# Patient Record
Sex: FEMALE | Race: WHITE | NOT HISPANIC OR LATINO | Employment: OTHER | URBAN - METROPOLITAN AREA
[De-identification: names, ages, dates, MRNs, and addresses within clinical notes are randomized per-mention and may not be internally consistent; named-entity substitution may affect disease eponyms.]

---

## 2017-02-08 ENCOUNTER — ANESTHESIA (OUTPATIENT)
Dept: PERIOP | Facility: HOSPITAL | Age: 67
End: 2017-02-08

## 2017-02-08 ENCOUNTER — ANESTHESIA EVENT (OUTPATIENT)
Dept: PERIOP | Facility: HOSPITAL | Age: 67
End: 2017-02-08

## 2017-02-08 ENCOUNTER — HOSPITAL ENCOUNTER (INPATIENT)
Facility: HOSPITAL | Age: 67
LOS: 10 days | Discharge: NURSING, REHAB OR LONG-TERM CARE | End: 2017-02-18
Attending: ORTHOPAEDIC SURGERY | Admitting: ORTHOPAEDIC SURGERY

## 2017-02-08 ENCOUNTER — APPOINTMENT (OUTPATIENT)
Dept: GENERAL RADIOLOGY | Facility: HOSPITAL | Age: 67
End: 2017-02-08
Attending: ORTHOPAEDIC SURGERY

## 2017-02-08 DIAGNOSIS — A49.02 MRSA INFECTION: ICD-10-CM

## 2017-02-08 DIAGNOSIS — R26.2 DIFFICULTY WALKING: Primary | ICD-10-CM

## 2017-02-08 DIAGNOSIS — T14.8XXA HEMATOMA: ICD-10-CM

## 2017-02-08 PROBLEM — S70.01XA HEMATOMA OF RIGHT HIP: Status: ACTIVE | Noted: 2017-02-08

## 2017-02-08 LAB
ABO GROUP BLD: NORMAL
ALBUMIN SERPL-MCNC: 3.4 G/DL (ref 3.5–5.2)
ALBUMIN/GLOB SERPL: 1.1 G/DL
ALP SERPL-CCNC: 437 U/L (ref 39–117)
ALT SERPL W P-5'-P-CCNC: 143 U/L (ref 1–33)
ANION GAP SERPL CALCULATED.3IONS-SCNC: 14.8 MMOL/L
APTT PPP: 41.1 SECONDS (ref 22.7–35.4)
AST SERPL-CCNC: 198 U/L (ref 1–32)
BASOPHILS # BLD AUTO: 0.03 10*3/MM3 (ref 0–0.2)
BASOPHILS NFR BLD AUTO: 0.5 % (ref 0–1.5)
BILIRUB SERPL-MCNC: 0.6 MG/DL (ref 0.1–1.2)
BLD GP AB SCN SERPL QL: NEGATIVE
BUN BLD-MCNC: 24 MG/DL (ref 8–23)
BUN/CREAT SERPL: 27 (ref 7–25)
CALCIUM SPEC-SCNC: 9.1 MG/DL (ref 8.6–10.5)
CHLORIDE SERPL-SCNC: 90 MMOL/L (ref 98–107)
CO2 SERPL-SCNC: 25.2 MMOL/L (ref 22–29)
CREAT BLD-MCNC: 0.89 MG/DL (ref 0.57–1)
DEPRECATED RDW RBC AUTO: 55.4 FL (ref 37–54)
EOSINOPHIL # BLD AUTO: 0.1 10*3/MM3 (ref 0–0.7)
EOSINOPHIL NFR BLD AUTO: 1.5 % (ref 0.3–6.2)
ERYTHROCYTE [DISTWIDTH] IN BLOOD BY AUTOMATED COUNT: 16.2 % (ref 11.7–13)
GFR SERPL CREATININE-BSD FRML MDRD: 64 ML/MIN/1.73
GLOBULIN UR ELPH-MCNC: 3.1 GM/DL
GLUCOSE BLD-MCNC: 109 MG/DL (ref 65–99)
HCT VFR BLD AUTO: 24.9 % (ref 35.6–45.5)
HCT VFR BLD AUTO: 27.2 % (ref 35.6–45.5)
HGB BLD-MCNC: 8 G/DL (ref 11.9–15.5)
HGB BLD-MCNC: 8.8 G/DL (ref 11.9–15.5)
IMM GRANULOCYTES # BLD: 0 10*3/MM3 (ref 0–0.03)
IMM GRANULOCYTES NFR BLD: 0 % (ref 0–0.5)
INR PPP: 1.81 (ref 0.9–1.1)
INR PPP: 2.01 (ref 0.9–1.1)
LYMPHOCYTES # BLD AUTO: 1.57 10*3/MM3 (ref 0.9–4.8)
LYMPHOCYTES NFR BLD AUTO: 24.2 % (ref 19.6–45.3)
MCH RBC QN AUTO: 30.1 PG (ref 26.9–32)
MCHC RBC AUTO-ENTMCNC: 32.4 G/DL (ref 32.4–36.3)
MCV RBC AUTO: 93.2 FL (ref 80.5–98.2)
MONOCYTES # BLD AUTO: 0.99 10*3/MM3 (ref 0.2–1.2)
MONOCYTES NFR BLD AUTO: 15.3 % (ref 5–12)
NEUTROPHILS # BLD AUTO: 3.8 10*3/MM3 (ref 1.9–8.1)
NEUTROPHILS NFR BLD AUTO: 58.5 % (ref 42.7–76)
NRBC BLD MANUAL-RTO: 0 /100 WBC (ref 0–0)
PLATELET # BLD AUTO: 328 10*3/MM3 (ref 140–500)
PMV BLD AUTO: 10.3 FL (ref 6–12)
POTASSIUM BLD-SCNC: 4.7 MMOL/L (ref 3.5–5.2)
PROT SERPL-MCNC: 6.5 G/DL (ref 6–8.5)
PROTHROMBIN TIME: 20.3 SECONDS (ref 11.7–14.2)
PROTHROMBIN TIME: 22.1 SECONDS (ref 11.7–14.2)
RBC # BLD AUTO: 2.92 10*6/MM3 (ref 3.9–5.2)
RH BLD: POSITIVE
SODIUM BLD-SCNC: 130 MMOL/L (ref 136–145)
WBC NRBC COR # BLD: 6.49 10*3/MM3 (ref 4.5–10.7)

## 2017-02-08 PROCEDURE — 85018 HEMOGLOBIN: CPT | Performed by: ANESTHESIOLOGY

## 2017-02-08 PROCEDURE — 87075 CULTR BACTERIA EXCEPT BLOOD: CPT | Performed by: ORTHOPAEDIC SURGERY

## 2017-02-08 PROCEDURE — 25010000002 NEOSTIGMINE 10 MG/10ML SOLUTION: Performed by: ANESTHESIOLOGY

## 2017-02-08 PROCEDURE — 80053 COMPREHEN METABOLIC PANEL: CPT | Performed by: ORTHOPAEDIC SURGERY

## 2017-02-08 PROCEDURE — 86901 BLOOD TYPING SEROLOGIC RH(D): CPT

## 2017-02-08 PROCEDURE — 25010000002 ONDANSETRON PER 1 MG: Performed by: ANESTHESIOLOGY

## 2017-02-08 PROCEDURE — 85610 PROTHROMBIN TIME: CPT | Performed by: ORTHOPAEDIC SURGERY

## 2017-02-08 PROCEDURE — 25010000002 CEFAZOLIN PER 500 MG: Performed by: ANESTHESIOLOGY

## 2017-02-08 PROCEDURE — 72170 X-RAY EXAM OF PELVIS: CPT

## 2017-02-08 PROCEDURE — 85730 THROMBOPLASTIN TIME PARTIAL: CPT | Performed by: ORTHOPAEDIC SURGERY

## 2017-02-08 PROCEDURE — P9016 RBC LEUKOCYTES REDUCED: HCPCS

## 2017-02-08 PROCEDURE — 25010000002 PROPOFOL 10 MG/ML EMULSION: Performed by: ANESTHESIOLOGY

## 2017-02-08 PROCEDURE — 86900 BLOOD TYPING SEROLOGIC ABO: CPT

## 2017-02-08 PROCEDURE — 86920 COMPATIBILITY TEST SPIN: CPT

## 2017-02-08 PROCEDURE — 85014 HEMATOCRIT: CPT | Performed by: ANESTHESIOLOGY

## 2017-02-08 PROCEDURE — 0JCL0ZZ EXTIRPATION OF MATTER FROM RIGHT UPPER LEG SUBCUTANEOUS TISSUE AND FASCIA, OPEN APPROACH: ICD-10-PCS | Performed by: ORTHOPAEDIC SURGERY

## 2017-02-08 PROCEDURE — 25010000002 FENTANYL CITRATE (PF) 100 MCG/2ML SOLUTION: Performed by: ANESTHESIOLOGY

## 2017-02-08 PROCEDURE — 87205 SMEAR GRAM STAIN: CPT | Performed by: ORTHOPAEDIC SURGERY

## 2017-02-08 PROCEDURE — 25010000002 HYDROMORPHONE PER 4 MG: Performed by: ANESTHESIOLOGY

## 2017-02-08 PROCEDURE — 36430 TRANSFUSION BLD/BLD COMPNT: CPT

## 2017-02-08 PROCEDURE — 87186 SC STD MICRODIL/AGAR DIL: CPT | Performed by: ORTHOPAEDIC SURGERY

## 2017-02-08 PROCEDURE — 86850 RBC ANTIBODY SCREEN: CPT

## 2017-02-08 PROCEDURE — 87070 CULTURE OTHR SPECIMN AEROBIC: CPT | Performed by: ORTHOPAEDIC SURGERY

## 2017-02-08 PROCEDURE — 25010000002 MIDAZOLAM PER 1 MG: Performed by: ANESTHESIOLOGY

## 2017-02-08 PROCEDURE — 25010000002 VANCOMYCIN PER 500 MG: Performed by: ANESTHESIOLOGY

## 2017-02-08 PROCEDURE — 85025 COMPLETE CBC W/AUTO DIFF WBC: CPT | Performed by: ORTHOPAEDIC SURGERY

## 2017-02-08 PROCEDURE — 87147 CULTURE TYPE IMMUNOLOGIC: CPT | Performed by: ORTHOPAEDIC SURGERY

## 2017-02-08 RX ORDER — OXYBUTYNIN CHLORIDE 10 MG/1
10 TABLET, EXTENDED RELEASE ORAL DAILY
Status: DISCONTINUED | OUTPATIENT
Start: 2017-02-08 | End: 2017-02-18 | Stop reason: HOSPADM

## 2017-02-08 RX ORDER — OXYCODONE AND ACETAMINOPHEN 10; 325 MG/1; MG/1
1 TABLET ORAL 4 TIMES DAILY PRN
COMMUNITY
End: 2017-06-29

## 2017-02-08 RX ORDER — FLUTICASONE PROPIONATE 50 MCG
2 SPRAY, SUSPENSION (ML) NASAL DAILY
Status: DISCONTINUED | OUTPATIENT
Start: 2017-02-08 | End: 2017-02-18 | Stop reason: HOSPADM

## 2017-02-08 RX ORDER — FAMOTIDINE 10 MG/ML
20 INJECTION, SOLUTION INTRAVENOUS ONCE
Status: COMPLETED | OUTPATIENT
Start: 2017-02-08 | End: 2017-02-08

## 2017-02-08 RX ORDER — ISOSORBIDE MONONITRATE 30 MG/1
30 TABLET, EXTENDED RELEASE ORAL DAILY
Status: DISCONTINUED | OUTPATIENT
Start: 2017-02-08 | End: 2017-02-18 | Stop reason: HOSPADM

## 2017-02-08 RX ORDER — POVIDONE-IODINE 10 MG/G
OINTMENT TOPICAL AS NEEDED
Status: DISCONTINUED | OUTPATIENT
Start: 2017-02-08 | End: 2017-02-08 | Stop reason: HOSPADM

## 2017-02-08 RX ORDER — FERROUS SULFATE 325(65) MG
325 TABLET ORAL 2 TIMES DAILY
Status: DISCONTINUED | OUTPATIENT
Start: 2017-02-08 | End: 2017-02-18 | Stop reason: HOSPADM

## 2017-02-08 RX ORDER — NALOXONE HCL 0.4 MG/ML
0.2 VIAL (ML) INJECTION AS NEEDED
Status: DISCONTINUED | OUTPATIENT
Start: 2017-02-08 | End: 2017-02-08 | Stop reason: HOSPADM

## 2017-02-08 RX ORDER — FENTANYL CITRATE 50 UG/ML
INJECTION, SOLUTION INTRAMUSCULAR; INTRAVENOUS AS NEEDED
Status: DISCONTINUED | OUTPATIENT
Start: 2017-02-08 | End: 2017-02-08 | Stop reason: SURG

## 2017-02-08 RX ORDER — BUPIVACAINE HCL/0.9 % NACL/PF 0.1 %
3 PLASTIC BAG, INJECTION (ML) EPIDURAL EVERY 8 HOURS
Status: COMPLETED | OUTPATIENT
Start: 2017-02-09 | End: 2017-02-09

## 2017-02-08 RX ORDER — PANTOPRAZOLE SODIUM 40 MG/1
40 TABLET, DELAYED RELEASE ORAL
Status: DISCONTINUED | OUTPATIENT
Start: 2017-02-09 | End: 2017-02-18 | Stop reason: HOSPADM

## 2017-02-08 RX ORDER — OXYCODONE AND ACETAMINOPHEN 10; 325 MG/1; MG/1
1 TABLET ORAL 3 TIMES DAILY PRN
Status: ON HOLD | COMMUNITY
End: 2017-07-26

## 2017-02-08 RX ORDER — NEOSTIGMINE METHYLSULFATE 1 MG/ML
INJECTION, SOLUTION INTRAVENOUS AS NEEDED
Status: DISCONTINUED | OUTPATIENT
Start: 2017-02-08 | End: 2017-02-08 | Stop reason: SURG

## 2017-02-08 RX ORDER — AMLODIPINE BESYLATE 10 MG/1
10 TABLET ORAL DAILY
COMMUNITY
End: 2020-12-16 | Stop reason: SDUPTHER

## 2017-02-08 RX ORDER — FENTANYL CITRATE 50 UG/ML
50 INJECTION, SOLUTION INTRAMUSCULAR; INTRAVENOUS
Status: DISCONTINUED | OUTPATIENT
Start: 2017-02-08 | End: 2017-02-08 | Stop reason: HOSPADM

## 2017-02-08 RX ORDER — PROMETHAZINE HYDROCHLORIDE 25 MG/1
25 TABLET ORAL ONCE AS NEEDED
Status: DISCONTINUED | OUTPATIENT
Start: 2017-02-08 | End: 2017-02-08 | Stop reason: HOSPADM

## 2017-02-08 RX ORDER — IPRATROPIUM BROMIDE AND ALBUTEROL SULFATE 2.5; .5 MG/3ML; MG/3ML
3 SOLUTION RESPIRATORY (INHALATION) EVERY 4 HOURS PRN
Status: DISCONTINUED | OUTPATIENT
Start: 2017-02-08 | End: 2017-02-18 | Stop reason: HOSPADM

## 2017-02-08 RX ORDER — MIDAZOLAM HYDROCHLORIDE 1 MG/ML
2 INJECTION INTRAMUSCULAR; INTRAVENOUS
Status: DISCONTINUED | OUTPATIENT
Start: 2017-02-08 | End: 2017-02-08 | Stop reason: HOSPADM

## 2017-02-08 RX ORDER — ASPIRIN 81 MG/1
81 TABLET, CHEWABLE ORAL DAILY
Status: DISCONTINUED | OUTPATIENT
Start: 2017-02-08 | End: 2017-02-09

## 2017-02-08 RX ORDER — PREGABALIN 100 MG/1
200 CAPSULE ORAL 2 TIMES DAILY
Status: DISCONTINUED | OUTPATIENT
Start: 2017-02-08 | End: 2017-02-18 | Stop reason: HOSPADM

## 2017-02-08 RX ORDER — WARFARIN SODIUM 6 MG/1
6 TABLET ORAL
Status: DISCONTINUED | OUTPATIENT
Start: 2017-02-09 | End: 2017-02-08 | Stop reason: SDUPTHER

## 2017-02-08 RX ORDER — SODIUM CHLORIDE, SODIUM LACTATE, POTASSIUM CHLORIDE, CALCIUM CHLORIDE 600; 310; 30; 20 MG/100ML; MG/100ML; MG/100ML; MG/100ML
9 INJECTION, SOLUTION INTRAVENOUS CONTINUOUS
Status: DISCONTINUED | OUTPATIENT
Start: 2017-02-08 | End: 2017-02-18 | Stop reason: HOSPADM

## 2017-02-08 RX ORDER — HYDROMORPHONE HYDROCHLORIDE 1 MG/ML
0.25 INJECTION, SOLUTION INTRAMUSCULAR; INTRAVENOUS; SUBCUTANEOUS
Status: DISCONTINUED | OUTPATIENT
Start: 2017-02-08 | End: 2017-02-08 | Stop reason: HOSPADM

## 2017-02-08 RX ORDER — PROMETHAZINE HYDROCHLORIDE 25 MG/ML
12.5 INJECTION, SOLUTION INTRAMUSCULAR; INTRAVENOUS ONCE AS NEEDED
Status: DISCONTINUED | OUTPATIENT
Start: 2017-02-08 | End: 2017-02-08 | Stop reason: HOSPADM

## 2017-02-08 RX ORDER — SENNA AND DOCUSATE SODIUM 50; 8.6 MG/1; MG/1
2 TABLET, FILM COATED ORAL 2 TIMES DAILY
Status: DISCONTINUED | OUTPATIENT
Start: 2017-02-08 | End: 2017-02-18 | Stop reason: HOSPADM

## 2017-02-08 RX ORDER — LEVOTHYROXINE SODIUM 0.1 MG/1
100 TABLET ORAL
Status: DISCONTINUED | OUTPATIENT
Start: 2017-02-08 | End: 2017-02-18 | Stop reason: HOSPADM

## 2017-02-08 RX ORDER — PROPOFOL 10 MG/ML
VIAL (ML) INTRAVENOUS AS NEEDED
Status: DISCONTINUED | OUTPATIENT
Start: 2017-02-08 | End: 2017-02-08 | Stop reason: SURG

## 2017-02-08 RX ORDER — AMLODIPINE BESYLATE 10 MG/1
10 TABLET ORAL DAILY
Status: DISCONTINUED | OUTPATIENT
Start: 2017-02-08 | End: 2017-02-08

## 2017-02-08 RX ORDER — DIPHENHYDRAMINE HCL 25 MG
25 CAPSULE ORAL EVERY 6 HOURS PRN
Status: DISCONTINUED | OUTPATIENT
Start: 2017-02-08 | End: 2017-02-18 | Stop reason: HOSPADM

## 2017-02-08 RX ORDER — GLYCOPYRROLATE 0.2 MG/ML
INJECTION INTRAMUSCULAR; INTRAVENOUS AS NEEDED
Status: DISCONTINUED | OUTPATIENT
Start: 2017-02-08 | End: 2017-02-08 | Stop reason: SURG

## 2017-02-08 RX ORDER — CLOPIDOGREL BISULFATE 75 MG/1
75 TABLET ORAL DAILY
Status: DISCONTINUED | OUTPATIENT
Start: 2017-02-08 | End: 2017-02-18 | Stop reason: HOSPADM

## 2017-02-08 RX ORDER — DOCUSATE SODIUM 100 MG/1
100 CAPSULE, LIQUID FILLED ORAL 2 TIMES DAILY PRN
Status: DISCONTINUED | OUTPATIENT
Start: 2017-02-08 | End: 2017-02-18 | Stop reason: HOSPADM

## 2017-02-08 RX ORDER — ROCURONIUM BROMIDE 10 MG/ML
INJECTION, SOLUTION INTRAVENOUS AS NEEDED
Status: DISCONTINUED | OUTPATIENT
Start: 2017-02-08 | End: 2017-02-08 | Stop reason: SURG

## 2017-02-08 RX ORDER — HYDROMORPHONE HYDROCHLORIDE 1 MG/ML
0.5 INJECTION, SOLUTION INTRAMUSCULAR; INTRAVENOUS; SUBCUTANEOUS
Status: DISCONTINUED | OUTPATIENT
Start: 2017-02-08 | End: 2017-02-18 | Stop reason: HOSPADM

## 2017-02-08 RX ORDER — NALOXONE HCL 0.4 MG/ML
0.1 VIAL (ML) INJECTION
Status: DISCONTINUED | OUTPATIENT
Start: 2017-02-08 | End: 2017-02-18 | Stop reason: HOSPADM

## 2017-02-08 RX ORDER — HYDROMORPHONE HCL 110MG/55ML
PATIENT CONTROLLED ANALGESIA SYRINGE INTRAVENOUS AS NEEDED
Status: DISCONTINUED | OUTPATIENT
Start: 2017-02-08 | End: 2017-02-08 | Stop reason: SURG

## 2017-02-08 RX ORDER — SODIUM CHLORIDE 0.9 % (FLUSH) 0.9 %
1-10 SYRINGE (ML) INJECTION AS NEEDED
Status: DISCONTINUED | OUTPATIENT
Start: 2017-02-08 | End: 2017-02-08 | Stop reason: HOSPADM

## 2017-02-08 RX ORDER — ONDANSETRON 2 MG/ML
INJECTION INTRAMUSCULAR; INTRAVENOUS AS NEEDED
Status: DISCONTINUED | OUTPATIENT
Start: 2017-02-08 | End: 2017-02-08 | Stop reason: SURG

## 2017-02-08 RX ORDER — HYDROMORPHONE HYDROCHLORIDE 1 MG/ML
0.5 INJECTION, SOLUTION INTRAMUSCULAR; INTRAVENOUS; SUBCUTANEOUS
Status: DISCONTINUED | OUTPATIENT
Start: 2017-02-08 | End: 2017-02-08 | Stop reason: HOSPADM

## 2017-02-08 RX ORDER — DOCUSATE SODIUM 100 MG/1
100 CAPSULE, LIQUID FILLED ORAL 2 TIMES DAILY
Status: ON HOLD | COMMUNITY
End: 2017-07-26

## 2017-02-08 RX ORDER — LABETALOL HYDROCHLORIDE 5 MG/ML
5 INJECTION, SOLUTION INTRAVENOUS
Status: DISCONTINUED | OUTPATIENT
Start: 2017-02-08 | End: 2017-02-08 | Stop reason: HOSPADM

## 2017-02-08 RX ORDER — DOCUSATE SODIUM 100 MG/1
100 CAPSULE, LIQUID FILLED ORAL 2 TIMES DAILY
Status: DISCONTINUED | OUTPATIENT
Start: 2017-02-08 | End: 2017-02-18 | Stop reason: HOSPADM

## 2017-02-08 RX ORDER — FERROUS SULFATE 325(65) MG
325 TABLET ORAL 2 TIMES DAILY
COMMUNITY
End: 2018-04-27 | Stop reason: ALTCHOICE

## 2017-02-08 RX ORDER — ATORVASTATIN CALCIUM 40 MG/1
40 TABLET, FILM COATED ORAL DAILY
Status: DISCONTINUED | OUTPATIENT
Start: 2017-02-08 | End: 2017-02-18 | Stop reason: HOSPADM

## 2017-02-08 RX ORDER — ISOSORBIDE MONONITRATE 30 MG/1
30 TABLET, EXTENDED RELEASE ORAL DAILY
COMMUNITY
End: 2020-12-16 | Stop reason: SDUPTHER

## 2017-02-08 RX ORDER — HYDRALAZINE HYDROCHLORIDE 20 MG/ML
5 INJECTION INTRAMUSCULAR; INTRAVENOUS
Status: DISCONTINUED | OUTPATIENT
Start: 2017-02-08 | End: 2017-02-08 | Stop reason: HOSPADM

## 2017-02-08 RX ORDER — ASPIRIN 81 MG/1
81 TABLET, CHEWABLE ORAL DAILY
COMMUNITY
End: 2017-06-29

## 2017-02-08 RX ORDER — LIDOCAINE HYDROCHLORIDE 20 MG/ML
INJECTION, SOLUTION INFILTRATION; PERINEURAL AS NEEDED
Status: DISCONTINUED | OUTPATIENT
Start: 2017-02-08 | End: 2017-02-08 | Stop reason: SURG

## 2017-02-08 RX ORDER — ONDANSETRON 2 MG/ML
4 INJECTION INTRAMUSCULAR; INTRAVENOUS ONCE AS NEEDED
Status: DISCONTINUED | OUTPATIENT
Start: 2017-02-08 | End: 2017-02-08 | Stop reason: HOSPADM

## 2017-02-08 RX ORDER — MIDAZOLAM HYDROCHLORIDE 1 MG/ML
1 INJECTION INTRAMUSCULAR; INTRAVENOUS
Status: DISCONTINUED | OUTPATIENT
Start: 2017-02-08 | End: 2017-02-08 | Stop reason: HOSPADM

## 2017-02-08 RX ORDER — MODAFINIL 100 MG/1
200 TABLET ORAL 2 TIMES DAILY
Status: DISCONTINUED | OUTPATIENT
Start: 2017-02-08 | End: 2017-02-17

## 2017-02-08 RX ORDER — CARVEDILOL 12.5 MG/1
12.5 TABLET ORAL 2 TIMES DAILY WITH MEALS
Status: DISCONTINUED | OUTPATIENT
Start: 2017-02-08 | End: 2017-02-08

## 2017-02-08 RX ORDER — CEFAZOLIN SODIUM 500 MG/2.2ML
INJECTION, POWDER, FOR SOLUTION INTRAMUSCULAR; INTRAVENOUS AS NEEDED
Status: DISCONTINUED | OUTPATIENT
Start: 2017-02-08 | End: 2017-02-08 | Stop reason: SURG

## 2017-02-08 RX ORDER — POTASSIUM CHLORIDE 750 MG/1
10 CAPSULE, EXTENDED RELEASE ORAL DAILY
Status: DISCONTINUED | OUTPATIENT
Start: 2017-02-08 | End: 2017-02-18 | Stop reason: HOSPADM

## 2017-02-08 RX ORDER — HYDROCODONE BITARTRATE AND ACETAMINOPHEN 10; 325 MG/1; MG/1
1 TABLET ORAL EVERY 4 HOURS PRN
Status: DISCONTINUED | OUTPATIENT
Start: 2017-02-08 | End: 2017-02-14

## 2017-02-08 RX ORDER — WARFARIN SODIUM 6 MG/1
6 TABLET ORAL
Status: DISCONTINUED | OUTPATIENT
Start: 2017-02-08 | End: 2017-02-09

## 2017-02-08 RX ORDER — VILAZODONE HYDROCHLORIDE 40 MG/1
40 TABLET ORAL DAILY
Status: DISCONTINUED | OUTPATIENT
Start: 2017-02-08 | End: 2017-02-09

## 2017-02-08 RX ORDER — DIPHENHYDRAMINE HYDROCHLORIDE 50 MG/ML
12.5 INJECTION INTRAMUSCULAR; INTRAVENOUS
Status: DISCONTINUED | OUTPATIENT
Start: 2017-02-08 | End: 2017-02-08 | Stop reason: HOSPADM

## 2017-02-08 RX ORDER — CLONAZEPAM 0.5 MG/1
0.5 TABLET ORAL 3 TIMES DAILY PRN
Status: DISCONTINUED | OUTPATIENT
Start: 2017-02-08 | End: 2017-02-18 | Stop reason: HOSPADM

## 2017-02-08 RX ORDER — PROMETHAZINE HYDROCHLORIDE 25 MG/1
25 SUPPOSITORY RECTAL ONCE AS NEEDED
Status: DISCONTINUED | OUTPATIENT
Start: 2017-02-08 | End: 2017-02-08 | Stop reason: HOSPADM

## 2017-02-08 RX ORDER — WARFARIN SODIUM 6 MG/1
6 TABLET ORAL
Status: ON HOLD | COMMUNITY
End: 2017-07-26

## 2017-02-08 RX ADMIN — VILAZODONE HYDROCHLORIDE 40 MG: 40 TABLET ORAL at 22:46

## 2017-02-08 RX ADMIN — SODIUM CHLORIDE, POTASSIUM CHLORIDE, SODIUM LACTATE AND CALCIUM CHLORIDE 500 ML: 600; 310; 30; 20 INJECTION, SOLUTION INTRAVENOUS at 18:46

## 2017-02-08 RX ADMIN — EPHEDRINE SULFATE 5 MG: 50 INJECTION INTRAMUSCULAR; INTRAVENOUS; SUBCUTANEOUS at 16:59

## 2017-02-08 RX ADMIN — MIDAZOLAM 1 MG: 1 INJECTION INTRAMUSCULAR; INTRAVENOUS at 14:41

## 2017-02-08 RX ADMIN — FERROUS SULFATE TAB 325 MG (65 MG ELEMENTAL FE) 325 MG: 325 (65 FE) TAB at 22:46

## 2017-02-08 RX ADMIN — ASPIRIN 81 MG: 81 TABLET, CHEWABLE ORAL at 22:47

## 2017-02-08 RX ADMIN — DOCUSATE SODIUM -SENNOSIDES 2 TABLET: 50; 8.6 TABLET, COATED ORAL at 22:46

## 2017-02-08 RX ADMIN — FENTANYL CITRATE 50 MCG: 50 INJECTION INTRAMUSCULAR; INTRAVENOUS at 14:41

## 2017-02-08 RX ADMIN — SODIUM CHLORIDE, POTASSIUM CHLORIDE, SODIUM LACTATE AND CALCIUM CHLORIDE 9 ML/HR: 600; 310; 30; 20 INJECTION, SOLUTION INTRAVENOUS at 14:40

## 2017-02-08 RX ADMIN — HYDROMORPHONE HYDROCHLORIDE 0.5 MG: 1 INJECTION, SOLUTION INTRAMUSCULAR; INTRAVENOUS; SUBCUTANEOUS at 19:30

## 2017-02-08 RX ADMIN — GLYCOPYRROLATE 0.6 MG: 0.2 INJECTION INTRAMUSCULAR; INTRAVENOUS at 17:10

## 2017-02-08 RX ADMIN — DOCUSATE SODIUM 100 MG: 100 CAPSULE, LIQUID FILLED ORAL at 22:55

## 2017-02-08 RX ADMIN — HYDROMORPHONE HYDROCHLORIDE 0.5 MG: 2 INJECTION, SOLUTION INTRAMUSCULAR; INTRAVENOUS; SUBCUTANEOUS at 17:18

## 2017-02-08 RX ADMIN — HYDROMORPHONE HYDROCHLORIDE 0.5 MG: 2 INJECTION, SOLUTION INTRAMUSCULAR; INTRAVENOUS; SUBCUTANEOUS at 17:02

## 2017-02-08 RX ADMIN — OXYBUTYNIN CHLORIDE 10 MG: 10 TABLET, FILM COATED, EXTENDED RELEASE ORAL at 22:46

## 2017-02-08 RX ADMIN — FENTANYL CITRATE 50 MCG: 50 INJECTION INTRAMUSCULAR; INTRAVENOUS at 19:04

## 2017-02-08 RX ADMIN — ROCURONIUM BROMIDE 30 MG: 10 INJECTION INTRAVENOUS at 16:27

## 2017-02-08 RX ADMIN — EPHEDRINE SULFATE 5 MG: 50 INJECTION INTRAMUSCULAR; INTRAVENOUS; SUBCUTANEOUS at 16:41

## 2017-02-08 RX ADMIN — FENTANYL CITRATE 100 MCG: 50 INJECTION INTRAMUSCULAR; INTRAVENOUS at 16:27

## 2017-02-08 RX ADMIN — CEFAZOLIN SODIUM 3 G: 500 INJECTION, POWDER, FOR SOLUTION INTRAMUSCULAR; INTRAVENOUS at 16:53

## 2017-02-08 RX ADMIN — HYDROMORPHONE HYDROCHLORIDE 0.5 MG: 2 INJECTION, SOLUTION INTRAMUSCULAR; INTRAVENOUS; SUBCUTANEOUS at 16:49

## 2017-02-08 RX ADMIN — FAMOTIDINE 20 MG: 10 INJECTION, SOLUTION INTRAVENOUS at 14:40

## 2017-02-08 RX ADMIN — EPHEDRINE SULFATE 5 MG: 50 INJECTION INTRAMUSCULAR; INTRAVENOUS; SUBCUTANEOUS at 16:51

## 2017-02-08 RX ADMIN — FLUTICASONE PROPIONATE 2 SPRAY: 50 SPRAY, METERED NASAL at 22:55

## 2017-02-08 RX ADMIN — LIDOCAINE HYDROCHLORIDE 60 MG: 20 INJECTION, SOLUTION INFILTRATION; PERINEURAL at 16:27

## 2017-02-08 RX ADMIN — EPHEDRINE SULFATE 5 MG: 50 INJECTION INTRAMUSCULAR; INTRAVENOUS; SUBCUTANEOUS at 16:48

## 2017-02-08 RX ADMIN — EPHEDRINE SULFATE 5 MG: 50 INJECTION INTRAMUSCULAR; INTRAVENOUS; SUBCUTANEOUS at 16:55

## 2017-02-08 RX ADMIN — NEOSTIGMINE METHYLSULFATE 3.5 MG: 1 INJECTION INTRAVENOUS at 17:10

## 2017-02-08 RX ADMIN — PROPOFOL 160 MG: 10 INJECTION, EMULSION INTRAVENOUS at 16:27

## 2017-02-08 RX ADMIN — CEFAZOLIN 3 G: 1 INJECTION, POWDER, FOR SOLUTION INTRAMUSCULAR; INTRAVENOUS; PARENTERAL at 23:54

## 2017-02-08 RX ADMIN — EPHEDRINE SULFATE 5 MG: 50 INJECTION INTRAMUSCULAR; INTRAVENOUS; SUBCUTANEOUS at 16:45

## 2017-02-08 RX ADMIN — HYDROMORPHONE HYDROCHLORIDE 0.5 MG: 1 INJECTION, SOLUTION INTRAMUSCULAR; INTRAVENOUS; SUBCUTANEOUS at 18:52

## 2017-02-08 RX ADMIN — ONDANSETRON 4 MG: 2 INJECTION INTRAMUSCULAR; INTRAVENOUS at 17:05

## 2017-02-08 RX ADMIN — VANCOMYCIN HYDROCHLORIDE 1 G: 1 INJECTION, POWDER, LYOPHILIZED, FOR SOLUTION INTRAVENOUS at 16:53

## 2017-02-08 RX ADMIN — PREGABALIN 200 MG: 100 CAPSULE ORAL at 22:55

## 2017-02-08 NOTE — PLAN OF CARE
Problem: Patient Care Overview (Adult)  Goal: Plan of Care Review  Outcome: Ongoing (interventions implemented as appropriate)    02/08/17 1428   Coping/Psychosocial Response Interventions   Plan Of Care Reviewed With patient   Patient Care Overview   Progress progress toward functional goals as expected       Goal: Adult Individualization and Mutuality  Outcome: Ongoing (interventions implemented as appropriate)                        02/08/17 1428   Mutuality/Individual Preferences   What Anxieties, Fears or Concerns Do You Have About Your Health or Care? pt denies   Individualization   Patient Specific Preferences pt goes by Frances Caraballo       Goal: Discharge Needs Assessment  Outcome: Ongoing (interventions implemented as appropriate)    02/08/17 1428   Discharge Needs Assessment   Concerns To Be Addressed denies needs/concerns at this time   Discharge Facility/Level Of Care Needs rehabilitation facility   Discharge Planning Comments patient reports tomorrow will return to [facility]   Living Environment   Transportation Available ambulance         Problem: Perioperative Period (Adult)  Goal: Signs and Symptoms of Listed Potential Problems Will be Absent or Manageable (Perioperative Period)  Outcome: Ongoing (interventions implemented as appropriate)    02/08/17 1428   Perioperative Period   Problems Assessed (Perioperative Period) pain;infection   Problems Present (Perioperative Period) pain;infection

## 2017-02-08 NOTE — PERIOPERATIVE NURSING NOTE
Call from Dr. Jimenez requesting me to call Dr. Martin regarding Xray results. Page out to Dr. Martin at [phone]. dpe

## 2017-02-08 NOTE — PERIOPERATIVE NURSING NOTE
Called into OR, Dr. Martin notified INR 1.8 and that patient had a dose of Plavix yesterday. MD also aware Hgb 8.8 and patients type and cross sent to blood bank. No new orders received.

## 2017-02-08 NOTE — H&P
Patient Care Team:  Talha Echeverria MD as PCP - General (Internal Medicine)    Chief complaint Right hip drainage    Subjective  Bleeding from right hip    History of Present Illness This patient underwent a revision total hip on 1/16/17. She has had some persistent drainage from the wound off and on. She appears to have a hematoma and she comes to the hosital for evacuation of the hematoma    Review of Systems     Past Medical History   Diagnosis Date   • Anxiety    • Arthritis    • Bipolar disorder    • COPD (chronic obstructive pulmonary disease)    • Coronary artery disease    • Diastolic dysfunction      grade II per echocardiogram 11/18/2016   • Difficulty walking    • Disease of thyroid gland      hypothyroidism   • Dislocation of hip joint      recurrent dislocation right hip   • DVT (deep venous thrombosis)    • GERD without esophagitis    • Heartburn    • Hematoma    • Hyperlipidemia    • Hypertension    • LVH (left ventricular hypertrophy)      mild to moderate per echocardiogram 11/18/2016   • Major depressive disorder    • Mitral annular calcification      severe per echocardiogram 11/18/2016   • Mitral regurgitation      mild per echo 11/18/2016   • Mitral valve disease    • Muscle weakness    • Narcolepsy    • Overactive bladder    • Pneumonia    • Pulmonary emboli    • Pulmonary hypertension      mild per echo 11/18/2016   • Sleep apnea      obstructive   • Tricuspid regurgitation      mild to moderate per echo 11/18/2016     Past Surgical History   Procedure Laterality Date   • Total hip arthroplasty Right 2011   • Total hip arthroplasty revision Right 11/22/2016   • Knee arthroplasty Left 03/2010     TWICE   • Total knee arthroplasty revision Left 09/2010   • Other surgical history       IVC filter placement   • Orif ankle fracture Left 06/16/2011   • Hardware removal foot / ankle Left 12/01/2011   • Quadriceps tendon repair Left 06/21/2010     also done 9-    • Joint replacement      • Coronary angioplasty with stent placement       x2 stents   • Cataract extraction w/ intraocular lens implant Right    • Eye surgery       cataract surgery bilateral     History reviewed. No pertinent family history.  Social History   Substance Use Topics   • Smoking status: Former Smoker     Packs/day: 1.00     Years: 19.00     Types: Cigarettes     Quit date: 11/21/2016   • Smokeless tobacco: Never Used   • Alcohol use No     Prescriptions Prior to Admission   Medication Sig Dispense Refill Last Dose   • ALBUTEROL IN Inhale 4 (Four) Times a Day As Needed.   2/8/2017 at 0900   • amLODIPine (NORVASC) 10 MG tablet Take 10 mg by mouth Daily.   2/7/2017 at 0900   • aspirin 81 MG chewable tablet Chew 81 mg Daily.   2/7/2017 at 0900   • atorvastatin (LIPITOR) 40 MG tablet Take 40 mg by mouth daily.   2/7/2017 at 2100   • carvedilol (COREG) 12.5 MG tablet Take 12.5 mg by mouth 2 (two) times a day with meals.   2/7/2017 at 2100   • clonazePAM (KlonoPIN) 0.5 MG tablet Take 0.5 mg by mouth 3 (three) times a day as needed for seizures.   2/7/2017 at 0500   • clopidogrel (PLAVIX) 75 MG tablet Take 1 tablet by mouth daily. 90 tablet 3 2/7/2017 at 0900   • docusate sodium (COLACE) 100 MG capsule Take 100 mg by mouth 2 (Two) Times a Day.   2/7/2017 at 0900   • ferrous sulfate 325 (65 FE) MG tablet Take 325 mg by mouth 2 (Two) Times a Day.   2/7/2017 at 1730   • fluticasone (FLONASE) 50 MCG/ACT nasal spray 2 sprays into each nostril daily. Administer 2 sprays in each nostril for each dose.   2/7/2017 at 0900   • ipratropium-albuterol (DUO-NEB) 0.5-2.5 mg/mL nebulizer Take 3 mL by nebulization every 4 (four) hours as needed for wheezing.   Taking   • isosorbide mononitrate (IMDUR) 30 MG 24 hr tablet Take 30 mg by mouth Daily.   2/7/2017 at 0900   • levothyroxine (SYNTHROID, LEVOTHROID) 100 MCG tablet Take 100 mcg by mouth daily.   2/8/2017 at 0600   • modafinil (PROVIGIL) 200 MG tablet Take 200 mg by mouth 2 (two) times a day.    2/7/2017 at 2100   • omeprazole (PriLOSEC) 20 MG capsule Take 20 mg by mouth 2 (two) times a day.   2/7/2017 at 2100   • oxyCODONE-acetaminophen (PERCOCET)  MG per tablet Take 1 tablet by mouth 4 (Four) Times a Day As Needed for moderate pain (4-6).   2/7/2017 at 2242   • oxyCODONE-acetaminophen (PERCOCET)  MG per tablet Take 1 tablet by mouth 3 (Three) Times a Day As Needed for moderate pain (4-6).   2/7/2017 at 0900   • potassium chloride (K-DUR) 10 MEQ CR tablet Take 10 mEq by mouth daily.   2/7/2017 at 0900   • pregabalin (LYRICA) 200 MG capsule Take 200 mg by mouth 2 (two) times a day.   2/7/2017 at 2100   • tiotropium (SPIRIVA) 18 MCG per inhalation capsule Place 1 capsule into inhaler and inhale 1 (one) time daily.   2/7/2017 at 0900   • vilazodone (VIIBRYD) 40 MG tablet tablet Take 40 mg by mouth daily.   2/7/2017 at 2100   • warfarin (COUMADIN) 6 MG tablet Take 6 mg by mouth Daily.   2/6/2017 at 1700   • darifenacin (ENABLEX) 15 MG 24 hr tablet Take 15 mg by mouth daily.   2/7/2017 at 0900     Allergies:  Review of patient's allergies indicates no known allergies.    Objective  right hip drainage    Vital Signs  Temp:  [98.9 °F (37.2 °C)] 98.9 °F (37.2 °C)  Heart Rate:  [64] 64  Resp:  [20] 20  BP: (108)/(43) 108/43    Physical Exam Alert and Awake    HEENT  PERRLA    Neck is supple    Heart  RRR without gallops or murmers   Lungs  Clear to auscutation    Abdomen  Positive bowel sounds with no rebound    Neuro exam is normal    Vascular exam  There are palpable pulses in all 4 extremities    Ortho  Right hip wound has some old blood draining. No odor is noted. No erythema noted.     Results Review:   I reviewed the patient's new clinical results.      Assessment/Plan  Hematoma right hip and the plan is for I&D    Active Problems:    * No active hospital problems. *      Assessment & Plan    I discussed the patients findings and my recommendations with patient    Erlin Martin,  MD  02/08/17  2:33 PM    Time:

## 2017-02-08 NOTE — ANESTHESIA PROCEDURE NOTES
Airway  Urgency: elective    Airway not difficult    General Information and Staff    Patient location during procedure: OR  Anesthesiologist: NANCY ARTEAGA    Indications and Patient Condition  Indications for airway management: airway protection    Preoxygenated: yes  Mask difficulty assessment: 1 - vent by mask    Final Airway Details  Final airway type: endotracheal airway      Successful airway: ETT  Cuffed: yes   Successful intubation technique: direct laryngoscopy  Facilitating devices/methods: intubating stylet  Endotracheal tube insertion site: oral  Blade: Palmer  Blade size: #2  ETT size: 7.0 mm  Cormack-Lehane Classification: grade IIa - partial view of glottis  Placement verified by: chest auscultation and capnometry   Cuff volume (mL): 5  Measured from: teeth  ETT to teeth (cm): 22  Number of attempts at approach: 1

## 2017-02-09 LAB
ABO + RH BLD: NORMAL
ABO + RH BLD: NORMAL
ANION GAP SERPL CALCULATED.3IONS-SCNC: 12.4 MMOL/L
BH BB BLOOD EXPIRATION DATE: NORMAL
BH BB BLOOD EXPIRATION DATE: NORMAL
BH BB BLOOD TYPE BARCODE: 5100
BH BB BLOOD TYPE BARCODE: 5100
BH BB DISPENSE STATUS: NORMAL
BH BB DISPENSE STATUS: NORMAL
BH BB PRODUCT CODE: NORMAL
BH BB PRODUCT CODE: NORMAL
BH BB UNIT NUMBER: NORMAL
BH BB UNIT NUMBER: NORMAL
BUN BLD-MCNC: 15 MG/DL (ref 8–23)
BUN/CREAT SERPL: 25.4 (ref 7–25)
CALCIUM SPEC-SCNC: 8.7 MG/DL (ref 8.6–10.5)
CHLORIDE SERPL-SCNC: 92 MMOL/L (ref 98–107)
CO2 SERPL-SCNC: 24.6 MMOL/L (ref 22–29)
CREAT BLD-MCNC: 0.59 MG/DL (ref 0.57–1)
CROSSMATCH INTERPRETATION: NORMAL
CROSSMATCH INTERPRETATION: NORMAL
GFR SERPL CREATININE-BSD FRML MDRD: 102 ML/MIN/1.73
GLUCOSE BLD-MCNC: 112 MG/DL (ref 65–99)
HCT VFR BLD AUTO: 27 % (ref 35.6–45.5)
HGB BLD-MCNC: 8.9 G/DL (ref 11.9–15.5)
INR PPP: 2.06 (ref 0.9–1.1)
POTASSIUM BLD-SCNC: 4.7 MMOL/L (ref 3.5–5.2)
PROTHROMBIN TIME: 22.5 SECONDS (ref 11.7–14.2)
SODIUM BLD-SCNC: 129 MMOL/L (ref 136–145)
UNIT  ABO: NORMAL
UNIT  ABO: NORMAL
UNIT  RH: NORMAL
UNIT  RH: NORMAL

## 2017-02-09 PROCEDURE — 36430 TRANSFUSION BLD/BLD COMPNT: CPT

## 2017-02-09 PROCEDURE — P9016 RBC LEUKOCYTES REDUCED: HCPCS

## 2017-02-09 PROCEDURE — 85018 HEMOGLOBIN: CPT | Performed by: ORTHOPAEDIC SURGERY

## 2017-02-09 PROCEDURE — 85014 HEMATOCRIT: CPT | Performed by: ORTHOPAEDIC SURGERY

## 2017-02-09 PROCEDURE — 80048 BASIC METABOLIC PNL TOTAL CA: CPT | Performed by: ORTHOPAEDIC SURGERY

## 2017-02-09 PROCEDURE — 85610 PROTHROMBIN TIME: CPT | Performed by: ORTHOPAEDIC SURGERY

## 2017-02-09 PROCEDURE — 97161 PT EVAL LOW COMPLEX 20 MIN: CPT

## 2017-02-09 PROCEDURE — 25010000002 VANCOMYCIN: Performed by: ORTHOPAEDIC SURGERY

## 2017-02-09 PROCEDURE — 97110 THERAPEUTIC EXERCISES: CPT

## 2017-02-09 PROCEDURE — 86900 BLOOD TYPING SEROLOGIC ABO: CPT

## 2017-02-09 PROCEDURE — 94640 AIRWAY INHALATION TREATMENT: CPT

## 2017-02-09 RX ORDER — ASPIRIN 325 MG
325 TABLET ORAL 2 TIMES DAILY
Status: DISCONTINUED | OUTPATIENT
Start: 2017-02-09 | End: 2017-02-13

## 2017-02-09 RX ORDER — VILAZODONE HYDROCHLORIDE 40 MG/1
40 TABLET ORAL NIGHTLY
Status: DISCONTINUED | OUTPATIENT
Start: 2017-02-09 | End: 2017-02-18 | Stop reason: HOSPADM

## 2017-02-09 RX ADMIN — HYDROCODONE BITARTRATE AND ACETAMINOPHEN 1 TABLET: 10; 325 TABLET ORAL at 10:25

## 2017-02-09 RX ADMIN — DOCUSATE SODIUM 100 MG: 100 CAPSULE, LIQUID FILLED ORAL at 09:15

## 2017-02-09 RX ADMIN — ATORVASTATIN CALCIUM 40 MG: 40 TABLET, FILM COATED ORAL at 09:15

## 2017-02-09 RX ADMIN — FERROUS SULFATE TAB 325 MG (65 MG ELEMENTAL FE) 325 MG: 325 (65 FE) TAB at 09:15

## 2017-02-09 RX ADMIN — FERROUS SULFATE TAB 325 MG (65 MG ELEMENTAL FE) 325 MG: 325 (65 FE) TAB at 17:44

## 2017-02-09 RX ADMIN — LEVOTHYROXINE SODIUM 100 MCG: 100 TABLET ORAL at 05:47

## 2017-02-09 RX ADMIN — DOCUSATE SODIUM -SENNOSIDES 2 TABLET: 50; 8.6 TABLET, COATED ORAL at 09:15

## 2017-02-09 RX ADMIN — VILAZODONE HYDROCHLORIDE 40 MG: 40 TABLET ORAL at 21:30

## 2017-02-09 RX ADMIN — POTASSIUM CHLORIDE 10 MEQ: 750 CAPSULE, EXTENDED RELEASE ORAL at 09:15

## 2017-02-09 RX ADMIN — OXYBUTYNIN CHLORIDE 10 MG: 10 TABLET, FILM COATED, EXTENDED RELEASE ORAL at 09:15

## 2017-02-09 RX ADMIN — PREGABALIN 200 MG: 100 CAPSULE ORAL at 11:47

## 2017-02-09 RX ADMIN — HYDROCODONE BITARTRATE AND ACETAMINOPHEN 1 TABLET: 10; 325 TABLET ORAL at 00:22

## 2017-02-09 RX ADMIN — FLUTICASONE PROPIONATE 2 SPRAY: 50 SPRAY, METERED NASAL at 09:15

## 2017-02-09 RX ADMIN — CEFAZOLIN 3 G: 1 INJECTION, POWDER, FOR SOLUTION INTRAMUSCULAR; INTRAVENOUS; PARENTERAL at 09:16

## 2017-02-09 RX ADMIN — ASPIRIN 325 MG: 325 TABLET ORAL at 17:44

## 2017-02-09 RX ADMIN — HYDROCODONE BITARTRATE AND ACETAMINOPHEN 1 TABLET: 10; 325 TABLET ORAL at 21:30

## 2017-02-09 RX ADMIN — HYDROCODONE BITARTRATE AND ACETAMINOPHEN 1 TABLET: 10; 325 TABLET ORAL at 15:22

## 2017-02-09 RX ADMIN — VANCOMYCIN HYDROCHLORIDE 2000 MG: 1 INJECTION, POWDER, LYOPHILIZED, FOR SOLUTION INTRAVENOUS at 14:07

## 2017-02-09 RX ADMIN — TIOTROPIUM BROMIDE 1 CAPSULE: 18 CAPSULE ORAL; RESPIRATORY (INHALATION) at 08:24

## 2017-02-09 RX ADMIN — MODAFINIL 200 MG: 100 TABLET ORAL at 11:48

## 2017-02-09 RX ADMIN — CLOPIDOGREL 75 MG: 75 TABLET, FILM COATED ORAL at 09:15

## 2017-02-09 RX ADMIN — ASPIRIN 325 MG: 325 TABLET ORAL at 09:15

## 2017-02-09 RX ADMIN — PANTOPRAZOLE SODIUM 40 MG: 40 TABLET, DELAYED RELEASE ORAL at 05:47

## 2017-02-09 RX ADMIN — PREGABALIN 200 MG: 100 CAPSULE ORAL at 17:44

## 2017-02-09 RX ADMIN — DOCUSATE SODIUM 100 MG: 100 CAPSULE, LIQUID FILLED ORAL at 17:44

## 2017-02-09 RX ADMIN — HYDROCODONE BITARTRATE AND ACETAMINOPHEN 1 TABLET: 10; 325 TABLET ORAL at 05:47

## 2017-02-09 NOTE — PLAN OF CARE
Problem: Patient Care Overview (Adult)  Goal: Plan of Care Review  Outcome: Ongoing (interventions implemented as appropriate)    02/09/17 7398   Coping/Psychosocial Response Interventions   Plan Of Care Reviewed With patient   Patient Care Overview   Progress improving   Outcome Evaluation   Outcome Summary/Follow up Plan PT WORKED C PT, UP TO CHAIR, PAIN CONTROLLED       Goal: Adult Individualization and Mutuality  Outcome: Ongoing (interventions implemented as appropriate)  Goal: Discharge Needs Assessment  Outcome: Ongoing (interventions implemented as appropriate)    Problem: Hip Replacement, Total (Adult)  Goal: Signs and Symptoms of Listed Potential Problems Will be Absent or Manageable (Hip Replacement, Total)  Outcome: Ongoing (interventions implemented as appropriate)    Problem: Fall Risk (Adult)  Goal: Absence of Falls  Outcome: Ongoing (interventions implemented as appropriate)

## 2017-02-09 NOTE — CONSULTS
"Adult Nutrition  Assessment/PES    Patient Name:  Frances Downs  YOB: 1950  MRN: [id number]  Admit Date:  2/8/2017    Assessment Date:  2/9/2017     Consult received- spoke with patient and stated appetite has been poor since Nov 2016-encouraged adequate po intake, likes ensure-ordered; RD to monitor and follow           Reason for Assessment       02/09/17 1159    Reason for Assessment    Reason For Assessment/Visit identified at risk by screening criteria;nurse/nurse practitioner consult    Diagnosis --   hematoma R hip                Anthropometrics       02/09/17 1159    Anthropometrics (Special Considerations)    Height Used for Calculations 1.626 m (5' 4\")    Weight Used for Calculations 103 kg (226 lb)    RD Calculated     RD Calculated %     RD Calculated BMI (kg/m2) 38.7    Usual Body Weight (UBW)    Weight Loss 1.814 kg (4 lb)    Weight Loss Time Frame 2 months    Body Mass Index (BMI)    BMI Grade 35 - 39.9 - obesity - grade II            Labs/Tests/Procedures/Meds       02/09/17 1159    Labs/Tests/Procedures/Meds    Diagnostic Test/Procedure Review reviewed    Labs/Tests Review Reviewed;Glucose;Na+    Medication Review Reviewed, pertinent   colace, iron, PPI    Significant Vitals reviewed            Physical Findings       02/09/17 1200    Physical Findings/Assessment    Additional Documentation --   B=19            Estimated/Assessed Needs       02/09/17 1200    Calculation Measurements    Weight Used For Calculations 103 kg (226 lb)    Height Used for Calculations 1.626 m (5' 4\")    Estimated/Assessed Energy Needs    Energy Need Method Kcal/kg    kcal/kg 20    20 Kcal/Kg (kcal) 2050.26    Estimated/Assessed Protein Needs    Weight Used for Protein Calculation 103 kg (226 lb)    Protein (gm/kg) 0.8    0.8 Gm Protein (gm) 82.01    Estimated/Assessed Fluid Needs    Fluid Need Method RDA method    RDA Method (mL)  2000            Nutrition Prescription Ordered       " 02/09/17 1200    Nutrition Prescription PO    Current PO Diet Regular            Evaluation of Received Nutrient/Fluid Intake       02/09/17 1200    PO Evaluation    Number of Meals 1    % PO Intake 25              Problem/Interventions:        Problem 1       02/09/17 1200    Nutrition Diagnoses Problem 1    Problem 1 Inadequate Nutrient Intake    Etiology (related to) MNT for Treatment/Condition    Signs/Symptoms (evidenced by) Unintended Weight Change    Unintended Weight Change Loss    Number of Pounds Lost 4#    Weight loss time period 2 months                    Intervention Goal       02/09/17 1201    Intervention Goal    General Maintain nutrition    PO Tolerate PO;Increase intake    Weight Maintain weight            Nutrition Intervention       02/09/17 1201    Nutrition Intervention    RD/Tech Action Interview for preference;Follow Tx progress;Care plan reviewd;Supplement provided;Encourage intake            Nutrition Prescription       02/09/17 1201    Nutrition Prescription PO    PO Prescription Begin/change supplement    Supplement Ensure    Supplement Frequency Daily    New PO Prescription Ordered? Yes            Education/Evaluation       02/09/17 1201    Education    Education Will Instruct as appropriate    Monitor/Evaluation    Monitor Per protocol    Education Follow-up Reinforce PRN          Electronically signed by:  Maribel Seay RD  02/09/17 12:01 PM

## 2017-02-09 NOTE — PERIOPERATIVE NURSING NOTE
Return call from Dr. Martin. Will review xray when he gets home. Lab results given. 2 Units of blood to be infused tonight. Call to Dr. Jimenez.

## 2017-02-09 NOTE — CONSULTS
CONSULT NOTE    Infectious Diseases - Krysta Guerra MD  [facility]       Patient Identification:  Name: Frances Downs  Age: 66 y.o.  Sex: female  :  1950  MRN: [id number]             Date of Consultation:  2017        Primary Care Physician: Talha Echeverria MD                               Requesting Physician: Dr. Martin  Reason for Consultation: Positive culture for MRSA after I&D and evacuation of right hip hematoma.    Impression: 66-year-old female with history of right hip replacement 6 years ago started to have recurrent dislocation eventually requiring revision in 2016.  Subsequently she had a change in her prosthetic position in early January while sitting in the wheelchair resulting in the evaluation and requiring another revision performed on 2017.  Patient subsequently developed postop bleeding and hematoma formation and was brought in electively yesterday for I&D and hematoma evacuation.  Patient did not have any fever did not have any chills and did not have any worsening pain in her hip area.  It appears the patient's hematoma was evacuated hip joint is still in place and intraoperative culture this morning came back positive for MRSA resulting in infectious disease consultation.  This positive culture in the above context is consistent with:  1- probable postop surgical site infection with infected hematoma  2- at risk of deep joint infection and prosthetic involvement clinically not apparent at this time  3-status post multiple surgeries on right hip - as detailed above  4-history of hypercoagulable state on chronic and the coalition therapy for DVT and PEs  5-coronary artery disease and is post angioplasty and stenting  6-hypothyroidism  7-anemia  8-COPD        Recommendations/Discussions: At this juncture I agree with your antibiotics choice consisting of IV vancomycin.  Will ask pharmacy to dose it to achieve a trough level of 20.  If in  orthopedic surgery services opinion, based on the operative finding, joint space is not involved and prosthetic joint is not infected then she would need couple of weeks of antibiotic therapy directed towards MRSA.  Based on the sensitivity IV vancomycin can be switched to oral Zyvox with close monitoring of Interaction and side effects.  If in orthopedic surgery service's opinion joint space is involved, then the question becomes IV approaching it as if it's to salvage the prosthetic joint and in that scenario patient would need combination of vancomycin and rifampin based on the sensitivity data and should be treated for 6 weeks with any needed adjunctive orthopedic intervention based on their assessment.  We will discuss with Dr. Martin about these concepts of care while continuing vancomycin for the time being.  Thank you Dr. Martin for letting me be the part of your patient care please see above impression and recommendation.  Please don't hesitate to call me at [phone].      History of Present Illness:   Patient is a 66-year-old female with conjugated past medical history has been struggling with recurrent dislocation of the right hip which was placed 6 years ago.  Patient eventually had revision performed in November 2016.  Subsequently she had a unremarkable course until early January when it snapped again according to her.  She underwent second revision on January 16, 2017 and since then patient has not stopped draining.  From the wound.  She had bleeding and she required transfusions.  She eventually brought in yesterday for I&D of hematoma which was performed.  I do not have operative report available but it looks like everything was outside the joint space.  Intraoperative samples are taken for Gram stain and culture and today, positive for MRSA and resulting in introduction of vancomycin and infectious disease consultation.  Patient denies any fever or denies any chills denies any nausea vomiting or  diarrhea.      Past Medical History:  Past Medical History   Diagnosis Date   • Anxiety    • Arthritis    • Bipolar disorder    • COPD (chronic obstructive pulmonary disease)    • Coronary artery disease    • Diastolic dysfunction      grade II per echocardiogram 11/18/2016   • Difficulty walking    • Disease of thyroid gland      hypothyroidism   • Dislocation of hip joint      recurrent dislocation right hip   • DVT (deep venous thrombosis)    • GERD without esophagitis    • Heartburn    • Hematoma    • Hyperlipidemia    • Hypertension    • LVH (left ventricular hypertrophy)      mild to moderate per echocardiogram 11/18/2016   • Major depressive disorder    • Mitral annular calcification      severe per echocardiogram 11/18/2016   • Mitral regurgitation      mild per echo 11/18/2016   • Mitral valve disease    • Muscle weakness    • Narcolepsy    • Overactive bladder    • Pneumonia    • Pulmonary emboli    • Pulmonary hypertension      mild per echo 11/18/2016   • Sleep apnea      obstructive   • Tricuspid regurgitation      mild to moderate per echo 11/18/2016     Past Surgical History:  Past Surgical History   Procedure Laterality Date   • Total hip arthroplasty Right 2011   • Total hip arthroplasty revision Right 11/22/2016   • Knee arthroplasty Left 03/2010     TWICE   • Total knee arthroplasty revision Left 09/2010   • Other surgical history       IVC filter placement   • Orif ankle fracture Left 06/16/2011   • Hardware removal foot / ankle Left 12/01/2011   • Quadriceps tendon repair Left 06/21/2010     also done 9-    • Joint replacement     • Coronary angioplasty with stent placement       x2 stents   • Cataract extraction w/ intraocular lens implant Right    • Eye surgery       cataract surgery bilateral   • Incision and drainage hip Right 2/8/2017     Procedure: RT HIP INCISION AND DRAINAGE;  Surgeon: Erlin Martin MD;  Location: [facility];  Service:       Home Meds:  Prescriptions Prior  to Admission   Medication Sig Dispense Refill Last Dose   • ALBUTEROL IN Inhale 4 (Four) Times a Day As Needed.   2/8/2017 at 0900   • amLODIPine (NORVASC) 10 MG tablet Take 10 mg by mouth Daily.   2/7/2017 at 0900   • aspirin 81 MG chewable tablet Chew 81 mg Daily.   2/7/2017 at 0900   • atorvastatin (LIPITOR) 40 MG tablet Take 40 mg by mouth daily.   2/7/2017 at 2100   • carvedilol (COREG) 12.5 MG tablet Take 12.5 mg by mouth 2 (two) times a day with meals.   2/7/2017 at 2100   • clonazePAM (KlonoPIN) 0.5 MG tablet Take 0.5 mg by mouth 3 (three) times a day as needed for seizures.   2/7/2017 at 0500   • clopidogrel (PLAVIX) 75 MG tablet Take 1 tablet by mouth daily. 90 tablet 3 2/7/2017 at 0900   • docusate sodium (COLACE) 100 MG capsule Take 100 mg by mouth 2 (Two) Times a Day.   2/7/2017 at 0900   • ferrous sulfate 325 (65 FE) MG tablet Take 325 mg by mouth 2 (Two) Times a Day.   2/7/2017 at 1730   • fluticasone (FLONASE) 50 MCG/ACT nasal spray 2 sprays into each nostril daily. Administer 2 sprays in each nostril for each dose.   2/7/2017 at 0900   • ipratropium-albuterol (DUO-NEB) 0.5-2.5 mg/mL nebulizer Take 3 mL by nebulization every 4 (four) hours as needed for wheezing.   Taking   • isosorbide mononitrate (IMDUR) 30 MG 24 hr tablet Take 30 mg by mouth Daily.   2/7/2017 at 0900   • levothyroxine (SYNTHROID, LEVOTHROID) 100 MCG tablet Take 100 mcg by mouth daily.   2/8/2017 at 0600   • modafinil (PROVIGIL) 200 MG tablet Take 200 mg by mouth 2 (two) times a day.   2/7/2017 at 2100   • omeprazole (PriLOSEC) 20 MG capsule Take 20 mg by mouth 2 (two) times a day.   2/7/2017 at 2100   • oxyCODONE-acetaminophen (PERCOCET)  MG per tablet Take 1 tablet by mouth 4 (Four) Times a Day As Needed for moderate pain (4-6).   2/7/2017 at 2242   • oxyCODONE-acetaminophen (PERCOCET)  MG per tablet Take 1 tablet by mouth 3 (Three) Times a Day As Needed for moderate pain (4-6).   2/7/2017 at 0900   • potassium  chloride (K-DUR) 10 MEQ CR tablet Take 10 mEq by mouth daily.   2/7/2017 at 0900   • pregabalin (LYRICA) 200 MG capsule Take 200 mg by mouth 2 (two) times a day.   2/7/2017 at 2100   • tiotropium (SPIRIVA) 18 MCG per inhalation capsule Place 1 capsule into inhaler and inhale 1 (one) time daily.   2/7/2017 at 0900   • vilazodone (VIIBRYD) 40 MG tablet tablet Take 40 mg by mouth daily.   2/7/2017 at 2100   • warfarin (COUMADIN) 6 MG tablet Take 6 mg by mouth Daily.   2/6/2017 at 1700   • darifenacin (ENABLEX) 15 MG 24 hr tablet Take 15 mg by mouth daily.   2/7/2017 at 0900     Current Meds:     Current Facility-Administered Medications:   •  aspirin tablet 325 mg, 325 mg, Oral, BID, Erlin Martin MD, 325 mg at 02/09/17 0915  •  atorvastatin (LIPITOR) tablet 40 mg, 40 mg, Oral, Daily, Erlin Martin MD, 40 mg at 02/09/17 0915  •  benzocaine-menthol (CHLORASEPTIC) lozenge 1 lozenge, 1 lozenge, Mouth/Throat, Q2H PRN, Erlin Martin MD  •  bisacodyl (DULCOLAX) EC tablet 10 mg, 10 mg, Oral, Daily PRN, Erlin Martin MD  •  clonazePAM (KlonoPIN) tablet 0.5 mg, 0.5 mg, Oral, TID PRN, Erlin Martin MD  •  clopidogrel (PLAVIX) tablet 75 mg, 75 mg, Oral, Daily, Erlin Martin MD, 75 mg at 02/09/17 0915  •  diphenhydrAMINE (BENADRYL) capsule 25 mg, 25 mg, Oral, Q6H PRN, Erlin Martin MD  •  docusate sodium (COLACE) capsule 100 mg, 100 mg, Oral, BID, Erlin Martin MD, 100 mg at 02/09/17 0915  •  docusate sodium (COLACE) capsule 100 mg, 100 mg, Oral, BID PRN, Erlin Martin MD  •  ferrous sulfate tablet 325 mg, 325 mg, Oral, BID, Erlin Martin MD, 325 mg at 02/09/17 0915  •  fluticasone (FLONASE) 50 MCG/ACT nasal spray 2 spray, 2 spray, Nasal, Daily, Erlin Martin MD, 2 spray at 02/09/17 0915  •  HYDROcodone-acetaminophen (NORCO)  MG per tablet 1 tablet, 1 tablet, Oral, Q4H PRN, Erlin Martin MD, 1 tablet at 02/09/17 1025  •  HYDROmorphone (DILAUDID) injection 0.5 mg, 0.5 mg,  Intravenous, Q2H PRN **AND** naloxone (NARCAN) injection 0.1 mg, 0.1 mg, Intravenous, Q5 Min PRN, Erlin Martin MD  •  ipratropium-albuterol (DUO-NEB) nebulizer solution 3 mL, 3 mL, Nebulization, Q4H PRN, Erlin Martin MD  •  isosorbide mononitrate (IMDUR) 24 hr tablet 30 mg, 30 mg, Oral, Daily, Erlin Martin MD, 30 mg at 02/08/17 2144  •  lactated ringers infusion, 9 mL/hr, Intravenous, Continuous, Bernadette Farris MD, Last Rate: 9 mL/hr at 02/08/17 1440, 9 mL/hr at 02/08/17 1440  •  levothyroxine (SYNTHROID, LEVOTHROID) tablet 100 mcg, 100 mcg, Oral, Q AM, Erlin Martin MD, 100 mcg at 02/09/17 0547  •  modafinil (PROVIGIL) tablet 200 mg, 200 mg, Oral, BID, Erlin Martin MD, 200 mg at 02/09/17 1148  •  oxybutynin XL (DITROPAN-XL) 24 hr tablet 10 mg, 10 mg, Oral, Daily, Erlin Martin MD, 10 mg at 02/09/17 0915  •  pantoprazole (PROTONIX) EC tablet 40 mg, 40 mg, Oral, Q AM, Erlin Martin MD, 40 mg at 02/09/17 0547  •  Pharmacy to dose vancomycin, , Does not apply, Continuous PRN, Erlin Martin MD  •  potassium chloride (MICRO-K) CR capsule 10 mEq, 10 mEq, Oral, Daily, Erlin Martin MD, 10 mEq at 02/09/17 0915  •  pregabalin (LYRICA) capsule 200 mg, 200 mg, Oral, BID, Erlin Martin MD, 200 mg at 02/09/17 1147  •  sennosides-docusate sodium (SENOKOT-S) 8.6-50 MG tablet 2 tablet, 2 tablet, Oral, BID, Erlin Martin MD, 2 tablet at 02/09/17 0915  •  tiotropium (SPIRIVA) 18 MCG per inhalation capsule 1 capsule, 1 capsule, Inhalation, Daily - RT, Erlin Martin MD, 1 capsule at 02/09/17 0824  •  [START ON 2/10/2017] vancomycin 1500 mg/500 mL 0.9% NS IVPB ([facility]), 1,500 mg, Intravenous, Q12H, Erlin Martin MD  •  vancomycin 2000 mg/500 mL 0.9% NS IVPB ([facility]), 20 mg/kg, Intravenous, Once, Erlin Martin MD  •  vilazodone (VIIBRYD) tablet 40 mg, 40 mg, Oral, Nightly, Erlin Martin MD  Allergies:  No Known Allergies  Social History:   Social History   Substance Use Topics   •  "Smoking status: Former Smoker     Packs/day: 1.00     Years: 19.00     Types: Cigarettes     Quit date: 11/21/2016   • Smokeless tobacco: Never Used   • Alcohol use No      Family History:  History reviewed. No pertinent family history.       Review of Systems  See history of present illness and past medical history. .  Constitutional: Negative for activity change, appetite change, fatigue, fever and unexpected weight change.   HENT: Positive for sore throat. Negative for trouble swallowing.   Eyes: Negative for pain and visual disturbance.   Respiratory: Positive for apnea. Negative for cough, chest tightness and shortness of breath.   Cardiovascular: Negative for chest pain, palpitations and leg swelling.   Gastrointestinal: Negative for abdominal pain, constipation, diarrhea, nausea and vomiting.   Endocrine:   Negative for Diabetes or thyroid disease   Genitourinary: Negative for hematuria.   Musculoskeletal: Positive for arthralgias and joint swelling. Negative for back pain, neck pain and neck stiffness.   Skin: Negative for rash and wound.   Neurological: Negative for dizziness, syncope, weakness, light-headedness and headaches.   Hematological: Negative for adenopathy. Bruises/bleeds easily.   Psychiatric/Behavioral: Positive for confusion. Negative for agitation and behavioral problems.     Vitals:   Visit Vitals   • BP 92/40 (BP Location: Right arm, Patient Position: Sitting)   • Pulse 59   • Temp 97.8 °F (36.6 °C) (Oral)   • Resp 16   • Ht 64\" (162.6 cm)   • Wt 226 lb (103 kg)   • SpO2 96%   • BMI 38.79 kg/m2     I/O:   Intake/Output Summary (Last 24 hours) at 02/09/17 1340  Last data filed at 02/09/17 0858   Gross per 24 hour   Intake   1307 ml   Output    650 ml   Net    657 ml     Exam:  General Appearance:    Alert, cooperative, no distress, appears stated age   Head:    Normocephalic, without obvious abnormality, atraumatic   Eyes:    PERRL, conjunctiva/corneas clear, EOM's intact, both eyes   Ears:  "   Normal external ear canals, both ears   Nose:   Nares normal, septum midline, mucosa normal, no drainage    or sinus tenderness   Throat:   Lips, tongue, gums normal; oral mucosa pink and moist   Neck:   Supple, symmetrical, trachea midline, no adenopathy;     thyroid:  no enlargement/tenderness/nodules; no carotid    bruit or JVD   Back:     Symmetric, no curvature, ROM normal, no CVA tenderness   Lungs:     Clear to auscultation bilaterally, respirations unlabored   Chest Wall:    No tenderness or deformity    Heart:    Regular rate and rhythm, S1 and S2 normal, no murmur, rub   or gallop   Abdomen:     Soft, non-tender, bowel sounds active all four quadrants,     no masses, no hepatomegaly, no splenomegaly   Extremities:   right hip surgical site dressed drain in place    Pulses:   Pulses palpable in all extremities; symmetric all extremities   Skin:   Skin color normal, Skin is warm and dry,  no rashes or palpable lesions       Data Review:    I reviewed the patient's new clinical results.    Results from last 7 days  Lab Units 02/09/17  0506 02/08/17  1844 02/08/17  1334   WBC 10*3/mm3  --   --  6.49   HEMOGLOBIN g/dL 8.9* 8.0* 8.8*   PLATELETS 10*3/mm3  --   --  328       Results from last 7 days  Lab Units 02/09/17  0506 02/08/17  1333   SODIUM mmol/L 129* 130*   POTASSIUM mmol/L 4.7 4.7   CHLORIDE mmol/L 92* 90*   TOTAL CO2 mmol/L 24.6 25.2   BUN mg/dL 15 24*   CREATININE mg/dL 0.59 0.89   CALCIUM mg/dL 8.7 9.1   GLUCOSE mg/dL 112* 109*       Microbiology Results (last 21 days)        Procedure Component Value - Date/Time       Anaerobic Culture [[id number]] Collected: 02/08/17 1701       Lab Status: In process Specimen: Wound from Hip, Right Updated: 02/08/17 1708       Wound Culture [[id number]] (Abnormal) Collected: 02/08/17 1701       Lab Status: Preliminary result Specimen: Wound from Hip, Right Updated: 02/09/17 1027        Wound Culture           Moderate growth (3+) Staphylococcus aureus, MRSA (C)            Methicillin resistant Staphylococcus aureus, Patient may be an isolation risk.           Gram Stain Result Rare (1+) WBCs seen          Rare (1+) Gram positive cocci in pairs           Assessment:  Active Problems:    Hematoma of right hip      Plan:  See above  Krysta Benito MD   2/9/2017  1:40 PM    Much of this encounter note is an electronic transcription/translation of spoken language to printed text. The electronic translation of spoken language may permit erroneous, or at times, nonsensical words or phrases to be inadvertently transcribed; Although I have reviewed the note for such errors, some may still exist

## 2017-02-09 NOTE — PLAN OF CARE
Problem: Patient Care Overview (Adult)  Goal: Plan of Care Review    02/09/17 0850   Coping/Psychosocial Response Interventions   Plan Of Care Reviewed With patient   Outcome Evaluation   Outcome Summary/Follow up Plan Pt s/p R hip I & D; also w/R acetabular fracture; presents with pain, weakness, impaired balance, and non-weightbearing status limiting mobility. Pt would benefit from skilled PT to address impairments and assist w/return to PLOF. Recommend snu for further PT prior to returning home, as pt currently requires assist of two w/all mobility.          Problem: Inpatient Physical Therapy  Goal: Bed Mobility Goal LTG- PT    02/09/17 0850   Bed Mobility PT LTG   Bed Mobility PT LTG, Date Established 02/09/17   Bed Mobility PT LTG, Time to Achieve 5 days   Bed Mobility PT LTG, Activity Type all bed mobility   Bed Mobility PT LTG, [place] Level minimum assist (75% patient effort);2 person assist required       Goal: Transfer Training Goal 1 LTG- PT    02/09/17 0850   Transfer Training PT LTG   Transfer Training PT LTG, Date Established 02/09/17   Transfer Training PT LTG, Time to Achieve 5 days   Transfer Training PT LTG, Activity Type all transfers   Transfer Training PT LTG, [place] Level minimum assist (75% patient effort);2 person assist required   Transfer Training PT LTG, Assist Device walker, rolling       Goal: Patient Education Goal LTG- PT    02/09/17 0850   Patient Education PT LTG   Patient Education PT LTG, Date Established 02/09/17   Patient Education PT LTG, Time to Achieve 5 days   Patient Education PT LTG, Education Type HEP;precaution per surgeon   Patient Education PT LTG, Education Understanding demonstrate adequately;verbalize understanding

## 2017-02-09 NOTE — CONSULTS
Name: Frances Downs ADMIT: 2017   : 1950  PCP: Talha Echeverria MD    MRN: [id number] LOS: 0 days   AGE/SEX: 66 y.o. female  ROOM: P779/1           ConsultsDate of Admit: 2017  Date of Consult: 17    Subjective   History of Present Illness  The patient is a pleasant 66-year-old white female with a history of coronary artery disease status post drug-eluting stent in May 2016, COPD, hypertension, recent right hip revision on 2017 at [facility] who has had frequent bloody drainage from the incision since that time requiring 12 units of blood total according to the patient.  She was admitted to orthopedics after a right hip drainage earlier today.  Our service was consulted for medical management.  She has multiple medical problems including coronary artery disease and takes Plavix and aspirin.  She also has hypertension for which he takes Coreg and amlodipine.  She had some postoperative hypotension at the 80 systolic which has now improved.  She also has DENISE but did not bring her CPAP to the hospital.  She is history of provoked DVTs ×2 1 in  and one in  with pulmonary embolism.  Her home medication list suggests that she takes warfarin 6 mg chronically with the patient currently denies taking taking this medication.  States she only takes after her surgeries.  She does endorse being a little confused from the operation and being sleepy.  She was sleeping and snoring when I came in to evaluate her.    Past Medical History   Diagnosis Date   • Anxiety    • Arthritis    • Bipolar disorder    • COPD (chronic obstructive pulmonary disease)    • Coronary artery disease    • Diastolic dysfunction      grade II per echocardiogram 2016   • Difficulty walking    • Disease of thyroid gland      hypothyroidism   • Dislocation of hip joint      recurrent dislocation right hip   • DVT (deep venous thrombosis)    • GERD without esophagitis    •  Heartburn    • Hematoma    • Hyperlipidemia    • Hypertension    • LVH (left ventricular hypertrophy)      mild to moderate per echocardiogram 11/18/2016   • Major depressive disorder    • Mitral annular calcification      severe per echocardiogram 11/18/2016   • Mitral regurgitation      mild per echo 11/18/2016   • Mitral valve disease    • Muscle weakness    • Narcolepsy    • Overactive bladder    • Pneumonia    • Pulmonary emboli    • Pulmonary hypertension      mild per echo 11/18/2016   • Sleep apnea      obstructive   • Tricuspid regurgitation      mild to moderate per echo 11/18/2016     Past Surgical History   Procedure Laterality Date   • Total hip arthroplasty Right 2011   • Total hip arthroplasty revision Right 11/22/2016   • Knee arthroplasty Left 03/2010     TWICE   • Total knee arthroplasty revision Left 09/2010   • Other surgical history       IVC filter placement   • Orif ankle fracture Left 06/16/2011   • Hardware removal foot / ankle Left 12/01/2011   • Quadriceps tendon repair Left 06/21/2010     also done 9-    • Joint replacement     • Coronary angioplasty with stent placement       x2 stents   • Cataract extraction w/ intraocular lens implant Right    • Eye surgery       cataract surgery bilateral     History reviewed. No pertinent family history.  Social History   Substance Use Topics   • Smoking status: Former Smoker     Packs/day: 1.00     Years: 19.00     Types: Cigarettes     Quit date: 11/21/2016   • Smokeless tobacco: Never Used   • Alcohol use No     Prescriptions Prior to Admission   Medication Sig Dispense Refill Last Dose   • ALBUTEROL IN Inhale 4 (Four) Times a Day As Needed.   2/8/2017 at 0900   • amLODIPine (NORVASC) 10 MG tablet Take 10 mg by mouth Daily.   2/7/2017 at 0900   • aspirin 81 MG chewable tablet Chew 81 mg Daily.   2/7/2017 at 0900   • atorvastatin (LIPITOR) 40 MG tablet Take 40 mg by mouth daily.   2/7/2017 at 2100   • carvedilol (COREG) 12.5 MG tablet Take  12.5 mg by mouth 2 (two) times a day with meals.   2/7/2017 at 2100   • clonazePAM (KlonoPIN) 0.5 MG tablet Take 0.5 mg by mouth 3 (three) times a day as needed for seizures.   2/7/2017 at 0500   • clopidogrel (PLAVIX) 75 MG tablet Take 1 tablet by mouth daily. 90 tablet 3 2/7/2017 at 0900   • docusate sodium (COLACE) 100 MG capsule Take 100 mg by mouth 2 (Two) Times a Day.   2/7/2017 at 0900   • ferrous sulfate 325 (65 FE) MG tablet Take 325 mg by mouth 2 (Two) Times a Day.   2/7/2017 at 1730   • fluticasone (FLONASE) 50 MCG/ACT nasal spray 2 sprays into each nostril daily. Administer 2 sprays in each nostril for each dose.   2/7/2017 at 0900   • ipratropium-albuterol (DUO-NEB) 0.5-2.5 mg/mL nebulizer Take 3 mL by nebulization every 4 (four) hours as needed for wheezing.   Taking   • isosorbide mononitrate (IMDUR) 30 MG 24 hr tablet Take 30 mg by mouth Daily.   2/7/2017 at 0900   • levothyroxine (SYNTHROID, LEVOTHROID) 100 MCG tablet Take 100 mcg by mouth daily.   2/8/2017 at 0600   • modafinil (PROVIGIL) 200 MG tablet Take 200 mg by mouth 2 (two) times a day.   2/7/2017 at 2100   • omeprazole (PriLOSEC) 20 MG capsule Take 20 mg by mouth 2 (two) times a day.   2/7/2017 at 2100   • oxyCODONE-acetaminophen (PERCOCET)  MG per tablet Take 1 tablet by mouth 4 (Four) Times a Day As Needed for moderate pain (4-6).   2/7/2017 at 2242   • oxyCODONE-acetaminophen (PERCOCET)  MG per tablet Take 1 tablet by mouth 3 (Three) Times a Day As Needed for moderate pain (4-6).   2/7/2017 at 0900   • potassium chloride (K-DUR) 10 MEQ CR tablet Take 10 mEq by mouth daily.   2/7/2017 at 0900   • pregabalin (LYRICA) 200 MG capsule Take 200 mg by mouth 2 (two) times a day.   2/7/2017 at 2100   • tiotropium (SPIRIVA) 18 MCG per inhalation capsule Place 1 capsule into inhaler and inhale 1 (one) time daily.   2/7/2017 at 0900   • vilazodone (VIIBRYD) 40 MG tablet tablet Take 40 mg by mouth daily.   2/7/2017 at 2100   • warfarin  (COUMADIN) 6 MG tablet Take 6 mg by mouth Daily.   2/6/2017 at 1700   • darifenacin (ENABLEX) 15 MG 24 hr tablet Take 15 mg by mouth daily.   2/7/2017 at 0900     Allergies:  Review of patient's allergies indicates no known allergies.    Review of Systems   Constitutional: Negative for activity change, appetite change, fatigue, fever and unexpected weight change.   HENT: Positive for sore throat. Negative for trouble swallowing.    Eyes: Negative for pain and visual disturbance.   Respiratory: Positive for apnea. Negative for cough, chest tightness and shortness of breath.    Cardiovascular: Negative for chest pain, palpitations and leg swelling.   Gastrointestinal: Negative for abdominal pain, constipation, diarrhea, nausea and vomiting.   Endocrine:        Negative for Diabetes or thyroid disease   Genitourinary: Negative for hematuria.   Musculoskeletal: Positive for arthralgias and joint swelling. Negative for back pain, neck pain and neck stiffness.   Skin: Negative for rash and wound.   Neurological: Negative for dizziness, syncope, weakness, light-headedness and headaches.   Hematological: Negative for adenopathy. Bruises/bleeds easily.   Psychiatric/Behavioral: Positive for confusion. Negative for agitation and behavioral problems.       Objective      Vital Signs  Temp:  [98.4 °F (36.9 °C)-99.5 °F (37.5 °C)] 99.3 °F (37.4 °C)  Heart Rate:  [64-83] 69  Resp:  [16-20] 16  BP: ()/(34-80) 96/49  Body mass index is 38.79 kg/(m^2).    Physical Exam   Constitutional: She is oriented to person, place, and time. She appears well-developed and well-nourished.   HENT:   Head: Normocephalic and atraumatic.   Mouth/Throat: Oropharynx is clear and moist. No oropharyngeal exudate.   Eyes: Conjunctivae and EOM are normal. Pupils are equal, round, and reactive to light. No scleral icterus.   Neck: Normal range of motion. Neck supple. No JVD present. No thyromegaly present.   Cardiovascular: Normal rate and regular  rhythm.  Exam reveals no gallop and no friction rub.    Murmur heard.  Pulmonary/Chest: Effort normal and breath sounds normal. No stridor. No respiratory distress. She has no wheezes.   Abdominal: Soft. Bowel sounds are normal. She exhibits no distension. There is no tenderness. There is no rebound and no guarding.   Musculoskeletal: She exhibits edema and tenderness.        Right hip: She exhibits decreased range of motion, tenderness and swelling.   Lymphadenopathy:     She has no cervical adenopathy.   Neurological: She is alert and oriented to person, place, and time. No cranial nerve deficit.   Skin: Skin is warm and dry. There is pallor.   Psychiatric: She has a normal mood and affect. Her behavior is normal.       Results Review:    I reviewed the patient's new clinical results.      Assessment/Plan     Active Problems:    Hematoma of right hip      Assessment & Plan    Anemia: due to hematoma of right hip, 8.8-->8, getting PRBC now, continue iron supplement    Hypotension: hold coreg, amlodipine for now.  If becomes hypertensive he will need to restart these medications    CAD: s/p MICHAEL in 2011 to RCA, MICHAEL to RCA on 5/2/16.  Recommend to hold tonight's dose of plavix but given stent 9 months ago she will need dual antiplatelet therapy and should restart this tomorrow if hgb fine.  Continue aspirin 81 mg    Chronic diastolic heart failure: No active issues ,appears euvolemic    H/o DVT and PE: These were provoked after left knee surgery in 2010 and 2011.  Patient states she does not currently take it but also he is confused at the current time.  She states she only takes warfarin after her surgeries., recommend to hold dose tonight and to re-evaluate in morning since her Hgb is 8 and she has had multiple transfusions in the past and currently getting a unit.  We'll need to address this in the morning to see if she really does take warfarin or not.    COPD: continue bronchodilators    Right hip hematoma:  drained earlier today by Dr. Martin    Thank you very much for asking [facility] to be involved in this patient's care.  We will follow along with you.      César Alvarez MD  [facility]  02/08/17  10:18 PM

## 2017-02-09 NOTE — PROGRESS NOTES
Orthopedic Total Progress Note        Patient: Frances Downs    Date of Admission: 2/8/2017  1:10 PM    YOB: 1950    Medical Record Number: [id number]    Attending Physician: Erlin Martin MD      POD # 1     Status post: RT HIP INCISION AND DRAINAGE      Systemic or Specific Complaints: No Complaints      No Known Allergies      Current Medications:  Scheduled Meds:  aspirin 81 mg Oral Daily   atorvastatin 40 mg Oral Daily   ceFAZolin 3 g Intravenous Q8H   clopidogrel 75 mg Oral Daily   docusate sodium 100 mg Oral BID   ferrous sulfate 325 mg Oral BID   fluticasone 2 spray Nasal Daily   isosorbide mononitrate 30 mg Oral Daily   levothyroxine 100 mcg Oral Q AM   modafinil 200 mg Oral BID   oxybutynin XL 10 mg Oral Daily   pantoprazole 40 mg Oral Q AM   potassium chloride 10 mEq Oral Daily   pregabalin 200 mg Oral BID   sennosides-docusate sodium 2 tablet Oral BID   tiotropium 1 capsule Inhalation Daily - RT   vilazodone 40 mg Oral Daily   warfarin 6 mg Oral Daily     Continuous Infusions:  lactated ringers 9 mL/hr Last Rate: 9 mL/hr (02/08/17 1440)     PRN Meds:.benzocaine-menthol  •  bisacodyl  •  clonazePAM  •  diphenhydrAMINE  •  docusate sodium  •  HYDROcodone-acetaminophen  •  HYDROmorphone **AND** naloxone  •  ipratropium-albuterol      Physical Exam: 66 y.o. female  General Appearance:    alert and oriented                Pain Relief: Patient reports some relief       Vitals:    02/09/17 0034 02/09/17 0051 02/09/17 0100 02/09/17 0335   BP: 97/56 94/51 92/53 97/57   BP Location:       Patient Position:       Pulse: 70 69 67 63   Resp: 16 16 16 16   Temp: 98.3 °F (36.8 °C) 97.9 °F (36.6 °C) 98.3 °F (36.8 °C) 97.6 °F (36.4 °C)   TempSrc: Oral Oral Oral Oral   SpO2: 97% 96% 97% 97%   Weight:       Height:               Extremities:   Operative extremity neurovascular status intact. ROM intact.    Incision intact w/out signs or  symptoms of infection. No           edema, no cyanosis, no  calf tenderness     Pulses:     Pulses palpable and equal bilaterally     Skin:     Skin Warm/Dry w/out ulceration, ecchymosis, rash, or   cyanosis     Activity: Mobilizing Per P.T.       Diagnostic Tests:   Admission on 02/08/2017   Component Date Value Ref Range Status   • ABO Type 02/08/2017 A   Final   • RH type 02/08/2017 Positive   Final   • Antibody Screen 02/08/2017 Negative   Final   • Glucose 02/08/2017 109* 65 - 99 mg/dL Final   • BUN 02/08/2017 24* 8 - 23 mg/dL Final   • Creatinine 02/08/2017 0.89  0.57 - 1.00 mg/dL Final   • Sodium 02/08/2017 130* 136 - 145 mmol/L Final   • Potassium 02/08/2017 4.7  3.5 - 5.2 mmol/L Final   • Chloride 02/08/2017 90* 98 - 107 mmol/L Final   • CO2 02/08/2017 25.2  22.0 - 29.0 mmol/L Final   • Calcium 02/08/2017 9.1  8.6 - 10.5 mg/dL Final   • Total Protein 02/08/2017 6.5  6.0 - 8.5 g/dL Final   • Albumin 02/08/2017 3.40* 3.50 - 5.20 g/dL Final   • ALT (SGPT) 02/08/2017 143* 1 - 33 U/L Final   • AST (SGOT) 02/08/2017 198* 1 - 32 U/L Final   • Alkaline Phosphatase 02/08/2017 437* 39 - 117 U/L Final   • Total Bilirubin 02/08/2017 0.6  0.1 - 1.2 mg/dL Final   • eGFR Non  Amer 02/08/2017 64  >60 mL/min/1.73 Final   • Globulin 02/08/2017 3.1  gm/dL Final   • A/G Ratio 02/08/2017 1.1  g/dL Final   • BUN/Creatinine Ratio 02/08/2017 27.0* 7.0 - 25.0 Final   • Anion Gap 02/08/2017 14.8  mmol/L Final   • PTT 02/08/2017 41.1* 22.7 - 35.4 seconds Final   • Protime 02/08/2017 20.3* 11.7 - 14.2 Seconds Final   • INR 02/08/2017 1.81* 0.90 - 1.10 Final   • WBC 02/08/2017 6.49  4.50 - 10.70 10*3/mm3 Final   • RBC 02/08/2017 2.92* 3.90 - 5.20 10*6/mm3 Final   • Hemoglobin 02/08/2017 8.8* 11.9 - 15.5 g/dL Final   • Hematocrit 02/08/2017 27.2* 35.6 - 45.5 % Final   • MCV 02/08/2017 93.2  80.5 - 98.2 fL Final   • MCH 02/08/2017 30.1  26.9 - 32.0 pg Final   • MCHC 02/08/2017 32.4  32.4 - 36.3 g/dL Final   • RDW 02/08/2017 16.2* 11.7 - 13.0 % Final   • RDW-SD 02/08/2017 55.4* 37.0 - 54.0  fl Final   • MPV 02/08/2017 10.3  6.0 - 12.0 fL Final   • Platelets 02/08/2017 328  140 - 500 10*3/mm3 Final   • Neutrophil % 02/08/2017 58.5  42.7 - 76.0 % Final   • Lymphocyte % 02/08/2017 24.2  19.6 - 45.3 % Final   • Monocyte % 02/08/2017 15.3* 5.0 - 12.0 % Final   • Eosinophil % 02/08/2017 1.5  0.3 - 6.2 % Final   • Basophil % 02/08/2017 0.5  0.0 - 1.5 % Final   • Immature Grans % 02/08/2017 0.0  0.0 - 0.5 % Final   • Neutrophils, Absolute 02/08/2017 3.80  1.90 - 8.10 10*3/mm3 Final   • Lymphocytes, Absolute 02/08/2017 1.57  0.90 - 4.80 10*3/mm3 Final   • Monocytes, Absolute 02/08/2017 0.99  0.20 - 1.20 10*3/mm3 Final   • Eosinophils, Absolute 02/08/2017 0.10  0.00 - 0.70 10*3/mm3 Final   • Basophils, Absolute 02/08/2017 0.03  0.00 - 0.20 10*3/mm3 Final   • Immature Grans, Absolute 02/08/2017 0.00  0.00 - 0.03 10*3/mm3 Final   • nRBC 02/08/2017 0.0  0.0 - 0.0 /100 WBC Final   • Hemoglobin 02/08/2017 8.0* 11.9 - 15.5 g/dL Final   • Hematocrit 02/08/2017 24.9* 35.6 - 45.5 % Final   • Product Code 02/09/2017 [id number]   Final   • Unit Number 02/09/2017 [id number]   Final   • UNIT  ABO 02/09/2017 O   Final   • UNIT  RH 02/09/2017 POS   Final   • CROSSMATCH 1 INTERPRETATION 02/09/2017 Compatible   Final   • Dispense Status 02/09/2017 IS   Final   • Blood Type 02/09/2017 OPOS   Final   • Blood Expiration Date 02/09/2017 [id number]   Final   • Blood Type Barcode 02/09/2017 5100   Final   • Product Code 02/09/2017 [id number]   Final   • Unit Number 02/09/2017 [id number]   Final   • UNIT  ABO 02/09/2017 O   Final   • UNIT  RH 02/09/2017 POS   Final   • CROSSMATCH 1 INTERPRETATION 02/09/2017 Compatible   Final   • Dispense Status 02/09/2017 IS   Final   • Blood Type 02/09/2017 OPOS   Final   • Blood Expiration Date 02/09/2017 [id number]   Final   • Blood Type Barcode 02/09/2017 5100   Final   • Protime 02/08/2017 22.1* 11.7 - 14.2 Seconds Final   • INR 02/08/2017 2.01* 0.90 - 1.10 Final   • Protime 02/09/2017  22.5* 11.7 - 14.2 Seconds Final   • INR 02/09/2017 2.06* 0.90 - 1.10 Final   • Glucose 02/09/2017 112* 65 - 99 mg/dL Final   • BUN 02/09/2017 15  8 - 23 mg/dL Final   • Creatinine 02/09/2017 0.59  0.57 - 1.00 mg/dL Final   • Sodium 02/09/2017 129* 136 - 145 mmol/L Final   • Potassium 02/09/2017 4.7  3.5 - 5.2 mmol/L Final   • Chloride 02/09/2017 92* 98 - 107 mmol/L Final   • CO2 02/09/2017 24.6  22.0 - 29.0 mmol/L Final   • Calcium 02/09/2017 8.7  8.6 - 10.5 mg/dL Final   • eGFR Non African Amer 02/09/2017 102  >60 mL/min/1.73 Final   • BUN/Creatinine Ratio 02/09/2017 25.4* 7.0 - 25.0 Final   • Anion Gap 02/09/2017 12.4  mmol/L Final   • Hemoglobin 02/09/2017 8.9* 11.9 - 15.5 g/dL Final   • Hematocrit 02/09/2017 27.0* 35.6 - 45.5 % Final       Xr Pelvis 1 Or 2 View    Result Date: 2/8/2017  Narrative: STAT PORTABLE RADIOGRAPHIC VIEW OF THE PELVIS AND RIGHT HIP  CLINICAL HISTORY: Status post incision and drainage.  Frontal projection of the pelvis and right hip demonstrate a right hip arthroplasty in place. There is a fracture through the acetabulum and the acetabular component of the right hip arthroplasty projects into the pelvis. Typical postoperative changes are identified within the adjacent soft tissues.  The findings of this fracture were directly discussed with Kim Manley RN, the nurse caring for this patient on 02/08/2017 at approximately 6:50 PM. She was instructed to notify Dr. Martin with these findings.  This report was finalized on 2/8/2017 8:35 PM by Dr. Benjy Jimenez MD.          Assessment:  Patient Active Problem List   Diagnosis   • Hematoma of right hip     Post-operative Pain  Immobility    Plan:    Continue Physical Therapy, increase mobility as tolerated.  Continue SCDs, Continue DVT prophalaxis.  Continue Pain management efforts  Continue Incisional care      Discharge Plan: tomorrow to SNF    Date: 2/9/2017   Time: 7:07 AM    Erlin Martin MD

## 2017-02-09 NOTE — ANESTHESIA POSTPROCEDURE EVALUATION
Patient: Frances Downs    Procedure Summary     Date Anesthesia Start Anesthesia Stop Room / Location    02/08/17 0295 0269  [facility] / [facility]       Procedure Diagnosis Surgeon Provider    RT HIP INCISION AND DRAINAGE (Right Hip) No diagnosis on file. MD Terence La MD          Anesthesia Type: general  Last vitals  BP 94/53 (02/08/17 2005)    Temp 37.3 °C (99.1 °F) (02/08/17 2005)    Pulse 70 (02/08/17 2005)   Resp 16 (02/08/17 2005)    SpO2 94 % (02/08/17 2005)      Post Anesthesia Care and Evaluation    Patient location during evaluation: PACU  Anesthetic complications: No anesthetic complications

## 2017-02-09 NOTE — PROGRESS NOTES
"Pharmacokinetic Consult - Vancomycin Dosing (Initial Note)    Frances Downs has been consulted for pharmacy to dose vancomycin for bone and joint infection per consult of Dr. Martin.  Goal trough: 15-20 mg/L     Relevant clinical data and objective history reviewed:  66 y.o. female 64\" (162.6 cm) 226 lb (103 kg)    CREATININE   Date Value Ref Range Status   02/09/2017 0.59 0.57 - 1.00 mg/dL Final   02/08/2017 0.89 0.57 - 1.00 mg/dL Final     BUN   Date Value Ref Range Status   02/09/2017 15 8 - 23 mg/dL Final     Estimated Creatinine Clearance: 81.9 mL/min (by C-G formula based on Cr of 0.59).    Lab Results   Component Value Date    WBC 6.49 02/08/2017     Temp Readings from Last 3 Encounters:   02/09/17 97.8 °F (36.6 °C) (Oral)        Assessment/Plan  Will start vancomycin 2gm (20mg/kg) IV x 1 dose now then 1500mg (14.5mg/kg) IV q12h. Vancomycin level ordered prior to noon dose on 2/11. Pharmacy will continue to follow daily while on vancomycin and adjust as needed.     Ivory Brewer, PharmD  2/9/2017  12:05 PM    "

## 2017-02-09 NOTE — PLAN OF CARE
Problem: Patient Care Overview (Adult)  Goal: Plan of Care Review  Outcome: Ongoing (interventions implemented as appropriate)    02/09/17 0251   Coping/Psychosocial Response Interventions   Plan Of Care Reviewed With patient   Patient Care Overview   Progress improving   Outcome Evaluation   Outcome Summary/Follow up Plan patient received 2 units PRBC after surgery upon arrival to unit, b/p's remain low, having increased pain in hip       Goal: Adult Individualization and Mutuality  Outcome: Ongoing (interventions implemented as appropriate)  Goal: Discharge Needs Assessment  Outcome: Ongoing (interventions implemented as appropriate)    Problem: Perioperative Period (Adult)  Goal: Signs and Symptoms of Listed Potential Problems Will be Absent or Manageable (Perioperative Period)  Outcome: Ongoing (interventions implemented as appropriate)    Problem: Hip Replacement, Total (Adult)  Goal: Signs and Symptoms of Listed Potential Problems Will be Absent or Manageable (Hip Replacement, Total)  Outcome: Ongoing (interventions implemented as appropriate)    Problem: Fall Risk (Adult)  Goal: Identify Related Risk Factors and Signs and Symptoms  Outcome: Outcome(s) achieved Date Met:  02/09/17  Goal: Absence of Falls  Outcome: Ongoing (interventions implemented as appropriate)

## 2017-02-09 NOTE — PROGRESS NOTES
Acute Care - Physical Therapy Initial Evaluation  [facility]     Patient Name: Frances Downs  : 1950  MRN: [id number]  Today's Date: 2017         Referring Physician: Veronica      Admit Date: 2017     Visit Dx:    ICD-10-CM ICD-9-CM   1. Difficulty walking R26.2 719.7   2. Hematoma T14.8 924.9     Patient Active Problem List   Diagnosis   • Hematoma of right hip     Past Medical History   Diagnosis Date   • Anxiety    • Arthritis    • Bipolar disorder    • COPD (chronic obstructive pulmonary disease)    • Coronary artery disease    • Diastolic dysfunction      grade II per echocardiogram 2016   • Difficulty walking    • Disease of thyroid gland      hypothyroidism   • Dislocation of hip joint      recurrent dislocation right hip   • DVT (deep venous thrombosis)    • GERD without esophagitis    • Heartburn    • Hematoma    • Hyperlipidemia    • Hypertension    • LVH (left ventricular hypertrophy)      mild to moderate per echocardiogram 2016   • Major depressive disorder    • Mitral annular calcification      severe per echocardiogram 2016   • Mitral regurgitation      mild per echo 2016   • Mitral valve disease    • Muscle weakness    • Narcolepsy    • Overactive bladder    • Pneumonia    • Pulmonary emboli    • Pulmonary hypertension      mild per echo 2016   • Sleep apnea      obstructive   • Tricuspid regurgitation      mild to moderate per echo 2016     Past Surgical History   Procedure Laterality Date   • Total hip arthroplasty Right    • Total hip arthroplasty revision Right 2016   • Knee arthroplasty Left 2010     TWICE   • Total knee arthroplasty revision Left 2010   • Other surgical history       IVC filter placement   • Orif ankle fracture Left 2011   • Hardware removal foot / ankle Left 2011   • Quadriceps tendon repair Left 2010     also done 2010    • Joint replacement     • Coronary angioplasty with  stent placement       x2 stents   • Cataract extraction w/ intraocular lens implant Right    • Eye surgery       cataract surgery bilateral          PT ASSESSMENT (last 72 hours)      PT Evaluation       02/09/17 0830 02/09/17 0251    Rehab Evaluation    Document Type evaluation  -EE     Subjective Information agree to therapy;complains of;pain  -EE     Patient Effort, Rehab Treatment good  -EE     Symptoms Noted During/After Treatment fatigue;increased pain  -EE     Symptoms Noted Comment PT order states NWB; activity order states TTWB. Discussed w/Jessica and will maintain NWB R LE until clarified with MD.  -EE     General Information    Referring Physician Veronica  -EE     General Observations Adult female, supine in bed in no acute distress.  -EE     Pertinent History Of Current Problem s/p R hip I & D; R acetabular fx  -EE     Precautions/Limitations fall precautions;non-weight bearing status   NWB R LE  -EE     Prior Level of Function independent:;all household mobility;community mobility  -EE     Equipment Currently Used at Home cane, straight;walker, rolling  -EE     Plans/Goals Discussed With patient;agreed upon  -EE     Barriers to Rehab none identified  -EE     Living Environment    Transportation Available  car  -BM    Clinical Impression    Patient/Family Goals Statement Decrease pain; go home  -EE     Criteria for Skilled Therapeutic Interventions Met yes;treatment indicated  -EE     Pathology/Pathophysiology Noted (Describe Specifically for Each System) musculoskeletal  -EE     Impairments Found (describe specific impairments) gait, locomotion, and balance  -EE     Rehab Potential good, to achieve stated therapy goals  -EE     Pain Assessment    Pain Assessment 0-10  -EE     Pain Score 7  -EE     Pain Type Acute pain  -EE     Pain Location Hip  -EE     Pain Orientation Right  -EE     Pain Intervention(s) Repositioned;Cold applied  -EE     Response to Interventions tolerated  -EE     Cognitive  Assessment/Intervention    Current Cognitive/Communication Assessment functional  -EE     Orientation Status oriented x 4  -EE     Follows Commands/Answers Questions 100% of the time  -EE     Personal Safety WNL/WFL  -EE     Personal Safety Interventions fall prevention program maintained;gait belt;muscle strengthening facilitated;nonskid shoes/slippers when out of bed;supervised activity  -EE     ROM (Range of Motion)    General ROM other (see comments)  -EE     General ROM Detail B UEs/L LE grossly WFL; R hip and knee not tested due to pain  -EE     MMT (Manual Muscle Testing)    General MMT Assessment lower extremity strength deficits identified  -EE     General MMT Assessment Detail L LE functionally 4-/5; R ankle 2-/5; R knee 2/5; R hip deferred  -EE     Mobility Assessment/Training    Extremity Weight-Bearing Status right lower extremity  -EE     Right Lower Extremity Weight-Bearing non weight-bearing  -EE     Bed Mobility, Assessment/Treatment    Bed Mobility, Assistive Device head of bed elevated;draw sheet;bed rails  -EE     Bed Mob, Supine to Sit, [place] moderate assist (50% patient effort);2 person assist required;verbal cues required  -EE     Bed Mob, Sit to Supine, [place] not tested  -EE     Transfer Assessment/Treatment    Transfers, Bed-Chair [place] moderate assist (50% patient effort);maximum assist (25% patient effort);2 person assist required;verbal cues required;hand held assist  -EE     Transfers, Sit-Stand [place] moderate assist (50% patient effort);2 person assist required;verbal cues required;hand held assist  -EE     Transfers, Stand-Sit [place] moderate assist (50% patient effort);2 person assist required;verbal cues required;hand held assist  -EE     Transfer, Maintain Weight Bearing Status cues to maintain weight bearing status;assist to maintain weight bearing status  -EE     Transfer, Safety Issues weight-shifting ability decreased  -EE     Transfer,  Impairments strength decreased;pain;impaired balance  -EE     Transfer, Comment stand pivot transfer bed to chair  -EE     Gait Assessment/Treatment    Gait, [place] Level not appropriate to assess  -EE     Motor Skills/Interventions    Additional Documentation Balance Skills Training (Group)  -EE     Balance Skills Training    Sitting-Level of Assistance Distant supervision  -EE     Sitting-Balance Support Right upper extremity supported;Left upper extremity supported  -EE     Standing-Level of Assistance Moderate assistance;x2  -EE     Static Standing Balance Support Right upper extremity supported;Left upper extremity supported  -EE     Therapy Exercises    Bilateral Lower Extremities AROM:;ankle pumps/circles;glut sets;quad sets;10 reps  -EE     Positioning and Restraints    Pre-Treatment Position in bed  -EE     Post Treatment Position chair  -EE     In Chair reclined;call light within reach;encouraged to call for assist;notified nsg  -EE       02/08/17 2018 02/08/17 2017    General Information    Equipment Currently Used at Home  cane, straight;walker, rolling  -BM    Living Environment    Lives With alone  -BM     Living Arrangements apartment  -BM     Home Accessibility stairs to enter home  -BM     Number of Stairs to Enter Home 1  -BM     Stair Railings at Home none  -BM     Type of Financial/Environmental Concern none  -BM     Transportation Available car  -BM       02/08/17 1428 02/08/17 1409    Living Environment    Living Arrangements  extended care facility  -JV    Transportation Available ambulance  -JV ambulance  -JV      User Key  (r) = Recorded By, (t) = Taken By, (c) = Cosigned By    Initials Name Provider Type    GALILEO Campos, PORTILLO Physical Therapist     Paula Sanders, RN Registered Nurse    CANDIDO Matos, RN Registered Nurse          Physical Therapy Education     Title: PT OT SLP Therapies (Active)     Topic: Physical Therapy (Active)     Point: Mobility training  (Active)    Learning Progress Summary    Learner Readiness Method Response Comment Documented by Status   Patient Acceptance E,TB NR  EE 02/09/17 0850 Active               Point: Home exercise program (Active)    Learning Progress Summary    Learner Readiness Method Response Comment Documented by Status   Patient Acceptance E,TB NR  EE 02/09/17 0850 Active               Point: Body mechanics (Active)    Learning Progress Summary    Learner Readiness Method Response Comment Documented by Status   Patient Acceptance E,TB NR  EE 02/09/17 0850 Active               Point: Precautions (Active)    Learning Progress Summary    Learner Readiness Method Response Comment Documented by Status   Patient Acceptance E,TB NR  EE 02/09/17 0850 Active                      User Key     Initials Effective Dates Name Provider Type Discipline    EE 12/01/15 -  Yue Campos, PT Physical Therapist PT                PT Recommendation and Plan  Anticipated Discharge Disposition: skilled nursing facility  Planned Therapy Interventions: balance training, bed mobility training, home exercise program, patient/family education, strengthening, stretching, transfer training  PT Frequency: daily  Plan of Care Review  Plan Of Care Reviewed With: patient  Outcome Summary/Follow up Plan: Pt s/p R hip I & D; also w/R acetabular fracture; presents with pain, weakness, impaired balance, and non-weightbearing status limiting mobility. Pt would benefit from skilled PT to address impairments and assist w/return to PLOF. Recommend snu for further PT prior to returning home, as pt currently requires assist of two w/all mobility.           IP PT Goals       02/09/17 0850          Bed Mobility PT LTG    Bed Mobility PT LTG, Date Established 02/09/17  -EE      Bed Mobility PT LTG, Time to Achieve 5 days  -EE      Bed Mobility PT LTG, Activity Type all bed mobility  -EE      Bed Mobility PT LTG, [place] Level minimum assist (75% patient effort);2 person assist  required  -EE      Transfer Training PT LTG    Transfer Training PT LTG, Date Established 02/09/17  -EE      Transfer Training PT LTG, Time to Achieve 5 days  -EE      Transfer Training PT LTG, Activity Type all transfers  -EE      Transfer Training PT LTG, [place] Level minimum assist (75% patient effort);2 person assist required  -EE      Transfer Training PT LTG, Assist Device walker, rolling  -EE      Patient Education PT LTG    Patient Education PT LTG, Date Established 02/09/17  -EE      Patient Education PT LTG, Time to Achieve 5 days  -EE      Patient Education PT LTG, Education Type HEP;precaution per surgeon  -EE      Patient Education PT LTG, Education Understanding demonstrate adequately;verbalize understanding  -EE        User Key  (r) = Recorded By, (t) = Taken By, (c) = Cosigned By    Initials Name Provider Type    GALILEO Campos PT Physical Therapist                Outcome Measures       02/09/17 0800          How much help from another person do you currently need...    Turning from your back to your side while in flat bed without using bedrails? 2  -EE      Moving from lying on back to sitting on the side of a flat bed without bedrails? 2  -EE      Moving to and from a bed to a chair (including a wheelchair)? 2  -EE      Standing up from a chair using your arms (e.g., wheelchair, bedside chair)? 2  -EE      Climbing 3-5 steps with a railing? 1  -EE      To walk in hospital room? 1  -EE      AM-PAC 6 Clicks Score 10  -EE      Functional Assessment    Outcome Measure Options AM-PAC 6 Clicks Basic Mobility (PT)  -EE        User Key  (r) = Recorded By, (t) = Taken By, (c) = Cosigned By    Initials Name Provider Type    GALILEO Campos PT Physical Therapist           Time Calculation:         PT Charges       02/09/17 0854          Time Calculation    Start Time 0830  -EE      Stop Time 0845  -EE      Time Calculation (min) 15 min  -EE      PT Received On 02/09/17  -EE      PT - Next Appointment  02/10/17  -EE      PT Goal Re-Cert Due Date 02/14/17  -EE        User Key  (r) = Recorded By, (t) = Taken By, (c) = Cosigned By    Initials Name Provider Type    EE Yue Campos PT Physical Therapist          Therapy Charges for Today     Code Description Service Date Service Provider Modifiers Qty    82834917438 HC PT EVAL LOW COMPLEXITY 2 2/9/2017 Yue Campos, PT GP 1    70602246821 HC PT THER PROC EA 15 MIN 2/9/2017 Yue Campos, PT GP 1    86284967737 HC PT THER SUPP EA 15 MIN 2/9/2017 Yue Campos, PT GP 1          PT G-Codes  Outcome Measure Options: AM-PAC 6 Clicks Basic Mobility (PT)      Yue Campos PT  2/9/2017

## 2017-02-09 NOTE — PROGRESS NOTES
Discharge Planning Assessment  [facility]     Patient Name: Frances Downs  MRN: [id number]  Today's Date: 2/9/2017    Admit Date: 2/8/2017          Discharge Needs Assessment       02/09/17 1731    Living Environment    Lives With facility resident    Living Arrangements --   receiving rehab at [place], [place]    Discharge Needs Assessment    Transportation Available car;family or friend will provide;ambulance            Discharge Plan       02/09/17 1720    Case Management/Social Work Plan    Plan to return to [facility]/rehab     Additional Comments Met w/ pt at the bedside, verified facesheet and explained CCP role. Pt was admitted from [facility]/rehab in [place], [place], she would like to return there when medically stable. Josie with [facility] notified, Pt's bed will remain on hold until she can return.  Partial transfer packet in pt's cubby.          Discharge Placement     Facility/Agency Request Status Selected? Address Phone Number Fax Number    [facility] Accepted    Yes [address] [facility] [place] [address] [phone]         Emily Oleary RN 2/9/2017 17:20    2/9/2017  Notified Josie, Pt's bed will remain on hold until she can return.Emily Oleary, ARRON                             Demographic Summary     None            Functional Status       02/09/17 1729    Functional Status Current    Ambulation 3-->assistive equipment and person    Transferring 3-->assistive equipment and person    Toileting 3-->assistive equipment and person    Bathing 2-->assistive person    Dressing 2-->assistive person    Eating 0-->independent    Communication 0-->understands/communicates without difficulty    Swallowing (if score 2 or more for any item, consult Rehab Services) 0-->swallows foods/liquids without difficulty    Change in Functional Status Since Onset of Current Illness/Injury yes    Functional Status Prior    Ambulation 1-->assistive  equipment    Transferring 1-->assistive equipment    Toileting 1-->assistive equipment    Bathing 0-->independent    Dressing 0-->independent    Eating 0-->independent    Communication 0-->understands/communicates without difficulty    Swallowing 0-->swallows foods/liquids without difficulty    IADL    Medications independent    Meal Preparation independent    Housekeeping independent    Laundry independent    Shopping independent    Oral Care independent    Activity Tolerance    Usual Activity Tolerance good    Current Activity Tolerance fair    Cognitive/Perceptual/Developmental    Current Mental Status/Cognitive Functioning unable to assess    Recent Changes in Mental Status/Cognitive Functioning unable to assess            Psychosocial     None            Abuse/Neglect     None            Legal     None            Substance Abuse     None            Patient Forms       02/09/17 1732    Patient Forms    Provider Choice List Delivered    Delivered to Patient    Method of delivery In person          Emily Olaery RN

## 2017-02-10 PROBLEM — I25.10 CORONARY ARTERY DISEASE: Status: ACTIVE | Noted: 2017-02-10

## 2017-02-10 PROBLEM — A49.02 MRSA (METHICILLIN RESISTANT STAPHYLOCOCCUS AUREUS) INFECTION: Status: ACTIVE | Noted: 2017-02-10

## 2017-02-10 PROBLEM — Z96.649 PROSTHETIC HIP INFECTION (HCC): Status: ACTIVE | Noted: 2017-02-10

## 2017-02-10 PROBLEM — I82.409 DVT (DEEP VENOUS THROMBOSIS) (HCC): Status: ACTIVE | Noted: 2017-02-10

## 2017-02-10 PROBLEM — E78.5 HYPERLIPIDEMIA: Status: ACTIVE | Noted: 2017-02-10

## 2017-02-10 PROBLEM — F31.9 BIPOLAR DISORDER (HCC): Status: ACTIVE | Noted: 2017-02-10

## 2017-02-10 PROBLEM — T84.59XA PROSTHETIC HIP INFECTION: Status: ACTIVE | Noted: 2017-02-10

## 2017-02-10 PROBLEM — I10 HYPERTENSION: Status: ACTIVE | Noted: 2017-02-10

## 2017-02-10 PROBLEM — M19.90 ARTHRITIS: Status: ACTIVE | Noted: 2017-02-10

## 2017-02-10 LAB
BACTERIA SPEC AEROBE CULT: ABNORMAL
GRAM STN SPEC: ABNORMAL
GRAM STN SPEC: ABNORMAL
HCT VFR BLD AUTO: 26.7 % (ref 35.6–45.5)
HGB BLD-MCNC: 8.6 G/DL (ref 11.9–15.5)
INR PPP: 2.07 (ref 0.9–1.1)
PROTHROMBIN TIME: 22.6 SECONDS (ref 11.7–14.2)

## 2017-02-10 PROCEDURE — 85014 HEMATOCRIT: CPT | Performed by: ORTHOPAEDIC SURGERY

## 2017-02-10 PROCEDURE — 25010000002 VITAMIN K1 PER 1 MG: Performed by: ORTHOPAEDIC SURGERY

## 2017-02-10 PROCEDURE — 97110 THERAPEUTIC EXERCISES: CPT

## 2017-02-10 PROCEDURE — 94799 UNLISTED PULMONARY SVC/PX: CPT

## 2017-02-10 PROCEDURE — 25010000002 VANCOMYCIN: Performed by: ORTHOPAEDIC SURGERY

## 2017-02-10 PROCEDURE — 94640 AIRWAY INHALATION TREATMENT: CPT

## 2017-02-10 PROCEDURE — 85610 PROTHROMBIN TIME: CPT | Performed by: ORTHOPAEDIC SURGERY

## 2017-02-10 PROCEDURE — 85018 HEMOGLOBIN: CPT | Performed by: ORTHOPAEDIC SURGERY

## 2017-02-10 RX ADMIN — PANTOPRAZOLE SODIUM 40 MG: 40 TABLET, DELAYED RELEASE ORAL at 05:33

## 2017-02-10 RX ADMIN — LEVOTHYROXINE SODIUM 100 MCG: 100 TABLET ORAL at 05:33

## 2017-02-10 RX ADMIN — HYDROCODONE BITARTRATE AND ACETAMINOPHEN 1 TABLET: 10; 325 TABLET ORAL at 17:35

## 2017-02-10 RX ADMIN — ATORVASTATIN CALCIUM 40 MG: 40 TABLET, FILM COATED ORAL at 21:52

## 2017-02-10 RX ADMIN — FERROUS SULFATE TAB 325 MG (65 MG ELEMENTAL FE) 325 MG: 325 (65 FE) TAB at 18:50

## 2017-02-10 RX ADMIN — PREGABALIN 200 MG: 100 CAPSULE ORAL at 21:17

## 2017-02-10 RX ADMIN — DOCUSATE SODIUM 100 MG: 100 CAPSULE, LIQUID FILLED ORAL at 09:34

## 2017-02-10 RX ADMIN — HYDROCODONE BITARTRATE AND ACETAMINOPHEN 1 TABLET: 10; 325 TABLET ORAL at 21:52

## 2017-02-10 RX ADMIN — CLOPIDOGREL 75 MG: 75 TABLET, FILM COATED ORAL at 13:18

## 2017-02-10 RX ADMIN — HYDROCODONE BITARTRATE AND ACETAMINOPHEN 1 TABLET: 10; 325 TABLET ORAL at 05:33

## 2017-02-10 RX ADMIN — VANCOMYCIN HYDROCHLORIDE 1500 MG: 1 INJECTION, POWDER, LYOPHILIZED, FOR SOLUTION INTRAVENOUS at 13:17

## 2017-02-10 RX ADMIN — VILAZODONE HYDROCHLORIDE 40 MG: 40 TABLET ORAL at 21:17

## 2017-02-10 RX ADMIN — TIOTROPIUM BROMIDE 1 CAPSULE: 18 CAPSULE ORAL; RESPIRATORY (INHALATION) at 08:53

## 2017-02-10 RX ADMIN — MODAFINIL 200 MG: 100 TABLET ORAL at 09:34

## 2017-02-10 RX ADMIN — POTASSIUM CHLORIDE 10 MEQ: 750 CAPSULE, EXTENDED RELEASE ORAL at 09:34

## 2017-02-10 RX ADMIN — PREGABALIN 200 MG: 100 CAPSULE ORAL at 09:34

## 2017-02-10 RX ADMIN — VANCOMYCIN HYDROCHLORIDE 1500 MG: 1 INJECTION, POWDER, LYOPHILIZED, FOR SOLUTION INTRAVENOUS at 00:50

## 2017-02-10 RX ADMIN — PHYTONADIONE 5 MG: 10 INJECTION, EMULSION INTRAMUSCULAR; INTRAVENOUS; SUBCUTANEOUS at 17:35

## 2017-02-10 RX ADMIN — ASPIRIN 325 MG: 325 TABLET ORAL at 09:34

## 2017-02-10 RX ADMIN — OXYBUTYNIN CHLORIDE 10 MG: 10 TABLET, FILM COATED, EXTENDED RELEASE ORAL at 09:34

## 2017-02-10 RX ADMIN — ASPIRIN 325 MG: 325 TABLET ORAL at 18:50

## 2017-02-10 RX ADMIN — HYDROCODONE BITARTRATE AND ACETAMINOPHEN 1 TABLET: 10; 325 TABLET ORAL at 09:34

## 2017-02-10 RX ADMIN — HYDROCODONE BITARTRATE AND ACETAMINOPHEN 1 TABLET: 10; 325 TABLET ORAL at 13:17

## 2017-02-10 NOTE — PLAN OF CARE
Problem: Patient Care Overview (Adult)  Goal: Plan of Care Review  Outcome: Ongoing (interventions implemented as appropriate)    02/10/17 0305   Coping/Psychosocial Response Interventions   Plan Of Care Reviewed With patient   Patient Care Overview   Progress no change   Outcome Evaluation   Outcome Summary/Follow up Plan patient sleeping most of night, continues to hold urine as patient refuses to notify staff when she needs to void r/t not wanting to move, pain controlled with oral pain medication at this time       Goal: Adult Individualization and Mutuality  Outcome: Ongoing (interventions implemented as appropriate)  Goal: Discharge Needs Assessment  Outcome: Ongoing (interventions implemented as appropriate)    Problem: Hip Replacement, Total (Adult)  Goal: Signs and Symptoms of Listed Potential Problems Will be Absent or Manageable (Hip Replacement, Total)  Outcome: Ongoing (interventions implemented as appropriate)    Problem: Fall Risk (Adult)  Goal: Absence of Falls  Outcome: Ongoing (interventions implemented as appropriate)    Problem: Infection, Risk/Actual (Adult)  Goal: Identify Related Risk Factors and Signs and Symptoms  Outcome: Outcome(s) achieved Date Met:  02/10/17  Goal: Infection Prevention/Resolution  Outcome: Ongoing (interventions implemented as appropriate)

## 2017-02-10 NOTE — PROGRESS NOTES
"  Infectious Diseases Progress Note    Krysta Benito MD     [facility]  [place]: 2 days  Patient Identification:  Name: Frances Downs  Age: 66 y.o.  Sex: female  :  1950  MRN: [id number]         Primary Care Physician: Talha Echeverria MD            Subjective: Feels about the same denies any fever and chills tolerating vancomycin without any problem  Interval History: See consultation note.     Objective:    Scheduled Meds:  aspirin 325 mg Oral BID   atorvastatin 40 mg Oral Daily   clopidogrel 75 mg Oral Daily   docusate sodium 100 mg Oral BID   ferrous sulfate 325 mg Oral BID   fluticasone 2 spray Nasal Daily   isosorbide mononitrate 30 mg Oral Daily   levothyroxine 100 mcg Oral Q AM   modafinil 200 mg Oral BID   oxybutynin XL 10 mg Oral Daily   pantoprazole 40 mg Oral Q AM   phytonadione (VITAMIN K) IVPB 5 mg Intravenous Once   potassium chloride 10 mEq Oral Daily   pregabalin 200 mg Oral BID   sennosides-docusate sodium 2 tablet Oral BID   tiotropium 1 capsule Inhalation Daily - RT   vancomycin 1,500 mg Intravenous Q12H   vilazodone 40 mg Oral Nightly     Continuous Infusions:  lactated ringers 9 mL/hr Last Rate: 9 mL/hr (17 1440)   Pharmacy to dose vancomycin         Vital signs in last 24 hours:  Temp:  [97.8 °F (36.6 °C)-99.5 °F (37.5 °C)] 99.5 °F (37.5 °C)  Heart Rate:  [58-66] 63  Resp:  [14-16] 16  BP: ()/(40-74) 108/58    Intake/Output:    Intake/Output Summary (Last 24 hours) at 02/10/17 0932  Last data filed at 02/10/17 0500   Gross per 24 hour   Intake      0 ml   Output   1225 ml   Net  -1225 ml       Exam:  Visit Vitals   • /58 (BP Location: Right arm, Patient Position: Lying)   • Pulse 63   • Temp 99.5 °F (37.5 °C) (Oral)   • Resp 16   • Ht 64\" (162.6 cm)   • Wt 226 lb (103 kg)   • SpO2 100%   • BMI 38.79 kg/m2       General Appearance:    Alert, cooperative, no distress, AAOx3                          Head:    Normocephalic, without obvious abnormality, " atraumatic                           Eyes:    PERRL, conjunctiva/corneas clear, EOM's intact, both eyes                         Throat:   Lips, tongue, gums normal; oral mucosa pink and moist                           Neck:   Supple, symmetrical, trachea midline, no JVD                         Lungs:    Clear to auscultation bilaterally, respirations unlabored                 Chest Wall:    No tenderness or deformity                          Heart:    Regular rate and rhythm, S1 and S2 normal, no murmur,no  Rub                                      or gallop                  Abdomen:     Soft, non-tender, bowel sounds active, no masses, no                                                        organomegaly                  Extremities:   Right hip dressed drain in place                        Pulses:   Pulses palpable in all extremities                            Skin:   Skin is warm and dry,  no rashes or palpable lesions                  Data Review:    I reviewed the patient's new clinical results.    Results from last 7 days  Lab Units 02/10/17  0256 02/09/17  0506 02/08/17  1844 02/08/17  1334   WBC 10*3/mm3  --   --   --  6.49   HEMOGLOBIN g/dL 8.6* 8.9* 8.0* 8.8*   PLATELETS 10*3/mm3  --   --   --  328       Results from last 7 days  Lab Units 02/09/17  0506 02/08/17  1333   SODIUM mmol/L 129* 130*   POTASSIUM mmol/L 4.7 4.7   CHLORIDE mmol/L 92* 90*   TOTAL CO2 mmol/L 24.6 25.2   BUN mg/dL 15 24*   CREATININE mg/dL 0.59 0.89   CALCIUM mg/dL 8.7 9.1   GLUCOSE mg/dL 112* 109*     Anaerobic Culture [[id number]] Collected: 02/08/17 1701        Lab Status: In process Specimen: Wound from Hip, Right Updated: 02/08/17 1708       Wound Culture [[id number]] (Abnormal)  Collected: 02/08/17 1701       Lab Status: Final result Specimen: Wound from Hip, Right Updated: 02/10/17 0648        Wound Culture           Moderate growth (3+) Staphylococcus aureus, MRSA (C)         D test is negative. Isolate does not exhibit  "\"inducible\" resistance to Clindamycin (isolate remains susceptible to Clindamycin).     Methicillin resistant Staphylococcus aureus, Patient may be an isolation risk.           Gram Stain Result Rare (1+) WBCs seen          Rare (1+) Gram positive cocci in pairs          Susceptibility         Staphylococcus aureus, MRSA         ROSSY         Clindamycin <=0.25  Susceptible         Erythromycin >=8  Resistant         Oxacillin >=4  Resistant         Penicillin G >=0.5  Resistant         Rifampin <=0.5  Susceptible         Tetracycline <=1  Susceptible         Trimethoprim + Sulfamethoxazole <=10  Susceptible         Vancomycin 1  Susceptible                             Assessment:  Active Problems:    Hematoma of right hip   postop surgical site infection with MRSA    Plan:  Continue IV vancomycin while awaiting the final decision making in terms of subsequent intervention    Krysta Benito MD  2/10/2017  9:32 AM    Much of this encounter note is an electronic transcription/translation of spoken language to printed text. The electronic translation of spoken language may permit erroneous, or at times, nonsensical words or phrases to be inadvertently transcribed; Although I have reviewed the note for such errors, some may still exist    "

## 2017-02-10 NOTE — PLAN OF CARE
Problem: Patient Care Overview (Adult)  Goal: Plan of Care Review    02/10/17 1430   Coping/Psychosocial Response Interventions   Plan Of Care Reviewed With patient   Patient Care Overview   Progress progress towards functional goals is fair   Outcome Evaluation   Outcome Summary/Follow up Plan Slow progress w/mobility due to pain and weakness. Continues to require assist of two for mobility; manual assistance required to maintain NWB status during transfer. No new concerns.

## 2017-02-10 NOTE — PROGRESS NOTES
Acute Care - Physical Therapy Treatment Note  [facility]     Patient Name: Frances Downs  : 1950  MRN: [id number]  Today's Date: 2/10/2017        Referring Physician: Veronica    Admit Date: 2017    Visit Dx:    ICD-10-CM ICD-9-CM   1. Difficulty walking R26.2 719.7   2. Hematoma T14.8 924.9     Patient Active Problem List   Diagnosis   • Hematoma of right hip   • DVT (deep venous thrombosis), hx of   • Hypertension   • Hyperlipidemia   • Coronary artery disease, hx of stent   • Arthritis   • Bipolar disorder   • Prosthetic hip infection, mrsa               Adult Rehabilitation Note       02/10/17 1410          Rehab Assessment/Intervention    Discipline physical therapist  -EE      Document Type therapy note (daily note)  -EE      Subjective Information agree to therapy;complains of;weakness;fatigue;pain  -EE      Patient Effort, Rehab Treatment adequate  -EE      Symptoms Noted During/After Treatment fatigue  -EE      Precautions/Limitations fall precautions;other (see comments)   NWB R LE  -EE      Recorded by [EE] Yue Campos, PT      Pain Assessment    Pain Assessment 0-10  -EE      Pain Score 7  -EE      Pain Type Acute pain  -EE      Pain Location Hip  -EE      Pain Orientation Right  -EE      Pain Intervention(s) Repositioned;Rest  -EE      Response to Interventions tolerated  -EE      Recorded by [EE] Yue Campos PT      Cognitive Assessment/Intervention    Current Cognitive/Communication Assessment functional  -EE      Orientation Status oriented x 4  -EE      Follows Commands/Answers Questions 100% of the time  -EE      Personal Safety WNL/WFL  -EE      Personal Safety Interventions fall prevention program maintained;gait belt;muscle strengthening facilitated;nonskid shoes/slippers when out of bed;supervised activity  -EE      Recorded by [EE] Yue Campos, PORTILLO      Mobility Assessment/Training    Extremity Weight-Bearing Status right lower extremity  -EE      Right Lower Extremity  Weight-Bearing non weight-bearing  -EE      Recorded by [EE] Yue Campos PT      Bed Mobility, Assessment/Treatment    Bed Mob, Supine to Sit, [place] not tested  -EE      Bed Mob, Sit to Supine, [place] moderate assist (50% patient effort);2 person assist required;verbal cues required  -EE      Recorded by [EE] Yue Campos, PT      Transfer Assessment/Treatment    Transfers, Bed-Chair Rob[place] moderate assist (50% patient effort);maximum assist (25% patient effort);2 person assist required;verbal cues required;hand held assist  -EE      Transfers, Sit-Stand Rob[place] moderate assist (50% patient effort);2 person assist required;verbal cues required;hand held assist  -EE      Transfers, Stand-Sit Rob[place] moderate assist (50% patient effort);2 person assist required;verbal cues required;hand held assist  -EE      Transfer, Maintain Weight Bearing Status cues to maintain weight bearing status;assist to maintain weight bearing status  -EE      Transfer, Safety Issues weight-shifting ability decreased  -EE      Transfer, Impairments strength decreased;pain;impaired balance  -EE      Transfer, Comment pt required physical assistance to keep R LE from floor and maintain NWB R LE  -EE      Recorded by [EE] Yue Campos PT      Therapy Exercises    Bilateral Lower Extremities AROM:;10 reps;ankle pumps/circles;glut sets;quad sets  -EE      Recorded by [EE] Yue Campos PT      Positioning and Restraints    Pre-Treatment Position sitting in chair/recliner  -EE      Post Treatment Position bed  -EE      In Bed supine;call light within reach;encouraged to call for assist;exit alarm on  -EE      Recorded by [EE] Yue Campos PT        User Key  (r) = Recorded By, (t) = Taken By, (c) = Cosigned By    Initials Name Effective Dates    EE Yue Campos PT 12/01/15 -                 IP PT Goals       02/09/17 0850          Bed Mobility PT LTG    Bed Mobility PT LTG, Date Established 02/09/17  -EE      Bed  Mobility PT LTG, Time to Achieve 5 days  -EE      Bed Mobility PT LTG, Activity Type all bed mobility  -EE      Bed Mobility PT LTG, [place] Level minimum assist (75% patient effort);2 person assist required  -EE      Transfer Training PT LTG    Transfer Training PT LTG, Date Established 02/09/17  -EE      Transfer Training PT LTG, Time to Achieve 5 days  -EE      Transfer Training PT LTG, Activity Type all transfers  -EE      Transfer Training PT LTG, [place] Level minimum assist (75% patient effort);2 person assist required  -EE      Transfer Training PT LTG, Assist Device walker, rolling  -EE      Patient Education PT LTG    Patient Education PT LTG, Date Established 02/09/17  -EE      Patient Education PT LTG, Time to Achieve 5 days  -EE      Patient Education PT LTG, Education Type HEP;precaution per surgeon  -EE      Patient Education PT LTG, Education Understanding demonstrate adequately;verbalize understanding  -EE        User Key  (r) = Recorded By, (t) = Taken By, (c) = Cosigned By    Initials Name Provider Type    GALILEO Campos, PT Physical Therapist          Physical Therapy Education     Title: PT OT SLP Therapies (Active)     Topic: Physical Therapy (Active)     Point: Mobility training (Active)    Learning Progress Summary    Learner Readiness Method Response Comment Documented by Status   Patient Acceptance E,TB NR  EE 02/10/17 1429 Active    Acceptance E,TB NR  EE 02/09/17 0850 Active               Point: Home exercise program (Active)    Learning Progress Summary    Learner Readiness Method Response Comment Documented by Status   Patient Acceptance E,TB NR  EE 02/10/17 1429 Active    Acceptance E,TB NR  EE 02/09/17 0850 Active               Point: Body mechanics (Active)    Learning Progress Summary    Learner Readiness Method Response Comment Documented by Status   Patient Acceptance E,TB NR  EE 02/10/17 1429 Active    Acceptance E,TB NR  EE 02/09/17 0850 Active               Point:  Precautions (Active)    Learning Progress Summary    Learner Readiness Method Response Comment Documented by Status   Patient Acceptance E,TB NR  EE 02/10/17 1429 Active    Acceptance E,TB NR  EE 02/09/17 0850 Active                      User Key     Initials Effective Dates Name Provider Type Discipline    EE 12/01/15 -  Yue Campos PT Physical Therapist PT                    PT Recommendation and Plan  Anticipated Discharge Disposition: skilled nursing facility  Planned Therapy Interventions: balance training, bed mobility training, home exercise program, patient/family education, strengthening, stretching, transfer training  PT Frequency: daily  Plan of Care Review  Plan Of Care Reviewed With: patient  Progress: progress towards functional goals is fair  Outcome Summary/Follow up Plan: Slow progress w/mobility due to pain and weakness. Continues to require assist of two for mobility; manual assistance required to maintain NWB status during transfer. No new concerns.           Outcome Measures       02/10/17 1400 02/09/17 0800       How much help from another person do you currently need...    Turning from your back to your side while in flat bed without using bedrails? 2  -EE 2  -EE     Moving from lying on back to sitting on the side of a flat bed without bedrails? 2  -EE 2  -EE     Moving to and from a bed to a chair (including a wheelchair)? 2  -EE 2  -EE     Standing up from a chair using your arms (e.g., wheelchair, bedside chair)? 2  -EE 2  -EE     Climbing 3-5 steps with a railing? 1  -EE 1  -EE     To walk in hospital room? 1  -EE 1  -EE     AM-PAC 6 Clicks Score 10  -EE 10  -EE     Functional Assessment    Outcome Measure Options AM-PAC 6 Clicks Basic Mobility (PT)  -EE AM-PAC 6 Clicks Basic Mobility (PT)  -EE       User Key  (r) = Recorded By, (t) = Taken By, (c) = Cosigned By    Initials Name Provider Type    GALILEO Campos PT Physical Therapist           Time Calculation:         PT Charges        02/10/17 1431          Time Calculation    Start Time 1410  -EE      Stop Time 1422  -EE      Time Calculation (min) 12 min  -EE      PT Received On 02/10/17  -EE      PT - Next Appointment 02/11/17  -EE        User Key  (r) = Recorded By, (t) = Taken By, (c) = Cosigned By    Initials Name Provider Type    EE Yue Campos PT Physical Therapist          Therapy Charges for Today     Code Description Service Date Service Provider Modifiers Qty    83213952366 HC PT EVAL LOW COMPLEXITY 2 2/9/2017 Yue Campos, PT GP 1    62603087034 HC PT THER PROC EA 15 MIN 2/9/2017 Yue Campos, PT GP 1    40648046804 HC PT THER SUPP EA 15 MIN 2/9/2017 Yue Campos, PT GP 1    82929036412 HC PT THER PROC EA 15 MIN 2/10/2017 Yue Campos, PT GP 1    95476353396 HC PT THER SUPP EA 15 MIN 2/10/2017 Yue Campos, PT GP 1          PT G-Codes  Outcome Measure Options: AM-PAC 6 Clicks Basic Mobility (PT)    Yue Campos PT  2/10/2017

## 2017-02-10 NOTE — PROGRESS NOTES
Name: Frances Downs ADMIT: 2017   : 1950  PCP: Talha Echeverria MD    MRN: [id number] LOS: 1 days   AGE/SEX: 66 y.o. female  ROOM: [facility]     Subjective   No acute events.  Pain is well-controlled.  Taking PO well.  No BM.    General: No fever or chills, Resp: No cough or dyspnea, Cardiac: No chest pain or palpitations, GI: No nausea, vomiting, or diarrhea    Objective   Vital Signs  Temp:  [97.2 °F (36.2 °C)-99.5 °F (37.5 °C)] 98.5 °F (36.9 °C)  Heart Rate:  [59-71] 62  Resp:  [16] 16  BP: ()/(40-60) 122/60  SpO2:  [93 %-98 %] 96 %  on  Flow (L/min):  [2] 2;   O2 Device: nasal cannula  Body mass index is 38.79 kg/(m^2).  Gerneral: Alert, no acute distress  Head: Normocephalic and atraumatic.   Mouth/Throat: Oropharynx is clear and moist. No oropharyngeal exudate.   Eyes: Conjunctivae and pupils nml, EOMI  Neck: Supple, no JVD   Cardiovascular: RRR, no murmur  Pulmonary/Chest: CTAB, no wheezes or rales  Abdominal: Soft. Bowel sounds are normal. She exhibits no distension or tenderness  Musculoskeletal: Right hip surgery site with drain in place, no edema  Neurological: CN intact, oriented x3  Skin: Skin is warm and dry. No pallor or rash.   Psychiatric: She has a normal mood and affect. Her behavior is normal.     ObjectiveResults Review:       I reviewed the patient's new clinical results.    Results from last 7 days  Lab Units 17  0506 17  1844 17  1334   WBC 10*3/mm3  --   --  6.49   HEMOGLOBIN g/dL 8.9* 8.0* 8.8*   PLATELETS 10*3/mm3  --   --  328     Results from last 7 days  Lab Units 17  0506 17  1333   SODIUM mmol/L 129* 130*   POTASSIUM mmol/L 4.7 4.7   CHLORIDE mmol/L 92* 90*   TOTAL CO2 mmol/L 24.6 25.2   BUN mg/dL 15 24*   CREATININE mg/dL 0.59 0.89   GLUCOSE mg/dL 112* 109*   Estimated Creatinine Clearance: 81.9 mL/min (by C-G formula based on Cr of 0.59).  Results from last 7 days  Lab Units 17  0506 17  1333   CALCIUM mg/dL 8.7  9.1   ALBUMIN g/dL  --  3.40*         aspirin 325 mg Oral BID   atorvastatin 40 mg Oral Daily   clopidogrel 75 mg Oral Daily   docusate sodium 100 mg Oral BID   ferrous sulfate 325 mg Oral BID   fluticasone 2 spray Nasal Daily   isosorbide mononitrate 30 mg Oral Daily   levothyroxine 100 mcg Oral Q AM   modafinil 200 mg Oral BID   oxybutynin XL 10 mg Oral Daily   pantoprazole 40 mg Oral Q AM   potassium chloride 10 mEq Oral Daily   pregabalin 200 mg Oral BID   sennosides-docusate sodium 2 tablet Oral BID   tiotropium 1 capsule Inhalation Daily - RT   [START ON 2/10/2017] vancomycin 1,500 mg Intravenous Q12H   vilazodone 40 mg Oral Nightly       lactated ringers 9 mL/hr Last Rate: 9 mL/hr (02/08/17 1440)   Pharmacy to dose vancomycin     Diet Regular      Assessment/Plan   Assessment:     Active Hospital Problems (** Indicates Principal Problem)    Diagnosis Date Noted   • Hematoma of right hip [S70.01XA] 02/08/2017      Resolved Hospital Problems    Diagnosis Date Noted Date Resolved   No resolved problems to display.       Plan:   Right Hip infected Hematoma   S/p OR 2/8.  Operative cultures growing MRSA.  Dr. Benito is on board and recommending IV vancomycin for now.      Anemia:   From right hip hematoma, s/p PRBC 8.0->8.9.  Will follow.  Continue supplemental iron     Perioperative hypotension   Holding amlodipine and coreg held. Improving and no evidence of rebound tachycardia.  Can restart these stepwise beginning with coreg in AM if this continues to improve.      CAD   s/p MICHAEL in 2011 to RCA, MICHAEL to RCA on 5/2/16.  Continue on dual antiplatelet therapy as stent is 9 months old.     Chronic diastolic heart failure: Appears well-compensated and euvolemic     H/o DVT and PE: These were provoked after left knee surgery in 2010 and 2011. Patient completed coumadin for these and did not continue other than with surgeries.    Would not start at this time in light of hematoma/anemia.  Recommend continuing with  mechanical prophylaxis but given history would consider pharmacologic prophylaxis with either temporary coumadin or lovenox when stable from a surgical standpoint.     COPD: continue bronchodilators, not in acute exacerbation       Disposition  TBD.      oYn Hines MD  [facility]  02/09/17  5:48PM

## 2017-02-10 NOTE — PLAN OF CARE
Problem: Patient Care Overview (Adult)  Goal: Plan of Care Review  Outcome: Ongoing (interventions implemented as appropriate)    02/10/17 1523   Coping/Psychosocial Response Interventions   Plan Of Care Reviewed With patient   Patient Care Overview   Progress no change   Outcome Evaluation   Outcome Summary/Follow up Plan Stand and pivot for transfer. C/o pain managed with PO pain medication. Continue IV antibiotics. IV vitamin K.       Goal: Adult Individualization and Mutuality  Outcome: Ongoing (interventions implemented as appropriate)  Goal: Discharge Needs Assessment  Outcome: Ongoing (interventions implemented as appropriate)    Problem: Hip Replacement, Total (Adult)  Goal: Signs and Symptoms of Listed Potential Problems Will be Absent or Manageable (Hip Replacement, Total)  Outcome: Ongoing (interventions implemented as appropriate)    02/10/17 1523   Hip Replacement, Total   Problems Assessed (Total Hip Replacement) all   Problems Present (Total Hip Replacement) pain;constipation         Problem: Fall Risk (Adult)  Goal: Absence of Falls  Outcome: Ongoing (interventions implemented as appropriate)    02/10/17 1523   Fall Risk (Adult)   Absence of Falls achieves outcome         Problem: Infection, Risk/Actual (Adult)  Goal: Infection Prevention/Resolution  Outcome: Ongoing (interventions implemented as appropriate)    02/10/17 1523   Infection, Risk/Actual (Adult)   Infection Prevention/Resolution making progress toward outcome

## 2017-02-10 NOTE — PROGRESS NOTES
Orthopedic Total Progress Note        Patient: Frances Downs    Date of Admission: 2/8/2017  1:10 PM    YOB: 1950    Medical Record Number: [id number]    Attending Physician: Erlin Martin MD      POD # 2     Status post: RT HIP INCISION AND DRAINAGE      Systemic or Specific Complaints: No Complaints. Her cultures from the OR are growing mrsa. We had a long discussion this morning regarding 6 weeks of antibiotics versus removal of the implants and then 6 weeks of antibiotics. She is going to consider these options and we will decide tomorrow what she would like to do.      No Known Allergies      Current Medications:  Scheduled Meds:  aspirin 325 mg Oral BID   atorvastatin 40 mg Oral Daily   clopidogrel 75 mg Oral Daily   docusate sodium 100 mg Oral BID   ferrous sulfate 325 mg Oral BID   fluticasone 2 spray Nasal Daily   isosorbide mononitrate 30 mg Oral Daily   levothyroxine 100 mcg Oral Q AM   modafinil 200 mg Oral BID   oxybutynin XL 10 mg Oral Daily   pantoprazole 40 mg Oral Q AM   potassium chloride 10 mEq Oral Daily   pregabalin 200 mg Oral BID   sennosides-docusate sodium 2 tablet Oral BID   tiotropium 1 capsule Inhalation Daily - RT   vancomycin 1,500 mg Intravenous Q12H   vilazodone 40 mg Oral Nightly     Continuous Infusions:  lactated ringers 9 mL/hr Last Rate: 9 mL/hr (02/08/17 1440)   Pharmacy to dose vancomycin       PRN Meds:.benzocaine-menthol  •  bisacodyl  •  clonazePAM  •  diphenhydrAMINE  •  docusate sodium  •  HYDROcodone-acetaminophen  •  HYDROmorphone **AND** naloxone  •  ipratropium-albuterol  •  Pharmacy to dose vancomycin      Physical Exam: 66 y.o. female  General Appearance:    alert and oriented                Pain Relief: Patient reports some relief       Vitals:    02/09/17 1504 02/09/17 1900 02/10/17 0000 02/10/17 0300   BP: 122/60 138/60 134/74 130/58   BP Location: Right arm Right arm Right arm Right arm   Patient Position: Lying Lying Lying Lying   Pulse:  62 66 62 58   Resp: 16 16 16 16   Temp: 98.5 °F (36.9 °C) 99.3 °F (37.4 °C) 98.2 °F (36.8 °C) 98.8 °F (37.1 °C)   TempSrc: Oral Oral Oral Oral   SpO2: 96% 92% 96% 94%   Weight:       Height:               Extremities:   Operative extremity neurovascular status intact. ROM intact.    Incision intact w/out signs or  symptoms of infection. No           edema, no cyanosis, no calf tenderness     Pulses:     Pulses palpable and equal bilaterally     Skin:     Skin Warm/Dry w/out ulceration, ecchymosis, rash, or   cyanosis     Activity: Mobilizing Per P.T.       Diagnostic Tests:   Admission on 02/08/2017   Component Date Value Ref Range Status   • ABO Type 02/08/2017 A   Final   • RH type 02/08/2017 Positive   Final   • Antibody Screen 02/08/2017 Negative   Final   • Glucose 02/08/2017 109* 65 - 99 mg/dL Final   • BUN 02/08/2017 24* 8 - 23 mg/dL Final   • Creatinine 02/08/2017 0.89  0.57 - 1.00 mg/dL Final   • Sodium 02/08/2017 130* 136 - 145 mmol/L Final   • Potassium 02/08/2017 4.7  3.5 - 5.2 mmol/L Final   • Chloride 02/08/2017 90* 98 - 107 mmol/L Final   • CO2 02/08/2017 25.2  22.0 - 29.0 mmol/L Final   • Calcium 02/08/2017 9.1  8.6 - 10.5 mg/dL Final   • Total Protein 02/08/2017 6.5  6.0 - 8.5 g/dL Final   • Albumin 02/08/2017 3.40* 3.50 - 5.20 g/dL Final   • ALT (SGPT) 02/08/2017 143* 1 - 33 U/L Final   • AST (SGOT) 02/08/2017 198* 1 - 32 U/L Final   • Alkaline Phosphatase 02/08/2017 437* 39 - 117 U/L Final   • Total Bilirubin 02/08/2017 0.6  0.1 - 1.2 mg/dL Final   • eGFR Non  Amer 02/08/2017 64  >60 mL/min/1.73 Final   • Globulin 02/08/2017 3.1  gm/dL Final   • A/G Ratio 02/08/2017 1.1  g/dL Final   • BUN/Creatinine Ratio 02/08/2017 27.0* 7.0 - 25.0 Final   • Anion Gap 02/08/2017 14.8  mmol/L Final   • PTT 02/08/2017 41.1* 22.7 - 35.4 seconds Final   • Protime 02/08/2017 20.3* 11.7 - 14.2 Seconds Final   • INR 02/08/2017 1.81* 0.90 - 1.10 Final   • WBC 02/08/2017 6.49  4.50 - 10.70 10*3/mm3 Final   • RBC  02/08/2017 2.92* 3.90 - 5.20 10*6/mm3 Final   • Hemoglobin 02/08/2017 8.8* 11.9 - 15.5 g/dL Final   • Hematocrit 02/08/2017 27.2* 35.6 - 45.5 % Final   • MCV 02/08/2017 93.2  80.5 - 98.2 fL Final   • MCH 02/08/2017 30.1  26.9 - 32.0 pg Final   • MCHC 02/08/2017 32.4  32.4 - 36.3 g/dL Final   • RDW 02/08/2017 16.2* 11.7 - 13.0 % Final   • RDW-SD 02/08/2017 55.4* 37.0 - 54.0 fl Final   • MPV 02/08/2017 10.3  6.0 - 12.0 fL Final   • Platelets 02/08/2017 328  140 - 500 10*3/mm3 Final   • Neutrophil % 02/08/2017 58.5  42.7 - 76.0 % Final   • Lymphocyte % 02/08/2017 24.2  19.6 - 45.3 % Final   • Monocyte % 02/08/2017 15.3* 5.0 - 12.0 % Final   • Eosinophil % 02/08/2017 1.5  0.3 - 6.2 % Final   • Basophil % 02/08/2017 0.5  0.0 - 1.5 % Final   • Immature Grans % 02/08/2017 0.0  0.0 - 0.5 % Final   • Neutrophils, Absolute 02/08/2017 3.80  1.90 - 8.10 10*3/mm3 Final   • Lymphocytes, Absolute 02/08/2017 1.57  0.90 - 4.80 10*3/mm3 Final   • Monocytes, Absolute 02/08/2017 0.99  0.20 - 1.20 10*3/mm3 Final   • Eosinophils, Absolute 02/08/2017 0.10  0.00 - 0.70 10*3/mm3 Final   • Basophils, Absolute 02/08/2017 0.03  0.00 - 0.20 10*3/mm3 Final   • Immature Grans, Absolute 02/08/2017 0.00  0.00 - 0.03 10*3/mm3 Final   • nRBC 02/08/2017 0.0  0.0 - 0.0 /100 WBC Final   • Wound Culture 02/08/2017 Moderate growth (3+) Staphylococcus aureus, MRSA*  Preliminary      Methicillin resistant Staphylococcus aureus, Patient may be an isolation risk.   • Gram Stain Result 02/08/2017 Rare (1+) WBCs seen   Preliminary   • Gram Stain Result 02/08/2017 Rare (1+) Gram positive cocci in pairs   Preliminary   • Hemoglobin 02/08/2017 8.0* 11.9 - 15.5 g/dL Final   • Hematocrit 02/08/2017 24.9* 35.6 - 45.5 % Final   • Product Code 02/09/2017 [id number]   Final   • Unit Number 02/09/2017 [id number]   Final   • UNIT  ABO 02/09/2017 O   Final   • UNIT  RH 02/09/2017 POS   Final   • CROSSMATCH 1 INTERPRETATION 02/09/2017 Compatible   Final   • Dispense  Status 02/09/2017 PT   Final   • Blood Type 02/09/2017 OPOS   Final   • Blood Expiration Date 02/09/2017 201703092359   Final   • Blood Type Barcode 02/09/2017 5100   Final   • Product Code 02/09/2017 [id number]   Final   • Unit Number 02/09/2017 [id number]   Final   • UNIT  ABO 02/09/2017 O   Final   • UNIT  RH 02/09/2017 POS   Final   • CROSSMATCH 1 INTERPRETATION 02/09/2017 Compatible   Final   • Dispense Status 02/09/2017 PT   Final   • Blood Type 02/09/2017 OPOS   Final   • Blood Expiration Date 02/09/2017 [id number]   Final   • Blood Type Barcode 02/09/2017 5100   Final   • Protime 02/08/2017 22.1* 11.7 - 14.2 Seconds Final   • INR 02/08/2017 2.01* 0.90 - 1.10 Final   • Protime 02/09/2017 22.5* 11.7 - 14.2 Seconds Final   • INR 02/09/2017 2.06* 0.90 - 1.10 Final   • Glucose 02/09/2017 112* 65 - 99 mg/dL Final   • BUN 02/09/2017 15  8 - 23 mg/dL Final   • Creatinine 02/09/2017 0.59  0.57 - 1.00 mg/dL Final   • Sodium 02/09/2017 129* 136 - 145 mmol/L Final   • Potassium 02/09/2017 4.7  3.5 - 5.2 mmol/L Final   • Chloride 02/09/2017 92* 98 - 107 mmol/L Final   • CO2 02/09/2017 24.6  22.0 - 29.0 mmol/L Final   • Calcium 02/09/2017 8.7  8.6 - 10.5 mg/dL Final   • eGFR Non African Amer 02/09/2017 102  >60 mL/min/1.73 Final   • BUN/Creatinine Ratio 02/09/2017 25.4* 7.0 - 25.0 Final   • Anion Gap 02/09/2017 12.4  mmol/L Final   • Hemoglobin 02/09/2017 8.9* 11.9 - 15.5 g/dL Final   • Hematocrit 02/09/2017 27.0* 35.6 - 45.5 % Final   • Protime 02/10/2017 22.6* 11.7 - 14.2 Seconds Final   • INR 02/10/2017 2.07* 0.90 - 1.10 Final   • Hemoglobin 02/10/2017 8.6* 11.9 - 15.5 g/dL Final   • Hematocrit 02/10/2017 26.7* 35.6 - 45.5 % Final       Xr Pelvis 1 Or 2 View    Result Date: 2/8/2017  Narrative: STAT PORTABLE RADIOGRAPHIC VIEW OF THE PELVIS AND RIGHT HIP  CLINICAL HISTORY: Status post incision and drainage.  Frontal projection of the pelvis and right hip demonstrate a right hip arthroplasty in place. There is a  fracture through the acetabulum and the acetabular component of the right hip arthroplasty projects into the pelvis. Typical postoperative changes are identified within the adjacent soft tissues.  The findings of this fracture were directly discussed with Kim Manley RN, the nurse caring for this patient on 02/08/2017 at approximately 6:50 PM. She was instructed to notify Dr. Martin with these findings.  This report was finalized on 2/8/2017 8:35 PM by Dr. Benjy Jimenez MD.          Assessment:  Patient Active Problem List   Diagnosis   • Hematoma of right hip     Post-operative Pain  Immobility    Plan:    Continue Physical Therapy, increase mobility as tolerated.  Continue SCDs, Continue DVT prophalaxis.  Continue Pain management efforts  Continue Incisional care  Continue IV antibiotics    Discharge Plan: tbd to SNF    Date: 2/10/2017   Time: 6:44 AM    Erlin Martin MD

## 2017-02-10 NOTE — PROGRESS NOTES
"DAILY PROGRESS NOTE  [facility]    Patient Identification:  Name: Frances Downs  Age: 66 y.o.  Sex: female  :  1950  MRN: [id number]         Primary Care Physician: Talha Echeverria MD    Subjective:  Interval History:She is anxious about having hip removed. Complains of hip pain.    Objective:    Scheduled Meds:    aspirin 325 mg Oral BID   atorvastatin 40 mg Oral Daily   clopidogrel 75 mg Oral Daily   docusate sodium 100 mg Oral BID   ferrous sulfate 325 mg Oral BID   fluticasone 2 spray Nasal Daily   isosorbide mononitrate 30 mg Oral Daily   levothyroxine 100 mcg Oral Q AM   modafinil 200 mg Oral BID   oxybutynin XL 10 mg Oral Daily   pantoprazole 40 mg Oral Q AM   potassium chloride 10 mEq Oral Daily   pregabalin 200 mg Oral BID   sennosides-docusate sodium 2 tablet Oral BID   tiotropium 1 capsule Inhalation Daily - RT   vancomycin 1,500 mg Intravenous Q12H   vilazodone 40 mg Oral Nightly     Continuous Infusions:    lactated ringers 9 mL/hr Last Rate: 9 mL/hr (17 1440)   Pharmacy to dose vancomycin         Vital signs in last 24 hours:  Temp:  [98.2 °F (36.8 °C)-99.5 °F (37.5 °C)] 99.3 °F (37.4 °C)  Heart Rate:  [56-63] 56  Resp:  [14-16] 16  BP: ()/(58-74) 112/72    Intake/Output:    Intake/Output Summary (Last 24 hours) at 02/10/17 2048  Last data filed at 02/10/17 0500   Gross per 24 hour   Intake      0 ml   Output    650 ml   Net   -650 ml       Exam:  Visit Vitals   • /72 (BP Location: Left arm, Patient Position: Lying)   • Pulse 56   • Temp 99.3 °F (37.4 °C) (Oral)   • Resp 16   • Ht 64\" (162.6 cm)   • Wt 226 lb (103 kg)   • SpO2 100%   • BMI 38.79 kg/m2       General Appearance:    Alert, cooperative, no distress   Head:    Normocephalic, without obvious abnormality, atraumatic   Eyes:       Throat:   Lips, tongue, gums normal   Neck:   Supple, symmetrical, trachea midline, no JVD   Lungs:     Clear to auscultation bilaterally, respirations unlabored "   Chest Wall:    No tenderness or deformity    Heart:    Regular rate and rhythm, S1 and S2 normal, no murmur,no  Rub or gallop   Abdomen:     Soft, non-tender, bowel sounds active, no masses, no organomegaly    Extremities:   Extremities normal, right hip with drain, no cyanosis or edema   Pulses:      Skin:   Skin is warm and dry,  no rashes or palpable lesions   Neurologic:   no focal deficits noted      [unfilled]  Data Review:    Results from last 7 days  Lab Units 02/09/17  0506 02/08/17  1333   SODIUM mmol/L 129* 130*   POTASSIUM mmol/L 4.7 4.7   CHLORIDE mmol/L 92* 90*   TOTAL CO2 mmol/L 24.6 25.2   BUN mg/dL 15 24*   CREATININE mg/dL 0.59 0.89   GLUCOSE mg/dL 112* 109*   CALCIUM mg/dL 8.7 9.1       Results from last 7 days  Lab Units 02/10/17  0256 02/09/17  0506 02/08/17  1844 02/08/17  1334   WBC 10*3/mm3  --   --   --  6.49   HEMOGLOBIN g/dL 8.6* 8.9* 8.0* 8.8*   HEMATOCRIT % 26.7* 27.0* 24.9* 27.2*   PLATELETS 10*3/mm3  --   --   --  328             No results found for: TROPONINT        Results from last 7 days  Lab Units 02/08/17  1333   ALK PHOS U/L 437*   BILIRUBIN mg/dL 0.6   ALT (SGPT) U/L 143*   AST (SGOT) U/L 198*             No results found for: POCGLU    Results from last 7 days  Lab Units 02/10/17  0256 02/09/17  0506 02/08/17  2024   INR  2.07* 2.06* 2.01*       Patient Active Problem List   Diagnosis Code   • Hematoma of right hip S70.01XA   • DVT (deep venous thrombosis), hx of I82.409   • Hypertension I10   • Hyperlipidemia E78.5   • Coronary artery disease, hx of stent I25.10   • Arthritis M19.90   • Bipolar disorder F31.9   • Prosthetic hip infection, 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 ?? Remove hip sometime. Off coumadin.    Driss Gutierrez MD  2/10/2017  8:48 PM

## 2017-02-11 LAB
ANION GAP SERPL CALCULATED.3IONS-SCNC: 12.4 MMOL/L
BASOPHILS # BLD AUTO: 0.06 10*3/MM3 (ref 0–0.2)
BASOPHILS NFR BLD AUTO: 1.5 % (ref 0–1.5)
BUN BLD-MCNC: 8 MG/DL (ref 8–23)
BUN/CREAT SERPL: 16.3 (ref 7–25)
CALCIUM SPEC-SCNC: 8.4 MG/DL (ref 8.6–10.5)
CHLORIDE SERPL-SCNC: 98 MMOL/L (ref 98–107)
CO2 SERPL-SCNC: 24.6 MMOL/L (ref 22–29)
CREAT BLD-MCNC: 0.49 MG/DL (ref 0.57–1)
DEPRECATED RDW RBC AUTO: 57 FL (ref 37–54)
EOSINOPHIL # BLD AUTO: 0.19 10*3/MM3 (ref 0–0.7)
EOSINOPHIL NFR BLD AUTO: 4.8 % (ref 0.3–6.2)
ERYTHROCYTE [DISTWIDTH] IN BLOOD BY AUTOMATED COUNT: 16.6 % (ref 11.7–13)
GFR SERPL CREATININE-BSD FRML MDRD: 127 ML/MIN/1.73
GLUCOSE BLD-MCNC: 85 MG/DL (ref 65–99)
HCT VFR BLD AUTO: 27.3 % (ref 35.6–45.5)
HGB BLD-MCNC: 8.6 G/DL (ref 11.9–15.5)
IMM GRANULOCYTES # BLD: 0.05 10*3/MM3 (ref 0–0.03)
IMM GRANULOCYTES NFR BLD: 1.3 % (ref 0–0.5)
INR PPP: 1.35 (ref 0.9–1.1)
LYMPHOCYTES # BLD AUTO: 1.31 10*3/MM3 (ref 0.9–4.8)
LYMPHOCYTES NFR BLD AUTO: 33.4 % (ref 19.6–45.3)
MCH RBC QN AUTO: 29.5 PG (ref 26.9–32)
MCHC RBC AUTO-ENTMCNC: 31.5 G/DL (ref 32.4–36.3)
MCV RBC AUTO: 93.5 FL (ref 80.5–98.2)
MONOCYTES # BLD AUTO: 0.61 10*3/MM3 (ref 0.2–1.2)
MONOCYTES NFR BLD AUTO: 15.6 % (ref 5–12)
NEUTROPHILS # BLD AUTO: 1.7 10*3/MM3 (ref 1.9–8.1)
NEUTROPHILS NFR BLD AUTO: 43.4 % (ref 42.7–76)
PLATELET # BLD AUTO: 335 10*3/MM3 (ref 140–500)
PMV BLD AUTO: 10.2 FL (ref 6–12)
POTASSIUM BLD-SCNC: 4.1 MMOL/L (ref 3.5–5.2)
PROTHROMBIN TIME: 16.2 SECONDS (ref 11.7–14.2)
RBC # BLD AUTO: 2.92 10*6/MM3 (ref 3.9–5.2)
SODIUM BLD-SCNC: 135 MMOL/L (ref 136–145)
VANCOMYCIN TROUGH SERPL-MCNC: 15.6 MCG/ML (ref 5–20)
WBC NRBC COR # BLD: 3.92 10*3/MM3 (ref 4.5–10.7)

## 2017-02-11 PROCEDURE — 97110 THERAPEUTIC EXERCISES: CPT

## 2017-02-11 PROCEDURE — 94640 AIRWAY INHALATION TREATMENT: CPT

## 2017-02-11 PROCEDURE — 80048 BASIC METABOLIC PNL TOTAL CA: CPT | Performed by: HOSPITALIST

## 2017-02-11 PROCEDURE — 85610 PROTHROMBIN TIME: CPT | Performed by: ORTHOPAEDIC SURGERY

## 2017-02-11 PROCEDURE — 85025 COMPLETE CBC W/AUTO DIFF WBC: CPT | Performed by: HOSPITALIST

## 2017-02-11 PROCEDURE — 80202 ASSAY OF VANCOMYCIN: CPT | Performed by: ORTHOPAEDIC SURGERY

## 2017-02-11 PROCEDURE — 25010000002 VANCOMYCIN: Performed by: ORTHOPAEDIC SURGERY

## 2017-02-11 RX ORDER — HYDROCODONE BITARTRATE AND ACETAMINOPHEN 10; 325 MG/1; MG/1
2 TABLET ORAL EVERY 4 HOURS PRN
Status: DISCONTINUED | OUTPATIENT
Start: 2017-02-11 | End: 2017-02-14

## 2017-02-11 RX ADMIN — VANCOMYCIN HYDROCHLORIDE 1500 MG: 1 INJECTION, POWDER, LYOPHILIZED, FOR SOLUTION INTRAVENOUS at 12:00

## 2017-02-11 RX ADMIN — OXYBUTYNIN CHLORIDE 10 MG: 10 TABLET, FILM COATED, EXTENDED RELEASE ORAL at 08:22

## 2017-02-11 RX ADMIN — VILAZODONE HYDROCHLORIDE 40 MG: 40 TABLET ORAL at 22:30

## 2017-02-11 RX ADMIN — DOCUSATE SODIUM 100 MG: 100 CAPSULE, LIQUID FILLED ORAL at 17:40

## 2017-02-11 RX ADMIN — FERROUS SULFATE TAB 325 MG (65 MG ELEMENTAL FE) 325 MG: 325 (65 FE) TAB at 17:39

## 2017-02-11 RX ADMIN — TIOTROPIUM BROMIDE 1 CAPSULE: 18 CAPSULE ORAL; RESPIRATORY (INHALATION) at 08:38

## 2017-02-11 RX ADMIN — FERROUS SULFATE TAB 325 MG (65 MG ELEMENTAL FE) 325 MG: 325 (65 FE) TAB at 08:22

## 2017-02-11 RX ADMIN — DOCUSATE SODIUM 100 MG: 100 CAPSULE, LIQUID FILLED ORAL at 08:21

## 2017-02-11 RX ADMIN — ASPIRIN 325 MG: 325 TABLET ORAL at 17:39

## 2017-02-11 RX ADMIN — ISOSORBIDE MONONITRATE 30 MG: 30 TABLET, EXTENDED RELEASE ORAL at 08:22

## 2017-02-11 RX ADMIN — CLOPIDOGREL 75 MG: 75 TABLET, FILM COATED ORAL at 08:22

## 2017-02-11 RX ADMIN — DOCUSATE SODIUM -SENNOSIDES 2 TABLET: 50; 8.6 TABLET, COATED ORAL at 22:30

## 2017-02-11 RX ADMIN — HYDROCODONE BITARTRATE AND ACETAMINOPHEN 2 TABLET: 10; 325 TABLET ORAL at 17:40

## 2017-02-11 RX ADMIN — MODAFINIL 200 MG: 100 TABLET ORAL at 17:40

## 2017-02-11 RX ADMIN — PANTOPRAZOLE SODIUM 40 MG: 40 TABLET, DELAYED RELEASE ORAL at 05:38

## 2017-02-11 RX ADMIN — LEVOTHYROXINE SODIUM 100 MCG: 100 TABLET ORAL at 05:38

## 2017-02-11 RX ADMIN — HYDROCODONE BITARTRATE AND ACETAMINOPHEN 1 TABLET: 10; 325 TABLET ORAL at 05:38

## 2017-02-11 RX ADMIN — PREGABALIN 200 MG: 100 CAPSULE ORAL at 08:22

## 2017-02-11 RX ADMIN — MODAFINIL 200 MG: 100 TABLET ORAL at 08:23

## 2017-02-11 RX ADMIN — HYDROCODONE BITARTRATE AND ACETAMINOPHEN 2 TABLET: 10; 325 TABLET ORAL at 22:30

## 2017-02-11 RX ADMIN — ATORVASTATIN CALCIUM 40 MG: 40 TABLET, FILM COATED ORAL at 22:30

## 2017-02-11 RX ADMIN — DOCUSATE SODIUM -SENNOSIDES 2 TABLET: 50; 8.6 TABLET, COATED ORAL at 08:21

## 2017-02-11 RX ADMIN — FLUTICASONE PROPIONATE 2 SPRAY: 50 SPRAY, METERED NASAL at 08:30

## 2017-02-11 RX ADMIN — VANCOMYCIN HYDROCHLORIDE 1500 MG: 1 INJECTION, POWDER, LYOPHILIZED, FOR SOLUTION INTRAVENOUS at 00:10

## 2017-02-11 RX ADMIN — ASPIRIN 325 MG: 325 TABLET ORAL at 08:21

## 2017-02-11 RX ADMIN — HYDROCODONE BITARTRATE AND ACETAMINOPHEN 2 TABLET: 10; 325 TABLET ORAL at 09:12

## 2017-02-11 RX ADMIN — POTASSIUM CHLORIDE 10 MEQ: 750 CAPSULE, EXTENDED RELEASE ORAL at 08:22

## 2017-02-11 RX ADMIN — HYDROCODONE BITARTRATE AND ACETAMINOPHEN 2 TABLET: 10; 325 TABLET ORAL at 13:34

## 2017-02-11 RX ADMIN — PREGABALIN 200 MG: 100 CAPSULE ORAL at 17:38

## 2017-02-11 NOTE — PLAN OF CARE
Problem: Patient Care Overview (Adult)  Goal: Plan of Care Review  Outcome: Ongoing (interventions implemented as appropriate)    02/11/17 6010   Coping/Psychosocial Response Interventions   Plan Of Care Reviewed With patient   Patient Care Overview   Progress no change   Outcome Evaluation   Outcome Summary/Follow up Plan pain and wkness limiting ability for pt to actively maintain NWB even for tfers

## 2017-02-11 NOTE — PLAN OF CARE
Problem: Patient Care Overview (Adult)  Goal: Plan of Care Review  Outcome: Ongoing (interventions implemented as appropriate)    02/11/17 1406   Coping/Psychosocial Response Interventions   Plan Of Care Reviewed With patient   Patient Care Overview   Progress improving   Outcome Evaluation   Outcome Summary/Follow up Plan Vancomycin continues trough 15.3 Poor pain control with 1 Lortab. MD notified and increased to 2 tabs . Good pain control with change         Problem: Hip Replacement, Total (Adult)  Goal: Signs and Symptoms of Listed Potential Problems Will be Absent or Manageable (Hip Replacement, Total)  Outcome: Ongoing (interventions implemented as appropriate)    Problem: Fall Risk (Adult)  Goal: Absence of Falls  Outcome: Ongoing (interventions implemented as appropriate)    Problem: Infection, Risk/Actual (Adult)  Goal: Infection Prevention/Resolution  Outcome: Ongoing (interventions implemented as appropriate)    Problem: Skin Integrity Impairment, Risk/Actual (Adult)  Goal: Identify Related Risk Factors and Signs and Symptoms  Outcome: Outcome(s) achieved Date Met:  02/11/17  Goal: Skin Integrity/Wound Healing  Outcome: Ongoing (interventions implemented as appropriate)

## 2017-02-11 NOTE — PROGRESS NOTES
"  Infectious Diseases Progress Note    Krysta Benito MD     [facility]  [place]: 3 days  Patient Identification:  Name: Frances Downs  Age: 66 y.o.  Sex: female  :  1950  MRN: [id number]         Primary Care Physician: Talha Echeverria MD            Subjective: No specific complaints denies any fever and chills.  Right hip still hurts and has persistent drainage  Interval History: See above     Objective:    Scheduled Meds:  aspirin 325 mg Oral BID   atorvastatin 40 mg Oral Daily   clopidogrel 75 mg Oral Daily   docusate sodium 100 mg Oral BID   ferrous sulfate 325 mg Oral BID   fluticasone 2 spray Nasal Daily   isosorbide mononitrate 30 mg Oral Daily   levothyroxine 100 mcg Oral Q AM   modafinil 200 mg Oral BID   oxybutynin XL 10 mg Oral Daily   pantoprazole 40 mg Oral Q AM   potassium chloride 10 mEq Oral Daily   pregabalin 200 mg Oral BID   sennosides-docusate sodium 2 tablet Oral BID   tiotropium 1 capsule Inhalation Daily - RT   vancomycin 1,500 mg Intravenous Q12H   vilazodone 40 mg Oral Nightly     Continuous Infusions:  lactated ringers 9 mL/hr Last Rate: 9 mL/hr (17 1440)   Pharmacy to dose vancomycin         Vital signs in last 24 hours:  Temp:  [97.6 °F (36.4 °C)-99.3 °F (37.4 °C)] 98.3 °F (36.8 °C)  Heart Rate:  [56-86] 58  Resp:  [16] 16  BP: ()/(58-76) 98/58    Intake/Output:    Intake/Output Summary (Last 24 hours) at 17 1830  Last data filed at 17 1700   Gross per 24 hour   Intake    440 ml   Output   1050 ml   Net   -610 ml       Exam:  Visit Vitals   • BP 98/58 (BP Location: Right arm, Patient Position: Sitting)   • Pulse 58   • Temp 98.3 °F (36.8 °C) (Oral)   • Resp 16   • Ht 64\" (162.6 cm)   • Wt 226 lb (103 kg)   • SpO2 97%   • BMI 38.79 kg/m2       General Appearance:    Alert, cooperative, no distress, AAOx3                          Head:    Normocephalic, without obvious abnormality, atraumatic                           Eyes:    PERRL, " conjunctiva/corneas clear, EOM's intact, both eyes                         Throat:   Lips, tongue, gums normal; oral mucosa pink and moist                           Neck:   Supple, symmetrical, trachea midline, no JVD                         Lungs:    Clear to auscultation bilaterally, respirations unlabored                 Chest Wall:    No tenderness or deformity                          Heart:    Regular rate and rhythm, S1 and S2 normal, no murmur,no  Rub                                      or gallop                  Abdomen:     Soft, non-tender, bowel sounds active, no masses, no                                                        organomegaly                  Extremities:   Right hip surgical site dressed drain in place                        Pulses:   Pulses palpable in all extremities                            Skin:   Skin is warm and dry,  no rashes or palpable lesions                  Neurologic:   CNII-XII intact, motor strength grossly intact, sensation grossly                                         intact to light touch, no focal deficits noted       Data Review:    I reviewed the patient's new clinical results.    Results from last 7 days  Lab Units 02/11/17  0343 02/10/17  0256 02/09/17  0506 02/08/17  1844 02/08/17  1334   WBC 10*3/mm3 3.92*  --   --   --  6.49   HEMOGLOBIN g/dL 8.6* 8.6* 8.9* 8.0* 8.8*   PLATELETS 10*3/mm3 335  --   --   --  328       Results from last 7 days  Lab Units 02/11/17  0343 02/09/17  0506 02/08/17  1333   SODIUM mmol/L 135* 129* 130*   POTASSIUM mmol/L 4.1 4.7 4.7   CHLORIDE mmol/L 98 92* 90*   TOTAL CO2 mmol/L 24.6 24.6 25.2   BUN mg/dL 8 15 24*   CREATININE mg/dL 0.49* 0.59 0.89   CALCIUM mg/dL 8.4* 8.7 9.1   GLUCOSE mg/dL 85 112* 109*         Assessment:  Principal Problem:    Prosthetic hip infection, mrsa  Active Problems:    Hematoma of right hip    DVT (deep venous thrombosis), hx of    Hypertension    Hyperlipidemia    Coronary artery disease, hx of stent     Arthritis    Bipolar disorder    MRSA (methicillin resistant Staphylococcus aureus) infection      Plan:  Arrange a PICC line and at least 6-8 weeks of IV vancomycin therapy from final surgery.    Krysta Benito MD  2/11/2017  6:30 PM    Much of this encounter note is an electronic transcription/translation of spoken language to printed text. The electronic translation of spoken language may permit erroneous, or at times, nonsensical words or phrases to be inadvertently transcribed; Although I have reviewed the note for such errors, some may still exist

## 2017-02-11 NOTE — PROGRESS NOTES
Acute Care - Physical Therapy Treatment Note  [facility]     Patient Name: Frances Downs  : 1950  MRN: [id number]  Today's Date: 2017        Referring Physician: Veronica    Admit Date: 2017    Visit Dx:    ICD-10-CM ICD-9-CM   1. Difficulty walking R26.2 719.7   2. Hematoma T14.8 924.9     Patient Active Problem List   Diagnosis   • Hematoma of right hip   • DVT (deep venous thrombosis), hx of   • Hypertension   • Hyperlipidemia   • Coronary artery disease, hx of stent   • Arthritis   • Bipolar disorder   • Prosthetic hip infection, mrsa   • MRSA (methicillin resistant Staphylococcus aureus) infection               Adult Rehabilitation Note       17 1350 02/10/17 1410       Rehab Assessment/Intervention    Discipline physical therapy assistant  - physical therapist  -EE     Document Type therapy note (daily note)  - therapy note (daily note)  -EE     Subjective Information agree to therapy;complains of;fatigue;pain;swelling;weakness  - agree to therapy;complains of;weakness;fatigue;pain  -EE     Patient Effort, Rehab Treatment  adequate  -EE     Symptoms Noted During/After Treatment  fatigue  -EE     Precautions/Limitations fall precautions;non-weight bearing status   right  - fall precautions;other (see comments)   NWB R LE  -EE     Specific Treatment Considerations rt hip drain  -JM      Recorded by [JM] Shruti Palmer PTA [EE] Yue Campos, PT     Pain Assessment    Pain Assessment 0-10  -JM 0-10  -EE     Pain Score 8  -JM 7  -EE     Pain Type  Acute pain  -EE     Pain Location Hip  -JM Hip  -EE     Pain Orientation Right  - Right  -EE     Pain Intervention(s) Medication (See MAR);Repositioned;Elevated  -JM Repositioned;Rest  -EE     Response to Interventions milad  -JM tolerated  -EE     Recorded by [MARILEE] Shruti Pamler PTA [EE] Yue Campos, PORTILLO     Cognitive Assessment/Intervention    Current Cognitive/Communication Assessment  functional  -EE     Orientation Status   oriented x 4  -EE     Follows Commands/Answers Questions  100% of the time  -EE     Personal Safety  WNL/WFL  -EE     Personal Safety Interventions  fall prevention program maintained;gait belt;muscle strengthening facilitated;nonskid shoes/slippers when out of bed;supervised activity  -EE     Recorded by  [EE] Yue Campos, PT     Mobility Assessment/Training    Extremity Weight-Bearing Status  right lower extremity  -EE     Right Lower Extremity Weight-Bearing  non weight-bearing  -EE     Recorded by  [EE] Yue Campos, PT     Bed Mobility, Assessment/Treatment    Bed Mobility, Assistive Device bed rails;head of bed elevated  -      Bed Mob, Supine to Sit, [place] moderate assist (50% patient effort);maximum assist (25% patient effort)  - not tested  -EE     Bed Mob, Sit to Supine, [place]  moderate assist (50% patient effort);2 person assist required;verbal cues required  -EE     Recorded by [JM] Shruti Palmer PTA [EE] Yue Campos, PT     Transfer Assessment/Treatment    Transfers, Bed-Chair [place] moderate assist (50% patient effort);2 person assist required;verbal cues required;nonverbal cues required (demo/gesture)  - moderate assist (50% patient effort);maximum assist (25% patient effort);2 person assist required;verbal cues required;hand held assist  -     Transfers, Sit-Stand [place] moderate assist (50% patient effort);2 person assist required  - moderate assist (50% patient effort);2 person assist required;verbal cues required;hand held assist  -EE     Transfers, Stand-Sit [place] minimum assist (75% patient effort);2 person assist required;verbal cues required  - moderate assist (50% patient effort);2 person assist required;verbal cues required;hand held assist  -EE     Transfers, Sit-Stand-Sit, Assist Device rolling walker  -      Transfer, Maintain Weight Bearing Status cues to maintain weight bearing status;unable to maintain weight bearing status  - cues to  maintain weight bearing status;assist to maintain weight bearing status  -EE     Transfer, Safety Issues weight-shifting ability decreased;balance decreased during turns  - weight-shifting ability decreased  -EE     Transfer, Impairments strength decreased;impaired balance;pain  - strength decreased;pain;impaired balance  -EE     Transfer, Comment still unable to NWB w/o assist--unable to maintain NWB entire tfer   slid toes on floor, but said no weight put down   - pt required physical assistance to keep R LE from floor and maintain NWB R LE  -EE     Recorded by [JM] Shruti Palmer PTA [EE] Yue Campos PT     Gait Assessment/Treatment    Gait, [place] Level unable to perform  -JM      Recorded by [JM] Shruti Palmer PTA      Therapy Exercises    Bilateral Lower Extremities  AROM:;10 reps;ankle pumps/circles;glut sets;quad sets  -EE     Recorded by  [EE] Yue Campos PT     Positioning and Restraints    Pre-Treatment Position in bed  -JM sitting in chair/recliner  -EE     Post Treatment Position  bed  -EE     In Bed  supine;call light within reach;encouraged to call for assist;exit alarm on  -EE     In Chair reclined;call light within reach;encouraged to call for assist;notified nsg  -JM      Recorded by [MARILEE] Shruti Palmer PTA [EE] Yue Campos PT       User Key  (r) = Recorded By, (t) = Taken By, (c) = Cosigned By    Initials Name Effective Dates    EE Yue Campos, PT 12/01/15 -     MARILEE Palmer PTA 02/18/16 -                 IP PT Goals       02/09/17 0850          Bed Mobility PT LTG    Bed Mobility PT LTG, Date Established 02/09/17  -EE      Bed Mobility PT LTG, Time to Achieve 5 days  -EE      Bed Mobility PT LTG, Activity Type all bed mobility  -EE      Bed Mobility PT LTG, [place] Level minimum assist (75% patient effort);2 person assist required  -EE      Transfer Training PT LTG    Transfer Training PT LTG, Date Established 02/09/17  -EE      Transfer Training PT LTG, Time to  Achieve 5 days  -EE      Transfer Training PT LTG, Activity Type all transfers  -EE      Transfer Training PT LTG, [place] Level minimum assist (75% patient effort);2 person assist required  -EE      Transfer Training PT LTG, Assist Device walker, rolling  -EE      Patient Education PT LTG    Patient Education PT LTG, Date Established 02/09/17  -EE      Patient Education PT LTG, Time to Achieve 5 days  -EE      Patient Education PT LTG, Education Type HEP;precaution per surgeon  -EE      Patient Education PT LTG, Education Understanding demonstrate adequately;verbalize understanding  -EE        User Key  (r) = Recorded By, (t) = Taken By, (c) = Cosigned By    Initials Name Provider Type    EE Yue Campos, PT Physical Therapist          Physical Therapy Education     Title: PT OT SLP Therapies (Active)     Topic: Physical Therapy (Active)     Point: Mobility training (Active)    Learning Progress Summary    Learner Readiness Method Response Comment Documented by Status   Patient Acceptance E,TB,D NR   02/11/17 1355 Active    Acceptance E,TB NR  EE 02/10/17 1429 Active    Acceptance E,TB NR  EE 02/09/17 0850 Active               Point: Home exercise program (Active)    Learning Progress Summary    Learner Readiness Method Response Comment Documented by Status   Patient Acceptance E,TB,D NR   02/11/17 1355 Active    Acceptance E,TB NR  EE 02/10/17 1429 Active    Acceptance E,TB NR  EE 02/09/17 0850 Active               Point: Body mechanics (Active)    Learning Progress Summary    Learner Readiness Method Response Comment Documented by Status   Patient Acceptance E,TB,D NR   02/11/17 1355 Active    Acceptance E,TB NR  EE 02/10/17 1429 Active    Acceptance E,TB NR  EE 02/09/17 0850 Active               Point: Precautions (Active)    Learning Progress Summary    Learner Readiness Method Response Comment Documented by Status   Patient Acceptance E,TB,D NR   02/11/17 1355 Active    Acceptance E,TB NR  EE  02/10/17 1429 Active    Acceptance E,TB NR  EE 02/09/17 0850 Active                      User Key     Initials Effective Dates Name Provider Type Discipline    EE 12/01/15 -  Yue Campos, PT Physical Therapist PT     02/18/16 -  Shruti Palmer PTA Physical Therapy Assistant PT                    PT Recommendation and Plan  Anticipated Discharge Disposition: skilled nursing facility  Planned Therapy Interventions: balance training, bed mobility training, home exercise program, patient/family education, strengthening, stretching, transfer training  PT Frequency: daily  Plan of Care Review  Plan Of Care Reviewed With: patient  Progress: no change  Outcome Summary/Follow up Plan: pain and wkness limiting ability for pt to actively maintain NWB even for tfers          Outcome Measures       02/11/17 1300 02/10/17 1400 02/09/17 0800    How much help from another person do you currently need...    Turning from your back to your side while in flat bed without using bedrails? 2  - 2  -EE 2  -EE    Moving from lying on back to sitting on the side of a flat bed without bedrails? 2  - 2  -EE 2  -EE    Moving to and from a bed to a chair (including a wheelchair)? 2  - 2  -EE 2  -EE    Standing up from a chair using your arms (e.g., wheelchair, bedside chair)? 2  - 2  -EE 2  -EE    Climbing 3-5 steps with a railing? 1  - 1  -EE 1  -EE    To walk in hospital room? 1  - 1  -EE 1  -EE    AM-PAC 6 Clicks Score 10  - 10  -EE 10  -EE    Functional Assessment    Outcome Measure Options  AM-PAC 6 Clicks Basic Mobility (PT)  -EE AM-PAC 6 Clicks Basic Mobility (PT)  -EE      User Key  (r) = Recorded By, (t) = Taken By, (c) = Cosigned By    Initials Name Provider Type    GALILEO Campos, PT Physical Therapist    MARILEE Palmer PTA Physical Therapy Assistant           Time Calculation:         PT Charges       02/11/17 1528          Time Calculation    Start Time 1340  -JM      Stop Time 1400  -JM      Time Calculation  (min) 20 min  -MARILEE      PT Received On 02/11/17  -MARILEE      PT - Next Appointment 02/12/17  -MARILEE        User Key  (r) = Recorded By, (t) = Taken By, (c) = Cosigned By    Initials Name Provider Type    MARILEE Palmer PTA Physical Therapy Assistant          Therapy Charges for Today     Code Description Service Date Service Provider Modifiers Qty    27208003841 HC PT THER PROC EA 15 MIN 2/11/2017 Shruti Palmer PTA GP 1    37546753521 HC PT THER SUPP EA 15 MIN 2/11/2017 Shruti Palmer PTA GP 1          PT G-Codes  Outcome Measure Options: AM-PAC 6 Clicks Basic Mobility (PT)    Shruti Palmer PTA  2/11/2017

## 2017-02-11 NOTE — PLAN OF CARE
Problem: Patient Care Overview (Adult)  Goal: Plan of Care Review  Outcome: Ongoing (interventions implemented as appropriate)    02/11/17 1417   Coping/Psychosocial Response Interventions   Plan Of Care Reviewed With patient   Patient Care Overview   Progress improving   Outcome Evaluation   Outcome Summary/Follow up Plan AA consulted for papin management. Femerol nerve block done. with pain relief. Pt continues to take pain med due to fear of increased pain.          Problem: Hip Replacement, Total (Adult)  Goal: Signs and Symptoms of Listed Potential Problems Will be Absent or Manageable (Hip Replacement, Total)  Outcome: Ongoing (interventions implemented as appropriate)    Problem: Infection, Risk/Actual (Adult)  Goal: Infection Prevention/Resolution  Outcome: Ongoing (interventions implemented as appropriate)    Problem: Skin Integrity Impairment, Risk/Actual (Adult)  Goal: Skin Integrity/Wound Healing  Outcome: Ongoing (interventions implemented as appropriate)

## 2017-02-11 NOTE — PROGRESS NOTES
"DAILY PROGRESS NOTE  [facility]    Patient Identification:  Name: Frances Downs  Age: 66 y.o.  Sex: female  :  1950  MRN: [id number]         Primary Care Physician: Talha Echeverria MD    Subjective:  Interval History:She is anxious about having hip removed. Complains of hip pain.    Objective:    Scheduled Meds:    aspirin 325 mg Oral BID   atorvastatin 40 mg Oral Daily   clopidogrel 75 mg Oral Daily   docusate sodium 100 mg Oral BID   ferrous sulfate 325 mg Oral BID   fluticasone 2 spray Nasal Daily   isosorbide mononitrate 30 mg Oral Daily   levothyroxine 100 mcg Oral Q AM   modafinil 200 mg Oral BID   oxybutynin XL 10 mg Oral Daily   pantoprazole 40 mg Oral Q AM   potassium chloride 10 mEq Oral Daily   pregabalin 200 mg Oral BID   sennosides-docusate sodium 2 tablet Oral BID   tiotropium 1 capsule Inhalation Daily - RT   vancomycin 1,500 mg Intravenous Q12H   vilazodone 40 mg Oral Nightly     Continuous Infusions:    lactated ringers 9 mL/hr Last Rate: 9 mL/hr (17 1440)   Pharmacy to dose vancomycin         Vital signs in last 24 hours:  Temp:  [98.4 °F (36.9 °C)-99.3 °F (37.4 °C)] 99.2 °F (37.3 °C)  Heart Rate:  [56-86] 81  Resp:  [16] 16  BP: ()/(58-72) 126/70    Intake/Output:    Intake/Output Summary (Last 24 hours) at 17 1137  Last data filed at 17 0700   Gross per 24 hour   Intake      0 ml   Output   1000 ml   Net  -1000 ml       Exam:  Visit Vitals   • /70 (BP Location: Left arm, Patient Position: Sitting)   • Pulse 81   • Temp 99.2 °F (37.3 °C) (Oral)   • Resp 16   • Ht 64\" (162.6 cm)   • Wt 226 lb (103 kg)   • SpO2 92%   • BMI 38.79 kg/m2       General Appearance:    Alert, cooperative, no distress   Head:    Normocephalic, without obvious abnormality, atraumatic   Eyes:       Throat:   Lips, tongue, gums normal   Neck:   Supple, symmetrical, trachea midline, no JVD   Lungs:     Clear to auscultation bilaterally, respirations unlabored "   Chest Wall:    No tenderness or deformity    Heart:    Regular rate and rhythm, S1 and S2 normal, no murmur,no  Rub or gallop   Abdomen:     Soft, non-tender, bowel sounds active, no masses, no organomegaly    Extremities:   Extremities normal, right hip with drain, no cyanosis or edema   Pulses:      Skin:   Skin is warm and dry,  no rashes or palpable lesions   Neurologic:   no focal deficits noted      [unfilled]  Data Review:    Results from last 7 days  Lab Units 02/11/17  0343 02/09/17  0506 02/08/17  1333   SODIUM mmol/L 135* 129* 130*   POTASSIUM mmol/L 4.1 4.7 4.7   CHLORIDE mmol/L 98 92* 90*   TOTAL CO2 mmol/L 24.6 24.6 25.2   BUN mg/dL 8 15 24*   CREATININE mg/dL 0.49* 0.59 0.89   GLUCOSE mg/dL 85 112* 109*   CALCIUM mg/dL 8.4* 8.7 9.1       Results from last 7 days  Lab Units 02/11/17  0343 02/10/17  0256 02/09/17  0506  02/08/17  1334   WBC 10*3/mm3 3.92*  --   --   --  6.49   HEMOGLOBIN g/dL 8.6* 8.6* 8.9*  < > 8.8*   HEMATOCRIT % 27.3* 26.7* 27.0*  < > 27.2*   PLATELETS 10*3/mm3 335  --   --   --  328   < > = values in this interval not displayed.          No results found for: TROPONINT        Results from last 7 days  Lab Units 02/08/17  1333   ALK PHOS U/L 437*   BILIRUBIN mg/dL 0.6   ALT (SGPT) U/L 143*   AST (SGOT) U/L 198*             No results found for: POCGLU    Results from last 7 days  Lab Units 02/11/17  0343 02/10/17  0256 02/09/17  0506   INR  1.35* 2.07* 2.06*       Patient Active Problem List   Diagnosis Code   • Hematoma of right hip S70.01XA   • DVT (deep venous thrombosis), hx of I82.409   • Hypertension I10   • Hyperlipidemia E78.5   • Coronary artery disease, hx of stent I25.10   • Arthritis M19.90   • Bipolar disorder F31.9   • Prosthetic hip infection, 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 ?? Remove hip sometime  Possibly on Tuesday. Off coumadin.    Driss Gutierrez MD  2/11/2017  11:37 AM

## 2017-02-11 NOTE — PROGRESS NOTES
"Pharmacy to Dose Vancomycin IV    Day 3  Consult for Dr. Martin  Treating: Bone & joint infection/Rt hip wound MRSA+  Goal trough/random: 15-20 mcg/mL  Dr Benito following    Current Vancomycin dose: 29 mg/Kg/day    IV Anti-Infectives     Ordered     Dose/Rate Route Frequency Start Stop    02/09/17 1201  vancomycin 1500 mg/500 mL 0.9% NS IVPB ([facility])     Ordering Provider:  Erlin Martin MD    1,500 mg Intravenous Every 12 Hours 02/10/17 0000      02/09/17 1201  vancomycin 2000 mg/500 mL 0.9% NS IVPB ([facility])     Ordering Provider:  Erlin Martin MD    20 mg/kg × 103 kg Intravenous Once 02/09/17 1245 02/09/17 1407    02/09/17 1058  Pharmacy to dose vancomycin     Ordering Provider:  Erlin Martin MD     Does not apply Continuous PRN 02/09/17 1056      02/08/17 2004  ceFAZolin in 0.9% normal saline (ANCEF) IVPB solution 3 g     Ordering Provider:  Erlin Martin MD    3 g  over 30 Minutes Intravenous Every 8 Hours 02/09/17 0000 02/09/17 0946      Relevant clinical data and objective history reviewed:  66 y.o. female 64\" (162.6 cm) 226 lb (103 kg)  Body mass index is 38.79 kg/(m^2).    Results from last 7 days  Lab Units 02/11/17  0343 02/09/17  0506 02/08/17  1333   CREATININE mg/dL 0.49* 0.59 0.89     Estimated Creatinine Clearance: 81.9 mL/min (by C-G formula based on Cr of 0.49).    Lab Results   Component Value Date    WBC 3.92 (L) 02/11/2017     Temp:  [97.6 °F (36.4 °C)-99.3 °F (37.4 °C)] 97.6 °F (36.4 °C)  Heart Rate:  [56-86] 70  Resp:  [16] 16  BP: (112-137)/(62-76) 134/76    IV Meds:  lactated ringers 9 mL/hr Last Rate: 9 mL/hr (02/08/17 1440)   Baseline cultures/labs/radiology:  2/08 Wound cx (Rt hip): 3+ MRSA  2/08 Anaerobic cx: NG x 3 days  2/08 CXR: right hip arthroplasty in place. There is a fracture through the acetabulum and the acetabular component of the right hip arthroplasty projects into the  pelvis.     Assessment/Plan:   67 y/o F that underwent revision of total hip on 01/16/17. She " had persistent drainage from wound & a hematoma s/p I&D on 02/08/17    Lab Results   Component Value Date    VANCO TROUGH 15.60 mcg/mL 02/11/2017 @ 11:21 am     Vancomycin trough level appropriate. Will continue same for now.    Thank you for your consult,    Puja Olvera [facility]  02/11/17 2:17 PM

## 2017-02-11 NOTE — PROGRESS NOTES
.   Orthopedic Progress Note    Subjective :   The patient continues to admit she has pain in her right hip.    Objective :    Vital signs in last 24 hours:  Temp:  [98.4 °F (36.9 °C)-99.5 °F (37.5 °C)] 98.4 °F (36.9 °C)  Heart Rate:  [56-86] 86  Resp:  [14-16] 16  BP: ()/(58-72) 137/72  Vitals:    02/10/17 1205 02/10/17 1838 02/11/17 0027 02/11/17 0355   BP: 98/58 112/72 112/62 137/72   BP Location: Right arm Left arm Left arm Left arm   Patient Position: Sitting Lying Lying Lying   Pulse: 60 56 61 86   Resp: 16 16 16 16   Temp: 98.4 °F (36.9 °C) 99.3 °F (37.4 °C) 98.7 °F (37.1 °C) 98.4 °F (36.9 °C)   TempSrc: Oral Oral Oral Oral   SpO2:   90% 91%   Weight:       Height:           PHYSICAL EXAM:  Patient is calm, in no acute distress, awake and oriented x 3.  Dressing clean, dry and intact.  No signs of infection.  Swelling in appropriate in amount.  Ecchymosis is appropriate in amount.  Homans test is negative.  Patient is neurovascularly intact distally.      LABS:    Results from last 7 days  Lab Units 02/11/17  0343   WBC 10*3/mm3 3.92*   HEMOGLOBIN g/dL 8.6*   HEMATOCRIT % 27.3*   PLATELETS 10*3/mm3 335       Results from last 7 days  Lab Units 02/11/17  0343   SODIUM mmol/L 135*   POTASSIUM mmol/L 4.1   CHLORIDE mmol/L 98   TOTAL CO2 mmol/L 24.6   BUN mg/dL 8   CREATININE mg/dL 0.49*   GLUCOSE mg/dL 85   CALCIUM mg/dL 8.4*       Results from last 7 days  Lab Units 02/11/17  0343 02/10/17  0256 02/09/17  0506  02/08/17  1334   INR  1.35* 2.07* 2.06*  < > 1.81*   APTT seconds  --   --   --   --  41.1*   < > = values in this interval not displayed.    ASSESSMENT:  Status post right hip I&D    Plan:  Continue Physical Therapy.  Continue SCDs, Continue aspirin 325 twice a day for DVT prophalaxis.  Dr. Martin recommends at this point a [place] to try to clear the patient's infection.  She would like to talk to her family about this.    Thony Finn PA-C    Date: 2/11/2017  Time: 6:30 AM

## 2017-02-12 LAB
ABO GROUP BLD: NORMAL
BLD GP AB SCN SERPL QL: NEGATIVE
HCT VFR BLD AUTO: 27.6 % (ref 35.6–45.5)
HGB BLD-MCNC: 8.5 G/DL (ref 11.9–15.5)
INR PPP: 1.18 (ref 0.9–1.1)
PROTHROMBIN TIME: 14.6 SECONDS (ref 11.7–14.2)
RH BLD: POSITIVE

## 2017-02-12 PROCEDURE — 94799 UNLISTED PULMONARY SVC/PX: CPT

## 2017-02-12 PROCEDURE — 94640 AIRWAY INHALATION TREATMENT: CPT

## 2017-02-12 PROCEDURE — 86850 RBC ANTIBODY SCREEN: CPT

## 2017-02-12 PROCEDURE — 97110 THERAPEUTIC EXERCISES: CPT

## 2017-02-12 PROCEDURE — 25010000002 ENOXAPARIN PER 10 MG: Performed by: ORTHOPAEDIC SURGERY

## 2017-02-12 PROCEDURE — 86900 BLOOD TYPING SEROLOGIC ABO: CPT

## 2017-02-12 PROCEDURE — 25010000002 VANCOMYCIN: Performed by: ORTHOPAEDIC SURGERY

## 2017-02-12 PROCEDURE — 86923 COMPATIBILITY TEST ELECTRIC: CPT

## 2017-02-12 PROCEDURE — P9016 RBC LEUKOCYTES REDUCED: HCPCS

## 2017-02-12 PROCEDURE — 85610 PROTHROMBIN TIME: CPT | Performed by: ORTHOPAEDIC SURGERY

## 2017-02-12 PROCEDURE — 86901 BLOOD TYPING SEROLOGIC RH(D): CPT

## 2017-02-12 PROCEDURE — 36430 TRANSFUSION BLD/BLD COMPNT: CPT

## 2017-02-12 PROCEDURE — 85014 HEMATOCRIT: CPT | Performed by: ORTHOPAEDIC SURGERY

## 2017-02-12 PROCEDURE — 85018 HEMOGLOBIN: CPT | Performed by: ORTHOPAEDIC SURGERY

## 2017-02-12 RX ADMIN — CLONAZEPAM 0.5 MG: 0.5 TABLET ORAL at 11:54

## 2017-02-12 RX ADMIN — VILAZODONE HYDROCHLORIDE 40 MG: 40 TABLET ORAL at 21:33

## 2017-02-12 RX ADMIN — HYDROCODONE BITARTRATE AND ACETAMINOPHEN 2 TABLET: 10; 325 TABLET ORAL at 13:16

## 2017-02-12 RX ADMIN — MODAFINIL 200 MG: 100 TABLET ORAL at 17:18

## 2017-02-12 RX ADMIN — FERROUS SULFATE TAB 325 MG (65 MG ELEMENTAL FE) 325 MG: 325 (65 FE) TAB at 21:33

## 2017-02-12 RX ADMIN — CLOPIDOGREL 75 MG: 75 TABLET, FILM COATED ORAL at 08:23

## 2017-02-12 RX ADMIN — TIOTROPIUM BROMIDE 1 CAPSULE: 18 CAPSULE ORAL; RESPIRATORY (INHALATION) at 12:19

## 2017-02-12 RX ADMIN — PANTOPRAZOLE SODIUM 40 MG: 40 TABLET, DELAYED RELEASE ORAL at 04:48

## 2017-02-12 RX ADMIN — PREGABALIN 150 MG: 100 CAPSULE ORAL at 08:23

## 2017-02-12 RX ADMIN — ISOSORBIDE MONONITRATE 30 MG: 30 TABLET, EXTENDED RELEASE ORAL at 08:23

## 2017-02-12 RX ADMIN — DOCUSATE SODIUM 100 MG: 100 CAPSULE, LIQUID FILLED ORAL at 08:24

## 2017-02-12 RX ADMIN — VANCOMYCIN HYDROCHLORIDE 1500 MG: 1 INJECTION, POWDER, LYOPHILIZED, FOR SOLUTION INTRAVENOUS at 11:52

## 2017-02-12 RX ADMIN — FERROUS SULFATE TAB 325 MG (65 MG ELEMENTAL FE) 325 MG: 325 (65 FE) TAB at 08:23

## 2017-02-12 RX ADMIN — HYDROCODONE BITARTRATE AND ACETAMINOPHEN 2 TABLET: 10; 325 TABLET ORAL at 17:19

## 2017-02-12 RX ADMIN — OXYBUTYNIN CHLORIDE 10 MG: 10 TABLET, FILM COATED, EXTENDED RELEASE ORAL at 08:23

## 2017-02-12 RX ADMIN — VANCOMYCIN HYDROCHLORIDE 1500 MG: 1 INJECTION, POWDER, LYOPHILIZED, FOR SOLUTION INTRAVENOUS at 00:05

## 2017-02-12 RX ADMIN — ASPIRIN 325 MG: 325 TABLET ORAL at 08:25

## 2017-02-12 RX ADMIN — DOCUSATE SODIUM -SENNOSIDES 2 TABLET: 50; 8.6 TABLET, COATED ORAL at 08:23

## 2017-02-12 RX ADMIN — DOCUSATE SODIUM -SENNOSIDES 2 TABLET: 50; 8.6 TABLET, COATED ORAL at 17:18

## 2017-02-12 RX ADMIN — MODAFINIL 200 MG: 100 TABLET ORAL at 08:23

## 2017-02-12 RX ADMIN — DOCUSATE SODIUM 100 MG: 100 CAPSULE, LIQUID FILLED ORAL at 17:18

## 2017-02-12 RX ADMIN — PREGABALIN 150 MG: 100 CAPSULE ORAL at 17:18

## 2017-02-12 RX ADMIN — POTASSIUM CHLORIDE 10 MEQ: 750 CAPSULE, EXTENDED RELEASE ORAL at 08:23

## 2017-02-12 RX ADMIN — HYDROCODONE BITARTRATE AND ACETAMINOPHEN 2 TABLET: 10; 325 TABLET ORAL at 09:19

## 2017-02-12 RX ADMIN — HYDROCODONE BITARTRATE AND ACETAMINOPHEN 2 TABLET: 10; 325 TABLET ORAL at 04:47

## 2017-02-12 RX ADMIN — ATORVASTATIN CALCIUM 40 MG: 40 TABLET, FILM COATED ORAL at 21:34

## 2017-02-12 RX ADMIN — ENOXAPARIN SODIUM 40 MG: 40 INJECTION SUBCUTANEOUS at 08:22

## 2017-02-12 RX ADMIN — HYDROCODONE BITARTRATE AND ACETAMINOPHEN 2 TABLET: 10; 325 TABLET ORAL at 21:33

## 2017-02-12 RX ADMIN — LEVOTHYROXINE SODIUM 100 MCG: 100 TABLET ORAL at 04:47

## 2017-02-12 NOTE — PLAN OF CARE
Problem: Patient Care Overview (Adult)  Goal: Plan of Care Review  Outcome: Ongoing (interventions implemented as appropriate)    02/12/17 1434   Coping/Psychosocial Response Interventions   Plan Of Care Reviewed With patient   Patient Care Overview   Progress no change   Outcome Evaluation   Outcome Summary/Follow up Plan still working towards more indep tfers and bed mobility, pn and wkness , NWB status limiitng

## 2017-02-12 NOTE — PROGRESS NOTES
Acute Care - Physical Therapy Treatment Note  [facility]     Patient Name: Frances Downs  : 1950  MRN: [id number]  Today's Date: 2017        Referring Physician: Veronica    Admit Date: 2017    Visit Dx:    ICD-10-CM ICD-9-CM   1. Difficulty walking R26.2 719.7   2. Hematoma T14.8 924.9     Patient Active Problem List   Diagnosis   • Hematoma of right hip   • DVT (deep venous thrombosis), hx of   • Hypertension   • Hyperlipidemia   • Coronary artery disease, hx of stent   • Arthritis   • Bipolar disorder   • Prosthetic hip infection, mrsa   • MRSA (methicillin resistant Staphylococcus aureus) infection               Adult Rehabilitation Note       17 1400 17 1350 02/10/17 1410    Rehab Assessment/Intervention    Discipline physical therapy assistant  - physical therapy assistant  - physical therapist  -EE    Document Type therapy note (daily note)  - therapy note (daily note)  - therapy note (daily note)  -EE    Subjective Information agree to therapy;complains of;weakness;fatigue;pain  - agree to therapy;complains of;fatigue;pain;swelling;weakness  - agree to therapy;complains of;weakness;fatigue;pain  -EE    Patient Effort, Rehab Treatment   adequate  -EE    Symptoms Noted During/After Treatment   fatigue  -EE    Precautions/Limitations fall precautions;non-weight bearing status  - fall precautions;non-weight bearing status   right  - fall precautions;other (see comments)   NWB R LE  -EE    Specific Treatment Considerations rt hip drain  - rt hip drain  -     Recorded by [JM] Shruti Palmer PTA [JM] Shruti Palmer PTA [EE] Yue Campos, PORTILLO    Pain Assessment    Pain Assessment 0-10  -JM 0-10  -JM 0-10  -EE    Pain Score 7  -JM 8  -JM 7  -EE    Pain Type   Acute pain  -EE    Pain Location Hip  -JM Hip  -JM Hip  -EE    Pain Orientation Right  -JM Right  -JM Right  -EE    Pain Intervention(s) Medication (See MAR);Repositioned  - Medication (See  MAR);Repositioned;Elevated  - Repositioned;Rest  -EE    Response to Interventions milad  -JM milad  -JM tolerated  -EE    Recorded by [JM] Shruti Palmer PTA [JM] Shruti Palmer PTA [EE] Yue Campos, PORTILLO    Cognitive Assessment/Intervention    Current Cognitive/Communication Assessment   functional  -EE    Orientation Status   oriented x 4  -EE    Follows Commands/Answers Questions   100% of the time  -EE    Personal Safety   WNL/WFL  -EE    Personal Safety Interventions   fall prevention program maintained;gait belt;muscle strengthening facilitated;nonskid shoes/slippers when out of bed;supervised activity  -EE    Recorded by   [EE] Yue Campos, PORTILLO    Mobility Assessment/Training    Extremity Weight-Bearing Status   right lower extremity  -EE    Right Lower Extremity Weight-Bearing   non weight-bearing  -EE    Recorded by   [EE] Yue Campos PT    Bed Mobility, Assessment/Treatment    Bed Mobility, Assistive Device bed rails;head of bed elevated  - bed rails;head of bed elevated  -     Bed Mob, Supine to Sit, Parker moderate assist (50% patient effort);maximum assist (25% patient effort)  - moderate assist (50% patient effort);maximum assist (25% patient effort)  - not tested  -EE    Bed Mob, Sit to Supine, Parker   moderate assist (50% patient effort);2 person assist required;verbal cues required  -EE    Recorded by [JM] Shruti Palmer PTA [JM] Shruti Palmer PTA [EE] Yue Campos, PORTILLO    Transfer Assessment/Treatment    Transfers, Bed-Chair Parker moderate assist (50% patient effort);2 person assist required;verbal cues required;nonverbal cues required (demo/gesture)  - moderate assist (50% patient effort);2 person assist required;verbal cues required;nonverbal cues required (demo/gesture)  - moderate assist (50% patient effort);maximum assist (25% patient effort);2 person assist required;verbal cues required;hand held assist  -EE    Transfers, Sit-Stand Parker moderate  assist (50% patient effort);2 person assist required  - moderate assist (50% patient effort);2 person assist required  -JM moderate assist (50% patient effort);2 person assist required;verbal cues required;hand held assist  -EE    Transfers, Stand-Sit Barron minimum assist (75% patient effort);2 person assist required;verbal cues required  - minimum assist (75% patient effort);2 person assist required;verbal cues required  -JM moderate assist (50% patient effort);2 person assist required;verbal cues required;hand held assist  -EE    Transfers, Sit-Stand-Sit, Assist Device rolling walker  -JM rolling walker  -JM     Transfer, Maintain Weight Bearing Status  cues to maintain weight bearing status;unable to maintain weight bearing status  -JM cues to maintain weight bearing status;assist to maintain weight bearing status  -EE    Transfer, Safety Issues weight-shifting ability decreased;balance decreased during turns  -JM weight-shifting ability decreased;balance decreased during turns  -JM weight-shifting ability decreased  -EE    Transfer, Impairments strength decreased;impaired balance;pain  - strength decreased;impaired balance;pain  -JM strength decreased;pain;impaired balance  -EE    Transfer, Comment still req assist to NWB  - still unable to NWB w/o assist--unable to maintain NWB entire tfer   slid toes on floor, but said no weight put down   - pt required physical assistance to keep R LE from floor and maintain NWB R LE  -EE    Recorded by [JM] Shruti Palmer PTA [JM] Shruti Palmer PTA [EE] Yue Campos, PORTILLO    Gait Assessment/Treatment    Gait, Barron Level unable to perform  - unable to perform  -     Recorded by [MARILEE] Shruti Palmer PTA [JM] Shruti Palmer PTA     Therapy Exercises    Bilateral Lower Extremities   AROM:;10 reps;ankle pumps/circles;glut sets;quad sets  -EE    Exercise Protocols --   unable to perf, urgency for BSC  -JM      Recorded by [JM] Shruti Palmer PTA   [EE] Yue Campos PT    Positioning and Restraints    Pre-Treatment Position in bed  -JM in bed  -JM sitting in chair/recliner  -EE    Post Treatment Position bsc  -  bed  -EE    In Bed sitting;call light within reach;with nsg  -JM  supine;call light within reach;encouraged to call for assist;exit alarm on  -EE    In Chair  reclined;call light within reach;encouraged to call for assist;notified nsg  -     Recorded by [JM] Shruti Palmer PTA [JM] Shruti Palmer PTA [EE] Yue Campos PT      User Key  (r) = Recorded By, (t) = Taken By, (c) = Cosigned By    Initials Name Effective Dates    EE Yue Campos PT 12/01/15 -     MARILEE Palmer PTA 02/18/16 -                 IP PT Goals       02/09/17 0850          Bed Mobility PT LTG    Bed Mobility PT LTG, Date Established 02/09/17  -EE      Bed Mobility PT LTG, Time to Achieve 5 days  -EE      Bed Mobility PT LTG, Activity Type all bed mobility  -EE      Bed Mobility PT LTG, Bell Level minimum assist (75% patient effort);2 person assist required  -EE      Transfer Training PT LTG    Transfer Training PT LTG, Date Established 02/09/17  -EE      Transfer Training PT LTG, Time to Achieve 5 days  -EE      Transfer Training PT LTG, Activity Type all transfers  -EE      Transfer Training PT LTG, Bell Level minimum assist (75% patient effort);2 person assist required  -EE      Transfer Training PT LTG, Assist Device walker, rolling  -EE      Patient Education PT LTG    Patient Education PT LTG, Date Established 02/09/17  -EE      Patient Education PT LTG, Time to Achieve 5 days  -EE      Patient Education PT LTG, Education Type HEP;precaution per surgeon  -EE      Patient Education PT LTG, Education Understanding demonstrate adequately;verbalize understanding  -EE        User Key  (r) = Recorded By, (t) = Taken By, (c) = Cosigned By    Initials Name Provider Type    GALILEO Campos PT Physical Therapist          Physical Therapy Education     Title:  PT OT SLP Therapies (Done)     Topic: Physical Therapy (Done)     Point: Mobility training (Done)    Learning Progress Summary    Learner Readiness Method Response Comment Documented by Status   Patient Acceptance E,TB,D VU,NR aware she is NWB rt, but c/o wkness and pn in left knee limiting also, assist given to maintain NWB rt  02/12/17 1430 Done    Acceptance E,TB,D NR   02/11/17 1355 Active    Acceptance E,TB NR  EE 02/10/17 1429 Active    Acceptance E,TB NR  EE 02/09/17 0850 Active               Point: Home exercise program (Done)    Learning Progress Summary    Learner Readiness Method Response Comment Documented by Status   Patient Acceptance E,TB,D VU,NR aware she is NWB rt, but c/o wkness and pn in left knee limiting also, assist given to maintain NWB rt  02/12/17 1430 Done    Acceptance E,TB,D NR   02/11/17 1355 Active    Acceptance E,TB NR  EE 02/10/17 1429 Active    Acceptance E,TB NR  EE 02/09/17 0850 Active               Point: Body mechanics (Done)    Learning Progress Summary    Learner Readiness Method Response Comment Documented by Status   Patient Acceptance E,TB,D VU,NR aware she is NWB rt, but c/o wkness and pn in left knee limiting also, assist given to maintain NWB rt  02/12/17 1430 Done    Acceptance E,TB,D NR   02/11/17 1355 Active    Acceptance E,TB NR  EE 02/10/17 1429 Active    Acceptance E,TB NR  EE 02/09/17 0850 Active               Point: Precautions (Done)    Learning Progress Summary    Learner Readiness Method Response Comment Documented by Status   Patient Acceptance E,TB,D VU,NR aware she is NWB rt, but c/o wkness and pn in left knee limiting also, assist given to maintain NWB rt  02/12/17 1430 Done    Acceptance E,TB,D NR   02/11/17 1355 Active    Acceptance E,TB NR  EE 02/10/17 1429 Active    Acceptance E,TB NR  EE 02/09/17 0850 Active                      User Key     Initials Effective Dates Name Provider Type Discipline    EE 12/01/15 -  Yue Campos, PT  Physical Therapist PT     02/18/16 -  Shruti Palmer PTA Physical Therapy Assistant PT                    PT Recommendation and Plan  Anticipated Discharge Disposition: skilled nursing facility  Planned Therapy Interventions: balance training, bed mobility training, home exercise program, patient/family education, strengthening, stretching, transfer training  PT Frequency: daily  Plan of Care Review  Plan Of Care Reviewed With: patient  Progress: no change  Outcome Summary/Follow up Plan: still working towards more indep tfers and bed mobility, pn and wkness , NWB status limiitng          Outcome Measures       02/12/17 1400 02/11/17 1300 02/10/17 1400    How much help from another person do you currently need...    Turning from your back to your side while in flat bed without using bedrails? 2  - 2  -JM 2  -EE    Moving from lying on back to sitting on the side of a flat bed without bedrails? 2  - 2  - 2  -EE    Moving to and from a bed to a chair (including a wheelchair)? 2  - 2  - 2  -EE    Standing up from a chair using your arms (e.g., wheelchair, bedside chair)? 2  - 2  -JM 2  -EE    Climbing 3-5 steps with a railing? 1  - 1  - 1  -EE    To walk in hospital room? 1  - 1  - 1  -EE    AM-PAC 6 Clicks Score 10  - 10  - 10  -EE    Functional Assessment    Outcome Measure Options   AM-PAC 6 Clicks Basic Mobility (PT)  -EE      User Key  (r) = Recorded By, (t) = Taken By, (c) = Cosigned By    Initials Name Provider Type    EE Yue Campos, PT Physical Therapist    MARILEE Palmer PTA Physical Therapy Assistant           Time Calculation:         PT Charges       02/12/17 1427          Time Calculation    Start Time 0945  -      Stop Time 1000  -      Time Calculation (min) 15 min  -      PT Received On 02/12/17  -      PT - Next Appointment 02/13/17  -        User Key  (r) = Recorded By, (t) = Taken By, (c) = Cosigned By    Initials Name Provider Type    MARILEE Palmer  BARRERA Physical Therapy Assistant          Therapy Charges for Today     Code Description Service Date Service Provider Modifiers Qty    09434519209 HC PT THER PROC EA 15 MIN 2/11/2017 Shruti Palmer PTA GP 1    44257699961 HC PT THER SUPP EA 15 MIN 2/11/2017 Shruti Palmer PTA GP 1    84093247037 HC PT THER PROC EA 15 MIN 2/12/2017 Shruti Palmer PTA GP 1          PT G-Codes  Outcome Measure Options: AM-PAC 6 Clicks Basic Mobility (PT)    Shruti Palmer PTA  2/12/2017

## 2017-02-12 NOTE — PLAN OF CARE
Problem: Patient Care Overview (Adult)  Goal: Plan of Care Review  Outcome: Ongoing (interventions implemented as appropriate)    02/12/17 0327   Coping/Psychosocial Response Interventions   Plan Of Care Reviewed With patient   Patient Care Overview   Progress improving   Outcome Evaluation   Outcome Summary/Follow up Plan pt up to chair with assist without difficulty. pain well controlled.         Problem: Hip Replacement, Total (Adult)  Goal: Signs and Symptoms of Listed Potential Problems Will be Absent or Manageable (Hip Replacement, Total)  Outcome: Ongoing (interventions implemented as appropriate)    02/12/17 0327   Hip Replacement, Total   Problems Assessed (Total Hip Replacement) pain;functional decline/self care deficit   Problems Present (Total Hip Replacement) functional decline/self care deficit         Problem: Fall Risk (Adult)  Goal: Absence of Falls  Outcome: Ongoing (interventions implemented as appropriate)    02/12/17 0327   Fall Risk (Adult)   Absence of Falls making progress toward outcome

## 2017-02-12 NOTE — PROGRESS NOTES
Orthopedic Total Progress Note        Patient: Frances Downs    Date of Admission: 2/8/2017  1:10 PM    YOB: 1950    Medical Record Number: [id number]    Attending Physician: Erlin Martin MD      POD # 4     Status post: RT HIP INCISION AND DRAINAGE      Systemic or Specific Complaints: No Complaints      No Known Allergies      Current Medications:  Scheduled Meds:  aspirin 325 mg Oral BID   atorvastatin 40 mg Oral Daily   clopidogrel 75 mg Oral Daily   docusate sodium 100 mg Oral BID   ferrous sulfate 325 mg Oral BID   fluticasone 2 spray Nasal Daily   isosorbide mononitrate 30 mg Oral Daily   levothyroxine 100 mcg Oral Q AM   modafinil 200 mg Oral BID   oxybutynin XL 10 mg Oral Daily   pantoprazole 40 mg Oral Q AM   potassium chloride 10 mEq Oral Daily   pregabalin 200 mg Oral BID   sennosides-docusate sodium 2 tablet Oral BID   tiotropium 1 capsule Inhalation Daily - RT   vancomycin 1,500 mg Intravenous Q12H   vilazodone 40 mg Oral Nightly     Continuous Infusions:  lactated ringers 9 mL/hr Last Rate: 9 mL/hr (02/08/17 1440)   Pharmacy to dose vancomycin       PRN Meds:.benzocaine-menthol  •  bisacodyl  •  clonazePAM  •  diphenhydrAMINE  •  docusate sodium  •  HYDROcodone-acetaminophen  •  HYDROcodone-acetaminophen  •  HYDROmorphone **AND** naloxone  •  ipratropium-albuterol  •  Pharmacy to dose vancomycin      Physical Exam: 66 y.o. female  General Appearance:    alert and oriented                Pain Relief: Patient reports some relief       Vitals:    02/11/17 1900 02/11/17 2300 02/12/17 0240 02/12/17 0300   BP: 116/64 112/62 118/62    BP Location: Left arm Right arm Right arm    Patient Position: Lying Lying Lying    Pulse: 63 64 64    Resp: 16 16 16    Temp: 97.8 °F (36.6 °C) 99.1 °F (37.3 °C) 97.3 °F (36.3 °C)    TempSrc: Oral Oral Oral    SpO2: 97% 95% 93% 93%   Weight:       Height:               Extremities:   Operative extremity neurovascular status intact. ROM intact.     Incision intact w/out signs or  symptoms of infection. No           edema, no cyanosis, no calf tenderness     Pulses:     Pulses palpable and equal bilaterally     Skin:     Skin Warm/Dry w/out ulceration, ecchymosis, rash, or   cyanosis     Activity: Mobilizing Per P.T.       Diagnostic Tests:   Admission on 02/08/2017   Component Date Value Ref Range Status   • ABO Type 02/08/2017 A   Final   • RH type 02/08/2017 Positive   Final   • Antibody Screen 02/08/2017 Negative   Final   • Glucose 02/08/2017 109* 65 - 99 mg/dL Final   • BUN 02/08/2017 24* 8 - 23 mg/dL Final   • Creatinine 02/08/2017 0.89  0.57 - 1.00 mg/dL Final   • Sodium 02/08/2017 130* 136 - 145 mmol/L Final   • Potassium 02/08/2017 4.7  3.5 - 5.2 mmol/L Final   • Chloride 02/08/2017 90* 98 - 107 mmol/L Final   • CO2 02/08/2017 25.2  22.0 - 29.0 mmol/L Final   • Calcium 02/08/2017 9.1  8.6 - 10.5 mg/dL Final   • Total Protein 02/08/2017 6.5  6.0 - 8.5 g/dL Final   • Albumin 02/08/2017 3.40* 3.50 - 5.20 g/dL Final   • ALT (SGPT) 02/08/2017 143* 1 - 33 U/L Final   • AST (SGOT) 02/08/2017 198* 1 - 32 U/L Final   • Alkaline Phosphatase 02/08/2017 437* 39 - 117 U/L Final   • Total Bilirubin 02/08/2017 0.6  0.1 - 1.2 mg/dL Final   • eGFR Non  Amer 02/08/2017 64  >60 mL/min/1.73 Final   • Globulin 02/08/2017 3.1  gm/dL Final   • A/G Ratio 02/08/2017 1.1  g/dL Final   • BUN/Creatinine Ratio 02/08/2017 27.0* 7.0 - 25.0 Final   • Anion Gap 02/08/2017 14.8  mmol/L Final   • PTT 02/08/2017 41.1* 22.7 - 35.4 seconds Final   • Protime 02/08/2017 20.3* 11.7 - 14.2 Seconds Final   • INR 02/08/2017 1.81* 0.90 - 1.10 Final   • WBC 02/08/2017 6.49  4.50 - 10.70 10*3/mm3 Final   • RBC 02/08/2017 2.92* 3.90 - 5.20 10*6/mm3 Final   • Hemoglobin 02/08/2017 8.8* 11.9 - 15.5 g/dL Final   • Hematocrit 02/08/2017 27.2* 35.6 - 45.5 % Final   • MCV 02/08/2017 93.2  80.5 - 98.2 fL Final   • MCH 02/08/2017 30.1  26.9 - 32.0 pg Final   • MCHC 02/08/2017 32.4  32.4 - 36.3 g/dL  "Final   • RDW 02/08/2017 16.2* 11.7 - 13.0 % Final   • RDW-SD 02/08/2017 55.4* 37.0 - 54.0 fl Final   • MPV 02/08/2017 10.3  6.0 - 12.0 fL Final   • Platelets 02/08/2017 328  140 - 500 10*3/mm3 Final   • Neutrophil % 02/08/2017 58.5  42.7 - 76.0 % Final   • Lymphocyte % 02/08/2017 24.2  19.6 - 45.3 % Final   • Monocyte % 02/08/2017 15.3* 5.0 - 12.0 % Final   • Eosinophil % 02/08/2017 1.5  0.3 - 6.2 % Final   • Basophil % 02/08/2017 0.5  0.0 - 1.5 % Final   • Immature Grans % 02/08/2017 0.0  0.0 - 0.5 % Final   • Neutrophils, Absolute 02/08/2017 3.80  1.90 - 8.10 10*3/mm3 Final   • Lymphocytes, Absolute 02/08/2017 1.57  0.90 - 4.80 10*3/mm3 Final   • Monocytes, Absolute 02/08/2017 0.99  0.20 - 1.20 10*3/mm3 Final   • Eosinophils, Absolute 02/08/2017 0.10  0.00 - 0.70 10*3/mm3 Final   • Basophils, Absolute 02/08/2017 0.03  0.00 - 0.20 10*3/mm3 Final   • Immature Grans, Absolute 02/08/2017 0.00  0.00 - 0.03 10*3/mm3 Final   • nRBC 02/08/2017 0.0  0.0 - 0.0 /100 WBC Final   • Culture 02/08/2017 No anaerobes isolated at 3 days   Preliminary   • Wound Culture 02/08/2017 Moderate growth (3+) Staphylococcus aureus, MRSA*  Final    D test is negative. Isolate does not exhibit \"inducible\" resistance to Clindamycin (isolate remains susceptible to Clindamycin).    Methicillin resistant Staphylococcus aureus, Patient may be an isolation risk.   • Gram Stain Result 02/08/2017 Rare (1+) WBCs seen   Final   • Gram Stain Result 02/08/2017 Rare (1+) Gram positive cocci in pairs   Final   • Hemoglobin 02/08/2017 8.0* 11.9 - 15.5 g/dL Final   • Hematocrit 02/08/2017 24.9* 35.6 - 45.5 % Final   • Product Code 02/09/2017 [id number]   Final   • Unit Number 02/09/2017 [id number]   Final   • UNIT  ABO 02/09/2017 O   Final   • UNIT  RH 02/09/2017 POS   Final   • CROSSMATCH 1 INTERPRETATION 02/09/2017 Compatible   Final   • Dispense Status 02/09/2017 PT   Final   • Blood Type 02/09/2017 OPOS   Final   • Blood Expiration Date 02/09/2017 " [id number]   Final   • Blood Type Barcode 02/09/2017 5100   Final   • Product Code 02/09/2017 [id number]   Final   • Unit Number 02/09/2017 [id number]   Final   • UNIT  ABO 02/09/2017 O   Final   • UNIT  RH 02/09/2017 POS   Final   • CROSSMATCH 1 INTERPRETATION 02/09/2017 Compatible   Final   • Dispense Status 02/09/2017 PT   Final   • Blood Type 02/09/2017 OPOS   Final   • Blood Expiration Date 02/09/2017 [id number]   Final   • Blood Type Barcode 02/09/2017 5100   Final   • Protime 02/08/2017 22.1* 11.7 - 14.2 Seconds Final   • INR 02/08/2017 2.01* 0.90 - 1.10 Final   • Protime 02/09/2017 22.5* 11.7 - 14.2 Seconds Final   • INR 02/09/2017 2.06* 0.90 - 1.10 Final   • Glucose 02/09/2017 112* 65 - 99 mg/dL Final   • BUN 02/09/2017 15  8 - 23 mg/dL Final   • Creatinine 02/09/2017 0.59  0.57 - 1.00 mg/dL Final   • Sodium 02/09/2017 129* 136 - 145 mmol/L Final   • Potassium 02/09/2017 4.7  3.5 - 5.2 mmol/L Final   • Chloride 02/09/2017 92* 98 - 107 mmol/L Final   • CO2 02/09/2017 24.6  22.0 - 29.0 mmol/L Final   • Calcium 02/09/2017 8.7  8.6 - 10.5 mg/dL Final   • eGFR Non African Amer 02/09/2017 102  >60 mL/min/1.73 Final   • BUN/Creatinine Ratio 02/09/2017 25.4* 7.0 - 25.0 Final   • Anion Gap 02/09/2017 12.4  mmol/L Final   • Hemoglobin 02/09/2017 8.9* 11.9 - 15.5 g/dL Final   • Hematocrit 02/09/2017 27.0* 35.6 - 45.5 % Final   • Protime 02/10/2017 22.6* 11.7 - 14.2 Seconds Final   • INR 02/10/2017 2.07* 0.90 - 1.10 Final   • Hemoglobin 02/10/2017 8.6* 11.9 - 15.5 g/dL Final   • Hematocrit 02/10/2017 26.7* 35.6 - 45.5 % Final   • Protime 02/11/2017 16.2* 11.7 - 14.2 Seconds Final   • INR 02/11/2017 1.35* 0.90 - 1.10 Final   • Glucose 02/11/2017 85  65 - 99 mg/dL Final   • BUN 02/11/2017 8  8 - 23 mg/dL Final   • Creatinine 02/11/2017 0.49* 0.57 - 1.00 mg/dL Final   • Sodium 02/11/2017 135* 136 - 145 mmol/L Final   • Potassium 02/11/2017 4.1  3.5 - 5.2 mmol/L Final   • Chloride 02/11/2017 98  98 - 107 mmol/L  Final   • CO2 02/11/2017 24.6  22.0 - 29.0 mmol/L Final   • Calcium 02/11/2017 8.4* 8.6 - 10.5 mg/dL Final   • eGFR Non African Amer 02/11/2017 127  >60 mL/min/1.73 Final   • BUN/Creatinine Ratio 02/11/2017 16.3  7.0 - 25.0 Final   • Anion Gap 02/11/2017 12.4  mmol/L Final   • WBC 02/11/2017 3.92* 4.50 - 10.70 10*3/mm3 Final   • RBC 02/11/2017 2.92* 3.90 - 5.20 10*6/mm3 Final   • Hemoglobin 02/11/2017 8.6* 11.9 - 15.5 g/dL Final   • Hematocrit 02/11/2017 27.3* 35.6 - 45.5 % Final   • MCV 02/11/2017 93.5  80.5 - 98.2 fL Final   • MCH 02/11/2017 29.5  26.9 - 32.0 pg Final   • MCHC 02/11/2017 31.5* 32.4 - 36.3 g/dL Final   • RDW 02/11/2017 16.6* 11.7 - 13.0 % Final   • RDW-SD 02/11/2017 57.0* 37.0 - 54.0 fl Final   • MPV 02/11/2017 10.2  6.0 - 12.0 fL Final   • Platelets 02/11/2017 335  140 - 500 10*3/mm3 Final   • Neutrophil % 02/11/2017 43.4  42.7 - 76.0 % Final   • Lymphocyte % 02/11/2017 33.4  19.6 - 45.3 % Final   • Monocyte % 02/11/2017 15.6* 5.0 - 12.0 % Final   • Eosinophil % 02/11/2017 4.8  0.3 - 6.2 % Final   • Basophil % 02/11/2017 1.5  0.0 - 1.5 % Final   • Immature Grans % 02/11/2017 1.3* 0.0 - 0.5 % Final   • Neutrophils, Absolute 02/11/2017 1.70* 1.90 - 8.10 10*3/mm3 Final   • Lymphocytes, Absolute 02/11/2017 1.31  0.90 - 4.80 10*3/mm3 Final   • Monocytes, Absolute 02/11/2017 0.61  0.20 - 1.20 10*3/mm3 Final   • Eosinophils, Absolute 02/11/2017 0.19  0.00 - 0.70 10*3/mm3 Final   • Basophils, Absolute 02/11/2017 0.06  0.00 - 0.20 10*3/mm3 Final   • Immature Grans, Absolute 02/11/2017 0.05* 0.00 - 0.03 10*3/mm3 Final   • Vancomycin Trough 02/11/2017 15.60  5.00 - 20.00 mcg/mL Final   • Protime 02/12/2017 14.6* 11.7 - 14.2 Seconds Final   • INR 02/12/2017 1.18* 0.90 - 1.10 Final   • Hemoglobin 02/12/2017 8.5* 11.9 - 15.5 g/dL Final   • Hematocrit 02/12/2017 27.6* 35.6 - 45.5 % Final       Xr Pelvis 1 Or 2 View    Result Date: 2/8/2017  Narrative: STAT PORTABLE RADIOGRAPHIC VIEW OF THE PELVIS AND RIGHT HIP   CLINICAL HISTORY: Status post incision and drainage.  Frontal projection of the pelvis and right hip demonstrate a right hip arthroplasty in place. There is a fracture through the acetabulum and the acetabular component of the right hip arthroplasty projects into the pelvis. Typical postoperative changes are identified within the adjacent soft tissues.  The findings of this fracture were directly discussed with Kim Manley RN, the nurse caring for this patient on 02/08/2017 at approximately 6:50 PM. She was instructed to notify Dr. Martin with these findings.  This report was finalized on 2/8/2017 8:35 PM by Dr. Benjy Jimenez MD.          Assessment:  Patient Active Problem List   Diagnosis   • Hematoma of right hip   • DVT (deep venous thrombosis), hx of   • Hypertension   • Hyperlipidemia   • Coronary artery disease, hx of stent   • Arthritis   • Bipolar disorder   • Prosthetic hip infection, mrsa   • MRSA (methicillin resistant Staphylococcus aureus) infection     Post-operative Pain  Immobility    Plan:    Continue Physical Therapy, increase mobility as tolerated.  Continue SCDs, Continue DVT prophalaxis.  Continue Pain management efforts  Continue Incisional care  Removal of right total hip on 2/13/17  Transfuse today    Discharge Plan: tbd to snf    Date: 2/12/2017   Time: 7:08 AM    Erlin Martin MD

## 2017-02-12 NOTE — PLAN OF CARE
Problem: Patient Care Overview (Adult)  Goal: Plan of Care Review  Outcome: Ongoing (interventions implemented as appropriate)    02/12/17 9266   Coping/Psychosocial Response Interventions   Plan Of Care Reviewed With patient   Patient Care Overview   Progress improving   Outcome Evaluation   Outcome Summary/Follow up Plan Received 1 unit of PRBC today. 2 units ready for OR. Good pain control Isolation continues         Problem: Fall Risk (Adult)  Goal: Absence of Falls  Outcome: Ongoing (interventions implemented as appropriate)    Problem: Infection, Risk/Actual (Adult)  Goal: Infection Prevention/Resolution  Outcome: Ongoing (interventions implemented as appropriate)    Problem: Skin Integrity Impairment, Risk/Actual (Adult)  Goal: Skin Integrity/Wound Healing  Outcome: Ongoing (interventions implemented as appropriate)

## 2017-02-12 NOTE — PROGRESS NOTES
"   LOS: 4 days   Patient Care Team:  Talha Echeverria MD as PCP - General (Internal Medicine)    Chief Complaint: scared    Subjective     HPI Comments: No appetite. Very anxious about tomorrow's procedure and the prospect of life without a hip joint.      Subjective:  Symptoms:  Stable.  She reports anxiety.  No shortness of breath, malaise, cough, chest pain, weakness, headache, chest pressure, anorexia or diarrhea.    Diet:  Poor intake.  No nausea or vomiting.    Activity level: Impaired due to pain.    Pain:  She complains of pain that is moderate.  She reports pain is improving.  Pain is well controlled.        History taken from: patient chart    Objective     Vital Signs  Temp:  [97.2 °F (36.2 °C)-99.1 °F (37.3 °C)] 98.8 °F (37.1 °C)  Heart Rate:  [60-66] 66  Resp:  [16-20] 16  BP: (111-120)/(62-68) 115/68    Objective:  General Appearance:  Comfortable and in no acute distress.    Vital signs: (most recent): Blood pressure 115/68, pulse 66, temperature 98.8 °F (37.1 °C), temperature source Oral, resp. rate 16, height 64\" (162.6 cm), weight 226 lb (103 kg), SpO2 96 %.  Vital signs are normal.  No fever.    Output: Producing urine and producing stool.    Lungs:  Normal respiratory rate and normal effort.  Breath sounds clear to auscultation.    Heart: Normal rate.  Regular rhythm.    Abdomen: Abdomen is soft.  Bowel sounds are normal.   There is no abdominal tenderness.     Extremities: There is no dependent edema.    Pulses: Distal pulses are intact.    Neurological: Patient is alert and oriented to person, place and time.    Skin:  Warm and dry.              Results Review:     I reviewed the patient's new clinical results.  I reviewed the patient's other test results and agree with the interpretation  Discussed with patient    Medication Review: reviewed    Assessment/Plan     Principal Problem:    Prosthetic hip infection, mrsa  Active Problems:    Hematoma of right hip    DVT (deep venous thrombosis), " hx of    Hypertension    Hyperlipidemia    Coronary artery disease, hx of stent    Arthritis    Bipolar disorder    MRSA (methicillin resistant Staphylococcus aureus) infection      Assessment:  (1. MRSA infection of right hip prosthesis--POD #4 s/p I&D  2. Anemia NOS  3. H/o DVT, AC on hold for OR  4. CAD with stent  5. HTN  6. Hyperlipidemia  7. Chronic pain syndrome  8. HypoT4  9. Bipolar d/o).     Plan:   (Receiving 2 units PRBCs now  Plan is for OR tomorrow--removal of right hip hardware  Pt pretty scared about whole thing  Supportive psychotherapy utilizing BATHE).       Cornelio Perry MD  02/12/17  5:16 PM    Time: 15min

## 2017-02-13 ENCOUNTER — ANESTHESIA (OUTPATIENT)
Dept: PERIOP | Facility: HOSPITAL | Age: 67
End: 2017-02-13

## 2017-02-13 ENCOUNTER — ANESTHESIA EVENT (OUTPATIENT)
Dept: PERIOP | Facility: HOSPITAL | Age: 67
End: 2017-02-13

## 2017-02-13 ENCOUNTER — APPOINTMENT (OUTPATIENT)
Dept: GENERAL RADIOLOGY | Facility: HOSPITAL | Age: 67
End: 2017-02-13

## 2017-02-13 LAB
ABO + RH BLD: NORMAL
ANION GAP SERPL CALCULATED.3IONS-SCNC: 13 MMOL/L
BACTERIA SPEC ANAEROBE CULT: NORMAL
BH BB BLOOD EXPIRATION DATE: NORMAL
BH BB BLOOD TYPE BARCODE: 6200
BH BB DISPENSE STATUS: NORMAL
BH BB PRODUCT CODE: NORMAL
BH BB UNIT NUMBER: NORMAL
BUN BLD-MCNC: 5 MG/DL (ref 8–23)
BUN/CREAT SERPL: 12.8 (ref 7–25)
CALCIUM SPEC-SCNC: 8.2 MG/DL (ref 8.6–10.5)
CHLORIDE SERPL-SCNC: 100 MMOL/L (ref 98–107)
CO2 SERPL-SCNC: 24 MMOL/L (ref 22–29)
CREAT BLD-MCNC: 0.39 MG/DL (ref 0.57–1)
DEPRECATED RDW RBC AUTO: 58.2 FL (ref 37–54)
DEPRECATED RDW RBC AUTO: 59.1 FL (ref 37–54)
ERYTHROCYTE [DISTWIDTH] IN BLOOD BY AUTOMATED COUNT: 17 % (ref 11.7–13)
ERYTHROCYTE [DISTWIDTH] IN BLOOD BY AUTOMATED COUNT: 17.2 % (ref 11.7–13)
GFR SERPL CREATININE-BSD FRML MDRD: >150 ML/MIN/1.73
GLUCOSE BLD-MCNC: 94 MG/DL (ref 65–99)
HCT VFR BLD AUTO: 29.1 % (ref 35.6–45.5)
HCT VFR BLD AUTO: 30.3 % (ref 35.6–45.5)
HGB BLD-MCNC: 9.1 G/DL (ref 11.9–15.5)
HGB BLD-MCNC: 9.5 G/DL (ref 11.9–15.5)
INR PPP: 1.13 (ref 0.9–1.1)
INR PPP: 1.3 (ref 0.9–1.1)
MCH RBC QN AUTO: 29.2 PG (ref 26.9–32)
MCH RBC QN AUTO: 29.4 PG (ref 26.9–32)
MCHC RBC AUTO-ENTMCNC: 31.3 G/DL (ref 32.4–36.3)
MCHC RBC AUTO-ENTMCNC: 31.4 G/DL (ref 32.4–36.3)
MCV RBC AUTO: 93.2 FL (ref 80.5–98.2)
MCV RBC AUTO: 93.9 FL (ref 80.5–98.2)
PLATELET # BLD AUTO: 319 10*3/MM3 (ref 140–500)
PLATELET # BLD AUTO: 358 10*3/MM3 (ref 140–500)
PMV BLD AUTO: 9.7 FL (ref 6–12)
PMV BLD AUTO: 9.8 FL (ref 6–12)
POTASSIUM BLD-SCNC: 4.1 MMOL/L (ref 3.5–5.2)
PROTHROMBIN TIME: 14.1 SECONDS (ref 11.7–14.2)
PROTHROMBIN TIME: 15.7 SECONDS (ref 11.7–14.2)
RBC # BLD AUTO: 3.1 10*6/MM3 (ref 3.9–5.2)
RBC # BLD AUTO: 3.25 10*6/MM3 (ref 3.9–5.2)
SODIUM BLD-SCNC: 137 MMOL/L (ref 136–145)
UNIT  ABO: NORMAL
UNIT  RH: NORMAL
WBC NRBC COR # BLD: 11.03 10*3/MM3 (ref 4.5–10.7)
WBC NRBC COR # BLD: 4.29 10*3/MM3 (ref 4.5–10.7)

## 2017-02-13 PROCEDURE — 87186 SC STD MICRODIL/AGAR DIL: CPT | Performed by: ORTHOPAEDIC SURGERY

## 2017-02-13 PROCEDURE — 36430 TRANSFUSION BLD/BLD COMPNT: CPT

## 2017-02-13 PROCEDURE — 25010000002 HYDROMORPHONE PER 4 MG: Performed by: NURSE ANESTHETIST, CERTIFIED REGISTERED

## 2017-02-13 PROCEDURE — 25010000002 PROPOFOL 10 MG/ML EMULSION: Performed by: NURSE ANESTHETIST, CERTIFIED REGISTERED

## 2017-02-13 PROCEDURE — 0SP90JZ REMOVAL OF SYNTHETIC SUBSTITUTE FROM RIGHT HIP JOINT, OPEN APPROACH: ICD-10-PCS | Performed by: ORTHOPAEDIC SURGERY

## 2017-02-13 PROCEDURE — 86900 BLOOD TYPING SEROLOGIC ABO: CPT

## 2017-02-13 PROCEDURE — 25010000002 MIDAZOLAM PER 1 MG: Performed by: ANESTHESIOLOGY

## 2017-02-13 PROCEDURE — 25010000002 PHENYLEPHRINE PER 1 ML: Performed by: NURSE ANESTHETIST, CERTIFIED REGISTERED

## 2017-02-13 PROCEDURE — 94799 UNLISTED PULMONARY SVC/PX: CPT

## 2017-02-13 PROCEDURE — 87205 SMEAR GRAM STAIN: CPT | Performed by: ORTHOPAEDIC SURGERY

## 2017-02-13 PROCEDURE — 25010000002 FENTANYL CITRATE (PF) 100 MCG/2ML SOLUTION

## 2017-02-13 PROCEDURE — 72170 X-RAY EXAM OF PELVIS: CPT

## 2017-02-13 PROCEDURE — 25010000002 VANCOMYCIN: Performed by: ORTHOPAEDIC SURGERY

## 2017-02-13 PROCEDURE — 87070 CULTURE OTHR SPECIMN AEROBIC: CPT | Performed by: ORTHOPAEDIC SURGERY

## 2017-02-13 PROCEDURE — P9016 RBC LEUKOCYTES REDUCED: HCPCS

## 2017-02-13 PROCEDURE — 80202 ASSAY OF VANCOMYCIN: CPT | Performed by: ORTHOPAEDIC SURGERY

## 2017-02-13 PROCEDURE — P9041 ALBUMIN (HUMAN),5%, 50ML: HCPCS | Performed by: NURSE ANESTHETIST, CERTIFIED REGISTERED

## 2017-02-13 PROCEDURE — 87176 TISSUE HOMOGENIZATION CULTR: CPT | Performed by: ORTHOPAEDIC SURGERY

## 2017-02-13 PROCEDURE — 85610 PROTHROMBIN TIME: CPT | Performed by: ORTHOPAEDIC SURGERY

## 2017-02-13 PROCEDURE — 25010000002 ALBUMIN HUMAN 5% PER 50 ML: Performed by: NURSE ANESTHETIST, CERTIFIED REGISTERED

## 2017-02-13 PROCEDURE — 85027 COMPLETE CBC AUTOMATED: CPT | Performed by: HOSPITALIST

## 2017-02-13 PROCEDURE — 87147 CULTURE TYPE IMMUNOLOGIC: CPT | Performed by: ORTHOPAEDIC SURGERY

## 2017-02-13 PROCEDURE — 94640 AIRWAY INHALATION TREATMENT: CPT

## 2017-02-13 PROCEDURE — 25010000002 HYDROMORPHONE PER 4 MG: Performed by: ORTHOPAEDIC SURGERY

## 2017-02-13 PROCEDURE — 85027 COMPLETE CBC AUTOMATED: CPT | Performed by: ANESTHESIOLOGY

## 2017-02-13 PROCEDURE — 87075 CULTR BACTERIA EXCEPT BLOOD: CPT | Performed by: ORTHOPAEDIC SURGERY

## 2017-02-13 PROCEDURE — 25010000002 FENTANYL CITRATE (PF) 100 MCG/2ML SOLUTION: Performed by: NURSE ANESTHETIST, CERTIFIED REGISTERED

## 2017-02-13 PROCEDURE — 80048 BASIC METABOLIC PNL TOTAL CA: CPT | Performed by: HOSPITALIST

## 2017-02-13 RX ORDER — FAMOTIDINE 10 MG/ML
20 INJECTION, SOLUTION INTRAVENOUS ONCE
Status: COMPLETED | OUTPATIENT
Start: 2017-02-13 | End: 2017-02-13

## 2017-02-13 RX ORDER — SODIUM CHLORIDE, SODIUM LACTATE, POTASSIUM CHLORIDE, CALCIUM CHLORIDE 600; 310; 30; 20 MG/100ML; MG/100ML; MG/100ML; MG/100ML
9 INJECTION, SOLUTION INTRAVENOUS CONTINUOUS
Status: DISCONTINUED | OUTPATIENT
Start: 2017-02-13 | End: 2017-02-18 | Stop reason: HOSPADM

## 2017-02-13 RX ORDER — TRANEXAMIC ACID 100 MG/ML
INJECTION, SOLUTION INTRAVENOUS
Status: DISPENSED
Start: 2017-02-13 | End: 2017-02-14

## 2017-02-13 RX ORDER — FENTANYL CITRATE 50 UG/ML
INJECTION, SOLUTION INTRAMUSCULAR; INTRAVENOUS AS NEEDED
Status: DISCONTINUED | OUTPATIENT
Start: 2017-02-13 | End: 2017-02-13 | Stop reason: SURG

## 2017-02-13 RX ORDER — FENTANYL CITRATE 50 UG/ML
INJECTION, SOLUTION INTRAMUSCULAR; INTRAVENOUS
Status: COMPLETED
Start: 2017-02-13 | End: 2017-02-13

## 2017-02-13 RX ORDER — HYDROMORPHONE HCL 110MG/55ML
PATIENT CONTROLLED ANALGESIA SYRINGE INTRAVENOUS AS NEEDED
Status: DISCONTINUED | OUTPATIENT
Start: 2017-02-13 | End: 2017-02-13 | Stop reason: SURG

## 2017-02-13 RX ORDER — ROCURONIUM BROMIDE 10 MG/ML
INJECTION, SOLUTION INTRAVENOUS AS NEEDED
Status: DISCONTINUED | OUTPATIENT
Start: 2017-02-13 | End: 2017-02-13 | Stop reason: SURG

## 2017-02-13 RX ORDER — PROMETHAZINE HYDROCHLORIDE 25 MG/1
25 SUPPOSITORY RECTAL ONCE AS NEEDED
Status: DISCONTINUED | OUTPATIENT
Start: 2017-02-13 | End: 2017-02-13 | Stop reason: HOSPADM

## 2017-02-13 RX ORDER — MIDAZOLAM HYDROCHLORIDE 1 MG/ML
2 INJECTION INTRAMUSCULAR; INTRAVENOUS
Status: DISCONTINUED | OUTPATIENT
Start: 2017-02-13 | End: 2017-02-13 | Stop reason: HOSPADM

## 2017-02-13 RX ORDER — HYDROMORPHONE HYDROCHLORIDE 1 MG/ML
0.5 INJECTION, SOLUTION INTRAMUSCULAR; INTRAVENOUS; SUBCUTANEOUS
Status: DISCONTINUED | OUTPATIENT
Start: 2017-02-13 | End: 2017-02-13 | Stop reason: HOSPADM

## 2017-02-13 RX ORDER — WARFARIN SODIUM 4 MG/1
4 TABLET ORAL
Status: COMPLETED | OUTPATIENT
Start: 2017-02-13 | End: 2017-02-14

## 2017-02-13 RX ORDER — FENTANYL CITRATE 50 UG/ML
50 INJECTION, SOLUTION INTRAMUSCULAR; INTRAVENOUS
Status: DISCONTINUED | OUTPATIENT
Start: 2017-02-13 | End: 2017-02-13 | Stop reason: HOSPADM

## 2017-02-13 RX ORDER — MIDAZOLAM HYDROCHLORIDE 1 MG/ML
1 INJECTION INTRAMUSCULAR; INTRAVENOUS
Status: DISCONTINUED | OUTPATIENT
Start: 2017-02-13 | End: 2017-02-13 | Stop reason: HOSPADM

## 2017-02-13 RX ORDER — PROMETHAZINE HYDROCHLORIDE 25 MG/ML
6.25 INJECTION, SOLUTION INTRAMUSCULAR; INTRAVENOUS ONCE AS NEEDED
Status: DISCONTINUED | OUTPATIENT
Start: 2017-02-13 | End: 2017-02-13 | Stop reason: HOSPADM

## 2017-02-13 RX ORDER — LIDOCAINE HYDROCHLORIDE 20 MG/ML
INJECTION, SOLUTION INFILTRATION; PERINEURAL AS NEEDED
Status: DISCONTINUED | OUTPATIENT
Start: 2017-02-13 | End: 2017-02-13 | Stop reason: SURG

## 2017-02-13 RX ORDER — POVIDONE-IODINE 10 MG/G
OINTMENT TOPICAL AS NEEDED
Status: DISCONTINUED | OUTPATIENT
Start: 2017-02-13 | End: 2017-02-13 | Stop reason: HOSPADM

## 2017-02-13 RX ORDER — IPRATROPIUM BROMIDE AND ALBUTEROL SULFATE 2.5; .5 MG/3ML; MG/3ML
3 SOLUTION RESPIRATORY (INHALATION) ONCE AS NEEDED
Status: DISCONTINUED | OUTPATIENT
Start: 2017-02-13 | End: 2017-02-13 | Stop reason: HOSPADM

## 2017-02-13 RX ORDER — LIDOCAINE HYDROCHLORIDE 40 MG/ML
SOLUTION TOPICAL
Status: DISPENSED
Start: 2017-02-13 | End: 2017-02-14

## 2017-02-13 RX ORDER — PROPOFOL 10 MG/ML
VIAL (ML) INTRAVENOUS AS NEEDED
Status: DISCONTINUED | OUTPATIENT
Start: 2017-02-13 | End: 2017-02-13 | Stop reason: SURG

## 2017-02-13 RX ORDER — ALBUMIN, HUMAN INJ 5% 5 %
SOLUTION INTRAVENOUS CONTINUOUS PRN
Status: DISCONTINUED | OUTPATIENT
Start: 2017-02-13 | End: 2017-02-13 | Stop reason: SURG

## 2017-02-13 RX ORDER — FENTANYL CITRATE 50 UG/ML
INJECTION, SOLUTION INTRAMUSCULAR; INTRAVENOUS
Status: DISPENSED
Start: 2017-02-13 | End: 2017-02-14

## 2017-02-13 RX ORDER — TRANEXAMIC ACID 100 MG/ML
INJECTION, SOLUTION INTRAVENOUS AS NEEDED
Status: DISCONTINUED | OUTPATIENT
Start: 2017-02-13 | End: 2017-02-13 | Stop reason: SURG

## 2017-02-13 RX ORDER — SODIUM CHLORIDE 0.9 % (FLUSH) 0.9 %
1-10 SYRINGE (ML) INJECTION AS NEEDED
Status: DISCONTINUED | OUTPATIENT
Start: 2017-02-13 | End: 2017-02-13 | Stop reason: HOSPADM

## 2017-02-13 RX ORDER — PROMETHAZINE HYDROCHLORIDE 25 MG/1
25 TABLET ORAL ONCE AS NEEDED
Status: DISCONTINUED | OUTPATIENT
Start: 2017-02-13 | End: 2017-02-13 | Stop reason: HOSPADM

## 2017-02-13 RX ORDER — ALBUMIN, HUMAN INJ 5% 5 %
SOLUTION INTRAVENOUS
Status: DISPENSED
Start: 2017-02-13 | End: 2017-02-14

## 2017-02-13 RX ORDER — LIDOCAINE HYDROCHLORIDE 40 MG/ML
SOLUTION TOPICAL AS NEEDED
Status: DISCONTINUED | OUTPATIENT
Start: 2017-02-13 | End: 2017-02-13 | Stop reason: SURG

## 2017-02-13 RX ADMIN — PROPOFOL 150 MG: 10 INJECTION, EMULSION INTRAVENOUS at 18:10

## 2017-02-13 RX ADMIN — FENTANYL CITRATE 50 MCG: 50 INJECTION INTRAMUSCULAR; INTRAVENOUS at 19:53

## 2017-02-13 RX ADMIN — HYDROMORPHONE HYDROCHLORIDE 0.5 MG: 2 INJECTION, SOLUTION INTRAMUSCULAR; INTRAVENOUS; SUBCUTANEOUS at 19:00

## 2017-02-13 RX ADMIN — FENTANYL CITRATE 50 MCG: 50 INJECTION, SOLUTION INTRAMUSCULAR; INTRAVENOUS at 17:50

## 2017-02-13 RX ADMIN — SODIUM CHLORIDE, POTASSIUM CHLORIDE, SODIUM LACTATE AND CALCIUM CHLORIDE: 600; 310; 30; 20 INJECTION, SOLUTION INTRAVENOUS at 19:58

## 2017-02-13 RX ADMIN — TIOTROPIUM BROMIDE 1 CAPSULE: 18 CAPSULE ORAL; RESPIRATORY (INHALATION) at 12:28

## 2017-02-13 RX ADMIN — PREGABALIN 200 MG: 100 CAPSULE ORAL at 10:05

## 2017-02-13 RX ADMIN — VANCOMYCIN HYDROCHLORIDE 1500 MG: 1 INJECTION, POWDER, LYOPHILIZED, FOR SOLUTION INTRAVENOUS at 00:00

## 2017-02-13 RX ADMIN — FAMOTIDINE 20 MG: 10 INJECTION, SOLUTION INTRAVENOUS at 17:48

## 2017-02-13 RX ADMIN — PHENYLEPHRINE HYDROCHLORIDE 200 MCG: 10 INJECTION INTRAVENOUS at 19:27

## 2017-02-13 RX ADMIN — ALBUMIN (HUMAN): 0.05 SOLUTION INTRAVENOUS at 18:45

## 2017-02-13 RX ADMIN — FENTANYL CITRATE 50 MCG: 50 INJECTION INTRAMUSCULAR; INTRAVENOUS at 17:50

## 2017-02-13 RX ADMIN — SODIUM CHLORIDE, POTASSIUM CHLORIDE, SODIUM LACTATE AND CALCIUM CHLORIDE: 600; 310; 30; 20 INJECTION, SOLUTION INTRAVENOUS at 17:00

## 2017-02-13 RX ADMIN — EPHEDRINE SULFATE 10 MG: 50 INJECTION INTRAMUSCULAR; INTRAVENOUS; SUBCUTANEOUS at 19:36

## 2017-02-13 RX ADMIN — PHENYLEPHRINE HYDROCHLORIDE 100 MCG: 10 INJECTION INTRAVENOUS at 19:36

## 2017-02-13 RX ADMIN — LIDOCAINE HYDROCHLORIDE 1 EACH: 40 SPRAY LARYNGEAL; TRANSTRACHEAL at 18:13

## 2017-02-13 RX ADMIN — HYDROMORPHONE HYDROCHLORIDE 0.5 MG: 2 INJECTION, SOLUTION INTRAMUSCULAR; INTRAVENOUS; SUBCUTANEOUS at 18:45

## 2017-02-13 RX ADMIN — PANTOPRAZOLE SODIUM 40 MG: 40 TABLET, DELAYED RELEASE ORAL at 06:08

## 2017-02-13 RX ADMIN — EPHEDRINE SULFATE 10 MG: 50 INJECTION INTRAMUSCULAR; INTRAVENOUS; SUBCUTANEOUS at 19:11

## 2017-02-13 RX ADMIN — HYDROMORPHONE HYDROCHLORIDE 0.5 MG: 1 INJECTION, SOLUTION INTRAMUSCULAR; INTRAVENOUS; SUBCUTANEOUS at 14:48

## 2017-02-13 RX ADMIN — ROCURONIUM BROMIDE 50 MG: 10 INJECTION INTRAVENOUS at 18:10

## 2017-02-13 RX ADMIN — LIDOCAINE HYDROCHLORIDE 60 MG: 20 INJECTION, SOLUTION INFILTRATION; PERINEURAL at 18:10

## 2017-02-13 RX ADMIN — ISOSORBIDE MONONITRATE 30 MG: 30 TABLET, EXTENDED RELEASE ORAL at 09:28

## 2017-02-13 RX ADMIN — HYDROCODONE BITARTRATE AND ACETAMINOPHEN 2 TABLET: 10; 325 TABLET ORAL at 01:41

## 2017-02-13 RX ADMIN — PHENYLEPHRINE HYDROCHLORIDE 100 MCG: 10 INJECTION INTRAVENOUS at 19:18

## 2017-02-13 RX ADMIN — FENTANYL CITRATE 100 MCG: 50 INJECTION INTRAMUSCULAR; INTRAVENOUS at 18:10

## 2017-02-13 RX ADMIN — LEVOTHYROXINE SODIUM 100 MCG: 100 TABLET ORAL at 06:08

## 2017-02-13 RX ADMIN — MIDAZOLAM 1 MG: 1 INJECTION INTRAMUSCULAR; INTRAVENOUS at 17:49

## 2017-02-13 RX ADMIN — HYDROCODONE BITARTRATE AND ACETAMINOPHEN 2 TABLET: 10; 325 TABLET ORAL at 10:45

## 2017-02-13 RX ADMIN — FENTANYL CITRATE 50 MCG: 50 INJECTION INTRAMUSCULAR; INTRAVENOUS at 19:26

## 2017-02-13 RX ADMIN — PHENYLEPHRINE HYDROCHLORIDE 100 MCG: 10 INJECTION INTRAVENOUS at 19:11

## 2017-02-13 RX ADMIN — TRANEXAMIC ACID 1000 MG: 100 INJECTION, SOLUTION INTRAVENOUS at 18:50

## 2017-02-13 RX ADMIN — VANCOMYCIN HYDROCHLORIDE 1500 MG: 1 INJECTION, POWDER, LYOPHILIZED, FOR SOLUTION INTRAVENOUS at 12:36

## 2017-02-13 RX ADMIN — SODIUM CHLORIDE, POTASSIUM CHLORIDE, SODIUM LACTATE AND CALCIUM CHLORIDE 9 ML/HR: 600; 310; 30; 20 INJECTION, SOLUTION INTRAVENOUS at 17:48

## 2017-02-13 RX ADMIN — HYDROCODONE BITARTRATE AND ACETAMINOPHEN 2 TABLET: 10; 325 TABLET ORAL at 06:08

## 2017-02-13 NOTE — OP NOTE
Date of Procedure:  02/08/2017    PREOPERATIVE DIAGNOSIS: Hematoma, right hip.     POSTOPERATIVE DIAGNOSIS: Hematoma, right hip.    PROCEDURE PERFORMED: Incision and drainage right hip wound.    SURGEON: Erlin Martin MD     FIRST ASSISTANT: KARMA Ness    ANESTHESIA: General.     ESTIMATED BLOOD LOSS: New blood loss was about 100 mL.     DESCRIPTION OF PROCEDURE: The patient was brought to the operating room and given a general anesthetic. She had been started on preoperative vancomycin and Ancef. She was placed in decubitus position with the right side up. Staples were removed from the previous incision. After this was done, the patient had the subcutaneous tissue opened and there was a large hematoma present. This was evacuated. The previous fascial repair had dehisced so this was opened further and further hematoma was encountered. All of this was removed, and then the patient had the wound irrigated with a dilute Betadine solution and bacitracin and then it was closed over a drain. We did take cultures intraoperatively. It was closed with interrupted 0 Vicryl and 2-0 Vicryl. First assistant KARMA Ness was present throughout the entire case.         LEEANN Oh:hans  D:   02/12/2017 17:13:00  T:   02/12/2017 21:23:11  Job ID:   [id number]  Document ID:   [id number]  cc:

## 2017-02-13 NOTE — PLAN OF CARE
Problem: Patient Care Overview (Adult)  Goal: Plan of Care Review  Outcome: Ongoing (interventions implemented as appropriate)    02/13/17 6522   Coping/Psychosocial Response Interventions   Plan Of Care Reviewed With patient   Outcome Evaluation   Outcome Summary/Follow up Plan To OR for removal of RTH. 2 units prbc completed prior to departure for preop. Continue contact isolation for MRSA. HV drain stiched in place.       Goal: Adult Individualization and Mutuality  Outcome: Ongoing (interventions implemented as appropriate)  Goal: Discharge Needs Assessment  Outcome: Ongoing (interventions implemented as appropriate)    Problem: Hip Replacement, Total (Adult)  Goal: Signs and Symptoms of Listed Potential Problems Will be Absent or Manageable (Hip Replacement, Total)  Outcome: Ongoing (interventions implemented as appropriate)    Problem: Fall Risk (Adult)  Goal: Absence of Falls  Outcome: Ongoing (interventions implemented as appropriate)    Problem: Infection, Risk/Actual (Adult)  Goal: Infection Prevention/Resolution  Outcome: Ongoing (interventions implemented as appropriate)    Problem: Skin Integrity Impairment, Risk/Actual (Adult)  Goal: Skin Integrity/Wound Healing  Outcome: Ongoing (interventions implemented as appropriate)

## 2017-02-13 NOTE — PLAN OF CARE
Problem: Patient Care Overview (Adult)  Goal: Plan of Care Review  Outcome: Ongoing (interventions implemented as appropriate)    02/13/17 1712   Coping/Psychosocial Response Interventions   Plan Of Care Reviewed With patient   Patient Care Overview   Progress no change   Outcome Evaluation   Outcome Summary/Follow up Plan preparing for OR         Problem: Perioperative Period (Adult)  Goal: Signs and Symptoms of Listed Potential Problems Will be Absent or Manageable (Perioperative Period)  Outcome: Ongoing (interventions implemented as appropriate)    02/13/17 1712   Perioperative Period   Problems Assessed (Perioperative Period) pain;infection;situational response   Problems Present (Perioperative Period) pain;infection

## 2017-02-13 NOTE — PROGRESS NOTES
Orthopedic Progress Note    Subjective :   The patient continues to have right hip pain.  She is with the understanding that this would be removed today.    Objective :    Vital signs in last 24 hours:  Temp:  [97.9 °F (36.6 °C)-98.8 °F (37.1 °C)] 98.5 °F (36.9 °C)  Heart Rate:  [60-69] 69  Resp:  [16-20] 16  BP: ()/(61-69) 111/67  Vitals:    02/12/17 1900 02/12/17 2007 02/12/17 2300 02/13/17 0300   BP: 114/61 98/63 112/69 111/67   BP Location: Left arm  Left arm Left arm   Patient Position: Lying  Lying Lying   Pulse: 60 60 65 69   Resp: 16 16 16 16   Temp: 98.3 °F (36.8 °C) 98.3 °F (36.8 °C) 98.6 °F (37 °C) 98.5 °F (36.9 °C)   TempSrc: Oral Oral Oral Oral   SpO2: 95% 97% 95% 95%   Weight:       Height:           PHYSICAL EXAM:  Patient is calm, in no acute distress, awake and oriented x 3.  Patient does have drainage from her right hip.  Swelling in appropriate in amount.  Ecchymosis is appropriate in amount.  Homans test is negative.  Patient is neurovascularly intact distally.      LABS:    Results from last 7 days  Lab Units 02/13/17  0405   WBC 10*3/mm3 4.29*   HEMOGLOBIN g/dL 9.5*   HEMATOCRIT % 30.3*   PLATELETS 10*3/mm3 319       Results from last 7 days  Lab Units 02/13/17  0405   SODIUM mmol/L 137   POTASSIUM mmol/L 4.1   CHLORIDE mmol/L 100   TOTAL CO2 mmol/L 24.0   BUN mg/dL 5*   CREATININE mg/dL 0.39*   GLUCOSE mg/dL 94   CALCIUM mg/dL 8.2*       Results from last 7 days  Lab Units 02/13/17  0405 02/12/17  0341 02/11/17  0343  02/08/17  1334   INR  1.13* 1.18* 1.35*  < > 1.81*   APTT seconds  --   --   --   --  41.1*   < > = values in this interval not displayed.    ASSESSMENT:  Status post right hematoma evacuation    Plan:  Dr. Martin is planning on her right hip removal later this afternoon.  The patient will get a clear liquid breakfast and then nothing by mouth after that.    Thony Finn PA-C    Date: 2/13/2017  Time: 7:37 AM

## 2017-02-13 NOTE — PROGRESS NOTES
"  Infectious Diseases Progress Note    Krysta Benito MD     [facility]  Los: 4 days  Patient Identification:  Name: Frances Downs  Age: 66 y.o.  Sex: female  :  1950  MRN: [id number]         Primary Care Physician: Talha Echeverria MD            Subjective: No specific complaints denies any fever and chills.  Right hip still hurts and has persistent drainage  Interval History: See above     Objective:    Scheduled Meds:    aspirin 325 mg Oral BID   atorvastatin 40 mg Oral Daily   clopidogrel 75 mg Oral Daily   docusate sodium 100 mg Oral BID   ferrous sulfate 325 mg Oral BID   fluticasone 2 spray Nasal Daily   isosorbide mononitrate 30 mg Oral Daily   levothyroxine 100 mcg Oral Q AM   modafinil 200 mg Oral BID   oxybutynin XL 10 mg Oral Daily   pantoprazole 40 mg Oral Q AM   potassium chloride 10 mEq Oral Daily   pregabalin 200 mg Oral BID   sennosides-docusate sodium 2 tablet Oral BID   tiotropium 1 capsule Inhalation Daily - RT   vancomycin 1,500 mg Intravenous Q12H   vilazodone 40 mg Oral Nightly     Continuous Infusions:    lactated ringers 9 mL/hr Last Rate: 9 mL/hr (17 1440)   Pharmacy to dose vancomycin         Vital signs in last 24 hours:  Temp:  [97.2 °F (36.2 °C)-99.1 °F (37.3 °C)] 98.3 °F (36.8 °C)  Heart Rate:  [60-66] 60  Resp:  [16-20] 16  BP: ()/(61-68) 98/63    Intake/Output:    Intake/Output Summary (Last 24 hours) at 17  Last data filed at 17   Gross per 24 hour   Intake    231 ml   Output   1000 ml   Net   -769 ml       Exam:  Visit Vitals   • BP 98/63   • Pulse 60   • Temp 98.3 °F (36.8 °C) (Oral)   • Resp 16   • Ht 64\" (162.6 cm)   • Wt 226 lb (103 kg)   • SpO2 97%   • BMI 38.79 kg/m2       General Appearance:    Alert, cooperative, no distress, AAOx3                          Head:    Normocephalic, without obvious abnormality, atraumatic                           Eyes:    PERRL, conjunctiva/corneas clear, EOM's intact, both eyes   "                       Throat:   Lips, tongue, gums normal; oral mucosa pink and moist                           Neck:   Supple, symmetrical, trachea midline, no JVD                         Lungs:    Clear to auscultation bilaterally, respirations unlabored                 Chest Wall:    No tenderness or deformity                          Heart:    Regular rate and rhythm, S1 and S2 normal, no murmur,no  Rub                                      or gallop                  Abdomen:     Soft, non-tender, bowel sounds active, no masses, no                                                        organomegaly                  Extremities:   Right hip surgical site dressed drain in place                        Pulses:   Pulses palpable in all extremities                            Skin:   Skin is warm and dry,  no rashes or palpable lesions                  Neurologic:   CNII-XII intact, motor strength grossly intact, sensation grossly                                         intact to light touch, no focal deficits noted       Data Review:    I reviewed the patient's new clinical results.    Results from last 7 days  Lab Units 02/12/17  0342 02/11/17  0343 02/10/17  0256 02/09/17  0506 02/08/17  1844 02/08/17  1334   WBC 10*3/mm3  --  3.92*  --   --   --  6.49   HEMOGLOBIN g/dL 8.5* 8.6* 8.6* 8.9* 8.0* 8.8*   PLATELETS 10*3/mm3  --  335  --   --   --  328       Results from last 7 days  Lab Units 02/11/17  0343 02/09/17  0506 02/08/17  1333   SODIUM mmol/L 135* 129* 130*   POTASSIUM mmol/L 4.1 4.7 4.7   CHLORIDE mmol/L 98 92* 90*   TOTAL CO2 mmol/L 24.6 24.6 25.2   BUN mg/dL 8 15 24*   CREATININE mg/dL 0.49* 0.59 0.89   CALCIUM mg/dL 8.4* 8.7 9.1   GLUCOSE mg/dL 85 112* 109*         Assessment:  Principal Problem:    Prosthetic hip infection, mrsa  Active Problems:    Hematoma of right hip    DVT (deep venous thrombosis), hx of    Hypertension    Hyperlipidemia    Coronary artery disease, hx of stent    Arthritis    Bipolar  disorder    MRSA (methicillin resistant Staphylococcus aureus) infection      Plan:  Arrange a PICC line and at least 6-8 weeks of IV vancomycin therapy from final surgery, which is scheduled for 2/13/2017.    Krysta Benito MD  2/12/2017  8:16 PM    Much of this encounter note is an electronic transcription/translation of spoken language to printed text. The electronic translation of spoken language may permit erroneous, or at times, nonsensical words or phrases to be inadvertently transcribed; Although I have reviewed the note for such errors, some may still exist

## 2017-02-13 NOTE — PROGRESS NOTES
"Pharmacokinetic Consult - Vancomycin Dosing    Frances Downs   Day 5 (2/13/2017)  Consult for Dr. Martin  Treating: Bone and Joint infection. Prosthetic Hip. Rt Hip wound MRSA+  Dr. Benito following. Plan for 6-8 weeks of IV Vancomycin     Relevant clinical data and objective history reviewed:  66 y.o. female 64\" (162.6 cm) 226 lb (103 kg)  Body mass index is 38.79 kg/(m^2).    CREATININE   Date Value Ref Range Status   02/13/2017 0.39 (L) 0.57 - 1.00 mg/dL Final   02/11/2017 0.49 (L) 0.57 - 1.00 mg/dL Final   02/09/2017 0.59 0.57 - 1.00 mg/dL Final       Estimated Creatinine Clearance: 81.9 mL/min (by C-G formula based on Cr of 0.39).      Temp Readings from Last 3 Encounters:   02/13/17 98.4 °F (36.9 °C) (Oral)     Baseline cultures/labs/radiology:  2/08 Wound cx (Rt hip): 3+ MRSA  2/08 CXR: right hip arthroplasty in place. There is a fracture through the acetabulum and the acetabular component of the right hip arthroplasty projects into the  pelvis.     Assessment/Plan  Currently on Vancomycin 1500 mg IV [id number] (29mg/kg/day). Last Vancomycin trough:15.6 mcg/mL (2/11/2017). Goal Trough 15-20 mcg/mL.   Plan to obtain repeat trough 2/14. Pharmacy will continue to follow.    Thanks.  Tracy Joyner, PharmD  "

## 2017-02-13 NOTE — SIGNIFICANT NOTE
02/13/17 1008   Rehab Treatment   Discipline physical therapist   Treatment Not Performed other (see comments)  (Pt just starting blood transfusion, going for surgery later this afternoon. Pt states she would like to rest, will still be getting up to BSC today. Requested PT follow up postoperatively; RN, Verónica, aware. )   Recommendation   PT - Next Appointment 02/14/17

## 2017-02-13 NOTE — PROGRESS NOTES
"  Infectious Diseases Progress Note    Krysta Benito MD     [facility]  Los: 5 days  Patient Identification:  Name: Frances Downs  Age: 66 y.o.  Sex: female  :  1950  MRN: [id number]         Primary Care Physician: Talha Echeverria MD            Subjective: The patient come to terms about the needed surgery and associated treatment for infected right hip prosthetic joint.  Planning to have surgery later this evening at 5 PM.    Interval History: See above     Objective:    Scheduled Meds:    aspirin 325 mg Oral BID   atorvastatin 40 mg Oral Daily   clopidogrel 75 mg Oral Daily   docusate sodium 100 mg Oral BID   ferrous sulfate 325 mg Oral BID   fluticasone 2 spray Nasal Daily   isosorbide mononitrate 30 mg Oral Daily   levothyroxine 100 mcg Oral Q AM   modafinil 200 mg Oral BID   oxybutynin XL 10 mg Oral Daily   pantoprazole 40 mg Oral Q AM   potassium chloride 10 mEq Oral Daily   pregabalin 200 mg Oral BID   sennosides-docusate sodium 2 tablet Oral BID   tiotropium 1 capsule Inhalation Daily - RT   vancomycin 1,500 mg Intravenous Q12H   vilazodone 40 mg Oral Nightly     Continuous Infusions:    lactated ringers 9 mL/hr Last Rate: 9 mL/hr (17 1440)   Pharmacy to dose vancomycin         Vital signs in last 24 hours:  Temp:  [97.9 °F (36.6 °C)-98.9 °F (37.2 °C)] 98.9 °F (37.2 °C)  Heart Rate:  [60-69] 68  Resp:  [16-20] 16  BP: ()/(61-80) 122/76    Intake/Output:    Intake/Output Summary (Last 24 hours) at 17 0954  Last data filed at 17 0635   Gross per 24 hour   Intake    731 ml   Output   1025 ml   Net   -294 ml       Exam:  Visit Vitals   • /76   • Pulse 68   • Temp 98.9 °F (37.2 °C)   • Resp 16   • Ht 64\" (162.6 cm)   • Wt 226 lb (103 kg)   • SpO2 96%   • BMI 38.79 kg/m2       General Appearance:    Alert, cooperative, no distress, AAOx3                          Head:    Normocephalic, without obvious abnormality, atraumatic                           " Eyes:    PERRL, conjunctiva/corneas clear, EOM's intact, both eyes                         Throat:   Lips, tongue, gums normal; oral mucosa pink and moist                           Neck:   Supple, symmetrical, trachea midline, no JVD                         Lungs:    Clear to auscultation bilaterally, respirations unlabored                 Chest Wall:    No tenderness or deformity                          Heart:    Regular rate and rhythm, S1 and S2 normal, no murmur,no  Rub                                      or gallop                  Abdomen:     Soft, non-tender, bowel sounds active, no masses, no                                                        organomegaly                  Extremities:   Right hip surgical site dressed                        Pulses:   Pulses palpable in all extremities                            Skin:   Skin is warm and dry,  no rashes or palpable lesions                         Data Review:    I reviewed the patient's new clinical results.    Results from last 7 days  Lab Units 02/13/17  0405 02/12/17  0342 02/11/17  0343 02/10/17  0256 02/09/17  0506 02/08/17  1844 02/08/17  1334   WBC 10*3/mm3 4.29*  --  3.92*  --   --   --  6.49   HEMOGLOBIN g/dL 9.5* 8.5* 8.6* 8.6* 8.9* 8.0* 8.8*   PLATELETS 10*3/mm3 319  --  335  --   --   --  328       Results from last 7 days  Lab Units 02/13/17  0405 02/11/17  0343 02/09/17  0506 02/08/17  1333   SODIUM mmol/L 137 135* 129* 130*   POTASSIUM mmol/L 4.1 4.1 4.7 4.7   CHLORIDE mmol/L 100 98 92* 90*   TOTAL CO2 mmol/L 24.0 24.6 24.6 25.2   BUN mg/dL 5* 8 15 24*   CREATININE mg/dL 0.39* 0.49* 0.59 0.89   CALCIUM mg/dL 8.2* 8.4* 8.7 9.1   GLUCOSE mg/dL 94 85 112* 109*         Assessment:  Principal Problem:    Prosthetic hip infection, mrsa  Active Problems:    Hematoma of right hip    DVT (deep venous thrombosis), hx of    Hypertension    Hyperlipidemia    Coronary artery disease, hx of stent    Arthritis    Bipolar disorder    MRSA (methicillin  resistant Staphylococcus aureus) infection      Plan:  6-8 weeks of IV vancomycin after final surgery and explant of hardware.  We would recommend observing off of antibiotic therapy for at least couple weeks with close monitoring of her inflammatory markers in that timeframe to decide if she is a candidate for stage II implant based on orthopedic surgery service's assessment.    Krysta Benito MD  2/13/2017  9:54 AM    Much of this encounter note is an electronic transcription/translation of spoken language to printed text. The electronic translation of spoken language may permit erroneous, or at times, nonsensical words or phrases to be inadvertently transcribed; Although I have reviewed the note for such errors, some may still exist

## 2017-02-13 NOTE — PLAN OF CARE
Problem: Patient Care Overview (Adult)  Goal: Plan of Care Review  Outcome: Ongoing (interventions implemented as appropriate)    02/13/17 0546   Coping/Psychosocial Response Interventions   Plan Of Care Reviewed With patient   Patient Care Overview   Progress no change   Outcome Evaluation   Outcome Summary/Follow up Plan blood finished tonight, 2 more on hold for OR-scheduled around 5pm, NWB, BSC, bandage changed once through the night, drain stitched in, contact isolation-MRSA       Goal: Adult Individualization and Mutuality  Outcome: Ongoing (interventions implemented as appropriate)  Goal: Discharge Needs Assessment  Outcome: Ongoing (interventions implemented as appropriate)    Problem: Fall Risk (Adult)  Goal: Absence of Falls  Outcome: Ongoing (interventions implemented as appropriate)    Problem: Infection, Risk/Actual (Adult)  Goal: Infection Prevention/Resolution  Outcome: Ongoing (interventions implemented as appropriate)    Problem: Skin Integrity Impairment, Risk/Actual (Adult)  Goal: Skin Integrity/Wound Healing  Outcome: Ongoing (interventions implemented as appropriate)

## 2017-02-13 NOTE — ANESTHESIA PREPROCEDURE EVALUATION
Anesthesia Evaluation     NPO Status: > 6 hours   Airway   Mallampati: III  TM distance: <3 FB  Neck ROM: limited  difficult intubation highly probable, small opening and anterior  Dental    (+) poor dentation    Pulmonary    (+) pneumonia , pulmonary embolism, COPD moderate, shortness of breath, sleep apnea on CPAP,   Cardiovascular   Exercise tolerance: poor (<4 METS)    NYHA Classification: II  ECG reviewed  Patient on routine beta blocker and Beta blocker given within 24 hours of surgery  Rhythm: regular    (+) hypertension poorly controlled, valvular problems/murmurs (mild to moderate MR with mild pulm hypertension) MR and TI, CAD, cardiac stents Drug eluting stent unknown timeframe GALVAN, DVT resolved      Neuro/Psych  (+) psychiatric history Bipolar,    GI/Hepatic/Renal/Endo    (+) obesity, morbid obesity, GERD, hypothyroidism,     Musculoskeletal     Abdominal   (+) obese,    Substance History      OB/GYN          Other   (+) blood dyscrasia (on Plavix), arthritis       Other Comment: MRSA infection of KAYLEE                                Anesthesia Plan    ASA 4 - emergent     general     intravenous induction   Anesthetic plan and risks discussed with patient.

## 2017-02-13 NOTE — ANESTHESIA PROCEDURE NOTES
Airway  Urgency: elective    Date/Time: 2/13/2017 6:13 PM  Airway not difficult    General Information and Staff    Patient location during procedure: OR  Anesthesiologist: SAUL XAVIER  CRNA: MARION CHARLES    Indications and Patient Condition  Indications for airway management: airway protection    Preoxygenated: yes  MILS maintained throughout  Mask difficulty assessment: 2 - vent by mask + OA or adjuvant +/- NMBA    Final Airway Details  Final airway type: endotracheal airway      Successful airway: ETT  Cuffed: yes   Successful intubation technique: direct laryngoscopy  Endotracheal tube insertion site: oral  Blade: Christopher  Blade size: #3  ETT size: 7.0 mm  Cormack-Lehane Classification: grade IIa - partial view of glottis  Placement verified by: chest auscultation and capnometry   Measured from: teeth  ETT to teeth (cm): 21  Number of attempts at approach: 1

## 2017-02-14 LAB
ANION GAP SERPL CALCULATED.3IONS-SCNC: 12.5 MMOL/L
BUN BLD-MCNC: 6 MG/DL (ref 8–23)
BUN/CREAT SERPL: 12 (ref 7–25)
CALCIUM SPEC-SCNC: 7.9 MG/DL (ref 8.6–10.5)
CHLORIDE SERPL-SCNC: 100 MMOL/L (ref 98–107)
CO2 SERPL-SCNC: 23.5 MMOL/L (ref 22–29)
CREAT BLD-MCNC: 0.5 MG/DL (ref 0.57–1)
DEPRECATED RDW RBC AUTO: 59.9 FL (ref 37–54)
ERYTHROCYTE [DISTWIDTH] IN BLOOD BY AUTOMATED COUNT: 17.5 % (ref 11.7–13)
FERRITIN SERPL-MCNC: 417.9 NG/ML (ref 13–150)
FOLATE SERPL-MCNC: 14.49 NG/ML (ref 4.78–24.2)
GFR SERPL CREATININE-BSD FRML MDRD: 124 ML/MIN/1.73
GLUCOSE BLD-MCNC: 93 MG/DL (ref 65–99)
HCT VFR BLD AUTO: 23.9 % (ref 35.6–45.5)
HGB BLD-MCNC: 7.6 G/DL (ref 11.9–15.5)
INR PPP: 1.27 (ref 0.9–1.1)
IRON 24H UR-MRATE: 22 MCG/DL (ref 37–145)
IRON SATN MFR SERPL: 13 % (ref 20–50)
MCH RBC QN AUTO: 29.9 PG (ref 26.9–32)
MCHC RBC AUTO-ENTMCNC: 31.8 G/DL (ref 32.4–36.3)
MCV RBC AUTO: 94.1 FL (ref 80.5–98.2)
PLATELET # BLD AUTO: 331 10*3/MM3 (ref 140–500)
PMV BLD AUTO: 10 FL (ref 6–12)
POTASSIUM BLD-SCNC: 4.3 MMOL/L (ref 3.5–5.2)
PROTHROMBIN TIME: 15.4 SECONDS (ref 11.7–14.2)
RBC # BLD AUTO: 2.54 10*6/MM3 (ref 3.9–5.2)
SODIUM BLD-SCNC: 136 MMOL/L (ref 136–145)
TIBC SERPL-MCNC: 173 MCG/DL (ref 298–536)
TRANSFERRIN SERPL-MCNC: 116 MG/DL (ref 200–360)
VANCOMYCIN TROUGH SERPL-MCNC: 14.2 MCG/ML (ref 5–20)
VIT B12 BLD-MCNC: 1167 PG/ML (ref 211–946)
WBC NRBC COR # BLD: 9.36 10*3/MM3 (ref 4.5–10.7)

## 2017-02-14 PROCEDURE — 82746 ASSAY OF FOLIC ACID SERUM: CPT | Performed by: HOSPITALIST

## 2017-02-14 PROCEDURE — 85027 COMPLETE CBC AUTOMATED: CPT | Performed by: HOSPITALIST

## 2017-02-14 PROCEDURE — 94799 UNLISTED PULMONARY SVC/PX: CPT

## 2017-02-14 PROCEDURE — C1894 INTRO/SHEATH, NON-LASER: HCPCS

## 2017-02-14 PROCEDURE — 82607 VITAMIN B-12: CPT | Performed by: HOSPITALIST

## 2017-02-14 PROCEDURE — 82728 ASSAY OF FERRITIN: CPT | Performed by: HOSPITALIST

## 2017-02-14 PROCEDURE — 94640 AIRWAY INHALATION TREATMENT: CPT

## 2017-02-14 PROCEDURE — 97110 THERAPEUTIC EXERCISES: CPT

## 2017-02-14 PROCEDURE — 80048 BASIC METABOLIC PNL TOTAL CA: CPT | Performed by: HOSPITALIST

## 2017-02-14 PROCEDURE — 84466 ASSAY OF TRANSFERRIN: CPT | Performed by: HOSPITALIST

## 2017-02-14 PROCEDURE — 25010000002 VANCOMYCIN: Performed by: ORTHOPAEDIC SURGERY

## 2017-02-14 PROCEDURE — 85610 PROTHROMBIN TIME: CPT | Performed by: ORTHOPAEDIC SURGERY

## 2017-02-14 PROCEDURE — 25010000002 ENOXAPARIN PER 10 MG: Performed by: PHYSICIAN ASSISTANT

## 2017-02-14 PROCEDURE — 83540 ASSAY OF IRON: CPT | Performed by: HOSPITALIST

## 2017-02-14 RX ORDER — WARFARIN SODIUM 5 MG/1
5 TABLET ORAL
Status: COMPLETED | OUTPATIENT
Start: 2017-02-14 | End: 2017-02-14

## 2017-02-14 RX ORDER — OXYCODONE AND ACETAMINOPHEN 10; 325 MG/1; MG/1
0.5 TABLET ORAL EVERY 4 HOURS PRN
Status: DISCONTINUED | OUTPATIENT
Start: 2017-02-14 | End: 2017-02-15

## 2017-02-14 RX ORDER — OXYCODONE AND ACETAMINOPHEN 10; 325 MG/1; MG/1
1 TABLET ORAL EVERY 4 HOURS PRN
Status: DISCONTINUED | OUTPATIENT
Start: 2017-02-14 | End: 2017-02-15

## 2017-02-14 RX ADMIN — WARFARIN SODIUM 5 MG: 5 TABLET ORAL at 17:33

## 2017-02-14 RX ADMIN — PREGABALIN 200 MG: 100 CAPSULE ORAL at 08:44

## 2017-02-14 RX ADMIN — POTASSIUM CHLORIDE 10 MEQ: 750 CAPSULE, EXTENDED RELEASE ORAL at 08:44

## 2017-02-14 RX ADMIN — LEVOTHYROXINE SODIUM 100 MCG: 100 TABLET ORAL at 04:49

## 2017-02-14 RX ADMIN — FLUTICASONE PROPIONATE 2 SPRAY: 50 SPRAY, METERED NASAL at 08:44

## 2017-02-14 RX ADMIN — TIOTROPIUM BROMIDE 1 CAPSULE: 18 CAPSULE ORAL; RESPIRATORY (INHALATION) at 15:38

## 2017-02-14 RX ADMIN — VANCOMYCIN HYDROCHLORIDE 1500 MG: 1 INJECTION, POWDER, LYOPHILIZED, FOR SOLUTION INTRAVENOUS at 02:31

## 2017-02-14 RX ADMIN — VANCOMYCIN HYDROCHLORIDE 1500 MG: 1 INJECTION, POWDER, LYOPHILIZED, FOR SOLUTION INTRAVENOUS at 12:59

## 2017-02-14 RX ADMIN — HYDROCODONE BITARTRATE AND ACETAMINOPHEN 2 TABLET: 10; 325 TABLET ORAL at 12:57

## 2017-02-14 RX ADMIN — PREGABALIN 200 MG: 100 CAPSULE ORAL at 17:31

## 2017-02-14 RX ADMIN — CLOPIDOGREL 75 MG: 75 TABLET, FILM COATED ORAL at 08:44

## 2017-02-14 RX ADMIN — OXYBUTYNIN CHLORIDE 10 MG: 10 TABLET, FILM COATED, EXTENDED RELEASE ORAL at 08:44

## 2017-02-14 RX ADMIN — OXYCODONE HYDROCHLORIDE AND ACETAMINOPHEN 1 TABLET: 10; 325 TABLET ORAL at 21:46

## 2017-02-14 RX ADMIN — VANCOMYCIN HYDROCHLORIDE 1500 MG: 1 INJECTION, POWDER, LYOPHILIZED, FOR SOLUTION INTRAVENOUS at 23:59

## 2017-02-14 RX ADMIN — DOCUSATE SODIUM 100 MG: 100 CAPSULE, LIQUID FILLED ORAL at 08:44

## 2017-02-14 RX ADMIN — DOCUSATE SODIUM -SENNOSIDES 2 TABLET: 50; 8.6 TABLET, COATED ORAL at 17:33

## 2017-02-14 RX ADMIN — CLONAZEPAM 0.5 MG: 0.5 TABLET ORAL at 17:31

## 2017-02-14 RX ADMIN — DOCUSATE SODIUM -SENNOSIDES 2 TABLET: 50; 8.6 TABLET, COATED ORAL at 08:44

## 2017-02-14 RX ADMIN — FERROUS SULFATE TAB 325 MG (65 MG ELEMENTAL FE) 325 MG: 325 (65 FE) TAB at 08:44

## 2017-02-14 RX ADMIN — OXYCODONE HYDROCHLORIDE AND ACETAMINOPHEN 1 TABLET: 10; 325 TABLET ORAL at 17:32

## 2017-02-14 RX ADMIN — ENOXAPARIN SODIUM 40 MG: 40 INJECTION SUBCUTANEOUS at 08:44

## 2017-02-14 RX ADMIN — CLONAZEPAM 0.5 MG: 0.5 TABLET ORAL at 08:44

## 2017-02-14 RX ADMIN — FERROUS SULFATE TAB 325 MG (65 MG ELEMENTAL FE) 325 MG: 325 (65 FE) TAB at 17:34

## 2017-02-14 RX ADMIN — WARFARIN SODIUM 4 MG: 4 TABLET ORAL at 00:02

## 2017-02-14 RX ADMIN — HYDROCODONE BITARTRATE AND ACETAMINOPHEN 2 TABLET: 10; 325 TABLET ORAL at 04:46

## 2017-02-14 RX ADMIN — VILAZODONE HYDROCHLORIDE 40 MG: 40 TABLET ORAL at 21:46

## 2017-02-14 RX ADMIN — PANTOPRAZOLE SODIUM 40 MG: 40 TABLET, DELAYED RELEASE ORAL at 04:49

## 2017-02-14 RX ADMIN — FERROUS SULFATE TAB 325 MG (65 MG ELEMENTAL FE) 325 MG: 325 (65 FE) TAB at 00:05

## 2017-02-14 RX ADMIN — ISOSORBIDE MONONITRATE 30 MG: 30 TABLET, EXTENDED RELEASE ORAL at 08:44

## 2017-02-14 RX ADMIN — HYDROCODONE BITARTRATE AND ACETAMINOPHEN 2 TABLET: 10; 325 TABLET ORAL at 08:44

## 2017-02-14 RX ADMIN — HYDROCODONE BITARTRATE AND ACETAMINOPHEN 2 TABLET: 10; 325 TABLET ORAL at 00:01

## 2017-02-14 RX ADMIN — ATORVASTATIN CALCIUM 40 MG: 40 TABLET, FILM COATED ORAL at 21:46

## 2017-02-14 RX ADMIN — MODAFINIL 200 MG: 100 TABLET ORAL at 08:44

## 2017-02-14 NOTE — PLAN OF CARE
Problem: Patient Care Overview (Adult)  Goal: Plan of Care Review  Outcome: Ongoing (interventions implemented as appropriate)    02/14/17 0526   Coping/Psychosocial Response Interventions   Plan Of Care Reviewed With patient   Patient Care Overview   Progress no change   Outcome Evaluation   Outcome Summary/Follow up Plan surgery 2/13/2017. pain well controlled.         Problem: Fall Risk (Adult)  Goal: Absence of Falls  Outcome: Ongoing (interventions implemented as appropriate)    02/14/17 0526   Fall Risk (Adult)   Absence of Falls making progress toward outcome

## 2017-02-14 NOTE — PLAN OF CARE
Problem: Patient Care Overview (Adult)  Goal: Plan of Care Review    02/14/17 1104   Coping/Psychosocial Response Interventions   Plan Of Care Reviewed With patient   Outcome Evaluation   Outcome Summary/Follow up Plan Pt s/p R KAYLEE removal w/implantation of spacer, presents with post op pain, weakness, impaired balance, and NWB RLE limiting mobility. Pt demonstrates good tolerance of exercsies; requires assist of 2 for bed mobility and transfers due to weakness and impaired balance. Pt would continue to benefit from skilled PT to address impairments and increase independence with functional mobility. Recommend snu at [place] for further strengthening and transfer training.          Problem: Inpatient Physical Therapy  Goal: Bed Mobility Goal LTG- PT  Outcome: Ongoing (interventions implemented as appropriate)    02/09/17 0850 02/14/17 1104   Bed Mobility PT LTG   Bed Mobility PT LTG, Time to Achieve --  1 wk   Bed Mobility PT LTG, Activity Type all bed mobility --    Bed Mobility PT LTG, [place] minimum assist (75% patient effort);2 person assist required --    Bed Mobility PT LTG, Date Goal Reviewed --  02/14/17   Bed Mobility PT LTG, Outcome --  goal ongoing   Bed Mobility PT LTG, Reason Goal Not Met --  progress slower than expected       Goal: Transfer Training Goal 1 LTG- PT  Outcome: Revised    02/14/17 1104   Transfer Training PT LTG   Transfer Training PT LTG, Date Established 02/14/17   Transfer Training PT LTG, Time to Achieve 1 wk   Transfer Training PT LTG, Activity Type all transfers   Transfer Training PT LTG, [place] moderate assist (50% patient effort);2 person assist required   Transfer Training PT LTG, Date Goal Reviewed 02/14/17   Transfer Training PT LTG, Outcome goal revised   Transfer Training PT LTG, Reason Goal Not Met progress slower than expected       Goal: Patient Education Goal LTG- PT  Outcome: Ongoing (interventions implemented as appropriate)    02/09/17 0850 02/14/17  1104   Patient Education PT LTG   Patient Education PT LTG, Time to Achieve --  1 wk   Patient Education PT LTG, Education Type HEP;precaution per surgeon --    Patient Education PT LTG, Education Understanding demonstrate adequately;verbalize understanding --    Patient Education PT LTG, Date Goal Reviewed --  02/14/17   Patient Education PT LTG Outcome --  goal ongoing   Patient Education PT LTG, Reason Goal Not Met --  progress slower than expected

## 2017-02-14 NOTE — PROGRESS NOTES
Acute Care - Physical Therapy Re-Evaluation  [facility]     Patient Name: Frances Downs  : 1950  MRN: [id number]  Today's Date: 2017   Onset of Illness/Injury or Date of Surgery Date: 17     Referring Physician: Veronica      Admit Date: 2017     Visit Dx:    ICD-10-CM ICD-9-CM   1. Difficulty walking R26.2 719.7   2. Hematoma T14.8 924.9   3. MRSA infection A49.02 041.12     Patient Active Problem List   Diagnosis   • Hematoma of right hip   • DVT (deep venous thrombosis), hx of   • Hypertension   • Hyperlipidemia   • Coronary artery disease, hx of stent   • Arthritis   • Bipolar disorder   • Prosthetic hip infection, mrsa   • MRSA (methicillin resistant Staphylococcus aureus) infection     Past Medical History   Diagnosis Date   • Anxiety    • Arthritis    • Bipolar disorder    • COPD (chronic obstructive pulmonary disease)    • Coronary artery disease    • Diastolic dysfunction      grade II per echocardiogram 2016   • Difficulty walking    • Disease of thyroid gland      hypothyroidism   • Dislocation of hip joint      recurrent dislocation right hip   • DVT (deep venous thrombosis)    • GERD without esophagitis    • Heartburn    • Hematoma    • Hyperlipidemia    • Hypertension    • LVH (left ventricular hypertrophy)      mild to moderate per echocardiogram 2016   • Major depressive disorder    • Mitral annular calcification      severe per echocardiogram 2016   • Mitral regurgitation      mild per echo 2016   • Mitral valve disease    • Muscle weakness    • Narcolepsy    • Overactive bladder    • Pneumonia    • Pulmonary emboli    • Pulmonary hypertension      mild per echo 2016   • Sleep apnea      obstructive   • Tricuspid regurgitation      mild to moderate per echo 2016     Past Surgical History   Procedure Laterality Date   • Total hip arthroplasty Right    • Total hip arthroplasty revision Right 2016   • Knee arthroplasty Left  03/2010     TWICE   • Total knee arthroplasty revision Left 09/2010   • Other surgical history       IVC filter placement   • Orif ankle fracture Left 06/16/2011   • Hardware removal foot / ankle Left 12/01/2011   • Quadriceps tendon repair Left 06/21/2010     also done 9-    • Joint replacement     • Coronary angioplasty with stent placement       x2 stents   • Cataract extraction w/ intraocular lens implant Right    • Eye surgery       cataract surgery bilateral   • Incision and drainage hip Right 2/8/2017     Procedure: RT HIP INCISION AND DRAINAGE;  Surgeon: Erlin Martin MD;  Location: [facility];  Service:    • Hip spacer insertion with antibiotic cement Right 2/13/2017     Procedure: RIGHT TOTAL HIP REMOVAL;  Surgeon: Erlin Martin MD;  Location: [facility] OR;  Service:           PT ASSESSMENT (last 72 hours)      PT Evaluation       02/14/17 1033 02/12/17 1400    Rehab Evaluation    Document Type re-evaluation  -EE therapy note (daily note)  -JM    Subjective Information agree to therapy;complains of;weakness;fatigue;pain  -EE agree to therapy;complains of;weakness;fatigue;pain  -JM    Patient Effort, Rehab Treatment good  -EE     Symptoms Noted During/After Treatment fatigue;increased pain  -EE     General Information    Onset of Illness/Injury or Date of Surgery Date 02/13/17  -EE     Referring Physician Veronica  -EE     General Observations Adult female, supine in bed in no acute distress.  -EE     Pertinent History Of Current Problem s/p R KAYLEE removal with spacer implantation  -EE     Precautions/Limitations fall precautions;hip precautions- right;non-weight bearing status   NWB R LE  -EE fall precautions;non-weight bearing status  -    Plans/Goals Discussed With patient;agreed upon  -EE     Barriers to Rehab previous functional deficit  -EE     Clinical Impression    Patient/Family Goals Statement Get stronger, decrease pain, go to rehab  -EE     Criteria for Skilled Therapeutic  Interventions Met yes;treatment indicated  -EE     Pathology/Pathophysiology Noted (Describe Specifically for Each System) musculoskeletal  -EE     Impairments Found (describe specific impairments) gait, locomotion, and balance  -EE     Rehab Potential good, to achieve stated therapy goals  -EE     Pain Assessment    Pain Assessment 0-10  -EE 0-10  -JM    Pain Score 7  -EE 7  -JM    Post Pain Score 9  -EE     Pain Type Surgical pain  -EE     Pain Location Hip  -EE Hip  -JM    Pain Orientation Right  -EE Right  -JM    Pain Intervention(s) Repositioned;Rest  -EE Medication (See MAR);Repositioned  -JM    Response to Interventions tolerated  -EE milad  -JM    Cognitive Assessment/Intervention    Current Cognitive/Communication Assessment functional  -EE     Orientation Status oriented x 4  -EE     Follows Commands/Answers Questions 100% of the time  -EE     Personal Safety WNL/WFL  -EE     Personal Safety Interventions fall prevention program maintained;gait belt;muscle strengthening facilitated;nonskid shoes/slippers when out of bed;supervised activity  -EE     ROM (Range of Motion)    General ROM other (see comments)  -EE     General ROM Detail L LE, B UEs grossly WFL. R hip deferred 2' pain  -EE     MMT (Manual Muscle Testing)    General MMT Assessment other (see comments)  -EE     General MMT Assessment Detail L LE 3+/5; B UEs grossly 3+/5; R LE deferred 2' pain.  -EE     Mobility Assessment/Training    Extremity Weight-Bearing Status right lower extremity  -EE     Right Lower Extremity Weight-Bearing non weight-bearing  -EE     Bed Mobility, Assessment/Treatment    Bed Mobility, Assistive Device head of bed elevated;draw sheet  -EE bed rails;head of bed elevated  -    Bed Mob, Supine to Sit, [place] maximum assist (25% patient effort);2 person assist required;verbal cues required  -EE moderate assist (50% patient effort);maximum assist (25% patient effort)  -    Bed Mob, Sit to Supine, [place] not  tested  -EE     Bed Mobility, Impairments strength decreased;pain  -EE     Transfer Assessment/Treatment    Transfers, Bed-Chair [place] maximum assist (25% patient effort);2 person assist required;verbal cues required;hand held assist  -EE moderate assist (50% patient effort);2 person assist required;verbal cues required;nonverbal cues required (demo/gesture)  -JM    Transfers, Sit-Stand [place] maximum assist (25% patient effort);2 person assist required;verbal cues required;hand held assist  -EE moderate assist (50% patient effort);2 person assist required  -JM    Transfers, Stand-Sit [place] moderate assist (50% patient effort);2 person assist required;verbal cues required;hand held assist  -EE minimum assist (75% patient effort);2 person assist required;verbal cues required  -JM    Transfers, Sit-Stand-Sit, Assist Device  rolling walker  -JM    Transfer, Maintain Weight Bearing Status cues to maintain weight bearing status;assist to maintain weight bearing status  -EE     Transfer, Safety Issues weight-shifting ability decreased;balance decreased during turns  -EE weight-shifting ability decreased;balance decreased during turns  -JM    Transfer, Impairments strength decreased;impaired balance;pain  -EE strength decreased;impaired balance;pain  -JM    Transfer, Comment Assist to maintain NWB R LE; stand pivot transfer performed w/HHA x2  -EE still req assist to NWB  -JM    Gait Assessment/Treatment    Gait, [place] Level unable to perform  -EE unable to perform  -    Motor Skills/Interventions    Additional Documentation Balance Skills Training (Group)  -EE     Balance Skills Training    Sitting-Level of Assistance Close supervision  -EE     Sitting-Balance Support Right upper extremity supported;Left upper extremity supported  -EE     Standing-Level of Assistance Moderate assistance;x2  -EE     Static Standing Balance Support Right upper extremity supported;Left upper extremity supported   -EE     Therapy Exercises    Bilateral Lower Extremities AROM:;10 reps;ankle pumps/circles;glut sets;quad sets  -     Exercise Protocols  --   unable to perf, urgency for BSC  -JM    Positioning and Restraints    Pre-Treatment Position in bed  -EE in bed  -JM    Post Treatment Position chair  -EE bsc  -JM    In Bed  sitting;call light within reach;with nsg  -JM    In Chair reclined;call light within reach;encouraged to call for assist;legs elevated;notified nsg  -EE       02/11/17 1350       Rehab Evaluation    Document Type therapy note (daily note)  -     Subjective Information agree to therapy;complains of;fatigue;pain;swelling;weakness  -     General Information    Precautions/Limitations fall precautions;non-weight bearing status   right  -     Pain Assessment    Pain Assessment 0-10  -     Pain Score 8  -JM     Pain Location Hip  -JM     Pain Orientation Right  -JM     Pain Intervention(s) Medication (See MAR);Repositioned;Elevated  -     Response to Interventions milad  -     Bed Mobility, Assessment/Treatment    Bed Mobility, Assistive Device bed rails;head of bed elevated  -     Bed Mob, Supine to Sit, [place] moderate assist (50% patient effort);maximum assist (25% patient effort)  -JM     Transfer Assessment/Treatment    Transfers, Bed-Chair [place] moderate assist (50% patient effort);2 person assist required;verbal cues required;nonverbal cues required (demo/gesture)  -JM     Transfers, Sit-Stand [place] moderate assist (50% patient effort);2 person assist required  -JM     Transfers, Stand-Sit [place] minimum assist (75% patient effort);2 person assist required;verbal cues required  -JM     Transfers, Sit-Stand-Sit, Assist Device rolling walker  -     Transfer, Maintain Weight Bearing Status cues to maintain weight bearing status;unable to maintain weight bearing status  -JM     Transfer, Safety Issues weight-shifting ability decreased;balance decreased during turns   -JM     Transfer, Impairments strength decreased;impaired balance;pain  -JM     Transfer, Comment still unable to NWB w/o assist--unable to maintain NWB entire tfer   slid toes on floor, but said no weight put down   -JM     Gait Assessment/Treatment    Gait, [place] Level unable to perform  -JM     Positioning and Restraints    Pre-Treatment Position in bed  -JM     In Chair reclined;call light within reach;encouraged to call for assist;notified nsg  -JM       User Key  (r) = Recorded By, (t) = Taken By, (c) = Cosigned By    Initials Name Provider Type    EE Yue Campos, PT Physical Therapist    MARILEE Palmer, PTA Physical Therapy Assistant          Physical Therapy Education     Title: PT OT SLP Therapies (Active)     Topic: Physical Therapy (Active)     Point: Mobility training (Active)    Learning Progress Summary    Learner Readiness Method Response Comment Documented by Status   Patient Acceptance E,TB,D NR   02/14/17 1104 Active    Acceptance E,TB,D VU,NR aware she is NWB rt, but c/o wkness and pn in left knee limiting also, assist given to maintain NWB rt  02/12/17 1430 Done    Acceptance E,TB,D NR   02/11/17 1355 Active    Acceptance E,TB NR  EE 02/10/17 1429 Active    Acceptance E,TB NR  EE 02/09/17 0850 Active               Point: Home exercise program (Active)    Learning Progress Summary    Learner Readiness Method Response Comment Documented by Status   Patient Acceptance E,TB,D NR   02/14/17 1104 Active    Acceptance E,TB,D VU,NR aware she is NWB rt, but c/o wkness and pn in left knee limiting also, assist given to maintain NWB rt  02/12/17 1430 Done    Acceptance E,TB,D NR   02/11/17 1355 Active    Acceptance E,TB NR  EE 02/10/17 1429 Active    Acceptance E,TB NR  EE 02/09/17 0850 Active               Point: Body mechanics (Active)    Learning Progress Summary    Learner Readiness Method Response Comment Documented by Status   Patient Acceptance E,TB,D NR   02/14/17 1104  Active    Acceptance E,TB,D VU,NR aware she is NWB rt, but c/o wkness and pn in left knee limiting also, assist given to maintain NWB rt  02/12/17 1430 Done    Acceptance E,TB,D NR   02/11/17 1355 Active    Acceptance E,TB NR   02/10/17 1429 Active    Acceptance E,TB NR   02/09/17 0850 Active               Point: Precautions (Active)    Learning Progress Summary    Learner Readiness Method Response Comment Documented by Status   Patient Acceptance E,TB,D NR   02/14/17 1104 Active    Acceptance E,TB,D VU,NR aware she is NWB rt, but c/o wkness and pn in left knee limiting also, assist given to maintain NWB rt  02/12/17 1430 Done    Acceptance E,TB,D NR   02/11/17 1355 Active    Acceptance E,TB NR   02/10/17 1429 Active    Acceptance E,TB NR   02/09/17 0850 Active                      User Key     Initials Effective Dates Name Provider Type Discipline     12/01/15 -  Yue Campos, PT Physical Therapist PT     02/18/16 -  Shruti Palmer, BARRERA Physical Therapy Assistant PT                PT Recommendation and Plan  Anticipated Discharge Disposition: skilled nursing facility  Planned Therapy Interventions: balance training, bed mobility training, home exercise program, patient/family education, strengthening, stretching, transfer training  PT Frequency: daily  Plan of Care Review  Plan Of Care Reviewed With: patient  Progress: progress towards functional goals is fair  Outcome Summary/Follow up Plan: Pt s/p R KAYLEE removal w/implantation of spacer, presents with post op pain, weakness, impaired balance, and NWB RLE limiting mobility. Pt demonstrates good tolerance of exercsies; requires assist of 2 for bed mobility and transfers due to weakness and impaired balance. Pt would continue to benefit from skilled PT to address impairments and increase independence with functional mobility. Recommend snu at [place] for further strengthening and transfer training.           IP PT Goals       02/14/17 1104 02/09/17  0850       Bed Mobility PT LTG    Bed Mobility PT LTG, Date Established  02/09/17  -EE     Bed Mobility PT LTG, Time to Achieve 1 wk  -EE 5 days  -EE     Bed Mobility PT LTG, Activity Type  all bed mobility  -EE     Bed Mobility PT LTG, [place] Level  minimum assist (75% patient effort);2 person assist required  -EE     Bed Mobility PT LTG, Date Goal Reviewed 02/14/17  -EE      Bed Mobility PT LTG, Outcome goal ongoing  -EE      Bed Mobility PT LTG, Reason Goal Not Met progress slower than expected  -EE      Transfer Training PT LTG    Transfer Training PT LTG, Date Established 02/14/17  -EE 02/09/17  -EE     Transfer Training PT LTG, Time to Achieve 1 wk  -EE 5 days  -EE     Transfer Training PT LTG, Activity Type all transfers  -EE all transfers  -EE     Transfer Training PT LTG, [place] Level moderate assist (50% patient effort);2 person assist required  -EE minimum assist (75% patient effort);2 person assist required  -EE     Transfer Training PT LTG, Assist Device  walker, rolling  -EE     Transfer Training PT  LTG, Date Goal Reviewed 02/14/17  -EE      Transfer Training PT LTG, Outcome goal revised  -EE      Transfer Training PT LTG, Reason Goal Not Met progress slower than expected  -EE      Patient Education PT LTG    Patient Education PT LTG, Date Established  02/09/17  -EE     Patient Education PT LTG, Time to Achieve 1 wk  -EE 5 days  -EE     Patient Education PT LTG, Education Type  HEP;precaution per surgeon  -EE     Patient Education PT LTG, Education Understanding  demonstrate adequately;verbalize understanding  -EE     Patient Education PT LTG, Date Goal Reviewed 02/14/17  -EE      Patient Education PT LTG Outcome goal ongoing  -EE      Patient Education PT LTG, Reason Goal Not Met progress slower than expected  -EE        User Key  (r) = Recorded By, (t) = Taken By, (c) = Cosigned By    Initials Name Provider Type    GALILEO Campos, PT Physical Therapist                Outcome Measures        02/14/17 1100 02/12/17 1400 02/11/17 1300    How much help from another person do you currently need...    Turning from your back to your side while in flat bed without using bedrails? 2  -EE 2  -JM 2  -JM    Moving from lying on back to sitting on the side of a flat bed without bedrails? 2  -EE 2  -JM 2  -JM    Moving to and from a bed to a chair (including a wheelchair)? 2  -EE 2  -JM 2  -JM    Standing up from a chair using your arms (e.g., wheelchair, bedside chair)? 2  -EE 2  -JM 2  -JM    Climbing 3-5 steps with a railing? 1  -EE 1  -JM 1  -JM    To walk in hospital room? 1  -EE 1  -JM 1  -JM    AM-PAC 6 Clicks Score 10  -EE 10  -JM 10  -JM    Functional Assessment    Outcome Measure Options AM-PAC 6 Clicks Basic Mobility (PT)  -EE        User Key  (r) = Recorded By, (t) = Taken By, (c) = Cosigned By    Initials Name Provider Type    GALILEO Campos PT Physical Therapist    MARILEE Palmer, PTA Physical Therapy Assistant           Time Calculation:         PT Charges       02/14/17 1107          Time Calculation    Start Time 1033  -EE      Stop Time 1047  -EE      Time Calculation (min) 14 min  -EE      PT Received On 02/14/17  -EE      PT - Next Appointment 02/15/17  -EE      PT Goal Re-Cert Due Date 02/21/17  -EE        User Key  (r) = Recorded By, (t) = Taken By, (c) = Cosigned By    Initials Name Provider Type    GALILEO Campos PT Physical Therapist          Therapy Charges for Today     Code Description Service Date Service Provider Modifiers Qty    22310895169 HC PT THER PROC EA 15 MIN 2/14/2017 Yue Campos PT GP 1    52016494003 HC PT THER SUPP EA 15 MIN 2/14/2017 Yue Campos, PT GP 1          PT G-Codes  Outcome Measure Options: AM-PAC 6 Clicks Basic Mobility (PT)      uYe Campos PT  2/14/2017

## 2017-02-14 NOTE — PROGRESS NOTES
Continued Stay Note  [facility]     Patient Name: Frances Downs  MRN: [id number]  Today's Date: 2/14/2017    Admit Date: 2/8/2017          Discharge Plan       02/14/17 1644    Case Management/Social Work Plan    Additional Comments Called [facility] [place] and spoke with Beth (admissions [phone]) who states they are able to accept pt on IV antibiotics. Faxed orders to [phone]. Pt may return to [facility] when medically stable.               Discharge Codes     None            Abbey Martinez RN

## 2017-02-14 NOTE — NURSING NOTE
"Spoke with Karolina at 0100 and Karolina stated \" vanc trough not resulted D/T issues with instrumental equipment\". Just spoke with Karolina at this time and Karolina stated \" i just found the tube of blood, it was missing in action. i will run the result now \".   "

## 2017-02-14 NOTE — PROGRESS NOTES
"   LOS: 6 days   Patient Care Team:  Talha Echeverria MD as PCP - General (Internal Medicine)    Chief Complaint: right hip pain    Subjective     HPI Comments: C/o right hip pain. Otherwise doing fine. Feels less anxious now that surgery is behind her.      Subjective:  Symptoms:  Stable.  She reports anxiety.  No shortness of breath, malaise, cough, chest pain, weakness, headache, chest pressure, anorexia or diarrhea.    Diet:  Poor intake.  No nausea or vomiting.    Activity level: Impaired due to pain.    Pain:  She complains of pain that is moderate.  She reports pain is improving.  Pain is well controlled.        History taken from: patient chart RN    Objective     Vital Signs  Temp:  [97.8 °F (36.6 °C)-99.2 °F (37.3 °C)] 98.6 °F (37 °C)  Heart Rate:  [] 88  Resp:  [16-20] 18  BP: ()/(41-84) 110/64    Objective:  General Appearance:  Comfortable and in no acute distress.    Vital signs: (most recent): Blood pressure 110/64, pulse 88, temperature 98.6 °F (37 °C), temperature source Oral, resp. rate 18, height 64\" (162.6 cm), weight 226 lb (103 kg), SpO2 93 %.  Vital signs are normal.  No fever.    Output: Producing urine and producing stool.    Lungs:  Normal respiratory rate and normal effort.  Breath sounds clear to auscultation.    Heart: Normal rate.  Regular rhythm.    Abdomen: Abdomen is soft.  Bowel sounds are normal.   There is no abdominal tenderness.     Extremities: There is no dependent edema.    Pulses: Distal pulses are intact.    Neurological: Patient is alert and oriented to person, place and time.    Skin:  Warm and dry.              Results Review:     I reviewed the patient's new clinical results.  I reviewed the patient's other test results and agree with the interpretation  Discussed with patient and RN    Medication Review: reviewed    Assessment/Plan     Principal Problem:    Prosthetic hip infection, mrsa  Active Problems:    Hematoma of right hip    DVT (deep venous " thrombosis), hx of    Hypertension    Hyperlipidemia    Coronary artery disease, hx of stent    Arthritis    Bipolar disorder    MRSA (methicillin resistant Staphylococcus aureus) infection      Assessment:  (1. MRSA infection of right hip prosthesis--POD #5 s/p I&D, POD #1 s/p explantation right hip  2. Anemia of chronic disease with iron deficiency  3. H/o DVT, AC on hold for OR  4. CAD with stent  5. HTN  6. Hyperlipidemia  7. Chronic pain syndrome  8. HypoT4  9. Bipolar d/o).     Plan:   (S/p 2 units PRBCs pre-op yesterday  Hgb down again this AM post-op, more PRBCs ordered per Ortho  Consider iron supplement at dc  Incentive spirometry  Restarting Coumadin per Ortho  ).       Cornelio Perry MD  02/14/17  9:10 AM    Time: 15min

## 2017-02-14 NOTE — ANESTHESIA POSTPROCEDURE EVALUATION
Patient: Frances Downs    Procedure Summary     Date Anesthesia Start Anesthesia Stop Room / Location    02/13/17 1803 2020  [facility] / [facility]       Procedure Diagnosis Surgeon Provider    RIGHT TOTAL HIP REMOVAL (Right Hip) No diagnosis on file. MD Bereket La MD          Anesthesia Type: general  Last vitals  BP 95/47 (02/13/17 2100)    Temp      Pulse 110 (02/13/17 2100)   Resp 16 (02/13/17 2100)    SpO2 96 % (02/13/17 2100)      Post Anesthesia Care and Evaluation    Patient location during evaluation: PACU  Patient participation: complete - patient participated  Level of consciousness: awake and alert  Pain management: adequate  Airway patency: patent  Anesthetic complications: No anesthetic complications    Cardiovascular status: acceptable  Respiratory status: acceptable  Hydration status: acceptable

## 2017-02-14 NOTE — PROGRESS NOTES
Orthopedic Progress Note    Subjective :   The patient reports she does have some pain in her right hip.    Objective :    Vital signs in last 24 hours:  Temp:  [97.8 °F (36.6 °C)-99.2 °F (37.3 °C)] 98.7 °F (37.1 °C)  Heart Rate:  [] 91  Resp:  [16-20] 18  BP: ()/(41-84) 110/64  Vitals:    02/13/17 2315 02/13/17 2345 02/14/17 0015 02/14/17 0430   BP: 99/62  98/66 110/64   BP Location: Left arm  Left arm Left arm   Patient Position: Lying  Lying Lying   Pulse: 99  95 91   Resp: 16  18 18   Temp: 98.6 °F (37 °C)  98.6 °F (37 °C) 98.7 °F (37.1 °C)   TempSrc: Oral  Oral Oral   SpO2: 97% 97% 95% 93%   Weight:       Height:           PHYSICAL EXAM:  Patient is calm, in no acute distress, awake and oriented x 3.  Dressing clean, dry and intact.  She does have a Hemovac right hip.  Swelling in appropriate in amount.  Ecchymosis is appropriate in amount.  Homans test is negative.  Patient is neurovascularly intact distally.      LABS:    Results from last 7 days  Lab Units 02/14/17  0401   WBC 10*3/mm3 9.36   HEMOGLOBIN g/dL 7.6*   HEMATOCRIT % 23.9*   PLATELETS 10*3/mm3 331       Results from last 7 days  Lab Units 02/14/17  0401   SODIUM mmol/L 136   POTASSIUM mmol/L 4.3   CHLORIDE mmol/L 100   TOTAL CO2 mmol/L 23.5   BUN mg/dL 6*   CREATININE mg/dL 0.50*   GLUCOSE mg/dL 93   CALCIUM mg/dL 7.9*       Results from last 7 days  Lab Units 02/14/17  0401 02/13/17  2056 02/13/17  0405  02/08/17  1334   INR  1.27* 1.30* 1.13*  < > 1.81*   APTT seconds  --   --   --   --  41.1*   < > = values in this interval not displayed.    ASSESSMENT:  Status post right total hip removal    Plan:  Continue Physical Therapy.  Continue SCDs, Continue Coumadin for DVT prophalaxis.  Dispo planning for [facility] when medically stable if they are able to administer IV antibiotics.    Thony Finn PA-C    Date: 2/14/2017  Time: 7:02 AM

## 2017-02-14 NOTE — NURSING NOTE
Spoke with Dr Martin's PA Thony Finn regarding PRBC's. 2 units still ordered from OR last night. Hgb 7.6 this am, wants to wait to see if she will come up on her own. Iron PO BID, recheck in AM. Asymptomatic at this time

## 2017-02-14 NOTE — PLAN OF CARE
Problem: Patient Care Overview (Adult)  Goal: Plan of Care Review  Outcome: Ongoing (interventions implemented as appropriate)    02/14/17 0564   Coping/Psychosocial Response Interventions   Plan Of Care Reviewed With patient   Patient Care Overview   Progress improving   Outcome Evaluation   Outcome Summary/Follow up Plan Pt was able to sit in chair for awhile this am, pain control was a problem today (switched back to percocet), resting between care, may dc to rehab tomorrow with IV antibiotics, PICC line placed       Goal: Adult Individualization and Mutuality  Outcome: Ongoing (interventions implemented as appropriate)    Problem: Hip Replacement, Total (Adult)  Goal: Signs and Symptoms of Listed Potential Problems Will be Absent or Manageable (Hip Replacement, Total)  Outcome: Ongoing (interventions implemented as appropriate)    Problem: Fall Risk (Adult)  Goal: Absence of Falls  Outcome: Ongoing (interventions implemented as appropriate)    Problem: Infection, Risk/Actual (Adult)  Goal: Infection Prevention/Resolution  Outcome: Ongoing (interventions implemented as appropriate)    Problem: Skin Integrity Impairment, Risk/Actual (Adult)  Goal: Skin Integrity/Wound Healing  Outcome: Ongoing (interventions implemented as appropriate)

## 2017-02-14 NOTE — PROGRESS NOTES
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 Martin's request.   Goal trough: 15-20mg/L   Dr. Benito following: plan for 6-8 weeks of iv vancomycin    Relevant clinical data and objective history reviewed:  66 y.o. female 64\" (162.6 cm) 226 lb (103 kg)    Past Medical History   Diagnosis Date   • Anxiety    • Arthritis    • Bipolar disorder    • COPD (chronic obstructive pulmonary disease)    • Coronary artery disease    • Diastolic dysfunction      grade II per echocardiogram 11/18/2016   • Difficulty walking    • Disease of thyroid gland      hypothyroidism   • Dislocation of hip joint      recurrent dislocation right hip   • DVT (deep venous thrombosis)    • GERD without esophagitis    • Heartburn    • Hematoma    • Hyperlipidemia    • Hypertension    • LVH (left ventricular hypertrophy)      mild to moderate per echocardiogram 11/18/2016   • Major depressive disorder    • Mitral annular calcification      severe per echocardiogram 11/18/2016   • Mitral regurgitation      mild per echo 11/18/2016   • Mitral valve disease    • Muscle weakness    • Narcolepsy    • Overactive bladder    • Pneumonia    • Pulmonary emboli    • Pulmonary hypertension      mild per echo 11/18/2016   • Sleep apnea      obstructive   • Tricuspid regurgitation      mild to moderate per echo 11/18/2016     CREATININE   Date Value Ref Range Status   02/14/2017 0.50 (L) 0.57 - 1.00 mg/dL Final   02/13/2017 0.39 (L) 0.57 - 1.00 mg/dL Final   02/11/2017 0.49 (L) 0.57 - 1.00 mg/dL Final     BUN   Date Value Ref Range Status   02/14/2017 6 (L) 8 - 23 mg/dL Final     Estimated Creatinine Clearance: 81.9 mL/min (by C-G formula based on Cr of 0.5).    Lab Results   Component Value Date    WBC 9.36 02/14/2017     Temp Readings from Last 3 Encounters:   02/14/17 98.6 °F (37 °C) (Oral) "     Baseline culture/source/susceptibility:   Microbiology Results        Procedure Component Value Units Date/Time       Anaerobic Culture [[id number]] Collected: 02/13/17 1846       Specimen: Tissue from Hip, Right Updated: 02/13/17 1909       Tissue Culture [[id number]] (Normal) Collected: 02/13/17 1846       Specimen: Tissue from Hip, Right Updated: 02/14/17 0644        Culture Culture in progress         Gram Stain Result Rare (1+) WBCs seen          Rare (1+) Gram positive cocci in pairs        Anaerobic Culture [[id number]] (Normal) Collected: 02/08/17 1701       Specimen: Wound from Hip, Right Updated: 02/13/17 1001        Culture No anaerobes isolated at 5 days        Wound Culture [[id number]] (Abnormal)  Collected: 02/08/17 1701       Specimen: Wound from Hip, Right Updated: 02/10/17 0648        Wound Culture           Moderate growth (3+) Staphylococcus aureus, MRSA (C)        Gram Stain Result Rare (1+) WBCs seen          Rare (1+) Gram positive cocci in pairs          Susceptibility         Staphylococcus aureus, MRSA         ROSSY         Clindamycin <=0.25 ug/ml Susceptible         Erythromycin >=8 ug/ml Resistant         Oxacillin >=4 ug/ml Resistant         Penicillin G >=0.5 ug/ml Resistant         Rifampin <=0.5 ug/ml Susceptible         Tetracycline <=1 ug/ml Susceptible         Trimethoprim + Sulfamethoxazole <=10 ug/ml Susceptible         Vancomycin 1 ug/ml Susceptible                                  IV Anti-Infectives     Ordered     Dose/Rate Route Frequency Start Stop    02/09/17 1201  vancomycin 1500 mg/500 mL 0.9% NS IVPB ([facility])     Ordering Provider:  Erlin Martin MD    1,500 mg Intravenous Every 12 Hours 02/10/17 0000      02/09/17 1201  vancomycin 2000 mg/500 mL 0.9% NS IVPB ([facility])     Ordering Provider:  Erlin Martin MD    20 mg/kg × 103 kg Intravenous Once 02/09/17 1245 02/09/17 1407    02/09/17 1058  Pharmacy to dose vancomycin     Ordering Provider:  Erlin Martin MD     Does  not apply Continuous PRN 02/09/17 1056      02/08/17 2004  ceFAZolin in 0.9% normal saline (ANCEF) IVPB solution 3 g     Ordering Provider:  Erlin Martin MD    3 g  over 30 Minutes Intravenous Every 8 Hours 02/09/17 0000 02/09/17 0946         Lab Results   Component Value Date    [facility] 14.20 02/13/2017     No results found for: OSMANI    Assessment/Plan  Vancomycin trough level last night at 2338 = 14.2 mcg/ml (no accumulation has occurred) therefore will continue with same regimen of 1500mg iv q12h. Pharmacy will continue to follow daily while on vancomycin and adjust if needed.     Joni Hale [facility]

## 2017-02-14 NOTE — BRIEF OP NOTE
HIP SPACER INSERTION WITH ANTIBIOTIC CEMENT  Procedure Note    Frances Downs  2/8/2017 - 2/13/2017    Pre-op Diagnosis:   RTH infection  Post-op Diagnosis:     RTH infection  Procedure/CPT® Codes:      Procedure(s):  RIGHT TOTAL HIP REMOVAL    Surgeon(s):  Erlin Martin MD    Anesthesia: General    Staff:   Circulator: Briana Gurrola RN; Sheila Escobar RN  Scrub Person: Briana Diaz  Vendor Representative: Terence Ortiz  Assistant: Guillermo Smith CSA; TREASURE Valle    Estimated Blood Loss: 600 mL    Specimens:                  ID Type Source Tests Collected by Time Destination   1 : RIGHT HIP Tissue Hip, Right ANAEROBIC CULTURE, TISSUE CULTURE Erlin Martin MD 2/13/2017 1846          Drains:   Drain/Device Site 02/13/17 1935 Right hip collapsible closed device (Active)       Urethral Catheter 02/13/17 1820 100% silicone 16 (Active)       [REMOVED] Drain/Device Site 02/08/17 1710 Right hip collapsible closed device (Removed)   Removed 02/13/17 1825   Insertion Site drainage;dressing intact 2/13/2017  5:16 PM   Drainage Characteristics/Odor serosanguineous 2/13/2017  5:16 PM   Drainage Amount moderate 2/13/2017  5:16 PM   General output (mL) 75 2/13/2017  5:38 AM       Findings: RTH infection    Complications: none    TREASURE Valle     Date: 2/13/2017  Time: 7:42 PM

## 2017-02-14 NOTE — PROGRESS NOTES
"  Infectious Diseases Progress Note    Krysta Benito MD     [facility]  Los: 6 days  Patient Identification:  Name: Frances Downs  Age: 66 y.o.  Sex: female  :  1950  MRN: [id number]         Primary Care Physician: Talha Echeverria MD            Subjective: Underwent explant of hardware.  Feels better, denies any specific complaints.    Interval History: See above     Objective:    Scheduled Meds:    atorvastatin 40 mg Oral Daily   clopidogrel 75 mg Oral Daily   docusate sodium 100 mg Oral BID   ferrous sulfate 325 mg Oral BID   fluticasone 2 spray Nasal Daily   isosorbide mononitrate 30 mg Oral Daily   levothyroxine 100 mcg Oral Q AM   modafinil 200 mg Oral BID   oxybutynin XL 10 mg Oral Daily   pantoprazole 40 mg Oral Q AM   potassium chloride 10 mEq Oral Daily   pregabalin 200 mg Oral BID   sennosides-docusate sodium 2 tablet Oral BID   tiotropium 1 capsule Inhalation Daily - RT   vancomycin 1,500 mg Intravenous Q12H   vilazodone 40 mg Oral Nightly   warfarin 5 mg Oral Once     Continuous Infusions:    lactated ringers 9 mL/hr Last Rate: 0 mL/hr (17 1845)   lactated ringers 9 mL/hr Last Rate: 9 mL/hr (17 1748)   Pharmacy to dose vancomycin         Vital signs in last 24 hours:  Temp:  [97.8 °F (36.6 °C)-99.2 °F (37.3 °C)] 98.6 °F (37 °C)  Heart Rate:  [] 88  Resp:  [16-20] 18  BP: ()/(41-84) 110/64    Intake/Output:    Intake/Output Summary (Last 24 hours) at 17 1038  Last data filed at 17 0629   Gross per 24 hour   Intake   2200 ml   Output   1800 ml   Net    400 ml       Exam:  Visit Vitals   • /64 (BP Location: Right arm, Patient Position: Lying)   • Pulse 88   • Temp 98.6 °F (37 °C) (Oral)   • Resp 18   • Ht 64\" (162.6 cm)   • Wt 226 lb (103 kg)   • SpO2 93%   • BMI 38.79 kg/m2       General Appearance:    Alert, cooperative, no distress, AAOx3                          Head:    Normocephalic, without obvious abnormality, atraumatic        "                    Eyes:    PERRL, conjunctiva/corneas clear, EOM's intact, both eyes                         Throat:   Lips, tongue, gums normal; oral mucosa pink and moist                           Neck:   Supple, symmetrical, trachea midline, no JVD                         Lungs:    Clear to auscultation bilaterally, respirations unlabored                 Chest Wall:    No tenderness or deformity                          Heart:    Regular rate and rhythm, S1 and S2 normal, no murmur,no  Rub                                      or gallop                  Abdomen:     Soft, non-tender, bowel sounds active, no masses, no                                                        organomegaly                  Extremities:   Right surgical site dressed, drain in place                        Pulses:   Pulses palpable in all extremities                            Skin:   Skin is warm and dry,  no rashes or palpable lesions                         Data Review:    I reviewed the patient's new clinical results.    Results from last 7 days  Lab Units 02/14/17  0401 02/13/17 2056 02/13/17 0405 02/12/17  0342 02/11/17  0343 02/10/17  0256 02/09/17  0506  02/08/17  1334   WBC 10*3/mm3 9.36 11.03* 4.29*  --  3.92*  --   --   --  6.49   HEMOGLOBIN g/dL 7.6* 9.1* 9.5* 8.5* 8.6* 8.6* 8.9*  < > 8.8*   PLATELETS 10*3/mm3 331 358 319  --  335  --   --   --  328   < > = values in this interval not displayed.    Results from last 7 days  Lab Units 02/14/17  0401 02/13/17 0405 02/11/17  0343 02/09/17  0506 02/08/17  1333   SODIUM mmol/L 136 137 135* 129* 130*   POTASSIUM mmol/L 4.3 4.1 4.1 4.7 4.7   CHLORIDE mmol/L 100 100 98 92* 90*   TOTAL CO2 mmol/L 23.5 24.0 24.6 24.6 25.2   BUN mg/dL 6* 5* 8 15 24*   CREATININE mg/dL 0.50* 0.39* 0.49* 0.59 0.89   CALCIUM mg/dL 7.9* 8.2* 8.4* 8.7 9.1   GLUCOSE mg/dL 93 94 85 112* 109*         Assessment:  Principal Problem:    Prosthetic hip infection, mrsa  Active Problems:    Hematoma of right  hip    DVT (deep venous thrombosis), hx of    Hypertension    Hyperlipidemia    Coronary artery disease, hx of stent    Arthritis    Bipolar disorder    MRSA (methicillin resistant Staphylococcus aureus) infection   acute postop blood loss anemia    Plan:  See CCP orders.  PICC line  6-8 weeks of IV vancomycin after final surgery and explant of hardware.  We would recommend observing off of antibiotic therapy for at least couple weeks with close monitoring of her inflammatory markers in that timeframe to decide if she is a candidate for stage II implant based on orthopedic surgery service's assessment.    Krysta Benito MD  2/14/2017  10:38 AM    Much of this encounter note is an electronic transcription/translation of spoken language to printed text. The electronic translation of spoken language may permit erroneous, or at times, nonsensical words or phrases to be inadvertently transcribed; Although I have reviewed the note for such errors, some may still exist

## 2017-02-15 ENCOUNTER — APPOINTMENT (OUTPATIENT)
Dept: CARDIOLOGY | Facility: HOSPITAL | Age: 67
End: 2017-02-15
Attending: HOSPITALIST

## 2017-02-15 ENCOUNTER — APPOINTMENT (OUTPATIENT)
Dept: GENERAL RADIOLOGY | Facility: HOSPITAL | Age: 67
End: 2017-02-15
Attending: HOSPITALIST

## 2017-02-15 LAB
ABO + RH BLD: NORMAL
ABO + RH BLD: NORMAL
ANION GAP SERPL CALCULATED.3IONS-SCNC: 13.9 MMOL/L
BH BB BLOOD EXPIRATION DATE: NORMAL
BH BB BLOOD EXPIRATION DATE: NORMAL
BH BB BLOOD TYPE BARCODE: 6200
BH BB BLOOD TYPE BARCODE: 6200
BH BB DISPENSE STATUS: NORMAL
BH BB DISPENSE STATUS: NORMAL
BH BB PRODUCT CODE: NORMAL
BH BB PRODUCT CODE: NORMAL
BH BB UNIT NUMBER: NORMAL
BH BB UNIT NUMBER: NORMAL
BH CV LOWER VASCULAR LEFT COMMON FEMORAL AUGMENT: NORMAL
BH CV LOWER VASCULAR LEFT COMMON FEMORAL COMPETENT: NORMAL
BH CV LOWER VASCULAR LEFT COMMON FEMORAL COMPRESS: NORMAL
BH CV LOWER VASCULAR LEFT COMMON FEMORAL PHASIC: NORMAL
BH CV LOWER VASCULAR LEFT COMMON FEMORAL SPONT: NORMAL
BH CV LOWER VASCULAR LEFT DISTAL FEMORAL COMPRESS: NORMAL
BH CV LOWER VASCULAR LEFT GASTRONEMIUS COMPRESS: NORMAL
BH CV LOWER VASCULAR LEFT GREATER SAPH AK COMPRESS: NORMAL
BH CV LOWER VASCULAR LEFT GREATER SAPH BK COMPRESS: NORMAL
BH CV LOWER VASCULAR LEFT LESSER SAPH COMPRESS: NORMAL
BH CV LOWER VASCULAR LEFT MID FEMORAL AUGMENT: NORMAL
BH CV LOWER VASCULAR LEFT MID FEMORAL COMPETENT: NORMAL
BH CV LOWER VASCULAR LEFT MID FEMORAL COMPRESS: NORMAL
BH CV LOWER VASCULAR LEFT MID FEMORAL PHASIC: NORMAL
BH CV LOWER VASCULAR LEFT MID FEMORAL SPONT: NORMAL
BH CV LOWER VASCULAR LEFT PERONEAL COMPRESS: NORMAL
BH CV LOWER VASCULAR LEFT POPLITEAL AUGMENT: NORMAL
BH CV LOWER VASCULAR LEFT POPLITEAL COMPETENT: NORMAL
BH CV LOWER VASCULAR LEFT POPLITEAL COMPRESS: NORMAL
BH CV LOWER VASCULAR LEFT POPLITEAL PHASIC: NORMAL
BH CV LOWER VASCULAR LEFT POPLITEAL SPONT: NORMAL
BH CV LOWER VASCULAR LEFT POSTERIOR TIBIAL COMPRESS: NORMAL
BH CV LOWER VASCULAR LEFT PROXIMAL FEMORAL COMPRESS: NORMAL
BH CV LOWER VASCULAR LEFT SAPHENOFEMORAL JUNCTION AUGMENT: NORMAL
BH CV LOWER VASCULAR LEFT SAPHENOFEMORAL JUNCTION COMPRESS: NORMAL
BH CV LOWER VASCULAR LEFT SAPHENOFEMORAL JUNCTION PHASIC: NORMAL
BH CV LOWER VASCULAR LEFT SAPHENOFEMORAL JUNCTION SPONT: NORMAL
BH CV LOWER VASCULAR RIGHT COMMON FEMORAL AUGMENT: NORMAL
BH CV LOWER VASCULAR RIGHT COMMON FEMORAL COMPETENT: NORMAL
BH CV LOWER VASCULAR RIGHT COMMON FEMORAL COMPRESS: NORMAL
BH CV LOWER VASCULAR RIGHT COMMON FEMORAL PHASIC: NORMAL
BH CV LOWER VASCULAR RIGHT COMMON FEMORAL SPONT: NORMAL
BH CV LOWER VASCULAR RIGHT DISTAL FEMORAL COMPRESS: NORMAL
BH CV LOWER VASCULAR RIGHT GASTRONEMIUS COMPRESS: NORMAL
BH CV LOWER VASCULAR RIGHT GREATER SAPH AK COMPRESS: NORMAL
BH CV LOWER VASCULAR RIGHT GREATER SAPH BK COMPRESS: NORMAL
BH CV LOWER VASCULAR RIGHT LESSER SAPH COMPRESS: NORMAL
BH CV LOWER VASCULAR RIGHT MID FEMORAL AUGMENT: NORMAL
BH CV LOWER VASCULAR RIGHT MID FEMORAL COMPETENT: NORMAL
BH CV LOWER VASCULAR RIGHT MID FEMORAL COMPRESS: NORMAL
BH CV LOWER VASCULAR RIGHT MID FEMORAL PHASIC: NORMAL
BH CV LOWER VASCULAR RIGHT MID FEMORAL SPONT: NORMAL
BH CV LOWER VASCULAR RIGHT PERONEAL COMPRESS: NORMAL
BH CV LOWER VASCULAR RIGHT POPLITEAL AUGMENT: NORMAL
BH CV LOWER VASCULAR RIGHT POPLITEAL COMPETENT: NORMAL
BH CV LOWER VASCULAR RIGHT POPLITEAL COMPRESS: NORMAL
BH CV LOWER VASCULAR RIGHT POPLITEAL PHASIC: NORMAL
BH CV LOWER VASCULAR RIGHT POPLITEAL SPONT: NORMAL
BH CV LOWER VASCULAR RIGHT POSTERIOR TIBIAL COMPRESS: NORMAL
BH CV LOWER VASCULAR RIGHT PROXIMAL FEMORAL COMPRESS: NORMAL
BH CV LOWER VASCULAR RIGHT SAPHENOFEMORAL JUNCTION AUGMENT: NORMAL
BH CV LOWER VASCULAR RIGHT SAPHENOFEMORAL JUNCTION COMPRESS: NORMAL
BH CV LOWER VASCULAR RIGHT SAPHENOFEMORAL JUNCTION PHASIC: NORMAL
BH CV LOWER VASCULAR RIGHT SAPHENOFEMORAL JUNCTION SPONT: NORMAL
BILIRUB UR QL STRIP: NEGATIVE
BUN BLD-MCNC: 8 MG/DL (ref 8–23)
BUN/CREAT SERPL: 13.8 (ref 7–25)
CALCIUM SPEC-SCNC: 7.7 MG/DL (ref 8.6–10.5)
CHLORIDE SERPL-SCNC: 101 MMOL/L (ref 98–107)
CLARITY UR: ABNORMAL
CO2 SERPL-SCNC: 23.1 MMOL/L (ref 22–29)
COLOR UR: ABNORMAL
CREAT BLD-MCNC: 0.58 MG/DL (ref 0.57–1)
DEPRECATED RDW RBC AUTO: 59.6 FL (ref 37–54)
ERYTHROCYTE [DISTWIDTH] IN BLOOD BY AUTOMATED COUNT: 17.6 % (ref 11.7–13)
GFR SERPL CREATININE-BSD FRML MDRD: 104 ML/MIN/1.73
GLUCOSE BLD-MCNC: 90 MG/DL (ref 65–99)
GLUCOSE UR STRIP-MCNC: NEGATIVE MG/DL
HCT VFR BLD AUTO: 18.9 % (ref 35.6–45.5)
HCT VFR BLD AUTO: 24.2 % (ref 35.6–45.5)
HGB BLD-MCNC: 6 G/DL (ref 11.9–15.5)
HGB BLD-MCNC: 7.9 G/DL (ref 11.9–15.5)
HGB UR QL STRIP.AUTO: ABNORMAL
INR PPP: 1.59 (ref 0.9–1.1)
INR PPP: 1.6 (ref 0.9–1.1)
KETONES UR QL STRIP: ABNORMAL
LEUKOCYTE ESTERASE UR QL STRIP.AUTO: NEGATIVE
MCH RBC QN AUTO: 29.6 PG (ref 26.9–32)
MCHC RBC AUTO-ENTMCNC: 31.7 G/DL (ref 32.4–36.3)
MCV RBC AUTO: 93.1 FL (ref 80.5–98.2)
NITRITE UR QL STRIP: NEGATIVE
PH UR STRIP.AUTO: 5.5 [PH] (ref 5–8)
PLATELET # BLD AUTO: 297 10*3/MM3 (ref 140–500)
PMV BLD AUTO: 9.5 FL (ref 6–12)
POTASSIUM BLD-SCNC: 4 MMOL/L (ref 3.5–5.2)
PROT UR QL STRIP: ABNORMAL
PROTHROMBIN TIME: 18.4 SECONDS (ref 11.7–14.2)
PROTHROMBIN TIME: 18.5 SECONDS (ref 11.7–14.2)
RBC # BLD AUTO: 2.03 10*6/MM3 (ref 3.9–5.2)
SODIUM BLD-SCNC: 138 MMOL/L (ref 136–145)
SP GR UR STRIP: 1.02 (ref 1–1.03)
UNIT  ABO: NORMAL
UNIT  ABO: NORMAL
UNIT  RH: NORMAL
UNIT  RH: NORMAL
UROBILINOGEN UR QL STRIP: ABNORMAL
WBC NRBC COR # BLD: 7.89 10*3/MM3 (ref 4.5–10.7)

## 2017-02-15 PROCEDURE — 93970 EXTREMITY STUDY: CPT

## 2017-02-15 PROCEDURE — 80048 BASIC METABOLIC PNL TOTAL CA: CPT | Performed by: HOSPITALIST

## 2017-02-15 PROCEDURE — 25010000002 VANCOMYCIN: Performed by: ORTHOPAEDIC SURGERY

## 2017-02-15 PROCEDURE — 85610 PROTHROMBIN TIME: CPT | Performed by: ORTHOPAEDIC SURGERY

## 2017-02-15 PROCEDURE — 97110 THERAPEUTIC EXERCISES: CPT

## 2017-02-15 PROCEDURE — 85014 HEMATOCRIT: CPT | Performed by: ORTHOPAEDIC SURGERY

## 2017-02-15 PROCEDURE — 87633 RESP VIRUS 12-25 TARGETS: CPT | Performed by: HOSPITALIST

## 2017-02-15 PROCEDURE — 94640 AIRWAY INHALATION TREATMENT: CPT

## 2017-02-15 PROCEDURE — 85018 HEMOGLOBIN: CPT | Performed by: ORTHOPAEDIC SURGERY

## 2017-02-15 PROCEDURE — 85027 COMPLETE CBC AUTOMATED: CPT | Performed by: HOSPITALIST

## 2017-02-15 PROCEDURE — 25010000002 ONDANSETRON PER 1 MG: Performed by: ORTHOPAEDIC SURGERY

## 2017-02-15 PROCEDURE — 87581 M.PNEUMON DNA AMP PROBE: CPT | Performed by: HOSPITALIST

## 2017-02-15 PROCEDURE — 87798 DETECT AGENT NOS DNA AMP: CPT | Performed by: HOSPITALIST

## 2017-02-15 PROCEDURE — 71010 HC CHEST PA OR AP: CPT

## 2017-02-15 PROCEDURE — 86900 BLOOD TYPING SEROLOGIC ABO: CPT

## 2017-02-15 PROCEDURE — 36430 TRANSFUSION BLD/BLD COMPNT: CPT

## 2017-02-15 PROCEDURE — 81001 URINALYSIS AUTO W/SCOPE: CPT | Performed by: HOSPITALIST

## 2017-02-15 PROCEDURE — 87186 SC STD MICRODIL/AGAR DIL: CPT | Performed by: HOSPITALIST

## 2017-02-15 PROCEDURE — 94799 UNLISTED PULMONARY SVC/PX: CPT

## 2017-02-15 PROCEDURE — 87486 CHLMYD PNEUM DNA AMP PROBE: CPT | Performed by: HOSPITALIST

## 2017-02-15 PROCEDURE — 85610 PROTHROMBIN TIME: CPT | Performed by: PHYSICIAN ASSISTANT

## 2017-02-15 PROCEDURE — P9016 RBC LEUKOCYTES REDUCED: HCPCS

## 2017-02-15 PROCEDURE — 87086 URINE CULTURE/COLONY COUNT: CPT | Performed by: HOSPITALIST

## 2017-02-15 RX ORDER — OXYCODONE AND ACETAMINOPHEN 10; 325 MG/1; MG/1
2 TABLET ORAL EVERY 4 HOURS PRN
Status: DISCONTINUED | OUTPATIENT
Start: 2017-02-15 | End: 2017-02-18 | Stop reason: HOSPADM

## 2017-02-15 RX ORDER — ONDANSETRON 2 MG/ML
4 INJECTION INTRAMUSCULAR; INTRAVENOUS EVERY 6 HOURS PRN
Status: DISCONTINUED | OUTPATIENT
Start: 2017-02-15 | End: 2017-02-18 | Stop reason: HOSPADM

## 2017-02-15 RX ORDER — WARFARIN SODIUM 7.5 MG/1
7.5 TABLET ORAL
Status: DISCONTINUED | OUTPATIENT
Start: 2017-02-15 | End: 2017-02-18 | Stop reason: HOSPADM

## 2017-02-15 RX ADMIN — OXYCODONE HYDROCHLORIDE AND ACETAMINOPHEN 1 TABLET: 10; 325 TABLET ORAL at 08:16

## 2017-02-15 RX ADMIN — PREGABALIN 150 MG: 100 CAPSULE ORAL at 18:45

## 2017-02-15 RX ADMIN — OXYCODONE HYDROCHLORIDE AND ACETAMINOPHEN 2 TABLET: 10; 325 TABLET ORAL at 15:56

## 2017-02-15 RX ADMIN — MODAFINIL 200 MG: 100 TABLET ORAL at 08:16

## 2017-02-15 RX ADMIN — PREGABALIN 150 MG: 100 CAPSULE ORAL at 08:15

## 2017-02-15 RX ADMIN — LEVOTHYROXINE SODIUM 100 MCG: 100 TABLET ORAL at 06:23

## 2017-02-15 RX ADMIN — POTASSIUM CHLORIDE 10 MEQ: 750 CAPSULE, EXTENDED RELEASE ORAL at 08:16

## 2017-02-15 RX ADMIN — OXYBUTYNIN CHLORIDE 10 MG: 10 TABLET, FILM COATED, EXTENDED RELEASE ORAL at 08:15

## 2017-02-15 RX ADMIN — VILAZODONE HYDROCHLORIDE 40 MG: 40 TABLET ORAL at 20:38

## 2017-02-15 RX ADMIN — OXYCODONE HYDROCHLORIDE AND ACETAMINOPHEN 1 TABLET: 10; 325 TABLET ORAL at 12:15

## 2017-02-15 RX ADMIN — DOCUSATE SODIUM -SENNOSIDES 2 TABLET: 50; 8.6 TABLET, COATED ORAL at 18:45

## 2017-02-15 RX ADMIN — FERROUS SULFATE TAB 325 MG (65 MG ELEMENTAL FE) 325 MG: 325 (65 FE) TAB at 18:45

## 2017-02-15 RX ADMIN — OXYCODONE HYDROCHLORIDE AND ACETAMINOPHEN 1 TABLET: 10; 325 TABLET ORAL at 04:34

## 2017-02-15 RX ADMIN — DOCUSATE SODIUM -SENNOSIDES 2 TABLET: 50; 8.6 TABLET, COATED ORAL at 08:15

## 2017-02-15 RX ADMIN — OXYCODONE HYDROCHLORIDE AND ACETAMINOPHEN 2 TABLET: 10; 325 TABLET ORAL at 20:32

## 2017-02-15 RX ADMIN — PANTOPRAZOLE SODIUM 40 MG: 40 TABLET, DELAYED RELEASE ORAL at 06:23

## 2017-02-15 RX ADMIN — TIOTROPIUM BROMIDE 1 CAPSULE: 18 CAPSULE ORAL; RESPIRATORY (INHALATION) at 08:30

## 2017-02-15 RX ADMIN — FERROUS SULFATE TAB 325 MG (65 MG ELEMENTAL FE) 325 MG: 325 (65 FE) TAB at 08:15

## 2017-02-15 RX ADMIN — CLOPIDOGREL 75 MG: 75 TABLET, FILM COATED ORAL at 08:15

## 2017-02-15 RX ADMIN — ONDANSETRON 4 MG: 2 INJECTION INTRAMUSCULAR; INTRAVENOUS at 12:15

## 2017-02-15 RX ADMIN — FLUTICASONE PROPIONATE 2 SPRAY: 50 SPRAY, METERED NASAL at 08:15

## 2017-02-15 RX ADMIN — WARFARIN SODIUM 7.5 MG: 7.5 TABLET ORAL at 20:38

## 2017-02-15 RX ADMIN — VANCOMYCIN HYDROCHLORIDE 1500 MG: 1 INJECTION, POWDER, LYOPHILIZED, FOR SOLUTION INTRAVENOUS at 12:15

## 2017-02-15 RX ADMIN — ATORVASTATIN CALCIUM 40 MG: 40 TABLET, FILM COATED ORAL at 20:38

## 2017-02-15 RX ADMIN — VANCOMYCIN HYDROCHLORIDE 1500 MG: 1 INJECTION, POWDER, LYOPHILIZED, FOR SOLUTION INTRAVENOUS at 23:05

## 2017-02-15 RX ADMIN — ISOSORBIDE MONONITRATE 30 MG: 30 TABLET, EXTENDED RELEASE ORAL at 08:16

## 2017-02-15 RX ADMIN — OXYCODONE HYDROCHLORIDE AND ACETAMINOPHEN 1 TABLET: 10; 325 TABLET ORAL at 01:46

## 2017-02-15 NOTE — PROGRESS NOTES
"  Infectious Diseases Progress Note    Krysta Benito MD     [facility]  Los: 7 days  Patient Identification:  Name: Frances Downs  Age: 66 y.o.  Sex: female  :  1950  MRN: [id number]         Primary Care Physician: Talha Echeverria MD            Subjective: Underwent explant of hardware.  Feels better, denies any specific complaints.    Interval History: See above     Objective:    Scheduled Meds:    atorvastatin 40 mg Oral Daily   clopidogrel 75 mg Oral Daily   docusate sodium 100 mg Oral BID   ferrous sulfate 325 mg Oral BID   fluticasone 2 spray Nasal Daily   isosorbide mononitrate 30 mg Oral Daily   levothyroxine 100 mcg Oral Q AM   modafinil 200 mg Oral BID   oxybutynin XL 10 mg Oral Daily   pantoprazole 40 mg Oral Q AM   potassium chloride 10 mEq Oral Daily   pregabalin 200 mg Oral BID   sennosides-docusate sodium 2 tablet Oral BID   tiotropium 1 capsule Inhalation Daily - RT   vancomycin 1,500 mg Intravenous Q12H   vilazodone 40 mg Oral Nightly   warfarin 7.5 mg Oral Daily     Continuous Infusions:    lactated ringers 9 mL/hr Last Rate: 0 mL/hr (17 1845)   lactated ringers 9 mL/hr Last Rate: 9 mL/hr (17 1748)   Pharmacy to dose vancomycin         Vital signs in last 24 hours:  Temp:  [99 °F (37.2 °C)-101.2 °F (38.4 °C)] 99 °F (37.2 °C)  Heart Rate:  [] 80  Resp:  [16-20] 16  BP: (110-150)/(50-85) 118/61    Intake/Output:    Intake/Output Summary (Last 24 hours) at 02/15/17 1319  Last data filed at 02/15/17 0923   Gross per 24 hour   Intake 491.67 ml   Output    750 ml   Net -258.33 ml       Exam:  Visit Vitals   • /61   • Pulse 80   • Temp 99 °F (37.2 °C) (Oral)   • Resp 16   • Ht 64\" (162.6 cm)   • Wt 226 lb (103 kg)   • SpO2 95%   • BMI 38.79 kg/m2       General Appearance:    Alert, cooperative, no distress, AAOx3                          Head:    Normocephalic, without obvious abnormality, atraumatic                           Eyes:    PERRL, " conjunctiva/corneas clear, EOM's intact, both eyes                         Throat:   Lips, tongue, gums normal; oral mucosa pink and moist                           Neck:   Supple, symmetrical, trachea midline, no JVD                         Lungs:    Clear to auscultation bilaterally, respirations unlabored                 Chest Wall:    No tenderness or deformity                          Heart:    Regular rate and rhythm, S1 and S2 normal, no murmur,no  Rub                                      or gallop                  Abdomen:     Soft, non-tender, bowel sounds active, no masses, no                                                        organomegaly                  Extremities:   Right surgical site dressed, drain in place                        Pulses:   Pulses palpable in all extremities                            Skin:   Skin is warm and dry,  no rashes or palpable lesions                         Data Review:    I reviewed the patient's new clinical results.    Results from last 7 days  Lab Units 02/15/17  0328 02/14/17  0401 02/13/17  2056 02/13/17  0405 02/12/17  0342 02/11/17  0343 02/10/17  0256  02/08/17  1334   WBC 10*3/mm3 7.89 9.36 11.03* 4.29*  --  3.92*  --   --  6.49   HEMOGLOBIN g/dL 6.0* 7.6* 9.1* 9.5* 8.5* 8.6* 8.6*  < > 8.8*   PLATELETS 10*3/mm3 297 331 358 319  --  335  --   --  328   < > = values in this interval not displayed.    Results from last 7 days  Lab Units 02/15/17  0328 02/14/17  0401 02/13/17  0405 02/11/17  0343 02/09/17  0506 02/08/17  1333   SODIUM mmol/L 138 136 137 135* 129* 130*   POTASSIUM mmol/L 4.0 4.3 4.1 4.1 4.7 4.7   CHLORIDE mmol/L 101 100 100 98 92* 90*   TOTAL CO2 mmol/L 23.1 23.5 24.0 24.6 24.6 25.2   BUN mg/dL 8 6* 5* 8 15 24*   CREATININE mg/dL 0.58 0.50* 0.39* 0.49* 0.59 0.89   CALCIUM mg/dL 7.7* 7.9* 8.2* 8.4* 8.7 9.1   GLUCOSE mg/dL 90 93 94 85 112* 109*       Tissue Culture [[id number]] (Abnormal) Collected: 02/13/17 1846       Lab Status: Preliminary result  Specimen: Tissue from Hip, Right Updated: 02/15/17 0739        Culture Scant growth (1+) Gram Negative Bacilli (A)          Scant growth (1+) Staphylococcus species (A)         Gram Stain Result Rare (1+) WBCs seen          Rare (1+) Gram positive cocci in pairs           Assessment:  Principal Problem:    Prosthetic hip infection, mrsa  Active Problems:    Hematoma of right hip    DVT (deep venous thrombosis), hx of    Hypertension    Hyperlipidemia    Coronary artery disease, hx of stent    Arthritis    Bipolar disorder    MRSA (methicillin resistant Staphylococcus aureus) infection   acute postop blood loss anemia    Plan:see below  See CCP orders.  PICC line  6-8 weeks of IV vancomycin after final surgery and explant of hardware.  We would recommend observing off of antibiotic therapy for at least couple weeks with close monitoring of her inflammatory markers in that timeframe to decide if she is a candidate for stage II implant based on orthopedic surgery service's assessment.    Krysta Benito MD  2/15/2017  1:19 PM    Much of this encounter note is an electronic transcription/translation of spoken language to printed text. The electronic translation of spoken language may permit erroneous, or at times, nonsensical words or phrases to be inadvertently transcribed; Although I have reviewed the note for such errors, some may still exist

## 2017-02-15 NOTE — PLAN OF CARE
Problem: Patient Care Overview (Adult)  Goal: Plan of Care Review  Outcome: Ongoing (interventions implemented as appropriate)    02/15/17 1603   Coping/Psychosocial Response Interventions   Plan Of Care Reviewed With patient   Patient Care Overview   Progress declining   Outcome Evaluation   Outcome Summary/Follow up Plan pt unmotivated w/incr pain, wkness; educ offered on benefits of mobility, but NWB rt, and knee buckling on left limiting

## 2017-02-15 NOTE — PLAN OF CARE
Problem: Patient Care Overview (Adult)  Goal: Plan of Care Review  Outcome: Ongoing (interventions implemented as appropriate)    02/15/17 0347   Coping/Psychosocial Response Interventions   Plan Of Care Reviewed With patient   Patient Care Overview   Progress improving   Outcome Evaluation   Outcome Summary/Follow up Plan pain well controlled. pt. refusing to get oob due to pain.         Problem: Hip Replacement, Total (Adult)  Goal: Signs and Symptoms of Listed Potential Problems Will be Absent or Manageable (Hip Replacement, Total)  Outcome: Ongoing (interventions implemented as appropriate)    02/15/17 0347   Hip Replacement, Total   Problems Assessed (Total Hip Replacement) pain;functional decline/self care deficit   Problems Present (Total Hip Replacement) functional decline/self care deficit         Problem: Fall Risk (Adult)  Goal: Absence of Falls  Outcome: Ongoing (interventions implemented as appropriate)    02/15/17 0347   Fall Risk (Adult)   Absence of Falls making progress toward outcome

## 2017-02-15 NOTE — PROGRESS NOTES
Orthopedic Progress Note        Patient: Frances Downs    Date of Admission: 2/8/2017  1:10 PM    YOB: 1950    Medical Record Number: [id number]    Attending Physician: Erlin Martin MD      POD # 2  LOS: 7 days     Status post-RTH Removal with Girdlestone procedure      Systemic or Specific Complaints: Nausea this am.  Pain controlled today.        No Known Allergies      Current Medications:  Scheduled Meds:  atorvastatin 40 mg Oral Daily   clopidogrel 75 mg Oral Daily   docusate sodium 100 mg Oral BID   ferrous sulfate 325 mg Oral BID   fluticasone 2 spray Nasal Daily   isosorbide mononitrate 30 mg Oral Daily   levothyroxine 100 mcg Oral Q AM   modafinil 200 mg Oral BID   oxybutynin XL 10 mg Oral Daily   pantoprazole 40 mg Oral Q AM   potassium chloride 10 mEq Oral Daily   pregabalin 200 mg Oral BID   sennosides-docusate sodium 2 tablet Oral BID   tiotropium 1 capsule Inhalation Daily - RT   vancomycin 1,500 mg Intravenous Q12H   vilazodone 40 mg Oral Nightly     Continuous Infusions:  lactated ringers 9 mL/hr Last Rate: 0 mL/hr (02/13/17 1845)   lactated ringers 9 mL/hr Last Rate: 9 mL/hr (02/13/17 1748)   Pharmacy to dose vancomycin       PRN Meds:.benzocaine-menthol  •  bisacodyl  •  clonazePAM  •  diphenhydrAMINE  •  docusate sodium  •  HYDROmorphone **AND** naloxone  •  ipratropium-albuterol  •  ondansetron  •  oxyCODONE-acetaminophen **OR** oxyCODONE-acetaminophen  •  Pharmacy to dose vancomycin      Physical Exam: 66 y.o. female  General Appearance:    alert, oriented and anxious                Pain Relief: Patient reports some relief   Vitals:    02/15/17 0530 02/15/17 0757 02/15/17 0831 02/15/17 1132   BP: 110/50 118/62 132/67 120/60   BP Location: Right arm Right arm  Right arm   Patient Position: Lying Lying  Lying   Pulse: 86 82 86 82   Resp: 16 16 16 16   Temp: 100.2 °F (37.9 °C) 100 °F (37.8 °C) 99.6 °F (37.6 °C) 99.2 °F (37.3 °C)   TempSrc: Oral Oral Oral Oral   SpO2: 94%  95%  93%   Weight:       Height:              HEENT:    Normocephalic, without obvious abnormality, atraumatic.          PERRLA; EOMI; Neck supple with trachea midline. No JVD.    No lymphadenopathy     Lungs:     Clear to auscultation,respirations regular, even and                   Unlabored      Heart:    Regular rhythm and normal rate, normal S1 and S2, no            murmur, no gallop, no rub, no click     Abdomen:     Normal bowel sounds, no masses, no organomegaly, soft        non-tender, non-distended, no guarding, no rebound                 tenderness       Extremities:   Operative extremity neurovascular status intact. ROM intact.    Incision intact w/out signs or  symptoms of infection. No           edema, no cyanosis, no calf tenderness     Pulses:     Pulses palpable and equal bilaterally     Skin:     Skin Warm/Dry w/out ulceration, ecchymosis, rash, or   cyanosis     Activity: Mobilizing Per P.T.   Weight Bearing: As Tolerated    Diagnostic Tests:  Admission on 02/08/2017   No results displayed because visit has over 200 results.        Xr Pelvis 1 Or 2 View    Result Date: 2/13/2017  Narrative: PELVIS X-RAY  CLINICAL HISTORY: Postop removal of right total hip arthroplasty  Compared to the preop x-ray dated to 2/8/2017.  In the interval, the right hip arthroplasty has been removed. There is a comminuted fracture of the right iliac bone involving the quadrilateral plate and anterior and posterior columns that was also present on the preop x-ray. There is a lucency in the proximal femur due to the femoral stem component of the arthroplasty that has been removed. 3 cerclage wires are now in place within the proximal right femur. The left hip joint is unremarkable.  This report was finalized on 2/13/2017 8:42 PM by Dr. César Boland MD.      Xr Pelvis 1 Or 2 View    Result Date: 2/8/2017  Narrative: STAT PORTABLE RADIOGRAPHIC VIEW OF THE PELVIS AND RIGHT HIP  CLINICAL HISTORY: Status post incision and  drainage.  Frontal projection of the pelvis and right hip demonstrate a right hip arthroplasty in place. There is a fracture through the acetabulum and the acetabular component of the right hip arthroplasty projects into the pelvis. Typical postoperative changes are identified within the adjacent soft tissues.  The findings of this fracture were directly discussed with Kim Manley RN, the nurse caring for this patient on 02/08/2017 at approximately 6:50 PM. She was instructed to notify Dr. Martin with these findings.  This report was finalized on 2/8/2017 8:35 PM by Dr. Benjy Jimenez MD.              Assessment:  Patient Active Problem List   Diagnosis   • Hematoma of right hip   • DVT (deep venous thrombosis), hx of   • Hypertension   • Hyperlipidemia   • Coronary artery disease, hx of stent   • Arthritis   • Bipolar disorder   • Prosthetic hip infection, mrsa   • MRSA (methicillin resistant Staphylococcus aureus) infection      Acute Blood Loss Anemia  Post-operative Pain  Immobility      Plan:    Continue Physical Therapy, increase mobility as tolerated.  Continue SCDs, Continue DVT prophalaxis.  Continue Pain management efforts  Continue Incisional care  Coumadin  D/C Hemovac  IV ABX x 6 wks  Transfusion today  H&H in am      Discharge Plan: tomorrow to rehab    Date: 2/15/2017   Time: 12:23 PM    TREASURE Valle

## 2017-02-15 NOTE — SIGNIFICANT NOTE
02/15/17 0845   Rehab Treatment   Discipline physical therapy assistant   Treatment Not Performed patient/family declined treatment  (not feeling well, asked to check back later)   Recommendation   PT - Next Appointment 02/15/17

## 2017-02-15 NOTE — OP NOTE
Date of Procedure: 02/13/2017    PREOPERATIVE DIAGNOSIS: Infected right total hip.    POSTOPERATIVE DIAGNOSIS: Infected right total hip.    PROCEDURES PERFORMED: Removal of infected total hip and conversion to   Girdlestone.    SURGEON: Erlin Martin MD     FIRST ASSISTANT: Merna Shaffer PA-C     ASSISTANT: KARMA Ness     ANESTHESIA: General.     ESTIMATED BLOOD LOSS: 800 mL.      INDICATIONS FOR PROCEDURE: This is a 66-year-old lady with a right total hip who had a hematoma evacuated and the cultures are growing MRSA. She is brought to the operating room to remove the total hip.     DESCRIPTION OF PROCEDURE: The patient was brought to the operating room and given a general anesthetic, Green catheter inserted, placed in the decubitus position with the right side up. The staples were removed from the previous surgery and after this was done, the patient then had the hip prepped and draped. The patient then had the previous incision opened and it was extended distally. Subcutaneous tissue dissected away, the fascia was opened and extended distally. An extended trochanteric osteotomy was performed and then this exposed the femur. We were able to remove the femur although it was well _____. After the femur was removed we then subluxed the remaining femur anteriorly. The polyethylene was removed from the previous cup and then the cup _____ with 3 screws. Previous medial wall bone graft was also removed. After this was done, the wound was soaked with dilute Betadine solution and irrigated with 3000 mL of bacitracin. The patient had a drain positioned. We then closed the wound with running 0 Vicryl in the vastus lateralis and in the distal portion of tensor fascia, and the proximal portion was closed with interrupted 0 Vicryl. The subcutaneous tissue was closed with 0 Vicryl and 3-0 Vicryl, and staples in the skin. Abduction pillow positioned and her general anesthetic reversed. First Assistant  Merna Shaffer PA-C, was present throughout the entire case.         LEEANN OhP:dt  D:   02/14/2017 19:29:00  T:   02/14/2017 20:58:55  Job ID:   [id number]  Document ID:   [id number]  cc:

## 2017-02-15 NOTE — PROGRESS NOTES
Acute Care - Physical Therapy Treatment Note  [facility]     Patient Name: Frances Downs  : 1950  MRN: [id number]  Today's Date: 2/15/2017  Onset of Illness/Injury or Date of Surgery Date: 17     Referring Physician: Veronica    Admit Date: 2017    Visit Dx:    ICD-10-CM ICD-9-CM   1. Difficulty walking R26.2 719.7   2. Hematoma T14.8 924.9   3. MRSA infection A49.02 041.12     Patient Active Problem List   Diagnosis   • Hematoma of right hip   • DVT (deep venous thrombosis), hx of   • Hypertension   • Hyperlipidemia   • Coronary artery disease, hx of stent   • Arthritis   • Bipolar disorder   • Prosthetic hip infection, mrsa   • MRSA (methicillin resistant Staphylococcus aureus) infection               Adult Rehabilitation Note       02/15/17 1440          Rehab Assessment/Intervention    Discipline physical therapy assistant  -      Document Type therapy note (daily note)  -      Subjective Information complains of;weakness;fatigue;pain;swelling  -      Precautions/Limitations fall precautions;hip precautions- right;non-weight bearing status  -      Recorded by [] Shruti Palmer PTA      Pain Assessment    Pain Assessment 0-10  -      Pain Score 9  -      Pain Type Surgical pain  -      Pain Location Leg  -      Pain Orientation Right  -      Pain Intervention(s) Medication (See MAR);Repositioned;Ambulation/increased activity  -      Response to Interventions not milad  -      Recorded by [] Shruti Palmer PTA      Bed Mobility, Assessment/Treatment    Bed Mobility, Assistive Device bed rails;draw sheet  -      Bed Mobility, Scoot/Bridge, [place] moderate assist (50% patient effort);maximum assist (25% patient effort);2 person assist required  -      Bed Mob, Supine to Sit, [place] maximum assist (25% patient effort);2 person assist required  -      Bed Mob, Sit to Supine, [place] maximum assist (25% patient effort);2 person assist required   -      Bed Mobility, Safety Issues decreased use of arms for pushing/pulling;decreased use of legs for bridging/pushing;impaired trunk control for bed mobility  -      Bed Mobility, Impairments strength decreased  -JM      Recorded by [CLIFFORD Palmer PTA      Transfer Assessment/Treatment    Transfers, Sit-Stand [place] maximum assist (25% patient effort);2 person assist required  -      Transfers, Stand-Sit [place] maximum assist (25% patient effort);moderate assist (50% patient effort);2 person assist required;verbal cues required  -      Transfers, Sit-Stand-Sit, Assist Device standard walker   found rwx at end of session, left in room  -      Toilet Transfer, [place] maximum assist (25% patient effort);2 person assist required  -      Toilet Transfer, Assistive Device standard walker;rolling walker  -      Transfer, Safety Issues balance decreased during turns;weight-shifting ability decreased;knees buckling  -      Transfer, Impairments strength decreased;impaired balance;coordination impaired;unable to maintain weight bearing status;pain  -JM      Recorded by [MARILEE] Shruti Palmer PTA      Gait Assessment/Treatment    Gait, [place] Level not appropriate to assess  -JM      Recorded by [CLIFFORD Palmer PTA      Positioning and Restraints    Pre-Treatment Position in bed  -JM      In Bed supine;call light within reach;encouraged to call for assist;with nsg  -JM      Recorded by [CLIFFORD Palmer PTA        User Key  (r) = Recorded By, (t) = Taken By, (c) = Cosigned By    Initials Name Effective Dates    MARILEE Palmer PTA 02/18/16 -                 IP PT Goals       02/14/17 1104 02/09/17 0850       Bed Mobility PT LTG    Bed Mobility PT LTG, Date Established  02/09/17  -EE     Bed Mobility PT LTG, Time to Achieve 1 wk  -EE 5 days  -EE     Bed Mobility PT LTG, Activity Type  all bed mobility  -EE     Bed Mobility PT LTG, [place] Level  minimum assist  (75% patient effort);2 person assist required  -EE     Bed Mobility PT LTG, Date Goal Reviewed 02/14/17  -EE      Bed Mobility PT LTG, Outcome goal ongoing  -EE      Bed Mobility PT LTG, Reason Goal Not Met progress slower than expected  -EE      Transfer Training PT LTG    Transfer Training PT LTG, Date Established 02/14/17  -EE 02/09/17  -EE     Transfer Training PT LTG, Time to Achieve 1 wk  -EE 5 days  -EE     Transfer Training PT LTG, Activity Type all transfers  -EE all transfers  -EE     Transfer Training PT LTG, [place] Level moderate assist (50% patient effort);2 person assist required  -EE minimum assist (75% patient effort);2 person assist required  -EE     Transfer Training PT LTG, Assist Device  walker, rolling  -EE     Transfer Training PT  LTG, Date Goal Reviewed 02/14/17  -EE      Transfer Training PT LTG, Outcome goal revised  -EE      Transfer Training PT LTG, Reason Goal Not Met progress slower than expected  -EE      Patient Education PT LTG    Patient Education PT LTG, Date Established  02/09/17  -EE     Patient Education PT LTG, Time to Achieve 1 wk  -EE 5 days  -EE     Patient Education PT LTG, Education Type  HEP;precaution per surgeon  -EE     Patient Education PT LTG, Education Understanding  demonstrate adequately;verbalize understanding  -EE     Patient Education PT LTG, Date Goal Reviewed 02/14/17  -EE      Patient Education PT LTG Outcome goal ongoing  -EE      Patient Education PT LTG, Reason Goal Not Met progress slower than expected  -EE        User Key  (r) = Recorded By, (t) = Taken By, (c) = Cosigned By    Initials Name Provider Type    GALILEO Campos, PT Physical Therapist          Physical Therapy Education     Title: PT OT SLP Therapies (Active)     Topic: Physical Therapy (Active)     Point: Mobility training (Active)    Learning Progress Summary    Learner Readiness Method Response Comment Documented by Status   Patient Acceptance E,TB,D NR after much encouragement  agreed to get up on BSC  02/15/17 1600 Active    Acceptance E,TB,D NR  EE 02/14/17 1104 Active    Acceptance E,TB,D VU,NR aware she is NWB rt, but c/o wkness and pn in left knee limiting also, assist given to maintain NWB rt  02/12/17 1430 Done    Acceptance E,TB,D NR   02/11/17 1355 Active    Acceptance E,TB NR  EE 02/10/17 1429 Active    Acceptance E,TB NR  EE 02/09/17 0850 Active               Point: Home exercise program (Active)    Learning Progress Summary    Learner Readiness Method Response Comment Documented by Status   Patient Acceptance E,TB,D NR after much encouragement agreed to get up on BSC  02/15/17 1600 Active    Acceptance E,TB,D NR  EE 02/14/17 1104 Active    Acceptance E,TB,D VU,NR aware she is NWB rt, but c/o wkness and pn in left knee limiting also, assist given to maintain NWB rt  02/12/17 1430 Done    Acceptance E,TB,D NR   02/11/17 1355 Active    Acceptance E,TB NR  EE 02/10/17 1429 Active    Acceptance E,TB NR  EE 02/09/17 0850 Active               Point: Body mechanics (Active)    Learning Progress Summary    Learner Readiness Method Response Comment Documented by Status   Patient Acceptance E,TB,D NR after much encouragement agreed to get up on BSC  02/15/17 1600 Active    Acceptance E,TB,D NR  EE 02/14/17 1104 Active    Acceptance E,TB,D VU,NR aware she is NWB rt, but c/o wkness and pn in left knee limiting also, assist given to maintain NWB rt  02/12/17 1430 Done    Acceptance E,TB,D NR   02/11/17 1355 Active    Acceptance E,TB NR  EE 02/10/17 1429 Active    Acceptance E,TB NR  EE 02/09/17 0850 Active               Point: Precautions (Active)    Learning Progress Summary    Learner Readiness Method Response Comment Documented by Status   Patient Acceptance E,TB,D NR after much encouragement agreed to get up on BSC  02/15/17 1600 Active    Acceptance E,TB,D NR  EE 02/14/17 1104 Active    Acceptance E,TB,D VU,NR aware she is NWB rt, but c/o wkness and pn in left knee  limiting also, assist given to maintain NWB rt  02/12/17 1430 Done    Acceptance E,TB,D NR   02/11/17 1355 Active    Acceptance E,TB NR  EE 02/10/17 1429 Active    Acceptance E,TB NR  EE 02/09/17 0850 Active                      User Key     Initials Effective Dates Name Provider Type Discipline     12/01/15 -  Yue Campos, PT Physical Therapist PT     02/18/16 -  Shruti Palmer PTA Physical Therapy Assistant PT                    PT Recommendation and Plan  Anticipated Discharge Disposition: skilled nursing facility  Planned Therapy Interventions: balance training, bed mobility training, home exercise program, patient/family education, strengthening, stretching, transfer training  PT Frequency: daily  Plan of Care Review  Plan Of Care Reviewed With: patient  Progress: declining  Outcome Summary/Follow up Plan: pt unmotivated w/incr pain, wkness; educ offered on benefits of mobility, but NWB rt, and knee buckling on left limiting          Outcome Measures       02/15/17 1600 02/14/17 1100       How much help from another person do you currently need...    Turning from your back to your side while in flat bed without using bedrails? 2  - 2  -EE     Moving from lying on back to sitting on the side of a flat bed without bedrails? 2  - 2  -EE     Moving to and from a bed to a chair (including a wheelchair)? 2  - 2  -EE     Standing up from a chair using your arms (e.g., wheelchair, bedside chair)? 2  - 2  -EE     Climbing 3-5 steps with a railing? 1  - 1  -EE     To walk in hospital room? 1  - 1  -EE     AM-PAC 6 Clicks Score 10  - 10  -EE     Functional Assessment    Outcome Measure Options  AM-PAC 6 Clicks Basic Mobility (PT)  -EE       User Key  (r) = Recorded By, (t) = Taken By, (c) = Cosigned By    Initials Name Provider Type    EE Yue Campos, PT Physical Therapist    MARILEE Palmer, BARRERA Physical Therapy Assistant           Time Calculation:         PT Charges       02/15/17 9730  02/15/17 0845       Time Calculation    Start Time 1330  -MARILEE      Stop Time 1405  -MARILEE      Time Calculation (min) 35 min  -MARILEE      PT Received On 02/15/17  -MARILEE      PT - Next Appointment 02/16/17  -MARILEE 02/15/17  -MARILEE       User Key  (r) = Recorded By, (t) = Taken By, (c) = Cosigned By    Initials Name Provider Type    MARILEE Palmer PTA Physical Therapy Assistant          Therapy Charges for Today     Code Description Service Date Service Provider Modifiers Qty    23832872855 HC PT THER PROC EA 15 MIN 2/15/2017 Shrtui Palmer PTA GP 2    94274580841 HC PT THER SUPP EA 15 MIN 2/15/2017 Shruti Palmer PTA GP 2          PT G-Codes  Outcome Measure Options: AM-PAC 6 Clicks Basic Mobility (PT)    Shruti Palmer PTA  2/15/2017

## 2017-02-15 NOTE — PROGRESS NOTES
"   LOS: 7 days   Patient Care Team:  Talha Echeverria MD as PCP - General (Internal Medicine)    Chief Complaint: anxious    Subjective     HPI Comments: Feels very anxious and sad today. PT didn't go well. She's crying about it now.      Subjective:  Symptoms:  Stable.  She reports anxiety.  No shortness of breath, malaise, cough, chest pain, weakness, headache, chest pressure, anorexia or diarrhea.    Diet:  Poor intake.  No nausea or vomiting.    Activity level: Impaired due to pain.    Pain:  She complains of pain that is moderate.  She reports pain is improving.  Pain is well controlled.        History taken from: patient chart    Objective     Vital Signs  Temp:  [99 °F (37.2 °C)-101.2 °F (38.4 °C)] 99 °F (37.2 °C)  Heart Rate:  [] 82  Resp:  [16-20] 16  BP: (110-150)/(50-85) 120/64    Objective:  General Appearance:  Comfortable and in no acute distress.    Vital signs: (most recent): Blood pressure 120/64, pulse 82, temperature 99 °F (37.2 °C), temperature source Oral, resp. rate 16, height 64\" (162.6 cm), weight 226 lb (103 kg), SpO2 96 %.  Vital signs are normal.  No fever.    Output: Producing urine and producing stool.    Lungs:  Normal respiratory rate and normal effort.  Breath sounds clear to auscultation.    Heart: Normal rate.  Regular rhythm.    Abdomen: Abdomen is soft.  Bowel sounds are normal.   There is no abdominal tenderness.     Extremities: There is no dependent edema.    Pulses: Distal pulses are intact.    Neurological: Patient is alert and oriented to person, place and time.    Skin:  Warm and dry.              Results Review:     I reviewed the patient's new clinical results.  I reviewed the patient's other test results and agree with the interpretation  Discussed with patient and RN    Medication Review: reviewed    Assessment/Plan     Principal Problem:    Prosthetic hip infection, mrsa  Active Problems:    Hematoma of right hip    DVT (deep venous thrombosis), hx of    " Hypertension    Hyperlipidemia    Coronary artery disease, hx of stent    Arthritis    Bipolar disorder    MRSA (methicillin resistant Staphylococcus aureus) infection      Assessment:  (1. MRSA infection of right hip prosthesis--POD #6 s/p I&D, POD #2 s/p explantation right hip  2. Anemia of chronic disease with iron deficiency, now with acute post-op blood loss component  3. H/o DVT, AC on hold for OR  4. CAD with stent  5. HTN  6. Hyperlipidemia  7. Chronic pain syndrome  8. HypoT4  9. Bipolar d/o  10. Fever/Hypoxia).     Plan:   (S/p 2 units PRBCs pre-op  Hgb down again post-op, 2 more units PRBCs given per Ortho today  Consider iron supplement at dc  Incentive spirometry  Restarted Coumadin per Ortho  Fever and hypoxia overnight--check CXR, UA, BLE venous duplex, and RVP  ).       Cornelio Perry MD  02/15/17  3:56 PM    Time: 30min

## 2017-02-16 LAB
ABO + RH BLD: NORMAL
ABO + RH BLD: NORMAL
ANION GAP SERPL CALCULATED.3IONS-SCNC: 11.8 MMOL/L
B PERT DNA SPEC QL NAA+PROBE: NOT DETECTED
BACTERIA UR QL AUTO: ABNORMAL /HPF
BH BB BLOOD EXPIRATION DATE: NORMAL
BH BB BLOOD EXPIRATION DATE: NORMAL
BH BB BLOOD TYPE BARCODE: 6200
BH BB BLOOD TYPE BARCODE: 6200
BH BB DISPENSE STATUS: NORMAL
BH BB DISPENSE STATUS: NORMAL
BH BB PRODUCT CODE: NORMAL
BH BB PRODUCT CODE: NORMAL
BH BB UNIT NUMBER: NORMAL
BH BB UNIT NUMBER: NORMAL
BUN BLD-MCNC: 8 MG/DL (ref 8–23)
BUN/CREAT SERPL: 15.4 (ref 7–25)
C PNEUM DNA NPH QL NAA+NON-PROBE: NOT DETECTED
CALCIUM SPEC-SCNC: 7.7 MG/DL (ref 8.6–10.5)
CHLORIDE SERPL-SCNC: 100 MMOL/L (ref 98–107)
CO2 SERPL-SCNC: 24.2 MMOL/L (ref 22–29)
CREAT BLD-MCNC: 0.52 MG/DL (ref 0.57–1)
DEPRECATED RDW RBC AUTO: 55.7 FL (ref 37–54)
ERYTHROCYTE [DISTWIDTH] IN BLOOD BY AUTOMATED COUNT: 16.7 % (ref 11.7–13)
FLUAV H1 2009 PAND RNA NPH QL NAA+PROBE: NOT DETECTED
FLUAV H1 HA GENE NPH QL NAA+PROBE: NOT DETECTED
FLUAV H3 RNA NPH QL NAA+PROBE: NOT DETECTED
FLUAV SUBTYP SPEC NAA+PROBE: NOT DETECTED
FLUBV RNA ISLT QL NAA+PROBE: NOT DETECTED
GFR SERPL CREATININE-BSD FRML MDRD: 118 ML/MIN/1.73
GLUCOSE BLD-MCNC: 99 MG/DL (ref 65–99)
GRAN CASTS URNS QL MICRO: ABNORMAL /LPF
HADV DNA SPEC NAA+PROBE: NOT DETECTED
HCOV 229E RNA SPEC QL NAA+PROBE: NOT DETECTED
HCOV HKU1 RNA SPEC QL NAA+PROBE: NOT DETECTED
HCOV NL63 RNA SPEC QL NAA+PROBE: NOT DETECTED
HCOV OC43 RNA SPEC QL NAA+PROBE: NOT DETECTED
HCT VFR BLD AUTO: 22.8 % (ref 35.6–45.5)
HGB BLD-MCNC: 7.4 G/DL (ref 11.9–15.5)
HMPV RNA NPH QL NAA+NON-PROBE: NOT DETECTED
HPIV1 RNA SPEC QL NAA+PROBE: NOT DETECTED
HPIV2 RNA SPEC QL NAA+PROBE: NOT DETECTED
HPIV3 RNA NPH QL NAA+PROBE: NOT DETECTED
HPIV4 P GENE NPH QL NAA+PROBE: NOT DETECTED
HYALINE CASTS UR QL AUTO: ABNORMAL /LPF
INR PPP: 1.73 (ref 0.9–1.1)
M PNEUMO IGG SER IA-ACNC: NOT DETECTED
MCH RBC QN AUTO: 29.3 PG (ref 26.9–32)
MCHC RBC AUTO-ENTMCNC: 32 G/DL (ref 32.4–36.3)
MCV RBC AUTO: 91.6 FL (ref 80.5–98.2)
PLATELET # BLD AUTO: 280 10*3/MM3 (ref 140–500)
PMV BLD AUTO: 9.6 FL (ref 6–12)
POTASSIUM BLD-SCNC: 3.8 MMOL/L (ref 3.5–5.2)
PROTHROMBIN TIME: 19.7 SECONDS (ref 11.7–14.2)
RBC # BLD AUTO: 2.39 10*6/MM3 (ref 3.9–5.2)
RBC # UR: ABNORMAL /HPF
REF LAB TEST METHOD: ABNORMAL
RHINOVIRUS RNA SPEC NAA+PROBE: NOT DETECTED
RSV RNA NPH QL NAA+NON-PROBE: NOT DETECTED
SODIUM BLD-SCNC: 136 MMOL/L (ref 136–145)
SQUAMOUS #/AREA URNS HPF: ABNORMAL /HPF
UNIT  ABO: NORMAL
UNIT  ABO: NORMAL
UNIT  RH: NORMAL
UNIT  RH: NORMAL
WBC NRBC COR # BLD: 8.49 10*3/MM3 (ref 4.5–10.7)
WBC UR QL AUTO: ABNORMAL /HPF
YEAST URNS QL MICRO: ABNORMAL /HPF

## 2017-02-16 PROCEDURE — 25010000002 MORPHINE PER 10 MG: Performed by: ORTHOPAEDIC SURGERY

## 2017-02-16 PROCEDURE — 85014 HEMATOCRIT: CPT | Performed by: ORTHOPAEDIC SURGERY

## 2017-02-16 PROCEDURE — 85027 COMPLETE CBC AUTOMATED: CPT | Performed by: HOSPITALIST

## 2017-02-16 PROCEDURE — 85610 PROTHROMBIN TIME: CPT | Performed by: PHYSICIAN ASSISTANT

## 2017-02-16 PROCEDURE — 25010000002 VANCOMYCIN: Performed by: ORTHOPAEDIC SURGERY

## 2017-02-16 PROCEDURE — 80048 BASIC METABOLIC PNL TOTAL CA: CPT | Performed by: HOSPITALIST

## 2017-02-16 PROCEDURE — 85018 HEMOGLOBIN: CPT | Performed by: ORTHOPAEDIC SURGERY

## 2017-02-16 PROCEDURE — 25010000002 ONDANSETRON PER 1 MG: Performed by: ORTHOPAEDIC SURGERY

## 2017-02-16 RX ORDER — MORPHINE SULFATE 2 MG/ML
2 INJECTION, SOLUTION INTRAMUSCULAR; INTRAVENOUS
Status: COMPLETED | OUTPATIENT
Start: 2017-02-16 | End: 2017-02-16

## 2017-02-16 RX ORDER — MORPHINE SULFATE 2 MG/ML
1 INJECTION, SOLUTION INTRAMUSCULAR; INTRAVENOUS
Status: COMPLETED | OUTPATIENT
Start: 2017-02-16 | End: 2017-02-16

## 2017-02-16 RX ADMIN — MODAFINIL 200 MG: 100 TABLET ORAL at 09:01

## 2017-02-16 RX ADMIN — PANTOPRAZOLE SODIUM 40 MG: 40 TABLET, DELAYED RELEASE ORAL at 05:08

## 2017-02-16 RX ADMIN — OXYCODONE HYDROCHLORIDE AND ACETAMINOPHEN 2 TABLET: 10; 325 TABLET ORAL at 17:48

## 2017-02-16 RX ADMIN — PREGABALIN 200 MG: 100 CAPSULE ORAL at 09:01

## 2017-02-16 RX ADMIN — ATORVASTATIN CALCIUM 40 MG: 40 TABLET, FILM COATED ORAL at 20:30

## 2017-02-16 RX ADMIN — PREGABALIN 200 MG: 100 CAPSULE ORAL at 17:48

## 2017-02-16 RX ADMIN — FLUTICASONE PROPIONATE 2 SPRAY: 50 SPRAY, METERED NASAL at 09:01

## 2017-02-16 RX ADMIN — MORPHINE SULFATE 2 MG: 2 INJECTION, SOLUTION INTRAMUSCULAR; INTRAVENOUS at 15:44

## 2017-02-16 RX ADMIN — VANCOMYCIN HYDROCHLORIDE 1500 MG: 1 INJECTION, POWDER, LYOPHILIZED, FOR SOLUTION INTRAVENOUS at 12:09

## 2017-02-16 RX ADMIN — OXYCODONE HYDROCHLORIDE AND ACETAMINOPHEN 2 TABLET: 10; 325 TABLET ORAL at 09:24

## 2017-02-16 RX ADMIN — DOCUSATE SODIUM 100 MG: 100 CAPSULE, LIQUID FILLED ORAL at 17:48

## 2017-02-16 RX ADMIN — ERTAPENEM SODIUM 1 G: 1 INJECTION, POWDER, LYOPHILIZED, FOR SOLUTION INTRAMUSCULAR; INTRAVENOUS at 15:44

## 2017-02-16 RX ADMIN — LEVOTHYROXINE SODIUM 100 MCG: 100 TABLET ORAL at 05:08

## 2017-02-16 RX ADMIN — OXYCODONE HYDROCHLORIDE AND ACETAMINOPHEN 2 TABLET: 10; 325 TABLET ORAL at 05:08

## 2017-02-16 RX ADMIN — OXYCODONE HYDROCHLORIDE AND ACETAMINOPHEN 2 TABLET: 10; 325 TABLET ORAL at 13:34

## 2017-02-16 RX ADMIN — CLOPIDOGREL 75 MG: 75 TABLET, FILM COATED ORAL at 09:01

## 2017-02-16 RX ADMIN — OXYCODONE HYDROCHLORIDE AND ACETAMINOPHEN 2 TABLET: 10; 325 TABLET ORAL at 21:50

## 2017-02-16 RX ADMIN — ONDANSETRON 4 MG: 2 INJECTION INTRAMUSCULAR; INTRAVENOUS at 12:09

## 2017-02-16 RX ADMIN — DOCUSATE SODIUM -SENNOSIDES 2 TABLET: 50; 8.6 TABLET, COATED ORAL at 09:01

## 2017-02-16 RX ADMIN — TIOTROPIUM BROMIDE 1 CAPSULE: 18 CAPSULE ORAL; RESPIRATORY (INHALATION) at 08:57

## 2017-02-16 RX ADMIN — POTASSIUM CHLORIDE 10 MEQ: 750 CAPSULE, EXTENDED RELEASE ORAL at 09:01

## 2017-02-16 RX ADMIN — WARFARIN SODIUM 7.5 MG: 7.5 TABLET ORAL at 17:48

## 2017-02-16 RX ADMIN — FERROUS SULFATE TAB 325 MG (65 MG ELEMENTAL FE) 325 MG: 325 (65 FE) TAB at 17:48

## 2017-02-16 RX ADMIN — VILAZODONE HYDROCHLORIDE 40 MG: 40 TABLET ORAL at 20:30

## 2017-02-16 RX ADMIN — DOCUSATE SODIUM -SENNOSIDES 2 TABLET: 50; 8.6 TABLET, COATED ORAL at 17:48

## 2017-02-16 RX ADMIN — OXYCODONE HYDROCHLORIDE AND ACETAMINOPHEN 2 TABLET: 10; 325 TABLET ORAL at 00:40

## 2017-02-16 RX ADMIN — DOCUSATE SODIUM 100 MG: 100 CAPSULE, LIQUID FILLED ORAL at 09:01

## 2017-02-16 RX ADMIN — FERROUS SULFATE TAB 325 MG (65 MG ELEMENTAL FE) 325 MG: 325 (65 FE) TAB at 09:01

## 2017-02-16 RX ADMIN — OXYBUTYNIN CHLORIDE 10 MG: 10 TABLET, FILM COATED, EXTENDED RELEASE ORAL at 09:01

## 2017-02-16 NOTE — PROGRESS NOTES
"  Infectious Diseases Progress Note    Krysta Benito MD     [facility]  [place]: 8 days  Patient Identification:  Name: Frances Downs  Age: 66 y.o.  Sex: female  :  1950  MRN: [id number]         Primary Care Physician: Talha Echeverria MD            Subjective: No complaints feeling better awaiting arrangements for discharge.    Interval History: See above     Objective:    Scheduled Meds:    atorvastatin 40 mg Oral Daily   clopidogrel 75 mg Oral Daily   docusate sodium 100 mg Oral BID   ferrous sulfate 325 mg Oral BID   fluticasone 2 spray Nasal Daily   isosorbide mononitrate 30 mg Oral Daily   levothyroxine 100 mcg Oral Q AM   modafinil 200 mg Oral BID   oxybutynin XL 10 mg Oral Daily   pantoprazole 40 mg Oral Q AM   potassium chloride 10 mEq Oral Daily   pregabalin 200 mg Oral BID   sennosides-docusate sodium 2 tablet Oral BID   tiotropium 1 capsule Inhalation Daily - RT   vancomycin 1,500 mg Intravenous Q12H   vilazodone 40 mg Oral Nightly   warfarin 7.5 mg Oral Daily     Continuous Infusions:    lactated ringers 9 mL/hr Last Rate: 0 mL/hr (17 1845)   lactated ringers 9 mL/hr Last Rate: 9 mL/hr (17 1748)   Pharmacy to dose vancomycin         Vital signs in last 24 hours:  Temp:  [98.1 °F (36.7 °C)-101.2 °F (38.4 °C)] 98.9 °F (37.2 °C)  Heart Rate:  [72-84] 72  Resp:  [16] 16  BP: ()/(50-64) 110/58    Intake/Output:    Intake/Output Summary (Last 24 hours) at 17 1406  Last data filed at 17 1312   Gross per 24 hour   Intake    731 ml   Output    550 ml   Net    181 ml       Exam:  Visit Vitals   • /58 (BP Location: Right arm, Patient Position: Lying)   • Pulse 72   • Temp 98.9 °F (37.2 °C) (Oral)   • Resp 16   • Ht 64\" (162.6 cm)   • Wt 226 lb (103 kg)   • SpO2 98%   • BMI 38.79 kg/m2       General Appearance:    Alert, cooperative, no distress, AAOx3                          Head:    Normocephalic, without obvious abnormality, atraumatic                "            Eyes:    PERRL, conjunctiva/corneas clear, EOM's intact, both eyes                         Throat:   Lips, tongue, gums normal; oral mucosa pink and moist                           Neck:   Supple, symmetrical, trachea midline, no JVD                         Lungs:    Clear to auscultation bilaterally, respirations unlabored                 Chest Wall:    No tenderness or deformity                          Heart:    Regular rate and rhythm, S1 and S2 normal, no murmur,no  Rub                                      or gallop                  Abdomen:     Soft, non-tender, bowel sounds active, no masses, no                                                        organomegaly                  Extremities:   Right surgical site dressed, drain in place                        Pulses:   Pulses palpable in all extremities                            Skin:   Skin is warm and dry,  no rashes or palpable lesions                         Data Review:    I reviewed the patient's new clinical results.    Results from last 7 days  Lab Units 02/16/17  0324 02/15/17  2145 02/15/17  0328 02/14/17  0401 02/13/17  2056 02/13/17  0405 02/12/17  0342 02/11/17  0343   WBC 10*3/mm3 8.49  --  7.89 9.36 11.03* 4.29*  --  3.92*   HEMOGLOBIN g/dL 7.4* 7.9* 6.0* 7.6* 9.1* 9.5* 8.5* 8.6*   PLATELETS 10*3/mm3 280  --  297 331 358 319  --  335       Results from last 7 days  Lab Units 02/16/17  0324 02/15/17  0328 02/14/17  0401 02/13/17  0405 02/11/17  0343   SODIUM mmol/L 136 138 136 137 135*   POTASSIUM mmol/L 3.8 4.0 4.3 4.1 4.1   CHLORIDE mmol/L 100 101 100 100 98   TOTAL CO2 mmol/L 24.2 23.1 23.5 24.0 24.6   BUN mg/dL 8 8 6* 5* 8   CREATININE mg/dL 0.52* 0.58 0.50* 0.39* 0.49*   CALCIUM mg/dL 7.7* 7.7* 7.9* 8.2* 8.4*   GLUCOSE mg/dL 99 90 93 94 85         Tissue Culture [[id number]] (Abnormal)  Collected: 02/13/17 7455       Lab Status: Preliminary result Specimen: Tissue from Hip, Right Updated: 02/16/17 0984        Culture            "Scant growth (1+) Morganella morganii ssp morganii (A)                   Scant growth (1+) Staphylococcus aureus, MRSA (C)           Methicillin resistant Staphylococcus aureus, Patient may be an isolation risk.           Gram Stain Result Rare (1+) WBCs seen          Rare (1+) Gram positive cocci in pairs          Susceptibility         Morganella morganii ssp morganii         ROSSY         Ampicillin >=32  Resistant         Ampicillin + Sulbactam >=32  Resistant         Cefazolin >=64  Resistant         Cefepime <=1  Susceptible         Cefoxitin >=64  Resistant         Ceftriaxone 32  Intermediate         Ertapenem <=0.5  Susceptible         Gentamicin 2  Susceptible         Levofloxacin >=8  Resistant         Meropenem <=0.25  Susceptible         Piperacillin + Tazobactam 32  Intermediate         Trimethoprim + Sulfamethoxazole >=320  Resistant                               Anaerobic Culture [[id number]] (Normal) Collected: 02/08/17 1701       Lab Status: Final result Specimen: Wound from Hip, Right Updated: 02/13/17 1001        Culture No anaerobes isolated at 5 days        Wound Culture [[id number]] (Abnormal)  Collected: 02/08/17 1701       Lab Status: Final result Specimen: Wound from Hip, Right Updated: 02/10/17 0648        Wound Culture           Moderate growth (3+) Staphylococcus aureus, MRSA (C)         D test is negative. Isolate does not exhibit \"inducible\" resistance to Clindamycin (isolate remains susceptible to Clindamycin).     Methicillin resistant Staphylococcus aureus, Patient may be an isolation risk.           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  See orders    Krysta Benito MD  2/16/2017  2:06 PM    Much of this encounter note is an electronic transcription/translation of spoken language to printed text. The electronic translation of spoken language may permit erroneous, or at times, nonsensical words or phrases to be inadvertently transcribed; Although I have reviewed the note for such errors, some may still exist

## 2017-02-16 NOTE — SIGNIFICANT NOTE
02/16/17 1324   Rehab Treatment   Discipline physical therapy assistant   Treatment Not Performed patient/family declined treatment  (Pt refused to participate with PT due to pain. Encouraged and educated pt on the benefits of PT but pt continued to refuse stating that she cannot tolerate moving right now.)   Recommendation   PT - Next Appointment 02/17/17

## 2017-02-16 NOTE — PROGRESS NOTES
Orthopedic Progress Note        Patient: Frances Downs    Date of Admission: 2/8/2017  1:10 PM    YOB: 1950    Medical Record Number: [id number]    Attending Physician: Erlin Martin MD      POD # 3  LOS: 8 days     Status post-RTH removal      Systemic or Specific Complaints: Patient reports she is sore this am.  No  PT yesterday d/t transfusions.  Doppler was negative.  Patient on coumadin; will check INR today.  She would like to stay inpatient today and work with PT.      No Known Allergies      Current Medications:  Scheduled Meds:  atorvastatin 40 mg Oral Daily   clopidogrel 75 mg Oral Daily   docusate sodium 100 mg Oral BID   ferrous sulfate 325 mg Oral BID   fluticasone 2 spray Nasal Daily   isosorbide mononitrate 30 mg Oral Daily   levothyroxine 100 mcg Oral Q AM   modafinil 200 mg Oral BID   oxybutynin XL 10 mg Oral Daily   pantoprazole 40 mg Oral Q AM   potassium chloride 10 mEq Oral Daily   pregabalin 200 mg Oral BID   sennosides-docusate sodium 2 tablet Oral BID   tiotropium 1 capsule Inhalation Daily - RT   vancomycin 1,500 mg Intravenous Q12H   vilazodone 40 mg Oral Nightly   warfarin 7.5 mg Oral Daily     Continuous Infusions:  lactated ringers 9 mL/hr Last Rate: 0 mL/hr (02/13/17 1845)   lactated ringers 9 mL/hr Last Rate: 9 mL/hr (02/13/17 1748)   Pharmacy to dose vancomycin       PRN Meds:.benzocaine-menthol  •  bisacodyl  •  clonazePAM  •  diphenhydrAMINE  •  docusate sodium  •  HYDROmorphone **AND** naloxone  •  ipratropium-albuterol  •  ondansetron  •  oxyCODONE-acetaminophen  •  Pharmacy to dose vancomycin      Physical Exam: 66 y.o. female  General Appearance:    alert, oriented and anxious                Pain Relief: Patient reports some relief   Vitals:    02/15/17 2316 02/16/17 0043 02/16/17 0304 02/16/17 0730   BP: 99/59  103/50 104/62   BP Location: Left arm  Left arm Right arm   Patient Position: Lying  Lying Lying   Pulse: 84  81 76   Resp: 16 16 16   Temp:  (!) 101.2 °F (38.4 °C)  Comment: nurse notified 99.3 °F (37.4 °C) 100 °F (37.8 °C) 98.1 °F (36.7 °C)   TempSrc: Oral Oral Oral Oral   SpO2: 92%  96% 96%   Weight:       Height:              HEENT:    Normocephalic, without obvious abnormality, atraumatic.          PERRLA; EOMI; Neck supple with trachea midline. No JVD.    No lymphadenopathy     Lungs:     Clear to auscultation,respirations regular, even and                   Unlabored      Heart:    Regular rhythm and normal rate, normal S1 and S2, no            murmur, no gallop, no rub, no click     Abdomen:     Normal bowel sounds, no masses, no organomegaly, soft        non-tender, non-distended, no guarding, no rebound                 tenderness       Extremities:   Operative extremity neurovascular status intact. ROM intact.    Incision intact w/out signs or  symptoms of infection. No           edema, no cyanosis, no calf tenderness     Pulses:     Pulses palpable and equal bilaterally     Skin:     Skin Warm/Dry w/out ulceration, ecchymosis, rash, or   cyanosis     Activity: Mobilizing Per P.T.   Weight Bearing: As Tolerated    Diagnostic Tests:  Admission on 02/08/2017   No results displayed because visit has over 200 results.        Xr Chest 1 View    Result Date: 2/15/2017  Narrative: XR CHEST 1 VW-  HISTORY: Female who is 66 years-old,  hypoxia, fever  TECHNIQUE: Frontal view of the chest  COMPARISON: None available  FINDINGS: Left PICC extends to the superior vena cava. The heart is mildly enlarged. Aorta is calcified. The pulmonary vasculature is slightly congested. No focal pulmonary consolidation, pleural effusion, or pneumothorax. No acute osseous process.      Impression: No focal pulmonary consolidation. Mild cardiomegaly with slight pulmonary vascular congestion. Follow-up as clinical indications persist.  This report was finalized on 2/15/2017 6:23 PM by Dr. Terence Ross MD.      Xr Pelvis 1 Or 2 View    Result Date: 2/13/2017  Narrative:  PELVIS X-RAY  CLINICAL HISTORY: Postop removal of right total hip arthroplasty  Compared to the preop x-ray dated to 2/8/2017.  In the interval, the right hip arthroplasty has been removed. There is a comminuted fracture of the right iliac bone involving the quadrilateral plate and anterior and posterior columns that was also present on the preop x-ray. There is a lucency in the proximal femur due to the femoral stem component of the arthroplasty that has been removed. 3 cerclage wires are now in place within the proximal right femur. The left hip joint is unremarkable.  This report was finalized on 2/13/2017 8:42 PM by Dr. César Boland MD.      Xr Pelvis 1 Or 2 View    Result Date: 2/8/2017  Narrative: STAT PORTABLE RADIOGRAPHIC VIEW OF THE PELVIS AND RIGHT HIP  CLINICAL HISTORY: Status post incision and drainage.  Frontal projection of the pelvis and right hip demonstrate a right hip arthroplasty in place. There is a fracture through the acetabulum and the acetabular component of the right hip arthroplasty projects into the pelvis. Typical postoperative changes are identified within the adjacent soft tissues.  The findings of this fracture were directly discussed with Kim Manley RN, the nurse caring for this patient on 02/08/2017 at approximately 6:50 PM. She was instructed to notify Dr. Martin with these findings.  This report was finalized on 2/8/2017 8:35 PM by Dr. Benjy Jimenez MD.              Assessment:  Patient Active Problem List   Diagnosis   • Hematoma of right hip   • DVT (deep venous thrombosis), hx of   • Hypertension   • Hyperlipidemia   • Coronary artery disease, hx of stent   • Arthritis   • Bipolar disorder   • Prosthetic hip infection, mrsa   • MRSA (methicillin resistant Staphylococcus aureus) infection      Acute Blood Loss Anemia  Post-operative Pain  Immobility      Plan:    Continue Physical Therapy, increase mobility as tolerated.  Continue SCDs, Continue DVT  prophalaxis.  Continue Pain management efforts  Continue Incisional care  Coumadin  IV ABX x 6 wks.  Rehab in am      Discharge Plan: tomorrow to rehab    Date: 2/16/2017   Time: 7:53 AM    TREASURE Valle

## 2017-02-16 NOTE — PLAN OF CARE
Problem: Patient Care Overview (Adult)  Goal: Plan of Care Review  Outcome: Ongoing (interventions implemented as appropriate)    02/16/17 1851   Coping/Psychosocial Response Interventions   Plan Of Care Reviewed With patient   Patient Care Overview   Progress no change   Outcome Evaluation   Outcome Summary/Follow up Plan Increased pain. One time dose of morphine. Pain managed now with oral pain medication. Refused PT x2 today, RN educated on importance PT. Low grade temp. Invanz added to Vanc regimen. Plan DC tomorrow?       Goal: Adult Individualization and Mutuality  Outcome: Ongoing (interventions implemented as appropriate)  Goal: Discharge Needs Assessment  Outcome: Ongoing (interventions implemented as appropriate)    Problem: Hip Replacement, Total (Adult)  Goal: Signs and Symptoms of Listed Potential Problems Will be Absent or Manageable (Hip Replacement, Total)  Outcome: Ongoing (interventions implemented as appropriate)    Problem: Fall Risk (Adult)  Goal: Absence of Falls  Outcome: Ongoing (interventions implemented as appropriate)    Problem: Infection, Risk/Actual (Adult)  Goal: Infection Prevention/Resolution  Outcome: Ongoing (interventions implemented as appropriate)    02/16/17 1851   Infection, Risk/Actual (Adult)   Infection Prevention/Resolution achieves outcome         Problem: Skin Integrity Impairment, Risk/Actual (Adult)  Goal: Skin Integrity/Wound Healing  Outcome: Ongoing (interventions implemented as appropriate)    02/16/17 1851   Skin Integrity Impairment, Risk/Actual (Adult)   Skin Integrity/Wound Healing achieves outcome

## 2017-02-16 NOTE — PROGRESS NOTES
BLEV doppler completed.  Preliminary results:  Negative for DVT bilateral lower extremities.  Results given to ARRON Wylie.

## 2017-02-16 NOTE — PLAN OF CARE
Problem: Patient Care Overview (Adult)  Goal: Plan of Care Review  Outcome: Ongoing (interventions implemented as appropriate)    02/16/17 0334   Coping/Psychosocial Response Interventions   Plan Of Care Reviewed With patient   Patient Care Overview   Progress progress towards functional goals is fair   Outcome Evaluation   Outcome Summary/Follow up Plan Doing better overnight. Slept for about 5 hours. Continues to be unmotivated to do pt. RN spoke to patient regarding doing PT and the benefits. Patient stated she will try to do PT today. Still complains of pain but can rest comfortably. VSS. Continue to monitor.        Goal: Discharge Needs Assessment  Outcome: Ongoing (interventions implemented as appropriate)    Problem: Hip Replacement, Total (Adult)  Goal: Signs and Symptoms of Listed Potential Problems Will be Absent or Manageable (Hip Replacement, Total)  Outcome: Ongoing (interventions implemented as appropriate)    Problem: Fall Risk (Adult)  Goal: Absence of Falls  Outcome: Ongoing (interventions implemented as appropriate)    Problem: Infection, Risk/Actual (Adult)  Goal: Infection Prevention/Resolution  Outcome: Ongoing (interventions implemented as appropriate)    Problem: Skin Integrity Impairment, Risk/Actual (Adult)  Goal: Skin Integrity/Wound Healing  Outcome: Ongoing (interventions implemented as appropriate)

## 2017-02-16 NOTE — PROGRESS NOTES
"   LOS: 8 days   Patient Care Team:  Talha Echeverria MD as PCP - General (Internal Medicine)    Chief Complaint: pain    Subjective     HPI Comments: C/o pain in hip. Otherwise doing okay      Subjective:  Symptoms:  Stable.  She reports anxiety.  No shortness of breath, malaise, cough, chest pain, weakness, headache, chest pressure, anorexia or diarrhea.    Diet:  Poor intake.  No nausea or vomiting.    Activity level: Impaired due to pain.    Pain:  She complains of pain that is moderate.  She reports pain is improving.  Pain is well controlled.        History taken from: patient chart    Objective     Vital Signs  Temp:  [98.1 °F (36.7 °C)-101.2 °F (38.4 °C)] 100.5 °F (38.1 °C)  Heart Rate:  [72-84] 80  Resp:  [16] 16  BP: ()/(50-62) 138/58    Objective:  General Appearance:  Comfortable and in no acute distress.    Vital signs: (most recent): Blood pressure 138/58, pulse 80, temperature 100.5 °F (38.1 °C), temperature source Oral, resp. rate 16, height 64\" (162.6 cm), weight 226 lb (103 kg), SpO2 97 %.  Vital signs are normal.  No fever.    Output: Producing urine and producing stool.    Lungs:  Normal respiratory rate and normal effort.  Breath sounds clear to auscultation.    Heart: Normal rate.  Regular rhythm.    Abdomen: Abdomen is soft.  Bowel sounds are normal.   There is no abdominal tenderness.     Extremities: There is no dependent edema.    Pulses: Distal pulses are intact.    Neurological: Patient is alert and oriented to person, place and time.    Skin:  Warm and dry.              Results Review:     I reviewed the patient's new clinical results.  I reviewed the patient's other test results and agree with the interpretation  Discussed with patient    Medication Review: reviewed    Assessment/Plan     Principal Problem:    Prosthetic hip infection, mrsa  Active Problems:    Hematoma of right hip    DVT (deep venous thrombosis), hx of    Hypertension    Hyperlipidemia    Coronary artery " disease, hx of stent    Arthritis    Bipolar disorder    MRSA (methicillin resistant Staphylococcus aureus) infection      Assessment:  (1. MRSA infection of right hip prosthesis--POD #7 s/p I&D, POD #3 s/p explantation right hip  2. Anemia of chronic disease with iron deficiency, now with acute post-op blood loss component  3. H/o DVT, AC on hold for OR  4. CAD with stent  5. HTN  6. Hyperlipidemia  7. Chronic pain syndrome  8. HypoT4  9. Bipolar d/o  10. Fever/Hypoxia).     Plan:   (S/p 4 units PRBCs total, getting another one unit today  Consider iron supplement at dc  Incentive spirometry  Restarted Coumadin per Ortho  Fever and hypoxia again overnight--checked CXR, UA, BLE venous duplex, and RVP and nothing out of the ordinary  Abx adjusted by ID based on results of intra-operative cultures  ).       Cornelio Perry MD  02/16/17  4:45 PM    Time: 20min

## 2017-02-17 LAB
BACTERIA SPEC AEROBE CULT: ABNORMAL
BACTERIA SPEC AEROBE CULT: ABNORMAL
GRAM STN SPEC: ABNORMAL
GRAM STN SPEC: ABNORMAL
HCT VFR BLD AUTO: 25.1 % (ref 35.6–45.5)
HGB BLD-MCNC: 7.9 G/DL (ref 11.9–15.5)
INR PPP: 2.02 (ref 0.9–1.1)
PROTHROMBIN TIME: 22.2 SECONDS (ref 11.7–14.2)
VANCOMYCIN TROUGH SERPL-MCNC: 18.8 MCG/ML (ref 5–20)

## 2017-02-17 PROCEDURE — 94640 AIRWAY INHALATION TREATMENT: CPT

## 2017-02-17 PROCEDURE — 80202 ASSAY OF VANCOMYCIN: CPT | Performed by: ORTHOPAEDIC SURGERY

## 2017-02-17 PROCEDURE — 94799 UNLISTED PULMONARY SVC/PX: CPT

## 2017-02-17 PROCEDURE — 85014 HEMATOCRIT: CPT | Performed by: ORTHOPAEDIC SURGERY

## 2017-02-17 PROCEDURE — 25010000002 VANCOMYCIN: Performed by: ORTHOPAEDIC SURGERY

## 2017-02-17 PROCEDURE — 97110 THERAPEUTIC EXERCISES: CPT

## 2017-02-17 PROCEDURE — 25010000002 ONDANSETRON PER 1 MG: Performed by: ORTHOPAEDIC SURGERY

## 2017-02-17 PROCEDURE — 85018 HEMOGLOBIN: CPT | Performed by: ORTHOPAEDIC SURGERY

## 2017-02-17 PROCEDURE — 85610 PROTHROMBIN TIME: CPT | Performed by: PHYSICIAN ASSISTANT

## 2017-02-17 RX ORDER — DIAZEPAM 2 MG/1
2 TABLET ORAL EVERY 12 HOURS PRN
Status: CANCELLED | OUTPATIENT
Start: 2017-02-17 | End: 2017-02-27

## 2017-02-17 RX ORDER — MODAFINIL 100 MG/1
200 TABLET ORAL DAILY
Status: DISCONTINUED | OUTPATIENT
Start: 2017-02-18 | End: 2017-02-18 | Stop reason: HOSPADM

## 2017-02-17 RX ORDER — DIAZEPAM 2 MG/1
2 TABLET ORAL EVERY 12 HOURS PRN
Status: DISCONTINUED | OUTPATIENT
Start: 2017-02-17 | End: 2017-02-18 | Stop reason: HOSPADM

## 2017-02-17 RX ADMIN — PREGABALIN 200 MG: 100 CAPSULE ORAL at 20:08

## 2017-02-17 RX ADMIN — DIAZEPAM 2 MG: 2 TABLET ORAL at 21:48

## 2017-02-17 RX ADMIN — PANTOPRAZOLE SODIUM 40 MG: 40 TABLET, DELAYED RELEASE ORAL at 06:06

## 2017-02-17 RX ADMIN — OXYCODONE HYDROCHLORIDE AND ACETAMINOPHEN 2 TABLET: 10; 325 TABLET ORAL at 09:56

## 2017-02-17 RX ADMIN — DIAZEPAM 2 MG: 2 TABLET ORAL at 09:56

## 2017-02-17 RX ADMIN — LEVOTHYROXINE SODIUM 100 MCG: 100 TABLET ORAL at 06:06

## 2017-02-17 RX ADMIN — VILAZODONE HYDROCHLORIDE 40 MG: 40 TABLET ORAL at 20:08

## 2017-02-17 RX ADMIN — ISOSORBIDE MONONITRATE 30 MG: 30 TABLET, EXTENDED RELEASE ORAL at 09:02

## 2017-02-17 RX ADMIN — VANCOMYCIN HYDROCHLORIDE 1500 MG: 1 INJECTION, POWDER, LYOPHILIZED, FOR SOLUTION INTRAVENOUS at 00:43

## 2017-02-17 RX ADMIN — OXYCODONE HYDROCHLORIDE AND ACETAMINOPHEN 2 TABLET: 10; 325 TABLET ORAL at 17:45

## 2017-02-17 RX ADMIN — PREGABALIN 200 MG: 100 CAPSULE ORAL at 09:56

## 2017-02-17 RX ADMIN — OXYCODONE HYDROCHLORIDE AND ACETAMINOPHEN 2 TABLET: 10; 325 TABLET ORAL at 21:47

## 2017-02-17 RX ADMIN — VANCOMYCIN HYDROCHLORIDE 1500 MG: 1 INJECTION, POWDER, LYOPHILIZED, FOR SOLUTION INTRAVENOUS at 23:07

## 2017-02-17 RX ADMIN — DOCUSATE SODIUM -SENNOSIDES 2 TABLET: 50; 8.6 TABLET, COATED ORAL at 17:49

## 2017-02-17 RX ADMIN — OXYBUTYNIN CHLORIDE 10 MG: 10 TABLET, FILM COATED, EXTENDED RELEASE ORAL at 09:03

## 2017-02-17 RX ADMIN — CLOPIDOGREL 75 MG: 75 TABLET, FILM COATED ORAL at 09:04

## 2017-02-17 RX ADMIN — VANCOMYCIN HYDROCHLORIDE 1500 MG: 1 INJECTION, POWDER, LYOPHILIZED, FOR SOLUTION INTRAVENOUS at 13:08

## 2017-02-17 RX ADMIN — ATORVASTATIN CALCIUM 40 MG: 40 TABLET, FILM COATED ORAL at 20:08

## 2017-02-17 RX ADMIN — ONDANSETRON 4 MG: 2 INJECTION INTRAMUSCULAR; INTRAVENOUS at 20:07

## 2017-02-17 RX ADMIN — FERROUS SULFATE TAB 325 MG (65 MG ELEMENTAL FE) 325 MG: 325 (65 FE) TAB at 17:49

## 2017-02-17 RX ADMIN — TIOTROPIUM BROMIDE 1 CAPSULE: 18 CAPSULE ORAL; RESPIRATORY (INHALATION) at 08:50

## 2017-02-17 RX ADMIN — DOCUSATE SODIUM 100 MG: 100 CAPSULE, LIQUID FILLED ORAL at 09:04

## 2017-02-17 RX ADMIN — FERROUS SULFATE TAB 325 MG (65 MG ELEMENTAL FE) 325 MG: 325 (65 FE) TAB at 09:04

## 2017-02-17 RX ADMIN — OXYCODONE HYDROCHLORIDE AND ACETAMINOPHEN 2 TABLET: 10; 325 TABLET ORAL at 02:06

## 2017-02-17 RX ADMIN — DOCUSATE SODIUM 100 MG: 100 CAPSULE, LIQUID FILLED ORAL at 18:53

## 2017-02-17 RX ADMIN — MODAFINIL 200 MG: 100 TABLET ORAL at 09:03

## 2017-02-17 RX ADMIN — OXYCODONE HYDROCHLORIDE AND ACETAMINOPHEN 2 TABLET: 10; 325 TABLET ORAL at 06:06

## 2017-02-17 RX ADMIN — WARFARIN SODIUM 7.5 MG: 7.5 TABLET ORAL at 17:49

## 2017-02-17 RX ADMIN — POTASSIUM CHLORIDE 10 MEQ: 750 CAPSULE, EXTENDED RELEASE ORAL at 09:16

## 2017-02-17 RX ADMIN — DOCUSATE SODIUM -SENNOSIDES 2 TABLET: 50; 8.6 TABLET, COATED ORAL at 09:02

## 2017-02-17 RX ADMIN — ONDANSETRON 4 MG: 2 INJECTION INTRAMUSCULAR; INTRAVENOUS at 13:52

## 2017-02-17 RX ADMIN — ERTAPENEM SODIUM 1 G: 1 INJECTION, POWDER, LYOPHILIZED, FOR SOLUTION INTRAMUSCULAR; INTRAVENOUS at 16:01

## 2017-02-17 RX ADMIN — OXYCODONE HYDROCHLORIDE AND ACETAMINOPHEN 2 TABLET: 10; 325 TABLET ORAL at 13:52

## 2017-02-17 NOTE — PROGRESS NOTES
Orthopedic Progress Note        Patient: Frances Downs    Date of Admission: 2/8/2017  1:10 PM    YOB: 1950    Medical Record Number: [id number]    Attending Physician: Erlin Martin MD      POD # 4  LOS: 9 days     Status post-RT HIP INCISION AND DRAINAGE        Systemic or Specific Complaints: Patient admits to pain not controlled with her oral pain meds.  She had to have morphine IV x 1 dose yesterday afternoon to ease the pain.  There is no numbness.  She refused PT yesterday.  She reports only being able to sit at the side of the bed.        No Known Allergies      Current Medications:  Scheduled Meds:  atorvastatin 40 mg Oral Daily   clopidogrel 75 mg Oral Daily   docusate sodium 100 mg Oral BID   ertapenem 1 g Intravenous Q24H   ferrous sulfate 325 mg Oral BID   fluticasone 2 spray Nasal Daily   isosorbide mononitrate 30 mg Oral Daily   levothyroxine 100 mcg Oral Q AM   modafinil 200 mg Oral BID   oxybutynin XL 10 mg Oral Daily   pantoprazole 40 mg Oral Q AM   potassium chloride 10 mEq Oral Daily   pregabalin 200 mg Oral BID   sennosides-docusate sodium 2 tablet Oral BID   tiotropium 1 capsule Inhalation Daily - RT   vancomycin 1,500 mg Intravenous Q12H   vilazodone 40 mg Oral Nightly   warfarin 7.5 mg Oral Daily     Continuous Infusions:  lactated ringers 9 mL/hr Last Rate: 0 mL/hr (02/13/17 1845)   lactated ringers 9 mL/hr Last Rate: 9 mL/hr (02/13/17 1748)   Pharmacy to dose vancomycin       PRN Meds:.benzocaine-menthol  •  bisacodyl  •  clonazePAM  •  diphenhydrAMINE  •  docusate sodium  •  HYDROmorphone **AND** naloxone  •  ipratropium-albuterol  •  ondansetron  •  oxyCODONE-acetaminophen  •  Pharmacy to dose vancomycin      Physical Exam: 66 y.o. female  General Appearance:    alert, oriented and anxious                Pain Relief: Patient reports some relief   Vitals:    02/16/17 1915 02/16/17 2326 02/17/17 0300 02/17/17 0742   BP: 90/52 94/55 105/57 118/70   BP Location: Right  arm Left arm Left arm Right arm   Patient Position: Lying Lying Lying Lying   Pulse: 80 69 72 76   Resp: 16 18 18 18   Temp: 100.4 °F (38 °C) 100.2 °F (37.9 °C) 98.1 °F (36.7 °C) 99.1 °F (37.3 °C)   TempSrc: Oral Oral Oral Oral   SpO2: 97% 96% 100% 95%   Weight:       Height:              HEENT:    Normocephalic, without obvious abnormality, atraumatic.          PERRLA; EOMI; Neck supple with trachea midline. No JVD.    No lymphadenopathy     Lungs:     Clear to auscultation,respirations regular, even and                   Unlabored      Heart:    Regular rhythm and normal rate, normal S1 and S2, no            murmur, no gallop, no rub, no click     Abdomen:     Normal bowel sounds, no masses, no organomegaly, soft        non-tender, non-distended, no guarding, no rebound                 tenderness       Extremities:   Operative extremity neurovascular status intact. ROM intact.    Incision intact w/out signs or  symptoms of infection. No           edema, no cyanosis, no calf tenderness     Pulses:     Pulses palpable and equal bilaterally     Skin:     Skin Warm/Dry w/out ulceration, ecchymosis, rash, or   cyanosis     Activity: Mobilizing Per P.T.   Weight Bearing: As Tolerated    Diagnostic Tests:  Admission on 02/08/2017   No results displayed because visit has over 200 results.        Xr Chest 1 View    Result Date: 2/15/2017  Narrative: XR CHEST 1 VW-  HISTORY: Female who is 66 years-old,  hypoxia, fever  TECHNIQUE: Frontal view of the chest  COMPARISON: None available  FINDINGS: Left PICC extends to the superior vena cava. The heart is mildly enlarged. Aorta is calcified. The pulmonary vasculature is slightly congested. No focal pulmonary consolidation, pleural effusion, or pneumothorax. No acute osseous process.      Impression: No focal pulmonary consolidation. Mild cardiomegaly with slight pulmonary vascular congestion. Follow-up as clinical indications persist.  This report was finalized on 2/15/2017 6:23  PM by Dr. Terence Ross MD.      Xr Pelvis 1 Or 2 View    Result Date: 2/13/2017  Narrative: PELVIS X-RAY  CLINICAL HISTORY: Postop removal of right total hip arthroplasty  Compared to the preop x-ray dated to 2/8/2017.  In the interval, the right hip arthroplasty has been removed. There is a comminuted fracture of the right iliac bone involving the quadrilateral plate and anterior and posterior columns that was also present on the preop x-ray. There is a lucency in the proximal femur due to the femoral stem component of the arthroplasty that has been removed. 3 cerclage wires are now in place within the proximal right femur. The left hip joint is unremarkable.  This report was finalized on 2/13/2017 8:42 PM by Dr. César Boland MD.      Xr Pelvis 1 Or 2 View    Result Date: 2/8/2017  Narrative: STAT PORTABLE RADIOGRAPHIC VIEW OF THE PELVIS AND RIGHT HIP  CLINICAL HISTORY: Status post incision and drainage.  Frontal projection of the pelvis and right hip demonstrate a right hip arthroplasty in place. There is a fracture through the acetabulum and the acetabular component of the right hip arthroplasty projects into the pelvis. Typical postoperative changes are identified within the adjacent soft tissues.  The findings of this fracture were directly discussed with Kim Manley RN, the nurse caring for this patient on 02/08/2017 at approximately 6:50 PM. She was instructed to notify Dr. Martin with these findings.  This report was finalized on 2/8/2017 8:35 PM by Dr. Benjy Jimenez MD.              Assessment:  Patient Active Problem List   Diagnosis   • Hematoma of right hip   • DVT (deep venous thrombosis), hx of   • Hypertension   • Hyperlipidemia   • Coronary artery disease, hx of stent   • Arthritis   • Bipolar disorder   • Prosthetic hip infection, mrsa   • MRSA (methicillin resistant Staphylococcus aureus) infection      Acute Blood Loss Anemia  Post-operative Pain  Immobility      Plan:    Continue  Physical Therapy, increase mobility as tolerated.  Continue SCDs, Continue DVT prophalaxis.  Continue Pain management efforts  Continue Incisional care  Coumadin  Consider adding Valium for anxious/pain.  Remain on Percocet 10/325.  IV ABX (invanz/vanc) x 6 wks.      Discharge Plan: this afternoon or tomorrow morning pending pain control to rehab    Date: 2/17/2017   Time: 8:21 AM    TREASURE [place]

## 2017-02-17 NOTE — PROGRESS NOTES
"Pharmacokinetic Consult - Vancomycin Dosing (Follow-up Note)    Frances Downs is on day 9 pharmacy to dose vancomycin for MRSA+ prosthetic hip infection  Pharmacy dosing vancomycin per Dr. Martin's request.   Goal trough: 15-20 mg/L   Per Dr. Benito: plan is for 6-8 weeks of iv vancomycin    Relevant clinical data and objective history reviewed:  66 y.o. female 64\" (162.6 cm) 226 lb (103 kg)    Past Medical History   Diagnosis Date   • Anxiety    • Arthritis    • Bipolar disorder    • COPD (chronic obstructive pulmonary disease)    • Coronary artery disease    • Diastolic dysfunction      grade II per echocardiogram 11/18/2016   • Difficulty walking    • Disease of thyroid gland      hypothyroidism   • Dislocation of hip joint      recurrent dislocation right hip   • DVT (deep venous thrombosis)    • GERD without esophagitis    • Heartburn    • Hematoma    • Hyperlipidemia    • Hypertension    • LVH (left ventricular hypertrophy)      mild to moderate per echocardiogram 11/18/2016   • Major depressive disorder    • Mitral annular calcification      severe per echocardiogram 11/18/2016   • Mitral regurgitation      mild per echo 11/18/2016   • Mitral valve disease    • Muscle weakness    • Narcolepsy    • Overactive bladder    • Pneumonia    • Pulmonary emboli    • Pulmonary hypertension      mild per echo 11/18/2016   • Sleep apnea      obstructive   • Tricuspid regurgitation      mild to moderate per echo 11/18/2016     CREATININE   Date Value Ref Range Status   02/16/2017 0.52 (L) 0.57 - 1.00 mg/dL Final   02/15/2017 0.58 0.57 - 1.00 mg/dL Final   02/14/2017 0.50 (L) 0.57 - 1.00 mg/dL Final     BUN   Date Value Ref Range Status   02/16/2017 8 8 - 23 mg/dL Final     Estimated Creatinine Clearance: 81.9 mL/min (by C-G formula based on Cr of 0.52).    Lab Results   Component Value Date    WBC 8.49 02/16/2017     Temp Readings from Last 3 Encounters:   02/17/17 98 °F (36.7 °C) (Oral)         IV Anti-Infectives  "    Ordered     Dose/Rate Route Frequency Start Stop    02/16/17 1411  ertapenem (INVanz) 1 g/100 mL 0.9% NS VTB (mbp)     Ordering Provider:  Krysta Benito MD    1 g  over 30 Minutes Intravenous Every 24 Hours 02/16/17 1445      02/09/17 1201  vancomycin 1500 mg/500 mL 0.9% NS IVPB ([facility])     Ordering Provider:  Erlin Martin MD    1,500 mg Intravenous Every 12 Hours 02/10/17 0000      02/09/17 1201  vancomycin 2000 mg/500 mL 0.9% NS IVPB ([facility])     Ordering Provider:  Erlin Martin MD    20 mg/kg × 103 kg Intravenous Once 02/09/17 1245 02/09/17 1407    02/09/17 1058  Pharmacy to dose vancomycin     Ordering Provider:  Erlin Martin MD     Does not apply Continuous PRN 02/09/17 1056      02/08/17 2004  ceFAZolin in 0.9% normal saline (ANCEF) IVPB solution 3 g     Ordering Provider:  Erlin Martin MD    3 g  over 30 Minutes Intravenous Every 8 Hours 02/09/17 0000 02/09/17 0946         Lab Results   Component Value Date    [facility] 18.80 02/17/2017     No results found for: OSMANI    Assessment/Plan  Vancomycin trough level this am at 1115 =18.8 mcg/ml (therapeutic) therefore will continue with same regimen of 1500mg iv q12h. Pharmacy will continue to follow daily while on vancomycin and till d/c.    Joni Hale [facility]

## 2017-02-17 NOTE — PLAN OF CARE
Problem: Patient Care Overview (Adult)  Goal: Plan of Care Review  Outcome: Ongoing (interventions implemented as appropriate)    02/17/17 0251   Coping/Psychosocial Response Interventions   Plan Of Care Reviewed With patient   Patient Care Overview   Progress improving   Outcome Evaluation   Outcome Summary/Follow up Plan pain controlled during shift with PO pain meds; voiding per bedpan; vss; possible d/c in AM       Goal: Adult Individualization and Mutuality  Outcome: Ongoing (interventions implemented as appropriate)    Problem: Hip Replacement, Total (Adult)  Goal: Signs and Symptoms of Listed Potential Problems Will be Absent or Manageable (Hip Replacement, Total)  Outcome: Ongoing (interventions implemented as appropriate)    Problem: Fall Risk (Adult)  Goal: Absence of Falls  Outcome: Ongoing (interventions implemented as appropriate)    Problem: Infection, Risk/Actual (Adult)  Goal: Infection Prevention/Resolution  Outcome: Ongoing (interventions implemented as appropriate)    Problem: Skin Integrity Impairment, Risk/Actual (Adult)  Goal: Skin Integrity/Wound Healing  Outcome: Ongoing (interventions implemented as appropriate)

## 2017-02-17 NOTE — PROGRESS NOTES
Acute Care - Physical Therapy Treatment Note  [facility]     Patient Name: Frances Downs  : 1950  MRN: [id number]  Today's Date: 2017  Onset of Illness/Injury or Date of Surgery Date: 17     Referring Physician: Veronica    Admit Date: 2017    Visit Dx:    ICD-10-CM ICD-9-CM   1. Difficulty walking R26.2 719.7   2. Hematoma T14.8 924.9   3. MRSA infection A49.02 041.12     Patient Active Problem List   Diagnosis   • Hematoma of right hip   • DVT (deep venous thrombosis), hx of   • Hypertension   • Hyperlipidemia   • Coronary artery disease, hx of stent   • Arthritis   • Bipolar disorder   • Prosthetic hip infection, mrsa   • MRSA (methicillin resistant Staphylococcus aureus) infection               Adult Rehabilitation Note       17 1335 02/15/17 1440       Rehab Assessment/Intervention    Discipline physical therapist  -EE physical therapy assistant  -JM     Document Type therapy note (daily note)  -EE therapy note (daily note)  -     Subjective Information agree to therapy;complains of;weakness;fatigue;pain  -EE complains of;weakness;fatigue;pain;swelling  -JM     Patient Effort, Rehab Treatment adequate  -EE      Symptoms Noted During/After Treatment fatigue;increased pain  -EE      Precautions/Limitations fall precautions;non-weight bearing status   NWB RLE  -EE fall precautions;hip precautions- right;non-weight bearing status  -JM     Recorded by [EE] Yue Campos, PT [JM] Shruti Palmer PTA     Pain Assessment    Pain Assessment 0-10  -EE 0-10  -JM     Pain Score 9  -EE 9  -JM     Pain Type Surgical pain  -EE Surgical pain  -JM     Pain Location Hip  -EE Leg  -JM     Pain Orientation Right  -EE Right  -JM     Pain Intervention(s) Repositioned;Medication (See MAR)  -EE Medication (See MAR);Repositioned;Ambulation/increased activity  -JM     Response to Interventions tolerated  -EE not milad  -JM     Recorded by [EE] Yue Campos, PT [JM] Shruti Palmer, PTA     Cognitive  Assessment/Intervention    Current Cognitive/Communication Assessment functional  -EE      Orientation Status oriented x 4  -EE      Follows Commands/Answers Questions 100% of the time  -EE      Personal Safety WNL/WFL  -EE      Personal Safety Interventions fall prevention program maintained;gait belt;muscle strengthening facilitated;nonskid shoes/slippers when out of bed;supervised activity  -EE      Recorded by [EE] Yue Campos, PT      Bed Mobility, Assessment/Treatment    Bed Mobility, Assistive Device head of bed elevated;bed rails  -EE bed rails;draw sheet  -     Bed Mobility, Scoot/Bridge, [place] moderate assist (50% patient effort);2 person assist required;verbal cues required  -EE moderate assist (50% patient effort);maximum assist (25% patient effort);2 person assist required  -     Bed Mob, Supine to Sit, [place] moderate assist (50% patient effort);verbal cues required  -EE maximum assist (25% patient effort);2 person assist required  -     Bed Mob, Sit to Supine, [place] moderate assist (50% patient effort);2 person assist required;verbal cues required  -EE maximum assist (25% patient effort);2 person assist required  -     Bed Mobility, Safety Issues decreased use of legs for bridging/pushing  -EE decreased use of arms for pushing/pulling;decreased use of legs for bridging/pushing;impaired trunk control for bed mobility  -     Bed Mobility, Impairments strength decreased;pain  -EE strength decreased  -     Recorded by [EE] Yue Campos, PT [JM] Shruti Palmer, BARRERA     Transfer Assessment/Treatment    Transfers, Sit-Stand [place]  maximum assist (25% patient effort);2 person assist required  -     Transfers, Stand-Sit [place]  maximum assist (25% patient effort);moderate assist (50% patient effort);2 person assist required;verbal cues required  -     Transfers, Sit-Stand-Sit, Assist Device  standard walker   found rwx at end of session, left in room  -JM      Toilet Transfer, [place]  maximum assist (25% patient effort);2 person assist required  -     Toilet Transfer, Assistive Device  standard walker;rolling walker  -     Transfer, Safety Issues  balance decreased during turns;weight-shifting ability decreased;knees buckling  -     Transfer, Impairments  strength decreased;impaired balance;coordination impaired;unable to maintain weight bearing status;pain  -     Transfer, Comment pt declined transfer today; agreeable to exercises at EOB  -EE      Recorded by [EE] Yue Campos, PT [JM] Shruti Palmer PTA     Gait Assessment/Treatment    Gait, [place] Level  not appropriate to assess  -JM     Recorded by  [JM] Shruti Palmer PTA     Therapy Exercises    Bilateral Lower Extremities AROM:;10 reps;sitting;ankle pumps/circles;LAQ  -EE      Recorded by [EE] Yue Campos PT      Positioning and Restraints    Pre-Treatment Position in bed  -EE in bed  -JM     Post Treatment Position bed  -EE      In Bed fowlers;call light within reach;encouraged to call for assist;notified nsg  -EE supine;call light within reach;encouraged to call for assist;with nsg  -JM     Recorded by [EE] Yue Campos, PT [JM] Shruti Palmer PTA       User Key  (r) = Recorded By, (t) = Taken By, (c) = Cosigned By    Initials Name Effective Dates    EE Yue Campos, PT 12/01/15 -     MARILEE Palmer PTA 02/18/16 -                 IP PT Goals       02/14/17 1104 02/09/17 0850       Bed Mobility PT LTG    Bed Mobility PT LTG, Date Established  02/09/17  -EE     Bed Mobility PT LTG, Time to Achieve 1 wk  -EE 5 days  -EE     Bed Mobility PT LTG, Activity Type  all bed mobility  -EE     Bed Mobility PT LTG, [place] Level  minimum assist (75% patient effort);2 person assist required  -EE     Bed Mobility PT LTG, Date Goal Reviewed 02/14/17  -EE      Bed Mobility PT LTG, Outcome goal ongoing  -EE      Bed Mobility PT LTG, Reason Goal Not Met progress slower than expected  -EE       Transfer Training PT LTG    Transfer Training PT LTG, Date Established 02/14/17  -EE 02/09/17  -EE     Transfer Training PT LTG, Time to Achieve 1 wk  -EE 5 days  -EE     Transfer Training PT LTG, Activity Type all transfers  -EE all transfers  -EE     Transfer Training PT LTG, [place] Level moderate assist (50% patient effort);2 person assist required  -EE minimum assist (75% patient effort);2 person assist required  -EE     Transfer Training PT LTG, Assist Device  walker, rolling  -EE     Transfer Training PT  LTG, Date Goal Reviewed 02/14/17  -EE      Transfer Training PT LTG, Outcome goal revised  -EE      Transfer Training PT LTG, Reason Goal Not Met progress slower than expected  -EE      Patient Education PT LTG    Patient Education PT LTG, Date Established  02/09/17  -EE     Patient Education PT LTG, Time to Achieve 1 wk  -EE 5 days  -EE     Patient Education PT LTG, Education Type  HEP;precaution per surgeon  -EE     Patient Education PT LTG, Education Understanding  demonstrate adequately;verbalize understanding  -EE     Patient Education PT LTG, Date Goal Reviewed 02/14/17  -EE      Patient Education PT LTG Outcome goal ongoing  -EE      Patient Education PT LTG, Reason Goal Not Met progress slower than expected  -EE        User Key  (r) = Recorded By, (t) = Taken By, (c) = Cosigned By    Initials Name Provider Type    GALILEO Campos, PT Physical Therapist          Physical Therapy Education     Title: PT OT SLP Therapies (Done)     Topic: Physical Therapy (Done)     Point: Mobility training (Done)    Learning Progress Summary    Learner Readiness Method Response Comment Documented by Status   Patient Acceptance TB,D,E VU,NR  EE 02/17/17 1359 Done    Acceptance E,TB,D NR after much encouragement agreed to get up on BSC  02/15/17 1600 Active    Acceptance E,TB,D NR  EE 02/14/17 1104 Active    Acceptance E,TB,D VU,NR aware she is NWB rt, but c/o wkness and pn in left knee limiting also, assist given  to maintain NWB rt  02/12/17 1430 Done    Acceptance E,TB,D NR   02/11/17 1355 Active    Acceptance E,TB NR  EE 02/10/17 1429 Active    Acceptance E,TB NR  EE 02/09/17 0850 Active               Point: Home exercise program (Done)    Learning Progress Summary    Learner Readiness Method Response Comment Documented by Status   Patient Acceptance TB,D,E VU,NR  EE 02/17/17 1359 Done    Acceptance E,TB,D NR after much encouragement agreed to get up on BSC  02/15/17 1600 Active    Acceptance E,TB,D NR  EE 02/14/17 1104 Active    Acceptance E,TB,D VU,NR aware she is NWB rt, but c/o wkness and pn in left knee limiting also, assist given to maintain NWB rt  02/12/17 1430 Done    Acceptance E,TB,D NR   02/11/17 1355 Active    Acceptance E,TB NR  EE 02/10/17 1429 Active    Acceptance E,TB NR  EE 02/09/17 0850 Active               Point: Body mechanics (Done)    Learning Progress Summary    Learner Readiness Method Response Comment Documented by Status   Patient Acceptance TB,D,E VU,NR  EE 02/17/17 1359 Done    Acceptance E,TB,D NR after much encouragement agreed to get up on BSC  02/15/17 1600 Active    Acceptance E,TB,D NR  EE 02/14/17 1104 Active    Acceptance E,TB,D VU,NR aware she is NWB rt, but c/o wkness and pn in left knee limiting also, assist given to maintain NWB rt  02/12/17 1430 Done    Acceptance E,TB,D NR   02/11/17 1355 Active    Acceptance E,TB NR  EE 02/10/17 1429 Active    Acceptance E,TB NR  EE 02/09/17 0850 Active               Point: Precautions (Done)    Learning Progress Summary    Learner Readiness Method Response Comment Documented by Status   Patient Acceptance TB,D,E VU,NR  EE 02/17/17 1359 Done    Acceptance E,TB,D NR after much encouragement agreed to get up on BSC  02/15/17 1600 Active    Acceptance E,TB,D NR  EE 02/14/17 1104 Active    Acceptance E,TB,D VU,NR aware she is NWB rt, but c/o wkness and pn in left knee limiting also, assist given to maintain NWB rt  02/12/17 4825  Done    Acceptance E,TB,D NR  JM 02/11/17 1355 Active    Acceptance E,TB NR   02/10/17 1429 Active    Acceptance E,TB NR  EE 02/09/17 0850 Active                      User Key     Initials Effective Dates Name Provider Type Discipline    EE 12/01/15 -  Yue Campos, PT Physical Therapist PT    MARILEE 02/18/16 -  Shruti Palmer PTA Physical Therapy Assistant PT                    PT Recommendation and Plan  Anticipated Discharge Disposition: skilled nursing facility  Planned Therapy Interventions: balance training, bed mobility training, home exercise program, patient/family education, strengthening, stretching, transfer training  PT Frequency: daily  Plan of Care Review  Plan Of Care Reviewed With: patient  Progress: progress towards functional goals is fair  Outcome Summary/Follow up Plan: Pt demonstrating increased independence with supine > sit today; also able to tolerate increased exercises. Continues to be limited by pain and NWB R LE. No new concerns.           Outcome Measures       02/17/17 1400 02/15/17 1600       How much help from another person do you currently need...    Turning from your back to your side while in flat bed without using bedrails? 2  -EE 2  -JM     Moving from lying on back to sitting on the side of a flat bed without bedrails? 2  -EE 2  -JM     Moving to and from a bed to a chair (including a wheelchair)? 2  -EE 2  -JM     Standing up from a chair using your arms (e.g., wheelchair, bedside chair)? 2  -EE 2  -JM     Climbing 3-5 steps with a railing? 1  -EE 1  -JM     To walk in hospital room? 1  -EE 1  -JM     AM-PAC 6 Clicks Score 10  -EE 10  -JM     Functional Assessment    Outcome Measure Options AM-PAC 6 Clicks Basic Mobility (PT)  -EE        User Key  (r) = Recorded By, (t) = Taken By, (c) = Cosigned By    Initials Name Provider Type    GALILEO Campos, PT Physical Therapist    MARILEE Palmer, BARRERA Physical Therapy Assistant           Time Calculation:         PT Charges        02/17/17 1400          Time Calculation    Start Time 1340  -EE      Stop Time 1400  -EE      Time Calculation (min) 20 min  -EE      PT Received On 02/17/17  -EE      PT - Next Appointment 02/18/17  -EE        User Key  (r) = Recorded By, (t) = Taken By, (c) = Cosigned By    Initials Name Provider Type    EE Yue Campos PT Physical Therapist          Therapy Charges for Today     Code Description Service Date Service Provider Modifiers Qty    46347666042 HC PT THER PROC EA 15 MIN 2/17/2017 Yue Campos, PT GP 1    60378717357 HC PT THER SUPP EA 15 MIN 2/17/2017 Yue Campos PT GP 1          PT G-Codes  Outcome Measure Options: AM-PAC 6 Clicks Basic Mobility (PT)    Yue Campos PT  2/17/2017

## 2017-02-17 NOTE — PLAN OF CARE
Problem: Patient Care Overview (Adult)  Goal: Plan of Care Review    02/17/17 3585   Coping/Psychosocial Response Interventions   Plan Of Care Reviewed With patient   Patient Care Overview   Progress progress towards functional goals is fair   Outcome Evaluation   Outcome Summary/Follow up Plan Pt demonstrating increased independence with supine > sit today; also able to tolerate increased exercises. Continues to be limited by pain and NWB R LE. No new concerns.

## 2017-02-17 NOTE — PROGRESS NOTES
"  Infectious Diseases Progress Note    Krysta Benito MD     [facility]  Los: 9 days  Patient Identification:  Name: Frances Downs  Age: 66 y.o.  Sex: female  :  1950  MRN: [id number]         Primary Care Physician: Talha Echeverria MD            Subjective: No complaints feeling better awaiting arrangements for discharge.    Interval History: See above     Objective:    Scheduled Meds:    atorvastatin 40 mg Oral Daily   clopidogrel 75 mg Oral Daily   docusate sodium 100 mg Oral BID   ertapenem 1 g Intravenous Q24H   ferrous sulfate 325 mg Oral BID   fluticasone 2 spray Nasal Daily   isosorbide mononitrate 30 mg Oral Daily   levothyroxine 100 mcg Oral Q AM   modafinil 200 mg Oral BID   oxybutynin XL 10 mg Oral Daily   pantoprazole 40 mg Oral Q AM   potassium chloride 10 mEq Oral Daily   pregabalin 200 mg Oral BID   sennosides-docusate sodium 2 tablet Oral BID   tiotropium 1 capsule Inhalation Daily - RT   vancomycin 1,500 mg Intravenous Q12H   vilazodone 40 mg Oral Nightly   warfarin 7.5 mg Oral Daily     Continuous Infusions:    lactated ringers 9 mL/hr Last Rate: 0 mL/hr (17 1845)   lactated ringers 9 mL/hr Last Rate: 9 mL/hr (17 1748)   Pharmacy to dose vancomycin         Vital signs in last 24 hours:  Temp:  [98.1 °F (36.7 °C)-100.5 °F (38.1 °C)] 99.1 °F (37.3 °C)  Heart Rate:  [69-80] 70  Resp:  [16-18] 16  BP: ()/(52-70) 118/70    Intake/Output:    Intake/Output Summary (Last 24 hours) at 17 0915  Last data filed at 17   Gross per 24 hour   Intake    420 ml   Output    400 ml   Net     20 ml       Exam:  Visit Vitals   • /70 (BP Location: Right arm, Patient Position: Lying)   • Pulse 70   • Temp 99.1 °F (37.3 °C) (Oral)   • Resp 16   • Ht 64\" (162.6 cm)   • Wt 226 lb (103 kg)   • SpO2 96%   • BMI 38.79 kg/m2       General Appearance:    Alert, cooperative, no distress, AAOx3                          Head:    Normocephalic, without obvious " abnormality, atraumatic                           Eyes:    PERRL, conjunctiva/corneas clear, EOM's intact, both eyes                         Throat:   Lips, tongue, gums normal; oral mucosa pink and moist                           Neck:   Supple, symmetrical, trachea midline, no JVD                         Lungs:    Clear to auscultation bilaterally, respirations unlabored                 Chest Wall:    No tenderness or deformity                          Heart:    Regular rate and rhythm, S1 and S2 normal, no murmur,no  Rub                                      or gallop                  Abdomen:     Soft, non-tender, bowel sounds active, no masses, no                                                        organomegaly                  Extremities:   Right surgical site dressed, drain in place                        Pulses:   Pulses palpable in all extremities                            Skin:   Skin is warm and dry,  no rashes or palpable lesions                         Data Review:    I reviewed the patient's new clinical results.    Results from last 7 days  Lab Units 02/17/17  0414 02/16/17  0324 02/15/17  2145 02/15/17  0328 02/14/17  0401 02/13/17 2056 02/13/17  0405  02/11/17 0343   WBC 10*3/mm3  --  8.49  --  7.89 9.36 11.03* 4.29*  --  3.92*   HEMOGLOBIN g/dL 7.9* 7.4* 7.9* 6.0* 7.6* 9.1* 9.5*  < > 8.6*   PLATELETS 10*3/mm3  --  280  --  297 331 358 319  --  335   < > = values in this interval not displayed.    Results from last 7 days  Lab Units 02/16/17  0324 02/15/17  0328 02/14/17  0401 02/13/17 0405 02/11/17  0343   SODIUM mmol/L 136 138 136 137 135*   POTASSIUM mmol/L 3.8 4.0 4.3 4.1 4.1   CHLORIDE mmol/L 100 101 100 100 98   TOTAL CO2 mmol/L 24.2 23.1 23.5 24.0 24.6   BUN mg/dL 8 8 6* 5* 8   CREATININE mg/dL 0.52* 0.58 0.50* 0.39* 0.49*   CALCIUM mg/dL 7.7* 7.7* 7.9* 8.2* 8.4*   GLUCOSE mg/dL 99 90 93 94 85         Tissue Culture [[id number]] (Abnormal)  Collected: 02/13/17 1846       Lab Status:  "Preliminary result Specimen: Tissue from Hip, Right Updated: 02/16/17 0945        Culture           Scant growth (1+) Morganella morganii ssp morganii (A)                   Scant growth (1+) Staphylococcus aureus, MRSA (C)           Methicillin resistant Staphylococcus aureus, Patient may be an isolation risk.           Gram Stain Result Rare (1+) WBCs seen          Rare (1+) Gram positive cocci in pairs          Susceptibility         Morganella morganii ssp morganii         ROSSY         Ampicillin >=32  Resistant         Ampicillin + Sulbactam >=32  Resistant         Cefazolin >=64  Resistant         Cefepime <=1  Susceptible         Cefoxitin >=64  Resistant         Ceftriaxone 32  Intermediate         Ertapenem <=0.5  Susceptible         Gentamicin 2  Susceptible         Levofloxacin >=8  Resistant         Meropenem <=0.25  Susceptible         Piperacillin + Tazobactam 32  Intermediate         Trimethoprim + Sulfamethoxazole >=320  Resistant                               Anaerobic Culture [[id number]] (Normal) Collected: 02/08/17 1701       Lab Status: Final result Specimen: Wound from Hip, Right Updated: 02/13/17 1001        Culture No anaerobes isolated at 5 days        Wound Culture [[id number]] (Abnormal)  Collected: 02/08/17 1701       Lab Status: Final result Specimen: Wound from Hip, Right Updated: 02/10/17 0648        Wound Culture           Moderate growth (3+) Staphylococcus aureus, MRSA (C)         D test is negative. Isolate does not exhibit \"inducible\" resistance to Clindamycin (isolate remains susceptible to Clindamycin).     Methicillin resistant Staphylococcus aureus, Patient may be an isolation risk.           Gram Stain Result Rare (1+) WBCs seen          Rare (1+) Gram positive cocci in pairs       Urine culture reflects colonization as urinalysis is essentially bland.    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 Patient to follow up ID on 3/15/2016. patient to call at [phone] to make appointment    Krysta Benito MD  2/17/2017  9:15 AM    Much of this encounter note is an electronic transcription/translation of spoken language to printed text. The electronic translation of spoken language may permit erroneous, or at times, nonsensical words or phrases to be inadvertently transcribed; Although I have reviewed the note for such errors, some may still exist

## 2017-02-18 VITALS
RESPIRATION RATE: 18 BRPM | WEIGHT: 226 LBS | OXYGEN SATURATION: 92 % | SYSTOLIC BLOOD PRESSURE: 112 MMHG | TEMPERATURE: 99 F | HEART RATE: 92 BPM | DIASTOLIC BLOOD PRESSURE: 49 MMHG | BODY MASS INDEX: 38.58 KG/M2 | HEIGHT: 64 IN

## 2017-02-18 LAB
BACTERIA SPEC AEROBE CULT: ABNORMAL
BACTERIA SPEC ANAEROBE CULT: NORMAL
HCT VFR BLD AUTO: 23.8 % (ref 35.6–45.5)
HGB BLD-MCNC: 7.5 G/DL (ref 11.9–15.5)
INR PPP: 2.3 (ref 0.9–1.1)
PROTHROMBIN TIME: 24.6 SECONDS (ref 11.7–14.2)

## 2017-02-18 PROCEDURE — 25010000002 VANCOMYCIN: Performed by: ORTHOPAEDIC SURGERY

## 2017-02-18 PROCEDURE — 85018 HEMOGLOBIN: CPT | Performed by: ORTHOPAEDIC SURGERY

## 2017-02-18 PROCEDURE — 85610 PROTHROMBIN TIME: CPT | Performed by: PHYSICIAN ASSISTANT

## 2017-02-18 PROCEDURE — 97110 THERAPEUTIC EXERCISES: CPT

## 2017-02-18 PROCEDURE — 85014 HEMATOCRIT: CPT | Performed by: ORTHOPAEDIC SURGERY

## 2017-02-18 RX ORDER — WARFARIN SODIUM 7.5 MG/1
7.5 TABLET ORAL
Status: DISCONTINUED | OUTPATIENT
Start: 2017-02-18 | End: 2017-02-18 | Stop reason: HOSPADM

## 2017-02-18 RX ADMIN — OXYCODONE HYDROCHLORIDE AND ACETAMINOPHEN 2 TABLET: 10; 325 TABLET ORAL at 01:50

## 2017-02-18 RX ADMIN — DOCUSATE SODIUM -SENNOSIDES 2 TABLET: 50; 8.6 TABLET, COATED ORAL at 08:57

## 2017-02-18 RX ADMIN — PANTOPRAZOLE SODIUM 40 MG: 40 TABLET, DELAYED RELEASE ORAL at 06:04

## 2017-02-18 RX ADMIN — OXYCODONE HYDROCHLORIDE AND ACETAMINOPHEN 2 TABLET: 10; 325 TABLET ORAL at 06:04

## 2017-02-18 RX ADMIN — TIOTROPIUM BROMIDE 1 CAPSULE: 18 CAPSULE ORAL; RESPIRATORY (INHALATION) at 08:49

## 2017-02-18 RX ADMIN — POTASSIUM CHLORIDE 10 MEQ: 750 CAPSULE, EXTENDED RELEASE ORAL at 08:57

## 2017-02-18 RX ADMIN — ERTAPENEM SODIUM 1 G: 1 INJECTION, POWDER, LYOPHILIZED, FOR SOLUTION INTRAMUSCULAR; INTRAVENOUS at 13:56

## 2017-02-18 RX ADMIN — OXYBUTYNIN CHLORIDE 10 MG: 10 TABLET, FILM COATED, EXTENDED RELEASE ORAL at 08:57

## 2017-02-18 RX ADMIN — FERROUS SULFATE TAB 325 MG (65 MG ELEMENTAL FE) 325 MG: 325 (65 FE) TAB at 08:57

## 2017-02-18 RX ADMIN — LEVOTHYROXINE SODIUM 100 MCG: 100 TABLET ORAL at 06:04

## 2017-02-18 RX ADMIN — DIAZEPAM 2 MG: 2 TABLET ORAL at 12:10

## 2017-02-18 RX ADMIN — DOCUSATE SODIUM 100 MG: 100 CAPSULE, LIQUID FILLED ORAL at 08:57

## 2017-02-18 RX ADMIN — VANCOMYCIN HYDROCHLORIDE 1500 MG: 1 INJECTION, POWDER, LYOPHILIZED, FOR SOLUTION INTRAVENOUS at 11:12

## 2017-02-18 RX ADMIN — OXYCODONE HYDROCHLORIDE AND ACETAMINOPHEN 2 TABLET: 10; 325 TABLET ORAL at 10:17

## 2017-02-18 RX ADMIN — ISOSORBIDE MONONITRATE 30 MG: 30 TABLET, EXTENDED RELEASE ORAL at 08:57

## 2017-02-18 RX ADMIN — MODAFINIL 200 MG: 100 TABLET ORAL at 08:57

## 2017-02-18 RX ADMIN — OXYCODONE HYDROCHLORIDE AND ACETAMINOPHEN 2 TABLET: 10; 325 TABLET ORAL at 15:03

## 2017-02-18 RX ADMIN — FLUTICASONE PROPIONATE 2 SPRAY: 50 SPRAY, METERED NASAL at 08:58

## 2017-02-18 RX ADMIN — CLOPIDOGREL 75 MG: 75 TABLET, FILM COATED ORAL at 08:57

## 2017-02-18 RX ADMIN — PREGABALIN 200 MG: 100 CAPSULE ORAL at 09:07

## 2017-02-18 NOTE — PROGRESS NOTES
Procedure(s):  RIGHT TOTAL HIP REMOVAL     LOS: 10 days     Subjective :   Complains of pain. Ready to go to rehab today.     Objective :    Vital signs in last 24 hours:  Vitals:    02/17/17 1818 02/17/17 2239 02/18/17 0352 02/18/17 0655   BP: 110/64 107/61 91/49 104/63   BP Location: Right arm Right arm Right arm Right arm   Patient Position: Lying Lying Lying Lying   Pulse: 80 84 83 75   Resp: 18 18 18 20   Temp: 98.6 °F (37 °C) 100 °F (37.8 °C) 98.9 °F (37.2 °C) 98.1 °F (36.7 °C)   TempSrc: Oral Oral Oral Oral   SpO2: 96% 98% 90% 97%   Weight:       Height:           PHYSICAL EXAM:  Patient is calm, in no acute distress, awake and oriented x 3.  Dressing is clean, dry and intact.  No signs of infection.  Swelling is appropriate in amount.  Ecchymosis is appropriate in amount.  Homans test is negative.  Patient is neurovascularly intact distally.    LABS:    Results from last 7 days  Lab Units 02/18/17  0520  02/16/17  0324   WBC 10*3/mm3  --   --  8.49   HEMOGLOBIN g/dL 7.5*  < > 7.4*   HEMATOCRIT % 23.8*  < > 22.8*   PLATELETS 10*3/mm3  --   --  280   < > = values in this interval not displayed.    Results from last 7 days  Lab Units 02/16/17  0324   SODIUM mmol/L 136   POTASSIUM mmol/L 3.8   CHLORIDE mmol/L 100   TOTAL CO2 mmol/L 24.2   BUN mg/dL 8   CREATININE mg/dL 0.52*   GLUCOSE mg/dL 99   CALCIUM mg/dL 7.7*       Results from last 7 days  Lab Units 02/18/17  0520 02/17/17  0414 02/16/17  0802   INR  2.30* 2.02* 1.73*       ASSESSMENT:  Status post Procedure(s):  RIGHT TOTAL HIP REMOVAL      Plan:  Continue Physical Therapy, increase mobility and range of motion as tolerated.  Continue SCDs, Continue DVT prophylaxis while inpatient.  Dispo planning for rehab today. Coumadin 7.5mg. NWB.     Leila Grijalva PA-C    Date: 2/18/2017  Time: 9:47 AM

## 2017-02-18 NOTE — PLAN OF CARE
Problem: Patient Care Overview (Adult)  Goal: Plan of Care Review  Outcome: Ongoing (interventions implemented as appropriate)    02/18/17 0228   Coping/Psychosocial Response Interventions   Plan Of Care Reviewed With patient   Patient Care Overview   Progress improving   Outcome Evaluation   Outcome Summary/Follow up Plan pain controlled during shift with PO pain meds; NWB; voiding per bedpan; vss; possible d/c in AM       Goal: Adult Individualization and Mutuality  Outcome: Ongoing (interventions implemented as appropriate)  Goal: Discharge Needs Assessment  Outcome: Ongoing (interventions implemented as appropriate)    Problem: Hip Replacement, Total (Adult)  Goal: Signs and Symptoms of Listed Potential Problems Will be Absent or Manageable (Hip Replacement, Total)  Outcome: Ongoing (interventions implemented as appropriate)    Problem: Fall Risk (Adult)  Goal: Absence of Falls  Outcome: Ongoing (interventions implemented as appropriate)    Problem: Infection, Risk/Actual (Adult)  Goal: Infection Prevention/Resolution  Outcome: Ongoing (interventions implemented as appropriate)    Problem: Skin Integrity Impairment, Risk/Actual (Adult)  Goal: Skin Integrity/Wound Healing  Outcome: Ongoing (interventions implemented as appropriate)

## 2017-02-18 NOTE — DISCHARGE INSTRUCTIONS
Continue present treatment. Patient to follow up ID on 3/15/2016. patient to call at [phone] to make appointment  Patient is going be discharged on 42 days of following antibiotics:  Invanz 1 g IV every 24  Vancomycin IV to be administered by pharmacy and the dose to be adjusted to achieve a trough level of 20.  Patient to receive weekly CBC CMP and UA with abnormal results to be called to Dr. figueroa at [phone].  Patient is explained and instructed to follow-up with Dr. figueroa on 3/15/2017. Patient is given a phone number [phone] to make appointment. Instructed rehabilitation facility to make sure that this appointment take place.    Follow up with Dr. Martin in 2 weeks. Call to arrange appointment. Skin staples out 2 weeks post op then steri-strips placed. PT-INR Mondays and Thursday sent to office fax [phone] or call Rishi at [phone]. Non weight bearing lower extremity.   Follow Up Details: Follow up with Dr. Martin in 2 weeks. Call to arrange appointment. Skin staples out 2 weeks post op then steri-strips placed. PT-INR Mondays and Thursday sent to office fax [phone] or call Rishi at [phone]. Non weight bearing lower extremity.

## 2017-02-18 NOTE — PROGRESS NOTES
"  Infectious Diseases Progress Note    Krysta Benito MD     [facility]  Los: 10 days  Patient Identification:  Name: Frances Downs  Age: 66 y.o.  Sex: female  :  1950  MRN: [id number]         Primary Care Physician: Talha Echeverria MD            Subjective: No complaints feeling better awaiting arrangements for discharge.    Interval History: See above     Objective:    Scheduled Meds:    atorvastatin 40 mg Oral Daily   clopidogrel 75 mg Oral Daily   docusate sodium 100 mg Oral BID   ertapenem 1 g Intravenous Q24H   ferrous sulfate 325 mg Oral BID   fluticasone 2 spray Nasal Daily   isosorbide mononitrate 30 mg Oral Daily   levothyroxine 100 mcg Oral Q AM   modafinil 200 mg Oral Daily   oxybutynin XL 10 mg Oral Daily   pantoprazole 40 mg Oral Q AM   potassium chloride 10 mEq Oral Daily   pregabalin 200 mg Oral BID   sennosides-docusate sodium 2 tablet Oral BID   tiotropium 1 capsule Inhalation Daily - RT   vancomycin 1,500 mg Intravenous Q12H   vilazodone 40 mg Oral Nightly   warfarin 7.5 mg Oral Daily   warfarin 7.5 mg Oral Daily     Continuous Infusions:    lactated ringers 9 mL/hr Last Rate: 0 mL/hr (17 1845)   lactated ringers 9 mL/hr Last Rate: 9 mL/hr (17 174)   Pharmacy to dose vancomycin         Vital signs in last 24 hours:  Temp:  [98 °F (36.7 °C)-100 °F (37.8 °C)] 98.1 °F (36.7 °C)  Heart Rate:  [73-84] 75  Resp:  [18-20] 20  BP: ()/(49-68) 104/63    Intake/Output:    Intake/Output Summary (Last 24 hours) at 17 1057  Last data filed at 17 0900   Gross per 24 hour   Intake    360 ml   Output   1000 ml   Net   -640 ml       Exam:  Visit Vitals   • /63 (BP Location: Right arm, Patient Position: Lying)   • Pulse 75   • Temp 98.1 °F (36.7 °C) (Oral)   • Resp 20   • Ht 64\" (162.6 cm)   • Wt 226 lb (103 kg)   • SpO2 97%   • BMI 38.79 kg/m2       General Appearance:    Alert, cooperative, no distress, AAOx3                          Head:    " Normocephalic, without obvious abnormality, atraumatic                           Eyes:    PERRL, conjunctiva/corneas clear, EOM's intact, both eyes                         Throat:   Lips, tongue, gums normal; oral mucosa pink and moist                           Neck:   Supple, symmetrical, trachea midline, no JVD                         Lungs:    Clear to auscultation bilaterally, respirations unlabored                 Chest Wall:    No tenderness or deformity                          Heart:    Regular rate and rhythm, S1 and S2 normal, no murmur,no  Rub                                      or gallop                  Abdomen:     Soft, non-tender, bowel sounds active, no masses, no                                                        organomegaly                  Extremities:   Right surgical site dressed, drain in place                        Pulses:   Pulses palpable in all extremities                            Skin:   Skin is warm and dry,  no rashes or palpable lesions                         Data Review:    I reviewed the patient's new clinical results.    Results from last 7 days  Lab Units 02/18/17  0520 02/17/17  0414 02/16/17  0324 02/15/17  2145 02/15/17  0328 02/14/17  0401 02/13/17  2056 02/13/17  0405   WBC 10*3/mm3  --   --  8.49  --  7.89 9.36 11.03* 4.29*   HEMOGLOBIN g/dL 7.5* 7.9* 7.4* 7.9* 6.0* 7.6* 9.1* 9.5*   PLATELETS 10*3/mm3  --   --  280  --  297 331 358 319       Results from last 7 days  Lab Units 02/16/17  0324 02/15/17  0328 02/14/17  0401 02/13/17  0405   SODIUM mmol/L 136 138 136 137   POTASSIUM mmol/L 3.8 4.0 4.3 4.1   CHLORIDE mmol/L 100 101 100 100   TOTAL CO2 mmol/L 24.2 23.1 23.5 24.0   BUN mg/dL 8 8 6* 5*   CREATININE mg/dL 0.52* 0.58 0.50* 0.39*   CALCIUM mg/dL 7.7* 7.7* 7.9* 8.2*   GLUCOSE mg/dL 99 90 93 94         Tissue Culture [[id number]] (Abnormal)  Collected: 02/13/17 1846       Lab Status: Preliminary result Specimen: Tissue from Hip, Right Updated: 02/16/17 0966         "Culture           Scant growth (1+) Morganella morganii ssp morganii (A)                   Scant growth (1+) Staphylococcus aureus, MRSA (C)           Methicillin resistant Staphylococcus aureus, Patient may be an isolation risk.           Gram Stain Result Rare (1+) WBCs seen          Rare (1+) Gram positive cocci in pairs          Susceptibility         Morganella morganii ssp morganii         ROSSY         Ampicillin >=32  Resistant         Ampicillin + Sulbactam >=32  Resistant         Cefazolin >=64  Resistant         Cefepime <=1  Susceptible         Cefoxitin >=64  Resistant         Ceftriaxone 32  Intermediate         Ertapenem <=0.5  Susceptible         Gentamicin 2  Susceptible         Levofloxacin >=8  Resistant         Meropenem <=0.25  Susceptible         Piperacillin + Tazobactam 32  Intermediate         Trimethoprim + Sulfamethoxazole >=320  Resistant                               Anaerobic Culture [[id number]] (Normal) Collected: 02/08/17 1701       Lab Status: Final result Specimen: Wound from Hip, Right Updated: 02/13/17 1001        Culture No anaerobes isolated at 5 days        Wound Culture [[id number]] (Abnormal)  Collected: 02/08/17 1701       Lab Status: Final result Specimen: Wound from Hip, Right Updated: 02/10/17 0648        Wound Culture           Moderate growth (3+) Staphylococcus aureus, MRSA (C)         D test is negative. Isolate does not exhibit \"inducible\" resistance to Clindamycin (isolate remains susceptible to Clindamycin).     Methicillin resistant Staphylococcus aureus, Patient may be an isolation risk.           Gram Stain Result Rare (1+) WBCs seen          Rare (1+) Gram positive cocci in pairs       Urine culture reflects colonization as urinalysis is essentially bland.    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 Patient to follow up ID on 3/15/2016. patient to call at [phone] to make appointment  Patient is going be discharged on 42 days of following antibiotics:  Invanz 1 g IV every 24  Vancomycin IV to be administered by pharmacy and the dose to be adjusted to achieve a trough level of 20.  Patient to receive weekly CBC CMP and UA with abnormal results to be called to Dr. benito at [phone].  Patient is explained and instructed to follow-up with Dr. benito on 3/15/2017.  Patient is given a phone number [phone] to make appointment.  Instructed rehabilitation facility to make sure that this appointment take place.  Krysta Benito MD  2/18/2017  10:57 AM    Much of this encounter note is an electronic transcription/translation of spoken language to printed text. The electronic translation of spoken language may permit erroneous, or at times, nonsensical words or phrases to be inadvertently transcribed; Although I have reviewed the note for such errors, some may still exist

## 2017-02-18 NOTE — DISCHARGE SUMMARY
Discharge Summary    Date of Admission: 2/8/2017  Date of Discharge:  2/18/2017    Discharge Diagnosis:   Hematoma of right hip [S70.01XA]  MRSA Infection    PMHX:  Past Medical History   Diagnosis Date   • Anxiety    • Arthritis    • Bipolar disorder    • COPD (chronic obstructive pulmonary disease)    • Coronary artery disease    • Diastolic dysfunction      grade II per echocardiogram 11/18/2016   • Difficulty walking    • Disease of thyroid gland      hypothyroidism   • Dislocation of hip joint      recurrent dislocation right hip   • DVT (deep venous thrombosis)    • GERD without esophagitis    • Heartburn    • Hematoma    • Hyperlipidemia    • Hypertension    • LVH (left ventricular hypertrophy)      mild to moderate per echocardiogram 11/18/2016   • Major depressive disorder    • Mitral annular calcification      severe per echocardiogram 11/18/2016   • Mitral regurgitation      mild per echo 11/18/2016   • Mitral valve disease    • Muscle weakness    • Narcolepsy    • Overactive bladder    • Pneumonia    • Pulmonary emboli    • Pulmonary hypertension      mild per echo 11/18/2016   • Sleep apnea      obstructive   • Tricuspid regurgitation      mild to moderate per echo 11/18/2016       Discharge Disposition  Rehab Facility or Unit (DC - External)    Procedures Performed  Procedure(s):  RIGHT TOTAL HIP REMOVAL       Indication for Admission  Patient is a 66 y.o. female admitted after undergoing the above surgical procedure. They were admitted for post-operative pain control, medical management and physical therapy.  They progressed with physical therapy.    They were deemed stable for discharge.      Consults:   Consults     Date and Time Order Name Status Description    2/9/2017 1058 Inpatient Consult to Infectious Diseases Completed     2/8/2017 2031 Inpatient Consult to Internal Medicine            Discharge Instructions:  Patient is nonweightbearing operative leg.  Patient is to progress  ambulation  as tolerated.  Use walker as needed for stability and gait.  May progress to cane as tolerated.  Posterior hip dislocation precautions for 6 weeks.  The dressing should be changed daily until staples are removed.  Staples should be removed post-operative day 14.  Keep incision clean, dry and intact until staple removal then the patient may shower. PT/INR should be obtained every Monday and Thursday and call or fax results to office before 12 noon.  (Attention Jan)  Patient will follow-up in the office in 2 weeks.  Call the office at [phone] for any questions or concerns.      Discharge Medications   Frances Downs   Home Medication Instructions ROBBIE:[id number]    Printed on:02/18/17 1001   Medication Information                      ALBUTEROL IN  Inhale 4 (Four) Times a Day As Needed.             amLODIPine (NORVASC) 10 MG tablet  Take 10 mg by mouth Daily.             aspirin 81 MG chewable tablet  Chew 81 mg Daily.             atorvastatin (LIPITOR) 40 MG tablet  Take 40 mg by mouth daily.             carvedilol (COREG) 12.5 MG tablet  Take 12.5 mg by mouth 2 (two) times a day with meals.             clonazePAM (KlonoPIN) 0.5 MG tablet  Take 0.5 mg by mouth 3 (three) times a day as needed for seizures.             clopidogrel (PLAVIX) 75 MG tablet  Take 1 tablet by mouth daily.             darifenacin (ENABLEX) 15 MG 24 hr tablet  Take 15 mg by mouth daily.             docusate sodium (COLACE) 100 MG capsule  Take 100 mg by mouth 2 (Two) Times a Day.             ferrous sulfate 325 (65 FE) MG tablet  Take 325 mg by mouth 2 (Two) Times a Day.             fluticasone (FLONASE) 50 MCG/ACT nasal spray  2 sprays into each nostril daily. Administer 2 sprays in each nostril for each dose.             ipratropium-albuterol (DUO-NEB) 0.5-2.5 mg/mL nebulizer  Take 3 mL by nebulization every 4 (four) hours as needed for wheezing.             isosorbide mononitrate (IMDUR) 30 MG 24 hr tablet  Take 30 mg by mouth  Daily.             levothyroxine (SYNTHROID, LEVOTHROID) 100 MCG tablet  Take 100 mcg by mouth daily.             modafinil (PROVIGIL) 200 MG tablet  Take 200 mg by mouth 2 (two) times a day.             omeprazole (PriLOSEC) 20 MG capsule  Take 20 mg by mouth 2 (two) times a day.             oxyCODONE-acetaminophen (PERCOCET)  MG per tablet  Take 1 tablet by mouth 4 (Four) Times a Day As Needed for moderate pain (4-6).             oxyCODONE-acetaminophen (PERCOCET)  MG per tablet  Take 1 tablet by mouth 3 (Three) Times a Day As Needed for moderate pain (4-6).             potassium chloride (K-DUR) 10 MEQ CR tablet  Take 10 mEq by mouth daily.             pregabalin (LYRICA) 200 MG capsule  Take 200 mg by mouth 2 (two) times a day.             tiotropium (SPIRIVA) 18 MCG per inhalation capsule  Place 1 capsule into inhaler and inhale 1 (one) time daily.             Vancomycin HCl in Dextrose (PHARMACY TO DOSE VANCOMYCIN)  Daily.             vilazodone (VIIBRYD) 40 MG tablet tablet  Take 40 mg by mouth daily.             warfarin (COUMADIN) 6 MG tablet  Take 6 mg by mouth Daily.                 Discharge Diet:     Activity at Discharge:   Activity Instructions     Non-Weight Bearing - Specify Restrictions                     Follow-up Appointments  Future Appointments  Date Time Provider Department Center   4/20/2017 9:00 AM Andres Oliveros MD [facility] None     Referrals and Follow-ups to Schedule     Return to Clinic for Follow Up    As directed    Follow up with Dr. Martin in 2 weeks. Call to arrange appointment. Skin staples out 2 weeks post op then steri-strips placed. PT-INR Mondays and Thursday sent to office fax [phone] or call Rishi at [phone]. Non weight bearing lower extremity.   Follow Up Details:  Follow up with Dr. Martin in 2 weeks. Call to arrange appointment. Skin staples out 2 weeks post op then steri-strips placed. PT-INR Mondays and Thursday sent to office fax [phone] or call Rishi at  [phone]. Non weight bearing lower extremity.                 Test Results Pending at Discharge   Order Current Status    Anaerobic Culture Preliminary result           Leila Grijalva PA-C  02/18/17,  10:05 AM

## 2017-02-18 NOTE — PROGRESS NOTES
Acute Care - Physical Therapy Treatment Note  [facility]     Patient Name: Frances Downs  : 1950  MRN: [id number]  Today's Date: 2017  Onset of Illness/Injury or Date of Surgery Date: 17     Referring Physician: Veronica    Admit Date: 2017    Visit Dx:    ICD-10-CM ICD-9-CM   1. Difficulty walking R26.2 719.7   2. Hematoma T14.8 924.9   3. MRSA infection A49.02 041.12     Patient Active Problem List   Diagnosis   • Hematoma of right hip   • DVT (deep venous thrombosis), hx of   • Hypertension   • Hyperlipidemia   • Coronary artery disease, hx of stent   • Arthritis   • Bipolar disorder   • Prosthetic hip infection, mrsa   • MRSA (methicillin resistant Staphylococcus aureus) infection               Adult Rehabilitation Note       17 0839 17 1335 02/15/17 1440    Rehab Assessment/Intervention    Discipline physical therapy assistant  -DM physical therapist  -EE physical therapy assistant  -    Document Type therapy note (daily note)  -DM therapy note (daily note)  - therapy note (daily note)  -    Subjective Information agree to therapy;complains of;pain;fatigue;weakness  -DM agree to therapy;complains of;weakness;fatigue;pain  -EE complains of;weakness;fatigue;pain;swelling  -    Patient Effort, Rehab Treatment  adequate  -EE     Symptoms Noted During/After Treatment  fatigue;increased pain  -EE     Precautions/Limitations non-weight bearing status;fall precautions   NWB RLE, spacer  -DM fall precautions;non-weight bearing status   NWB RLE  -EE fall precautions;hip precautions- right;non-weight bearing status  -    Recorded by [DM] [place], PTA [EE] Yue Campos, PT [JM] Shruti Palmer, PTA    Pain Assessment    Pain Assessment 0-10  -DM 0-10  -EE 0-10  -JM    Pain Score 9  -DM 9  -EE 9  -JM    Pain Type  Surgical pain  -EE Surgical pain  -JM    Pain Location Hip  -DM Hip  -EE Leg  -JM    Pain Orientation Right  -DM Right  -EE Right  -JM    Pain  Intervention(s) Repositioned  -DM Repositioned;Medication (See MAR)  -EE Medication (See MAR);Repositioned;Ambulation/increased activity  -JM    Response to Interventions  tolerated  -EE not milad  -JM    Recorded by [DM] [place] PTA [EE] Yeu Campos, PT [JM] Shruti Palmer PTA    Cognitive Assessment/Intervention    Current Cognitive/Communication Assessment  functional  -EE     Orientation Status  oriented x 4  -EE     Follows Commands/Answers Questions  100% of the time  -EE     Personal Safety  WNL/WFL  -EE     Personal Safety Interventions  fall prevention program maintained;gait belt;muscle strengthening facilitated;nonskid shoes/slippers when out of bed;supervised activity  -EE     Recorded by  [EE] Yue Campos PT     Bed Mobility, Assessment/Treatment    Bed Mobility, Assistive Device  head of bed elevated;bed rails  -EE bed rails;draw sheet  -JM    Bed Mobility, Scoot/Bridge, [place] moderate assist (50% patient effort)  -DM moderate assist (50% patient effort);2 person assist required;verbal cues required  -EE moderate assist (50% patient effort);maximum assist (25% patient effort);2 person assist required  -JM    Bed Mob, Supine to Sit, [place] moderate assist (50% patient effort);2 person assist required  -DM moderate assist (50% patient effort);verbal cues required  -EE maximum assist (25% patient effort);2 person assist required  -JM    Bed Mob, Sit to Supine, [place]  moderate assist (50% patient effort);2 person assist required;verbal cues required  -EE maximum assist (25% patient effort);2 person assist required  -JM    Bed Mobility, Safety Issues  decreased use of legs for bridging/pushing  -EE decreased use of arms for pushing/pulling;decreased use of legs for bridging/pushing;impaired trunk control for bed mobility  -JM    Bed Mobility, Impairments  strength decreased;pain  -EE strength decreased  -JM    Recorded by [DM] [place] PTA [EE] Yue Campos, PT [JM]  Shruti Palmer PTA    Transfer Assessment/Treatment    Transfers, Bed-[place]air [place] not appropriate to assess  -      Transfers, Sit-Stand [place]   maximum assist (25% patient effort);2 person assist required  -    Transfers, Stand-Sit [place]   maximum assist (25% patient effort);moderate assist (50% patient effort);2 person assist required;verbal cues required  -    Transfers, Sit-Stand-Sit, Assist Device   standard walker   found rwx at end of session, left in room  -    Toilet Transfer, [place]   maximum assist (25% patient effort);2 person assist required  -    Toilet Transfer, Assistive Device   standard walker;rolling walker  -    Transfer, Safety Issues   balance decreased during turns;weight-shifting ability decreased;knees buckling  -    Transfer, Impairments   strength decreased;impaired balance;coordination impaired;unable to maintain weight bearing status;pain  -    Transfer, Comment  pt declined transfer today; agreeable to exercises at EOB  -EE     Recorded by [DM] [place] PTA [EE] Yue Campos, PT [] Shruti Palmer PTA    Gait Assessment/Treatment    Gait, [place] Level not appropriate to assess  -  not appropriate to assess  -    Recorded by [DM] [place] PTA  [] Shruti Palmer PTA    Balance Skills Training    Sitting-Level of Assistance Close supervision  -      Sitting-Balance Support Feet unsupported;Right upper extremity supported;Left upper extremity supported  -      Sitting-Balance Activities Trunk control activities  -      Sitting # of Minutes 8  -DM      Recorded by [DM] [place] PTA      Therapy Exercises    Bilateral Lower Extremities AROM:;10 reps;sitting  -DM AROM:;10 reps;sitting;ankle pumps/circles;LAQ  -EE     Recorded by [DM] [place] PTA [EE] Yue Campos, PT     Positioning and Restraints    Pre-Treatment Position in bed  -DM in bed  -EE in bed  -JM    Post Treatment Position bed   -DM bed  -EE     In Bed supine;call light within reach;encouraged to call for assist  -DM fowlers;call light within reach;encouraged to call for assist;notified nsg  -EE supine;call light within reach;encouraged to call for assist;with nsg  -JM    Recorded by [DM] [place], PTA [EE] Yue Campos, PT [JM] Shruti Palmer, PTA      User Key  (r) = Recorded By, (t) = Taken By, (c) = Cosigned By    Initials Name Effective Dates    DM [place], PTA 05/18/15 -     EE Yue Campos, PT 12/01/15 -     JM Shruti Palmer, PTA 02/18/16 -                 IP PT Goals       02/14/17 1104 02/09/17 0850       Bed Mobility PT LTG    Bed Mobility PT LTG, Date Established  02/09/17  -EE     Bed Mobility PT LTG, Time to Achieve 1 wk  -EE 5 days  -EE     Bed Mobility PT LTG, Activity Type  all bed mobility  -EE     Bed Mobility PT LTG, [place] Level  minimum assist (75% patient effort);2 person assist required  -EE     Bed Mobility PT LTG, Date Goal Reviewed 02/14/17  -EE      Bed Mobility PT LTG, Outcome goal ongoing  -EE      Bed Mobility PT LTG, Reason Goal Not Met progress slower than expected  -EE      Transfer Training PT LTG    Transfer Training PT LTG, Date Established 02/14/17  -EE 02/09/17  -EE     Transfer Training PT LTG, Time to Achieve 1 wk  -EE 5 days  -EE     Transfer Training PT LTG, Activity Type all transfers  -EE all transfers  -EE     Transfer Training PT LTG, [place] Level moderate assist (50% patient effort);2 person assist required  -EE minimum assist (75% patient effort);2 person assist required  -EE     Transfer Training PT LTG, Assist Device  walker, rolling  -EE     Transfer Training PT  LTG, Date Goal Reviewed 02/14/17  -EE      Transfer Training PT LTG, Outcome goal revised  -EE      Transfer Training PT LTG, Reason Goal Not Met progress slower than expected  -EE      Patient Education PT LTG    Patient Education PT LTG, Date Established  02/09/17  -EE     Patient Education PT  LTG, Time to Achieve 1 wk  -EE 5 days  -EE     Patient Education PT LTG, Education Type  HEP;precaution per surgeon  -EE     Patient Education PT LTG, Education Understanding  demonstrate adequately;verbalize understanding  -EE     Patient Education PT LTG, Date Goal Reviewed 02/14/17  -EE      Patient Education PT LTG Outcome goal ongoing  -EE      Patient Education PT LTG, Reason Goal Not Met progress slower than expected  -EE        User Key  (r) = Recorded By, (t) = Taken By, (c) = Cosigned By    Initials Name Provider Type    EE Yue Campos, PT Physical Therapist          Physical Therapy Education     Title: PT OT SLP Therapies (Active)     Topic: Physical Therapy (Active)     Point: Mobility training (Active)    Learning Progress Summary    Learner Readiness Method Response Comment Documented by Status   Patient Acceptance E,D NR   02/18/17 0841 Active    Acceptance TB,D,E VU,NR  EE 02/17/17 1359 Done    Acceptance E,TB,D NR after much encouragement agreed to get up on BSC  02/15/17 1600 Active    Acceptance E,TB,D NR   02/14/17 1104 Active    Acceptance E,TB,D VU,NR aware she is NWB rt, but c/o wkness and pn in left knee limiting also, assist given to maintain NWB rt  02/12/17 1430 Done    Acceptance E,TB,D NR   02/11/17 1355 Active    Acceptance E,TB NR  EE 02/10/17 1429 Active    Acceptance E,TB NR  EE 02/09/17 0850 Active               Point: Home exercise program (Active)    Learning Progress Summary    Learner Readiness Method Response Comment Documented by Status   Patient Acceptance E,D NR   02/18/17 0841 Active    Acceptance TB,D,E VU,NR  EE 02/17/17 1359 Done    Acceptance E,TB,D NR after much encouragement agreed to get up on BSC  02/15/17 1600 Active    Acceptance E,TB,D NR  EE 02/14/17 1104 Active    Acceptance E,TB,D VU,NR aware she is NWB rt, but c/o wkness and pn in left knee limiting also, assist given to maintain NWB rt  02/12/17 1430 Done    Acceptance E,TB,D NR   02/11/17  1355 Active    Acceptance E,TB NR  EE 02/10/17 1429 Active    Acceptance E,TB NR  EE 02/09/17 0850 Active               Point: Body mechanics (Active)    Learning Progress Summary    Learner Readiness Method Response Comment Documented by Status   Patient Acceptance E,D NR   02/18/17 0841 Active    Acceptance TB,D,E VU,NR  EE 02/17/17 1359 Done    Acceptance E,TB,D NR after much encouragement agreed to get up on BSC  02/15/17 1600 Active    Acceptance E,TB,D NR  EE 02/14/17 1104 Active    Acceptance E,TB,D VU,NR aware she is NWB rt, but c/o wkness and pn in left knee limiting also, assist given to maintain NWB rt  02/12/17 1430 Done    Acceptance E,TB,D NR   02/11/17 1355 Active    Acceptance E,TB NR  EE 02/10/17 1429 Active    Acceptance E,TB NR  EE 02/09/17 0850 Active               Point: Precautions (Active)    Learning Progress Summary    Learner Readiness Method Response Comment Documented by Status   Patient Acceptance E,D NR   02/18/17 0841 Active    Acceptance TB,D,E VU,NR  EE 02/17/17 1359 Done    Acceptance E,TB,D NR after much encouragement agreed to get up on BSC  02/15/17 1600 Active    Acceptance E,TB,D NR   02/14/17 1104 Active    Acceptance E,TB,D VU,NR aware she is NWB rt, but c/o wkness and pn in left knee limiting also, assist given to maintain NWB rt  02/12/17 1430 Done    Acceptance E,TB,D NR   02/11/17 1355 Active    Acceptance E,TB NR  EE 02/10/17 1429 Active    Acceptance E,TB NR  EE 02/09/17 0850 Active                      User Key     Initials Effective Dates Name Provider Type Discipline     05/18/15 -  [place], PTA Physical Therapy Assistant PT     12/01/15 -  Yue Campos, PT Physical Therapist PT     02/18/16 -  Shruti Palmer PTA Physical Therapy Assistant PT                    PT Recommendation and Plan  Anticipated Discharge Disposition: skilled nursing facility  Planned Therapy Interventions: balance training, bed mobility training, home exercise  program, patient/family education, strengthening, stretching, transfer training  PT Frequency: daily             Outcome Measures       02/18/17 0800 02/17/17 1400 02/15/17 1600    How much help from another person do you currently need...    Turning from your back to your side while in flat bed without using bedrails? 2  -DM 2  -EE 2  -JM    Moving from lying on back to sitting on the side of a flat bed without bedrails? 2  -DM 2  -EE 2  -JM    Moving to and from a bed to a chair (including a wheelchair)? 2  -DM 2  -EE 2  -JM    Standing up from a chair using your arms (e.g., wheelchair, bedside chair)? 2  -DM 2  -EE 2  -JM    Climbing 3-5 steps with a railing? 1  -DM 1  -EE 1  -JM    To walk in hospital room? 1  -DM 1  -EE 1  -JM    AM-PAC 6 Clicks Score 10  -DM 10  -EE 10  -JM    Functional Assessment    Outcome Measure Options AM-PAC 6 Clicks Basic Mobility (PT)  -DM AM-PAC 6 Clicks Basic Mobility (PT)  -EE       User Key  (r) = Recorded By, (t) = Taken By, (c) = Cosigned By    Initials Name Provider Type    DM [place] PTA Physical Therapy Assistant    GALILEO Campos, PT Physical Therapist    MARILEE Palmer PTA Physical Therapy Assistant           Time Calculation:         PT Charges       02/18/17 0841          Time Calculation    Start Time 0832  -DM      Stop Time 0847  -DM      Time Calculation (min) 15 min  -DM      PT Received On 02/18/17  -DM      PT - Next Appointment 02/19/17  -DM        User Key  (r) = Recorded By, (t) = Taken By, (c) = Cosigned By    Initials Name Provider Type    CARLA Traore PTA Physical Therapy Assistant          Therapy Charges for Today     Code Description Service Date Service Provider Modifiers Qty    43646228311 HC PT THER PROC EA 15 MIN 2/18/2017 New Traore PTA GP 1    33036071212 HC PT THER SUPP EA 15 MIN 2/18/2017 New Traore PTA GP 1          PT G-Codes  Outcome Measure Options: AM-PAC 6 Clicks Basic Mobility (PT)    New GUZMAN  Eugenie, PTA  2/18/2017

## 2017-03-15 ENCOUNTER — HOSPITAL ENCOUNTER (INPATIENT)
Facility: HOSPITAL | Age: 67
LOS: 7 days | Discharge: NURSING, REHAB OR LONG-TERM CARE | End: 2017-03-22
Attending: INTERNAL MEDICINE | Admitting: INTERNAL MEDICINE

## 2017-03-15 ENCOUNTER — DOCUMENTATION (OUTPATIENT)
Dept: INTERNAL MEDICINE | Facility: HOSPITAL | Age: 67
End: 2017-03-15

## 2017-03-15 DIAGNOSIS — T14.8XXA WOUND INFECTION: ICD-10-CM

## 2017-03-15 DIAGNOSIS — L08.9 WOUND INFECTION: ICD-10-CM

## 2017-03-15 PROBLEM — A49.8 BACTERIAL INFECTION DUE TO MORGANELLA MORGANII: Status: ACTIVE | Noted: 2017-03-15

## 2017-03-15 PROBLEM — T84.51XA INFECTED PROSTHESIS OF RIGHT HIP (HCC): Status: ACTIVE | Noted: 2017-02-10

## 2017-03-15 PROBLEM — A49.9: Status: ACTIVE | Noted: 2017-03-15

## 2017-03-15 LAB
ALBUMIN SERPL-MCNC: 2.7 G/DL (ref 3.5–5.2)
ALBUMIN/GLOB SERPL: 1 G/DL
ALP SERPL-CCNC: 196 U/L (ref 39–117)
ALT SERPL W P-5'-P-CCNC: 7 U/L (ref 1–33)
ANION GAP SERPL CALCULATED.3IONS-SCNC: 10 MMOL/L
AST SERPL-CCNC: 9 U/L (ref 1–32)
BASOPHILS # BLD AUTO: 0.05 10*3/MM3 (ref 0–0.2)
BASOPHILS NFR BLD AUTO: 0.7 % (ref 0–1.5)
BILIRUB SERPL-MCNC: 0.2 MG/DL (ref 0.1–1.2)
BUN BLD-MCNC: 6 MG/DL (ref 8–23)
BUN/CREAT SERPL: 8.5 (ref 7–25)
CALCIUM SPEC-SCNC: 8.9 MG/DL (ref 8.6–10.5)
CHLORIDE SERPL-SCNC: 101 MMOL/L (ref 98–107)
CO2 SERPL-SCNC: 31 MMOL/L (ref 22–29)
CREAT BLD-MCNC: 0.71 MG/DL (ref 0.57–1)
DEPRECATED RDW RBC AUTO: 53.2 FL (ref 37–54)
EOSINOPHIL # BLD AUTO: 0.33 10*3/MM3 (ref 0–0.7)
EOSINOPHIL NFR BLD AUTO: 4.8 % (ref 0.3–6.2)
ERYTHROCYTE [DISTWIDTH] IN BLOOD BY AUTOMATED COUNT: 15.4 % (ref 11.7–13)
GFR SERPL CREATININE-BSD FRML MDRD: 83 ML/MIN/1.73
GLOBULIN UR ELPH-MCNC: 2.7 GM/DL
GLUCOSE BLD-MCNC: 89 MG/DL (ref 65–99)
HCT VFR BLD AUTO: 28.9 % (ref 35.6–45.5)
HGB BLD-MCNC: 9.1 G/DL (ref 11.9–15.5)
IMM GRANULOCYTES # BLD: 0.02 10*3/MM3 (ref 0–0.03)
IMM GRANULOCYTES NFR BLD: 0.3 % (ref 0–0.5)
INR PPP: 1.23 (ref 0.9–1.1)
LYMPHOCYTES # BLD AUTO: 2.28 10*3/MM3 (ref 0.9–4.8)
LYMPHOCYTES NFR BLD AUTO: 33.1 % (ref 19.6–45.3)
MCH RBC QN AUTO: 29.6 PG (ref 26.9–32)
MCHC RBC AUTO-ENTMCNC: 31.5 G/DL (ref 32.4–36.3)
MCV RBC AUTO: 94.1 FL (ref 80.5–98.2)
MONOCYTES # BLD AUTO: 0.64 10*3/MM3 (ref 0.2–1.2)
MONOCYTES NFR BLD AUTO: 9.3 % (ref 5–12)
NEUTROPHILS # BLD AUTO: 3.56 10*3/MM3 (ref 1.9–8.1)
NEUTROPHILS NFR BLD AUTO: 51.8 % (ref 42.7–76)
PLATELET # BLD AUTO: 292 10*3/MM3 (ref 140–500)
PMV BLD AUTO: 10.1 FL (ref 6–12)
POTASSIUM BLD-SCNC: 3.8 MMOL/L (ref 3.5–5.2)
PROT SERPL-MCNC: 5.4 G/DL (ref 6–8.5)
PROTHROMBIN TIME: 15 SECONDS (ref 11.7–14.2)
RBC # BLD AUTO: 3.07 10*6/MM3 (ref 3.9–5.2)
SODIUM BLD-SCNC: 142 MMOL/L (ref 136–145)
WBC NRBC COR # BLD: 6.88 10*3/MM3 (ref 4.5–10.7)

## 2017-03-15 PROCEDURE — 93010 ELECTROCARDIOGRAM REPORT: CPT | Performed by: INTERNAL MEDICINE

## 2017-03-15 PROCEDURE — 81001 URINALYSIS AUTO W/SCOPE: CPT | Performed by: INTERNAL MEDICINE

## 2017-03-15 PROCEDURE — 85610 PROTHROMBIN TIME: CPT | Performed by: INTERNAL MEDICINE

## 2017-03-15 PROCEDURE — 25010000002 VANCOMYCIN: Performed by: INTERNAL MEDICINE

## 2017-03-15 PROCEDURE — 85025 COMPLETE CBC W/AUTO DIFF WBC: CPT | Performed by: INTERNAL MEDICINE

## 2017-03-15 PROCEDURE — 87205 SMEAR GRAM STAIN: CPT | Performed by: INTERNAL MEDICINE

## 2017-03-15 PROCEDURE — 93005 ELECTROCARDIOGRAM TRACING: CPT | Performed by: INTERNAL MEDICINE

## 2017-03-15 PROCEDURE — 87186 SC STD MICRODIL/AGAR DIL: CPT | Performed by: INTERNAL MEDICINE

## 2017-03-15 PROCEDURE — 80053 COMPREHEN METABOLIC PANEL: CPT | Performed by: INTERNAL MEDICINE

## 2017-03-15 PROCEDURE — 87070 CULTURE OTHR SPECIMN AEROBIC: CPT | Performed by: INTERNAL MEDICINE

## 2017-03-15 RX ORDER — WARFARIN SODIUM 6 MG/1
6 TABLET ORAL
Status: DISCONTINUED | OUTPATIENT
Start: 2017-03-15 | End: 2017-03-15

## 2017-03-15 RX ORDER — MODAFINIL 100 MG/1
200 TABLET ORAL DAILY
Status: DISCONTINUED | OUTPATIENT
Start: 2017-03-15 | End: 2017-03-18

## 2017-03-15 RX ORDER — FLUTICASONE PROPIONATE 50 MCG
2 SPRAY, SUSPENSION (ML) NASAL DAILY
Status: DISCONTINUED | OUTPATIENT
Start: 2017-03-15 | End: 2017-03-18

## 2017-03-15 RX ORDER — SODIUM CHLORIDE 0.9 % (FLUSH) 0.9 %
1-10 SYRINGE (ML) INJECTION AS NEEDED
Status: DISCONTINUED | OUTPATIENT
Start: 2017-03-15 | End: 2017-03-22 | Stop reason: HOSPADM

## 2017-03-15 RX ORDER — OXYCODONE AND ACETAMINOPHEN 10; 325 MG/1; MG/1
1 TABLET ORAL 4 TIMES DAILY PRN
Status: DISCONTINUED | OUTPATIENT
Start: 2017-03-15 | End: 2017-03-15

## 2017-03-15 RX ORDER — DOCUSATE SODIUM 100 MG/1
100 CAPSULE, LIQUID FILLED ORAL 2 TIMES DAILY
Status: DISCONTINUED | OUTPATIENT
Start: 2017-03-15 | End: 2017-03-18

## 2017-03-15 RX ORDER — FERROUS SULFATE 325(65) MG
325 TABLET ORAL 2 TIMES DAILY
Status: DISCONTINUED | OUTPATIENT
Start: 2017-03-15 | End: 2017-03-18

## 2017-03-15 RX ORDER — MORPHINE SULFATE 2 MG/ML
2 INJECTION, SOLUTION INTRAMUSCULAR; INTRAVENOUS EVERY 4 HOURS PRN
Status: DISCONTINUED | OUTPATIENT
Start: 2017-03-15 | End: 2017-03-18

## 2017-03-15 RX ORDER — AMLODIPINE BESYLATE 10 MG/1
10 TABLET ORAL DAILY
Status: DISCONTINUED | OUTPATIENT
Start: 2017-03-15 | End: 2017-03-22 | Stop reason: HOSPADM

## 2017-03-15 RX ORDER — METHYLPHENIDATE HYDROCHLORIDE 10 MG/1
10 TABLET ORAL 2 TIMES DAILY PRN
Status: ON HOLD | COMMUNITY
End: 2017-07-26

## 2017-03-15 RX ORDER — PREGABALIN 100 MG/1
200 CAPSULE ORAL 2 TIMES DAILY
Status: DISCONTINUED | OUTPATIENT
Start: 2017-03-15 | End: 2017-03-22 | Stop reason: HOSPADM

## 2017-03-15 RX ORDER — CLOPIDOGREL BISULFATE 75 MG/1
75 TABLET ORAL DAILY
Status: DISCONTINUED | OUTPATIENT
Start: 2017-03-15 | End: 2017-03-15

## 2017-03-15 RX ORDER — NALOXONE HCL 0.4 MG/ML
0.4 VIAL (ML) INJECTION
Status: DISCONTINUED | OUTPATIENT
Start: 2017-03-15 | End: 2017-03-22 | Stop reason: HOSPADM

## 2017-03-15 RX ORDER — CARVEDILOL 12.5 MG/1
12.5 TABLET ORAL 2 TIMES DAILY WITH MEALS
Status: DISCONTINUED | OUTPATIENT
Start: 2017-03-15 | End: 2017-03-22 | Stop reason: HOSPADM

## 2017-03-15 RX ORDER — ASPIRIN 81 MG/1
81 TABLET, CHEWABLE ORAL DAILY
Status: DISCONTINUED | OUTPATIENT
Start: 2017-03-15 | End: 2017-03-15

## 2017-03-15 RX ORDER — VILAZODONE HYDROCHLORIDE 40 MG/1
40 TABLET ORAL DAILY
Status: DISCONTINUED | OUTPATIENT
Start: 2017-03-15 | End: 2017-03-18

## 2017-03-15 RX ORDER — NALOXONE HCL 0.4 MG/ML
0.4 VIAL (ML) INJECTION
Status: DISCONTINUED | OUTPATIENT
Start: 2017-03-15 | End: 2017-03-15

## 2017-03-15 RX ORDER — ARIPIPRAZOLE 10 MG/1
20 TABLET ORAL DAILY
COMMUNITY
End: 2020-09-30 | Stop reason: ALTCHOICE

## 2017-03-15 RX ORDER — ATORVASTATIN CALCIUM 40 MG/1
40 TABLET, FILM COATED ORAL DAILY
Status: DISCONTINUED | OUTPATIENT
Start: 2017-03-15 | End: 2017-03-18

## 2017-03-15 RX ORDER — PANTOPRAZOLE SODIUM 40 MG/1
40 TABLET, DELAYED RELEASE ORAL
Status: DISCONTINUED | OUTPATIENT
Start: 2017-03-16 | End: 2017-03-18

## 2017-03-15 RX ORDER — ONDANSETRON 2 MG/ML
4 INJECTION INTRAMUSCULAR; INTRAVENOUS EVERY 6 HOURS PRN
Status: DISCONTINUED | OUTPATIENT
Start: 2017-03-15 | End: 2017-03-18

## 2017-03-15 RX ORDER — ASPIRIN 325 MG
325 TABLET, DELAYED RELEASE (ENTERIC COATED) ORAL EVERY 6 HOURS PRN
COMMUNITY
End: 2017-06-29

## 2017-03-15 RX ORDER — PROMETHAZINE HYDROCHLORIDE 25 MG/1
25 TABLET ORAL EVERY 4 HOURS PRN
COMMUNITY
End: 2018-04-27 | Stop reason: ALTCHOICE

## 2017-03-15 RX ORDER — LAMOTRIGINE 25 MG/1
25 TABLET ORAL 2 TIMES DAILY
COMMUNITY
End: 2021-12-17 | Stop reason: DRUGHIGH

## 2017-03-15 RX ORDER — HYDROCODONE BITARTRATE AND ACETAMINOPHEN 10; 325 MG/1; MG/1
2 TABLET ORAL 3 TIMES DAILY
COMMUNITY
End: 2017-07-29 | Stop reason: HOSPADM

## 2017-03-15 RX ORDER — OXYCODONE AND ACETAMINOPHEN 10; 325 MG/1; MG/1
1 TABLET ORAL 3 TIMES DAILY PRN
Status: DISCONTINUED | OUTPATIENT
Start: 2017-03-15 | End: 2017-03-15

## 2017-03-15 RX ORDER — HYDROCODONE BITARTRATE AND ACETAMINOPHEN 10; 325 MG/1; MG/1
1 TABLET ORAL EVERY 4 HOURS PRN
Status: DISCONTINUED | OUTPATIENT
Start: 2017-03-15 | End: 2017-03-18

## 2017-03-15 RX ORDER — LEVOTHYROXINE SODIUM 0.1 MG/1
100 TABLET ORAL DAILY
Status: DISCONTINUED | OUTPATIENT
Start: 2017-03-15 | End: 2017-03-18

## 2017-03-15 RX ORDER — ALBUTEROL SULFATE 2.5 MG/3ML
2.5 SOLUTION RESPIRATORY (INHALATION) EVERY 6 HOURS PRN
Status: DISCONTINUED | OUTPATIENT
Start: 2017-03-15 | End: 2017-03-17

## 2017-03-15 RX ORDER — HYDROCODONE BITARTRATE AND ACETAMINOPHEN 10; 325 MG/1; MG/1
1 TABLET ORAL EVERY 4 HOURS PRN
Status: ON HOLD | COMMUNITY
End: 2017-07-26 | Stop reason: DRUGHIGH

## 2017-03-15 RX ORDER — CLONAZEPAM 0.5 MG/1
0.5 TABLET ORAL 3 TIMES DAILY PRN
Status: DISCONTINUED | OUTPATIENT
Start: 2017-03-15 | End: 2017-03-18

## 2017-03-15 RX ORDER — OXYBUTYNIN CHLORIDE 10 MG/1
10 TABLET, EXTENDED RELEASE ORAL DAILY
Status: DISCONTINUED | OUTPATIENT
Start: 2017-03-15 | End: 2017-03-18

## 2017-03-15 RX ORDER — ACETAMINOPHEN 325 MG/1
650 TABLET ORAL EVERY 4 HOURS PRN
Status: DISCONTINUED | OUTPATIENT
Start: 2017-03-15 | End: 2017-03-18

## 2017-03-15 RX ORDER — ISOSORBIDE MONONITRATE 30 MG/1
30 TABLET, EXTENDED RELEASE ORAL DAILY
Status: DISCONTINUED | OUTPATIENT
Start: 2017-03-15 | End: 2017-03-18

## 2017-03-15 RX ORDER — DIAZEPAM 2 MG/1
2 TABLET ORAL EVERY 12 HOURS PRN
COMMUNITY
End: 2017-06-29

## 2017-03-15 RX ORDER — IPRATROPIUM BROMIDE AND ALBUTEROL SULFATE 2.5; .5 MG/3ML; MG/3ML
3 SOLUTION RESPIRATORY (INHALATION) EVERY 4 HOURS PRN
Status: DISCONTINUED | OUTPATIENT
Start: 2017-03-15 | End: 2017-03-18

## 2017-03-15 RX ORDER — HYDROCODONE BITARTRATE AND ACETAMINOPHEN 10; 325 MG/1; MG/1
1 TABLET ORAL EVERY 4 HOURS PRN
Status: DISCONTINUED | OUTPATIENT
Start: 2017-03-15 | End: 2017-03-15

## 2017-03-15 RX ORDER — MORPHINE SULFATE 2 MG/ML
1 INJECTION, SOLUTION INTRAMUSCULAR; INTRAVENOUS EVERY 4 HOURS PRN
Status: DISCONTINUED | OUTPATIENT
Start: 2017-03-15 | End: 2017-03-15

## 2017-03-15 RX ADMIN — AMLODIPINE BESYLATE 10 MG: 10 TABLET ORAL at 15:49

## 2017-03-15 RX ADMIN — ERTAPENEM SODIUM 1 G: 1 INJECTION, POWDER, LYOPHILIZED, FOR SOLUTION INTRAMUSCULAR; INTRAVENOUS at 15:54

## 2017-03-15 RX ADMIN — ATORVASTATIN CALCIUM 40 MG: 40 TABLET, FILM COATED ORAL at 15:49

## 2017-03-15 RX ADMIN — FERROUS SULFATE TAB 325 MG (65 MG ELEMENTAL FE) 325 MG: 325 (65 FE) TAB at 15:53

## 2017-03-15 RX ADMIN — CARVEDILOL 12.5 MG: 12.5 TABLET, FILM COATED ORAL at 15:53

## 2017-03-15 RX ADMIN — ASPIRIN 81 MG: 81 TABLET, CHEWABLE ORAL at 15:49

## 2017-03-15 RX ADMIN — FLUTICASONE PROPIONATE 2 SPRAY: 50 SPRAY, METERED NASAL at 15:50

## 2017-03-15 RX ADMIN — OXYCODONE HYDROCHLORIDE AND ACETAMINOPHEN 1 TABLET: 10; 325 TABLET ORAL at 15:54

## 2017-03-15 RX ADMIN — CLOPIDOGREL 75 MG: 75 TABLET, FILM COATED ORAL at 15:50

## 2017-03-15 RX ADMIN — PREGABALIN 200 MG: 100 CAPSULE ORAL at 15:53

## 2017-03-15 RX ADMIN — VILAZODONE HYDROCHLORIDE 40 MG: 40 TABLET ORAL at 15:52

## 2017-03-15 RX ADMIN — HYDROCODONE BITARTRATE AND ACETAMINOPHEN 1 TABLET: 10; 325 TABLET ORAL at 21:38

## 2017-03-15 RX ADMIN — VANCOMYCIN HYDROCHLORIDE 2000 MG: 1 INJECTION, POWDER, LYOPHILIZED, FOR SOLUTION INTRAVENOUS at 16:18

## 2017-03-15 RX ADMIN — MODAFINIL 200 MG: 100 TABLET ORAL at 15:51

## 2017-03-15 RX ADMIN — WARFARIN SODIUM 6 MG: 6 TABLET ORAL at 16:17

## 2017-03-15 RX ADMIN — HYDROCODONE BITARTRATE AND ACETAMINOPHEN 1 TABLET: 10; 325 TABLET ORAL at 17:19

## 2017-03-15 RX ADMIN — OXYBUTYNIN CHLORIDE 10 MG: 10 TABLET, EXTENDED RELEASE ORAL at 15:51

## 2017-03-15 RX ADMIN — LEVOTHYROXINE SODIUM 100 MCG: 100 TABLET ORAL at 15:50

## 2017-03-15 RX ADMIN — ISOSORBIDE MONONITRATE 30 MG: 30 TABLET ORAL at 15:50

## 2017-03-15 RX ADMIN — DOCUSATE SODIUM 100 MG: 100 CAPSULE, LIQUID FILLED ORAL at 15:54

## 2017-03-15 NOTE — CONSULTS
Orthopaedic Consult      Patient: Frances Downs    Date of Admission: 3/15/2017  1:03 PM    YOB: 1950    Medical Record Number: [id number]    Attending Physician: Krysta Benito MD  Consulting Physician: TREASURE Valle      Chief Complaints: Infected prosthesis of right hip [T84.51XA]      History of Present Illness: 66 y.o. female admitted to [facility] with Infected prosthesis of right hip [T84.51XA]. I was consulted for further evaluation and treatment. Patient admits to right hip girdlestone procedure about a month ago by Dr Martin.  She was discharged to [facility] for right hip infection.  She was discharged on Invanz and Vancomycin but according to Dr Benito, she was only getting Vancomycin.  According to the patient, she has been draining since the operation 1 month ago.  There is no numbness, no fevers, no chills.  She is nonweightbearing at the right lower extremity at rehab.    No Known Allergies    Home Medications:  Prescriptions Prior to Admission   Medication Sig Dispense Refill Last Dose   • ALBUTEROL IN Inhale 4 (Four) Times a Day As Needed.   2/8/2017 at 0900   • amLODIPine (NORVASC) 10 MG tablet Take 10 mg by mouth Daily.   2/7/2017 at 0900   • aspirin 81 MG chewable tablet Chew 81 mg Daily.   2/7/2017 at 0900   • atorvastatin (LIPITOR) 40 MG tablet Take 40 mg by mouth daily.   2/7/2017 at 2100   • carvedilol (COREG) 12.5 MG tablet Take 12.5 mg by mouth 2 (two) times a day with meals.   2/7/2017 at 2100   • clonazePAM (KlonoPIN) 0.5 MG tablet Take 0.5 mg by mouth 3 (three) times a day as needed for seizures.   2/7/2017 at 0500   • clopidogrel (PLAVIX) 75 MG tablet Take 1 tablet by mouth daily. 90 tablet 3 2/7/2017 at 0900   • darifenacin (ENABLEX) 15 MG 24 hr tablet Take 15 mg by mouth daily.   2/7/2017 at 0900   • docusate sodium (COLACE) 100 MG capsule Take 100 mg by mouth 2 (Two) Times a Day.   2/7/2017 at 0900   • ertapenem (INVanz) 1 g/100 mL 0.9% NS  VTB (mbp) Infuse 100 mL into a venous catheter Daily for 42 days. Indications: Bone and Joint Infection 4200 mL 0    • ferrous sulfate 325 (65 FE) MG tablet Take 325 mg by mouth 2 (Two) Times a Day.   2/7/2017 at 1730   • fluticasone (FLONASE) 50 MCG/ACT nasal spray 2 sprays into each nostril daily. Administer 2 sprays in each nostril for each dose.   2/7/2017 at 0900   • ipratropium-albuterol (DUO-NEB) 0.5-2.5 mg/mL nebulizer Take 3 mL by nebulization every 4 (four) hours as needed for wheezing.   Taking   • isosorbide mononitrate (IMDUR) 30 MG 24 hr tablet Take 30 mg by mouth Daily.   2/7/2017 at 0900   • levothyroxine (SYNTHROID, LEVOTHROID) 100 MCG tablet Take 100 mcg by mouth daily.   2/8/2017 at 0600   • modafinil (PROVIGIL) 200 MG tablet Take 200 mg by mouth 2 (two) times a day.   2/7/2017 at 2100   • omeprazole (PriLOSEC) 20 MG capsule Take 20 mg by mouth 2 (two) times a day.   2/7/2017 at 2100   • oxyCODONE-acetaminophen (PERCOCET)  MG per tablet Take 1 tablet by mouth 4 (Four) Times a Day As Needed for moderate pain (4-6).   2/7/2017 at 2242   • oxyCODONE-acetaminophen (PERCOCET)  MG per tablet Take 1 tablet by mouth 3 (Three) Times a Day As Needed for moderate pain (4-6).   2/7/2017 at 0900   • potassium chloride (K-DUR) 10 MEQ CR tablet Take 10 mEq by mouth daily.   2/7/2017 at 0900   • pregabalin (LYRICA) 200 MG capsule Take 200 mg by mouth 2 (two) times a day.   2/7/2017 at 2100   • tiotropium (SPIRIVA) 18 MCG per inhalation capsule Place 1 capsule into inhaler and inhale 1 (one) time daily.   2/7/2017 at 0900   • Vancomycin HCl in Dextrose (PHARMACY TO DOSE VANCOMYCIN) Daily. 90 each 0    • vilazodone (VIIBRYD) 40 MG tablet tablet Take 40 mg by mouth daily.   2/7/2017 at 2100   • warfarin (COUMADIN) 6 MG tablet Take 6 mg by mouth Daily.   2/6/2017 at 1700       Current Medications:  Scheduled Meds:  amLODIPine 10 mg Oral Daily   aspirin 81 mg Oral Daily   atorvastatin 40 mg Oral Daily    carvedilol 12.5 mg Oral BID With Meals   clopidogrel 75 mg Oral Daily   docusate sodium 100 mg Oral BID   ertapenem 1 g Intravenous Q24H   ferrous sulfate 325 mg Oral BID   fluticasone 2 spray Nasal Daily   isosorbide mononitrate 30 mg Oral Daily   levothyroxine 100 mcg Oral Daily   modafinil 200 mg Oral Daily   oxybutynin XL 10 mg Oral Daily   [START ON 3/16/2017] pantoprazole 40 mg Oral Q AM   Pharmacy to dose vancomycin  Does not apply Daily   pregabalin 200 mg Oral BID   tiotropium 1 capsule Inhalation Daily - RT   [START ON 3/16/2017] vancomycin 1,500 mg Intravenous Q12H   vancomycin 2,000 mg Intravenous Once   vilazodone 40 mg Oral Daily   warfarin 6 mg Oral Daily     Continuous Infusions:   PRN Meds:.•  acetaminophen  •  albuterol  •  clonazePAM  •  HYDROcodone-acetaminophen  •  ipratropium-albuterol  •  Morphine **AND** naloxone  •  ondansetron  •  oxyCODONE-acetaminophen  •  oxyCODONE-acetaminophen  •  sodium chloride    Past Medical History   Diagnosis Date   • Anxiety    • Arthritis    • Bipolar disorder    • COPD (chronic obstructive pulmonary disease)    • Coronary artery disease    • Diastolic dysfunction      grade II per echocardiogram 11/18/2016   • Difficulty walking    • Disease of thyroid gland      hypothyroidism   • Dislocation of hip joint      recurrent dislocation right hip   • DVT (deep venous thrombosis)    • GERD without esophagitis    • Heartburn    • Hematoma    • Hyperlipidemia    • Hypertension    • LVH (left ventricular hypertrophy)      mild to moderate per echocardiogram 11/18/2016   • Major depressive disorder    • Mitral annular calcification      severe per echocardiogram 11/18/2016   • Mitral regurgitation      mild per echo 11/18/2016   • Mitral valve disease    • Muscle weakness    • Narcolepsy    • Overactive bladder    • Pneumonia    • Pulmonary emboli    • Pulmonary hypertension      mild per echo 11/18/2016   • Sleep apnea      obstructive   • Tricuspid regurgitation       mild to moderate per echo 11/18/2016     Past Surgical History   Procedure Laterality Date   • Total hip arthroplasty Right 2011   • Total hip arthroplasty revision Right 11/22/2016   • Knee arthroplasty Left 03/2010     TWICE   • Total knee arthroplasty revision Left 09/2010   • Other surgical history       IVC filter placement   • Orif ankle fracture Left 06/16/2011   • Hardware removal foot / ankle Left 12/01/2011   • Quadriceps tendon repair Left 06/21/2010     also done 9-    • Joint replacement     • Coronary angioplasty with stent placement       x2 stents   • Cataract extraction w/ intraocular lens implant Right    • Eye surgery       cataract surgery bilateral   • Incision and drainage hip Right 2/8/2017     Procedure: RT HIP INCISION AND DRAINAGE;  Surgeon: Erlin Martin MD;  Location: [facility] OR;  Service:    • Hip spacer insertion with antibiotic cement Right 2/13/2017     Procedure: RIGHT TOTAL HIP REMOVAL;  Surgeon: Erlin Martin MD;  Location: [facility] OR;  Service:      Social History     Occupational History   • Not on file.     Social History Main Topics   • Smoking status: Former Smoker     Packs/day: 1.00     Years: 19.00     Types: Cigarettes     Quit date: 11/21/2016   • Smokeless tobacco: Never Used   • Alcohol use No   • Drug use: No   • Sexual activity: Defer    Social History     Social History Narrative     History reviewed. No pertinent family history.      Review of Systems:   Constitutional: Negative for fatigue, fever, or weight loss  HEENT: Patient denies any headaches, vision changes, sore throat. Admits to wearing glasses  Pulmonary: Patient denies any cough, congestion, SOA, or wheezing  Cardiovascular: Patient denies any chest pain, palpitations, weakness,  Gastrointestinal:  Patient denies nausea, vomiting, diarrhea, constipation  Genital/Urinary: Patient denies dysuria, urinary frequency, urgency, incontinence, retention  Musculoskeletal: Positive for  joint pain . Negative for muscle cramps  Neurological: Patient denies dizziness, paresthesia, loss of sensation, seizures, syncope  Endocrine system: Patient denies tremors, cold or heat intolerance  Psychological: Patient denies thoughts/plans or harming self or other; hallucinations,  insomnia  Skin: Patient denies any bruising, rashes, lesions, or ulcers   Hematopoietic: Patient denies history of MRSA or slow wound healing,  spontaneous or excessive bleeding    Physical Exam: 66 y.o. female  Vitals:    03/15/17 1300 03/15/17 1500   BP: 136/69 130/62   BP Location: Left arm Right arm   Patient Position: Lying Lying   Pulse: 64 60   Resp: 16 16   Temp: 99.3 °F (37.4 °C) 98 °F (36.7 °C)   TempSrc: Oral Oral   SpO2: 95% 96%                            General Appearance:  Alert, cooperative, in no acute distress    HEENT:    Normocephalic,  Atraumatic, Pupils are equal   Neck:   No adenopathy, supple, trachea midline, no thyromegaly       Lungs:     Clear to auscultation, equal expansion bilaterally, respirations regular, even and               unlabored    Heart:    Regular rhythm and normal rate, normal S1 and S2, no murmur, no gallops   Chest Wall:    Within normal limits   Abdomen:     Normal bowel sounds, no masses,  soft non-tender, non-distended,       Rectal:  Musculoskeletal:     Deferred    + serous drainage coming from a pocket at the mid and distal incision   Extremities:   No clubbin, no edema, no cyanosis   Pulses  Neurovascular:   Pulses palpable and equal bilaterally in all 4 extremities    Patient was alert and oriented x 3 spheres   Skin:   No lesions, ulcers or rashes   Neurologic:   Cranial nerves 2 - 12 grossly intact, sensation intact            Diagnostic Tests:    Admission on 03/15/2017   Component Date Value Ref Range Status   • Glucose 03/15/2017 89  65 - 99 mg/dL Final   • BUN 03/15/2017 6* 8 - 23 mg/dL Final   • Creatinine 03/15/2017 0.71  0.57 - 1.00 mg/dL Final   • Sodium 03/15/2017 142   136 - 145 mmol/L Final   • Potassium 03/15/2017 3.8  3.5 - 5.2 mmol/L Final   • Chloride 03/15/2017 101  98 - 107 mmol/L Final   • CO2 03/15/2017 31.0* 22.0 - 29.0 mmol/L Final   • Calcium 03/15/2017 8.9  8.6 - 10.5 mg/dL Final   • Total Protein 03/15/2017 5.4* 6.0 - 8.5 g/dL Final   • Albumin 03/15/2017 2.70* 3.50 - 5.20 g/dL Final   • ALT (SGPT) 03/15/2017 7  1 - 33 U/L Final   • AST (SGOT) 03/15/2017 9  1 - 32 U/L Final   • Alkaline Phosphatase 03/15/2017 196* 39 - 117 U/L Final   • Total Bilirubin 03/15/2017 0.2  0.1 - 1.2 mg/dL Final   • eGFR Non African Amer 03/15/2017 83  >60 mL/min/1.73 Final   • Globulin 03/15/2017 2.7  gm/dL Final   • A/G Ratio 03/15/2017 1.0  g/dL Final   • BUN/Creatinine Ratio 03/15/2017 8.5  7.0 - 25.0 Final   • Anion Gap 03/15/2017 10.0  mmol/L Final   • WBC 03/15/2017 6.88  4.50 - 10.70 10*3/mm3 Final   • RBC 03/15/2017 3.07* 3.90 - 5.20 10*6/mm3 Final   • Hemoglobin 03/15/2017 9.1* 11.9 - 15.5 g/dL Final   • Hematocrit 03/15/2017 28.9* 35.6 - 45.5 % Final   • MCV 03/15/2017 94.1  80.5 - 98.2 fL Final   • MCH 03/15/2017 29.6  26.9 - 32.0 pg Final   • MCHC 03/15/2017 31.5* 32.4 - 36.3 g/dL Final   • RDW 03/15/2017 15.4* 11.7 - 13.0 % Final   • RDW-SD 03/15/2017 53.2  37.0 - 54.0 fl Final   • MPV 03/15/2017 10.1  6.0 - 12.0 fL Final   • Platelets 03/15/2017 292  140 - 500 10*3/mm3 Final   • Neutrophil % 03/15/2017 51.8  42.7 - 76.0 % Final   • Lymphocyte % 03/15/2017 33.1  19.6 - 45.3 % Final   • Monocyte % 03/15/2017 9.3  5.0 - 12.0 % Final   • Eosinophil % 03/15/2017 4.8  0.3 - 6.2 % Final   • Basophil % 03/15/2017 0.7  0.0 - 1.5 % Final   • Immature Grans % 03/15/2017 0.3  0.0 - 0.5 % Final   • Neutrophils, Absolute 03/15/2017 3.56  1.90 - 8.10 10*3/mm3 Final   • Lymphocytes, Absolute 03/15/2017 2.28  0.90 - 4.80 10*3/mm3 Final   • Monocytes, Absolute 03/15/2017 0.64  0.20 - 1.20 10*3/mm3 Final   • Eosinophils, Absolute 03/15/2017 0.33  0.00 - 0.70 10*3/mm3 Final   • Basophils,  Absolute 03/15/2017 0.05  0.00 - 0.20 10*3/mm3 Final   • Immature Grans, Absolute 03/15/2017 0.02  0.00 - 0.03 10*3/mm3 Final   • Protime 03/15/2017 15.0* 11.7 - 14.2 Seconds Final   • INR 03/15/2017 1.23* 0.90 - 1.10 Final       Xr Chest 1 View    Result Date: 2/15/2017  Narrative: XR CHEST 1 VW-  HISTORY: Female who is 66 years-old,  hypoxia, fever  TECHNIQUE: Frontal view of the chest  COMPARISON: None available  FINDINGS: Left PICC extends to the superior vena cava. The heart is mildly enlarged. Aorta is calcified. The pulmonary vasculature is slightly congested. No focal pulmonary consolidation, pleural effusion, or pneumothorax. No acute osseous process.      Impression: No focal pulmonary consolidation. Mild cardiomegaly with slight pulmonary vascular congestion. Follow-up as clinical indications persist.  This report was finalized on 2/15/2017 6:23 PM by Dr. Terence Ross MD.      Xr Pelvis 1 Or 2 View    Result Date: 2/13/2017  Narrative: PELVIS X-RAY  CLINICAL HISTORY: Postop removal of right total hip arthroplasty  Compared to the preop x-ray dated to 2/8/2017.  In the interval, the right hip arthroplasty has been removed. There is a comminuted fracture of the right iliac bone involving the quadrilateral plate and anterior and posterior columns that was also present on the preop x-ray. There is a lucency in the proximal femur due to the femoral stem component of the arthroplasty that has been removed. 3 cerclage wires are now in place within the proximal right femur. The left hip joint is unremarkable.  This report was finalized on 2/13/2017 8:42 PM by Dr. César Boland MD.          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    NPO at midnight.  Hold Anticoagulants    Date: 3/15/2017  TREASURE Valle    Time: 30 min

## 2017-03-15 NOTE — H&P
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   Goal trough: 15-20  mg/L   Other antimicrobials: Invanz    Relevant clinical data and objective history reviewed:  66 y.o. female        Past Medical History   Diagnosis Date   • Anxiety    • Arthritis    • Bipolar disorder    • COPD (chronic obstructive pulmonary disease)    • Coronary artery disease    • Diastolic dysfunction      grade II per echocardiogram 11/18/2016   • Difficulty walking    • Disease of thyroid gland      hypothyroidism   • Dislocation of hip joint      recurrent dislocation right hip   • DVT (deep venous thrombosis)    • GERD without esophagitis    • Heartburn    • Hematoma    • Hyperlipidemia    • Hypertension    • LVH (left ventricular hypertrophy)      mild to moderate per echocardiogram 11/18/2016   • Major depressive disorder    • Mitral annular calcification      severe per echocardiogram 11/18/2016   • Mitral regurgitation      mild per echo 11/18/2016   • Mitral valve disease    • Muscle weakness    • Narcolepsy    • Overactive bladder    • Pneumonia    • Pulmonary emboli    • Pulmonary hypertension      mild per echo 11/18/2016   • Sleep apnea      obstructive   • Tricuspid regurgitation      mild to moderate per echo 11/18/2016   No results found for: CREATININE, BUN  CrCl cannot be calculated (Unknown ideal weight.).    Lab Results   Component Value Date    WBC 8.49 02/16/2017     Temp Readings from Last 3 Encounters:   03/15/17 99.3 °F (37.4 °C) (Oral)   02/18/17 99 °F (37.2 °C) (Oral)      Baseline culture/source/susceptibility: 3/15   WC  In process   Note:   Here for the follow up after right hip polymicrobial infection - MRSA and morganella  morganii  Discharged on 2/18/2017    Dosing History  (note: using weight from 2/8/17  103kg    Height 163cm    Scr 0.52)  RN will update as soon as available)      Crcl>100ml/min  3/15 ~ 1600  Vancomycin 2000mg load ( 19.4mg/kg)  3/16 ~ 0400  Vancomycin 1500mg ( 15mg/kg) iv q 12 hours  3/17    0330  Vancomycin trough prior to 4th overall dose  Assessment/Plan    1. See above dosing history  2. CMP with am labs  3. Pharmacy will monitor and adjust accordingly.        Maria Byrd, [facility]

## 2017-03-15 NOTE — PROGRESS NOTES
Here for the follow up after right hip polymicrobial infection - MRSA and morganella morganii  Discharged on 2017    Infectious Diseases Progress Note    Krysta Benito MD     [facility]  [place]: 0 days  Patient Identification:  Name: Frances Downs  Age: 66 y.o.  Sex: female  :  1950  MRN: [id number]         Primary Care Physician: Talha Echeverria MD            Subjective:feeling ok   Interval History: discharged from the hospital after explant of infected right hip and girdle stone conversion with intraoperative culture positive for MRSA and Morganella morganii.     Objective:    Scheduled Meds:  Continuous Infusions:  No current facility-administered medications for this visit.     Vital signs in last 24 hours:      Exam:  Afebrile and vital sign stable  General Appearance:    Alert, cooperative, no distress, AAOx3                          Head:    Normocephalic, without obvious abnormality, atraumatic                           Eyes:    PERRL, conjunctiva/corneas clear, EOM's intact, both eyes                         Throat:   Lips, tongue, gums normal; oral mucosa pink and moist                           Neck:   Supple, symmetrical, trachea midline, no JVD                         Lungs:    Clear to auscultation bilaterally, respirations unlabored                 Chest Wall:    No tenderness or deformity                          Heart:    Regular rate and rhythm, S1 and S2 normal, no murmur,                  Abdomen:     Soft, non-tender, bowel sounds active, no masses, no                                                          Extremities:   Persistent drainage from the right hip and erythema of the surgical incision. Left arm PICC in place.                        Pulses:   Pulses palpable in all extremities                            Skin:   Skin is warm and dry,  no rashes or palpable lesions                     Data Review:    I reviewed the patient's new clinical results.               Assessment:  Active Problems:    * No active hospital problems. *  ight hip polymicrobial infection - MRSA and morganella morganii s/p I&D and explant of hard ware and girdle stone conversion.  Upon reviewing the MAR from nursing home it appears that she is not getting INVANZ as recommended. She is only getting vanc.    Plan:  Admit the patient in the hospital and start IV invanz and continue vancomycin. Will consult DR. Martin.    Krysta Benito MD  3/15/2017  11:52 AM    Much of this encounter note is an electronic transcription/translation of spoken language to printed text. The electronic translation of spoken language may permit erroneous, or at times, nonsensical words or phrases to be inadvertently transcribed; Although I have reviewed the note for such errors, some may still exist

## 2017-03-15 NOTE — CONSULTS
Referring Provider: Krysta Benito MD  Reason for Consultation: To evaluate chronic medical conditions that may be exacerbated postoperatively  PCP: Talha Echeverria MD      HPI  Patient is a 66 y.o. female presents with history of a right hip infection presents for shin due to increased wound drainage and not receiving full antibiotic regimen.  Patient underwent a revision of her right hip at the end of 2016 and ended up with a hematoma that was evacuated in very 2017 and continued to have profuse drainage and a nonhealing wound.  She was admitted in February 2017 and had a conversion of hardware to a [place].  Intraoperative cultures grew back MRSA and Morganella morganii.  He was to receive IV Invanz and vancomycin through a PICC line till April 1 while at skilled nursing facility.  However patient followed up with infectious disease today and patient's wound is draining everywhere with surrounding edema and per records may have not been getting the Invanz.  States that the wound has been draining for about a month now.  She is nonweightbearing on the right.      PAST MEDICAL HISTORY  Past Medical History   Diagnosis Date   • Anxiety    • Arthritis    • Bipolar disorder    • COPD (chronic obstructive pulmonary disease)    • Coronary artery disease    • Diastolic dysfunction      grade II per echocardiogram 11/18/2016   • Difficulty walking    • Disease of thyroid gland      hypothyroidism   • Dislocation of hip joint      recurrent dislocation right hip   • DVT (deep venous thrombosis)    • GERD without esophagitis    • Heartburn    • Hematoma    • Hyperlipidemia    • Hypertension    • LVH (left ventricular hypertrophy)      mild to moderate per echocardiogram 11/18/2016   • Major depressive disorder    • Mitral annular calcification      severe per echocardiogram 11/18/2016   • Mitral regurgitation      mild per echo 11/18/2016   • Mitral valve disease    • Muscle weakness    • Narcolepsy    • Overactive  bladder    • Pneumonia    • Pulmonary emboli    • Pulmonary hypertension      mild per echo 11/18/2016   • Sleep apnea      obstructive   • Tricuspid regurgitation      mild to moderate per echo 11/18/2016       PAST SURGICAL HISTORY  Past Surgical History   Procedure Laterality Date   • Total hip arthroplasty Right 2011   • Total hip arthroplasty revision Right 11/22/2016   • Knee arthroplasty Left 03/2010     TWICE   • Total knee arthroplasty revision Left 09/2010   • Other surgical history       IVC filter placement   • Orif ankle fracture Left 06/16/2011   • Hardware removal foot / ankle Left 12/01/2011   • Quadriceps tendon repair Left 06/21/2010     also done 9-    • Joint replacement     • Coronary angioplasty with stent placement       x2 stents   • Cataract extraction w/ intraocular lens implant Right    • Eye surgery       cataract surgery bilateral   • Incision and drainage hip Right 2/8/2017     Procedure: RT HIP INCISION AND DRAINAGE;  Surgeon: Erlin Martin MD;  Location: [facility];  Service:    • Hip spacer insertion with antibiotic cement Right 2/13/2017     Procedure: RIGHT TOTAL HIP REMOVAL;  Surgeon: Erlin Martin MD;  Location: [facility];  Service:        FAMILY HISTORY  History reviewed. No pertinent family history.    SOCIAL HISTORY  Social History   Substance Use Topics   • Smoking status: Former Smoker     Packs/day: 1.00     Years: 19.00     Types: Cigarettes     Quit date: 11/21/2016   • Smokeless tobacco: Never Used   • Alcohol use No       MEDICATIONS:  Prescriptions Prior to Admission   Medication Sig Dispense Refill Last Dose   • ALBUTEROL IN Inhale 4 (Four) Times a Day As Needed.   2/8/2017 at 0900   • amLODIPine (NORVASC) 10 MG tablet Take 10 mg by mouth Daily.   2/7/2017 at 0900   • aspirin 81 MG chewable tablet Chew 81 mg Daily.   2/7/2017 at 0900   • atorvastatin (LIPITOR) 40 MG tablet Take 40 mg by mouth daily.   2/7/2017 at 2100   • carvedilol (COREG)  12.5 MG tablet Take 12.5 mg by mouth 2 (two) times a day with meals.   2/7/2017 at 2100   • clonazePAM (KlonoPIN) 0.5 MG tablet Take 0.5 mg by mouth 3 (three) times a day as needed for seizures.   2/7/2017 at 0500   • clopidogrel (PLAVIX) 75 MG tablet Take 1 tablet by mouth daily. 90 tablet 3 2/7/2017 at 0900   • darifenacin (ENABLEX) 15 MG 24 hr tablet Take 15 mg by mouth daily.   2/7/2017 at 0900   • docusate sodium (COLACE) 100 MG capsule Take 100 mg by mouth 2 (Two) Times a Day.   2/7/2017 at 0900   • ertapenem (INVanz) 1 g/100 mL 0.9% NS VTB (mbp) Infuse 100 mL into a venous catheter Daily for 42 days. Indications: Bone and Joint Infection 4200 mL 0    • ferrous sulfate 325 (65 FE) MG tablet Take 325 mg by mouth 2 (Two) Times a Day.   2/7/2017 at 1730   • fluticasone (FLONASE) 50 MCG/ACT nasal spray 2 sprays into each nostril daily. Administer 2 sprays in each nostril for each dose.   2/7/2017 at 0900   • ipratropium-albuterol (DUO-NEB) 0.5-2.5 mg/mL nebulizer Take 3 mL by nebulization every 4 (four) hours as needed for wheezing.   Taking   • isosorbide mononitrate (IMDUR) 30 MG 24 hr tablet Take 30 mg by mouth Daily.   2/7/2017 at 0900   • levothyroxine (SYNTHROID, LEVOTHROID) 100 MCG tablet Take 100 mcg by mouth daily.   2/8/2017 at 0600   • modafinil (PROVIGIL) 200 MG tablet Take 200 mg by mouth 2 (two) times a day.   2/7/2017 at 2100   • omeprazole (PriLOSEC) 20 MG capsule Take 20 mg by mouth 2 (two) times a day.   2/7/2017 at 2100   • oxyCODONE-acetaminophen (PERCOCET)  MG per tablet Take 1 tablet by mouth 4 (Four) Times a Day As Needed for moderate pain (4-6).   2/7/2017 at 2242   • oxyCODONE-acetaminophen (PERCOCET)  MG per tablet Take 1 tablet by mouth 3 (Three) Times a Day As Needed for moderate pain (4-6).   2/7/2017 at 0900   • potassium chloride (K-DUR) 10 MEQ CR tablet Take 10 mEq by mouth daily.   2/7/2017 at 0900   • pregabalin (LYRICA) 200 MG capsule Take 200 mg by mouth 2 (two)  "times a day.   2/7/2017 at 2100   • tiotropium (SPIRIVA) 18 MCG per inhalation capsule Place 1 capsule into inhaler and inhale 1 (one) time daily.   2/7/2017 at 0900   • Vancomycin HCl in Dextrose (PHARMACY TO DOSE VANCOMYCIN) Daily. 90 each 0    • vilazodone (VIIBRYD) 40 MG tablet tablet Take 40 mg by mouth daily.   2/7/2017 at 2100   • warfarin (COUMADIN) 6 MG tablet Take 6 mg by mouth Daily.   2/6/2017 at 1700       Allergies:  Review of patient's allergies indicates no known allergies.    Review of Systems:  Fatigue, picc line, slow healing wound, +mrsa,  joint pain, NWB on Right  Negative for following (except as per HPI):  Constitution: chills, fevers,   Eyes: change of vision, loss of vision and discharge  ENT: ear drainage, ear ringing and facial trauma  Respiratory: cough, pleuritic pain, shortness of air  Cardiovascular: chest pressure, pain, lower extremity edema, palpitations  Gastrointestinal: constipation, diarrhea, nausea, vomiting, pain    Integument: rash and wound  Hematologic / Lymphatic: excessive bleeding and easy bruising  Musculoskeletal:, joint stiffness, joint swelling and muscle pain  Neurological: headaches, numbness, seizures and tremors  Behavioral / Psych: anxiety, depression and hallucinations         Vital Signs  Temp:  [98 °F (36.7 °C)-99.3 °F (37.4 °C)] 98 °F (36.7 °C)  Heart Rate:  [60-64] 60  Resp:  [16] 16  BP: (130-136)/(62-69) 130/62  Flowsheet Rows         First Filed Value    Admission Height  65\" (165.1 cm) Documented at 03/15/2017 1500    Admission Weight  226 lb (103 kg) Documented at 03/15/2017 1500           Physical Exam:  General Appearance:    Alert, cooperative, in no acute distress   Head:    Normocephalic, without obvious abnormality, atraumatic   Eyes:         conjunctivae and sclerae normal, no icterus, PERRLA   ENT:    Ears grossly intact, oral mucosa moist,   Neck:   No adenopathy, supple, trachea midline,        Lungs:     Clear to auscultation,respirations " regular, even and                   unlabored    Heart:    Regular rhythm and normal rate,  no murmur, normal S1 and S2,   Abdomen:     Normal bowel sounds, no masses,  soft non-tender, non-distended,    Extremities:   Moves all extremities well, no cyanosis, no edema,             Pulses:   Pulses palpable and equal bilaterally   Skin:   No bleeding, rash, bruising. Right hip Incision with serous drainage, surrounding erythema    Neurologic:    Psych:   Cranial nerves 2 - 12 grossly intact, sensation intact,     Moves all extremities, equal bilateral strength    Alert and Oriented x 3, Normal Affect     LABS:  Admission on 03/15/2017   Component Date Value Ref Range Status   • Glucose 03/15/2017 89  65 - 99 mg/dL Final   • BUN 03/15/2017 6* 8 - 23 mg/dL Final   • Creatinine 03/15/2017 0.71  0.57 - 1.00 mg/dL Final   • Sodium 03/15/2017 142  136 - 145 mmol/L Final   • Potassium 03/15/2017 3.8  3.5 - 5.2 mmol/L Final   • Chloride 03/15/2017 101  98 - 107 mmol/L Final   • CO2 03/15/2017 31.0* 22.0 - 29.0 mmol/L Final   • Calcium 03/15/2017 8.9  8.6 - 10.5 mg/dL Final   • Total Protein 03/15/2017 5.4* 6.0 - 8.5 g/dL Final   • Albumin 03/15/2017 2.70* 3.50 - 5.20 g/dL Final   • ALT (SGPT) 03/15/2017 7  1 - 33 U/L Final   • AST (SGOT) 03/15/2017 9  1 - 32 U/L Final   • Alkaline Phosphatase 03/15/2017 196* 39 - 117 U/L Final   • Total Bilirubin 03/15/2017 0.2  0.1 - 1.2 mg/dL Final   • eGFR Non African Amer 03/15/2017 83  >60 mL/min/1.73 Final   • Globulin 03/15/2017 2.7  gm/dL Final   • A/G Ratio 03/15/2017 1.0  g/dL Final   • BUN/Creatinine Ratio 03/15/2017 8.5  7.0 - 25.0 Final   • Anion Gap 03/15/2017 10.0  mmol/L Final   • WBC 03/15/2017 6.88  4.50 - 10.70 10*3/mm3 Final   • RBC 03/15/2017 3.07* 3.90 - 5.20 10*6/mm3 Final   • Hemoglobin 03/15/2017 9.1* 11.9 - 15.5 g/dL Final   • Hematocrit 03/15/2017 28.9* 35.6 - 45.5 % Final   • MCV 03/15/2017 94.1  80.5 - 98.2 fL Final   • MCH 03/15/2017 29.6  26.9 - 32.0 pg Final    • MCHC 03/15/2017 31.5* 32.4 - 36.3 g/dL Final   • RDW 03/15/2017 15.4* 11.7 - 13.0 % Final   • RDW-SD 03/15/2017 53.2  37.0 - 54.0 fl Final   • MPV 03/15/2017 10.1  6.0 - 12.0 fL Final   • Platelets 03/15/2017 292  140 - 500 10*3/mm3 Final   • Neutrophil % 03/15/2017 51.8  42.7 - 76.0 % Final   • Lymphocyte % 03/15/2017 33.1  19.6 - 45.3 % Final   • Monocyte % 03/15/2017 9.3  5.0 - 12.0 % Final   • Eosinophil % 03/15/2017 4.8  0.3 - 6.2 % Final   • Basophil % 03/15/2017 0.7  0.0 - 1.5 % Final   • Immature Grans % 03/15/2017 0.3  0.0 - 0.5 % Final   • Neutrophils, Absolute 03/15/2017 3.56  1.90 - 8.10 10*3/mm3 Final   • Lymphocytes, Absolute 03/15/2017 2.28  0.90 - 4.80 10*3/mm3 Final   • Monocytes, Absolute 03/15/2017 0.64  0.20 - 1.20 10*3/mm3 Final   • Eosinophils, Absolute 03/15/2017 0.33  0.00 - 0.70 10*3/mm3 Final   • Basophils, Absolute 03/15/2017 0.05  0.00 - 0.20 10*3/mm3 Final   • Immature Grans, Absolute 03/15/2017 0.02  0.00 - 0.03 10*3/mm3 Final   • Protime 03/15/2017 15.0* 11.7 - 14.2 Seconds Final   • INR 03/15/2017 1.23* 0.90 - 1.10 Final       DIAGNOSTICS:  Xr Chest 1 View  Result Date: 2/15/2017  Narrative: XR CHEST 1 VW-  HISTORY: Female who is 66 years-old,  hypoxia, fever  TECHNIQUE: Frontal view of the chest  COMPARISON: None available  FINDINGS: Left PICC extends to the superior vena cava. The heart is mildly enlarged. Aorta is calcified. The pulmonary vasculature is slightly congested. No focal pulmonary consolidation, pleural effusion, or pneumothorax. No acute osseous process.      Impression: No focal pulmonary consolidation. Mild cardiomegaly with slight pulmonary vascular congestion. Follow-up as clinical indications persist.  This report was finalized on 2/15/2017 6:23 PM by Dr. Terence Ross MD.      Xr Pelvis 1 Or 2 View  Result Date: 2/13/2017  Narrative: PELVIS X-RAY  CLINICAL HISTORY: Postop removal of right total hip arthroplasty  Compared to the preop x-ray dated to  2/8/2017.  In the interval, the right hip arthroplasty has been removed. There is a comminuted fracture of the right iliac bone involving the quadrilateral plate and anterior and posterior columns that was also present on the preop x-ray. There is a lucency in the proximal femur due to the femoral stem component of the arthroplasty that has been removed. 3 cerclage wires are now in place within the proximal right femur. The left hip joint is unremarkable.  This report was finalized on 2/13/2017 8:42 PM by Dr. César Boland MD.           Results Review:   I reviewed the patient's new clinical results.  I personally viewed and interpreted the patient's EKG- SR HR 58      ASSESSMENT AND PLAN     + Mixed infection or prosthetic Right hip ( MRSA (methicillin resistant Staphylococcus aureus) infection  Bacterial infection due to Morganella morganii)  -IV Vanco and Invanz   -Ortho considering taking to the OR for I&D again   -Reculturing   -Infectious disease following   -PT and OT to evaluate and treat as soon as possible   -Patient states that the Percocet are too strong and therefore will change to Lortab and place the morphine to when necessary breakthrough pain      +h/o  DVT (deep venous thrombosis) and PE  -reStart anticoagulants  when stable     + Hypertension,   Hyperlipidemia,   Coronary artery disease, hx of stent,   -sTable continue medical management      +  Bipolar disorder, major depressive disorder, or anxiety  -stable, continue medical managment    +DENISE  -cpap ordered     + hypoThyroidism  -sTable continue Synthroid     +copd  -pt is on Spiriva and when necessary albuterol nebulizer treatments   -We'll continue medical management       + DVT prophylaxis SCDs and VERONA hose for now until surgery is decided   -Lasix and Coumadin are being held     I discussed the patients findings and my recommendations with the patient and/or family.  Please reference all orders placed.    Jelly Palmer,  MD  03/15/17  7:37 PM

## 2017-03-15 NOTE — H&P
Infectious disease history and physical  Infectious Diseases - Krysta Guerra MD  [facility]       Patient Identification:  Name: Frances Downs  Age: 66 y.o.  Sex: female  :  1950  MRN: [id number]                   Primary Care Physician: Talha Echeverria MD                                           History of Present Illness:   Patient is a 66-year-old female who has revision of her right hip at the end of last year.  Subsequently she developed hematoma that required surgery in January of this year which resulted in profuse drainage and nonhealing of the wound.  Patient was brought in for drainage of infected hematoma in February of this year and was found to have prosthetic joint infection that resulted  The hardware and conversion to [place].  Intraoperative culture did come back positive for MRSA and Morganella morganii night.  She also had Enterobacter urinary tract infection.  Patient was eventually discharged on 2017 on IV Invanz and IV vancomycin with a PICC line in the left arm.  The plan was to continue it for 42 days from 2017.  Patient has been residing in nursing home and has had follow-up with Dr. Martin since then.  Patient recalls Dr. Martin telling her to keep an eye on the wound  this was draining and may need another surgery.  Denies any fever or chills.  She does have nausea.  Patient came for a scheduled follow-up with me at the infectious disease office today and the it appeared that the drainage from the right hip incision has gotten worse with everything is soaking wet.  It is draining profusely with surrounding erythema.  Not much tenderness in that area.  Upon further review of the nursing home records it appeared the patient is only getting vancomycin and has not gotten any Invanz.  This on this change in status and the fact that she may not have been receiving the complete regimen it was decided that patient would need hospitalization.   Patient will need evaluation by orthopedic surgery service to decide if in the assessment she would need further I&D.  This will also give us an opportunity to make sure that when she is discharged she is on proper regimen that continue to be followed in the outpatient setting in the rehabilitation facility.      Past Medical History:  Past Medical History   Diagnosis Date   • Anxiety    • Arthritis    • Bipolar disorder    • COPD (chronic obstructive pulmonary disease)    • Coronary artery disease    • Diastolic dysfunction      grade II per echocardiogram 11/18/2016   • Difficulty walking    • Disease of thyroid gland      hypothyroidism   • Dislocation of hip joint      recurrent dislocation right hip   • DVT (deep venous thrombosis)    • GERD without esophagitis    • Heartburn    • Hematoma    • Hyperlipidemia    • Hypertension    • LVH (left ventricular hypertrophy)      mild to moderate per echocardiogram 11/18/2016   • Major depressive disorder    • Mitral annular calcification      severe per echocardiogram 11/18/2016   • Mitral regurgitation      mild per echo 11/18/2016   • Mitral valve disease    • Muscle weakness    • Narcolepsy    • Overactive bladder    • Pneumonia    • Pulmonary emboli    • Pulmonary hypertension      mild per echo 11/18/2016   • Sleep apnea      obstructive   • Tricuspid regurgitation      mild to moderate per echo 11/18/2016     Past Surgical History:  Past Surgical History   Procedure Laterality Date   • Total hip arthroplasty Right 2011   • Total hip arthroplasty revision Right 11/22/2016   • Knee arthroplasty Left 03/2010     TWICE   • Total knee arthroplasty revision Left 09/2010   • Other surgical history       IVC filter placement   • Orif ankle fracture Left 06/16/2011   • Hardware removal foot / ankle Left 12/01/2011   • Quadriceps tendon repair Left 06/21/2010     also done 9-    • Joint replacement     • Coronary angioplasty with stent placement       x2 stents   •  Cataract extraction w/ intraocular lens implant Right    • Eye surgery       cataract surgery bilateral   • Incision and drainage hip Right 2/8/2017     Procedure: RT HIP INCISION AND DRAINAGE;  Surgeon: Erlin Martin MD;  Location: [facility];  Service:    • Hip spacer insertion with antibiotic cement Right 2/13/2017     Procedure: RIGHT TOTAL HIP REMOVAL;  Surgeon: Erlin Martin MD;  Location: [facility];  Service:       Home Meds:  Prescriptions Prior to Admission   Medication Sig Dispense Refill Last Dose   • ALBUTEROL IN Inhale 4 (Four) Times a Day As Needed.   2/8/2017 at 0900   • amLODIPine (NORVASC) 10 MG tablet Take 10 mg by mouth Daily.   2/7/2017 at 0900   • aspirin 81 MG chewable tablet Chew 81 mg Daily.   2/7/2017 at 0900   • atorvastatin (LIPITOR) 40 MG tablet Take 40 mg by mouth daily.   2/7/2017 at 2100   • carvedilol (COREG) 12.5 MG tablet Take 12.5 mg by mouth 2 (two) times a day with meals.   2/7/2017 at 2100   • clonazePAM (KlonoPIN) 0.5 MG tablet Take 0.5 mg by mouth 3 (three) times a day as needed for seizures.   2/7/2017 at 0500   • clopidogrel (PLAVIX) 75 MG tablet Take 1 tablet by mouth daily. 90 tablet 3 2/7/2017 at 0900   • darifenacin (ENABLEX) 15 MG 24 hr tablet Take 15 mg by mouth daily.   2/7/2017 at 0900   • docusate sodium (COLACE) 100 MG capsule Take 100 mg by mouth 2 (Two) Times a Day.   2/7/2017 at 0900   • ertapenem (INVanz) 1 g/100 mL 0.9% NS VTB (mbp) Infuse 100 mL into a venous catheter Daily for 42 days. Indications: Bone and Joint Infection 4200 mL 0    • ferrous sulfate 325 (65 FE) MG tablet Take 325 mg by mouth 2 (Two) Times a Day.   2/7/2017 at 1730   • fluticasone (FLONASE) 50 MCG/ACT nasal spray 2 sprays into each nostril daily. Administer 2 sprays in each nostril for each dose.   2/7/2017 at 0900   • ipratropium-albuterol (DUO-NEB) 0.5-2.5 mg/mL nebulizer Take 3 mL by nebulization every 4 (four) hours as needed for wheezing.   Taking   • isosorbide  mononitrate (IMDUR) 30 MG 24 hr tablet Take 30 mg by mouth Daily.   2/7/2017 at 0900   • levothyroxine (SYNTHROID, LEVOTHROID) 100 MCG tablet Take 100 mcg by mouth daily.   2/8/2017 at 0600   • modafinil (PROVIGIL) 200 MG tablet Take 200 mg by mouth 2 (two) times a day.   2/7/2017 at 2100   • omeprazole (PriLOSEC) 20 MG capsule Take 20 mg by mouth 2 (two) times a day.   2/7/2017 at 2100   • oxyCODONE-acetaminophen (PERCOCET)  MG per tablet Take 1 tablet by mouth 4 (Four) Times a Day As Needed for moderate pain (4-6).   2/7/2017 at 2242   • oxyCODONE-acetaminophen (PERCOCET)  MG per tablet Take 1 tablet by mouth 3 (Three) Times a Day As Needed for moderate pain (4-6).   2/7/2017 at 0900   • potassium chloride (K-DUR) 10 MEQ CR tablet Take 10 mEq by mouth daily.   2/7/2017 at 0900   • pregabalin (LYRICA) 200 MG capsule Take 200 mg by mouth 2 (two) times a day.   2/7/2017 at 2100   • tiotropium (SPIRIVA) 18 MCG per inhalation capsule Place 1 capsule into inhaler and inhale 1 (one) time daily.   2/7/2017 at 0900   • Vancomycin HCl in Dextrose (PHARMACY TO DOSE VANCOMYCIN) Daily. 90 each 0    • vilazodone (VIIBRYD) 40 MG tablet tablet Take 40 mg by mouth daily.   2/7/2017 at 2100   • warfarin (COUMADIN) 6 MG tablet Take 6 mg by mouth Daily.   2/6/2017 at 1700     Current Meds:   No current facility-administered medications for this encounter.   Allergies:  No Known Allergies  Social History:   Social History   Substance Use Topics   • Smoking status: Former Smoker     Packs/day: 1.00     Years: 19.00     Types: Cigarettes     Quit date: 11/21/2016   • Smokeless tobacco: Never Used   • Alcohol use No      Family History:  No family history on file.       Review of Systems  See history of present illness and past medical history.    Patient denies headache, dizziness, syncope, falls, trauma, change in vision, change in hearing, change in taste, changes in weight, changes in appetite, focal weakness, numbness,  or paresthesia.    Patient denies chest pain, palpitations, dyspnea, orthopnea, PND, cough, sinus pressure, rhinorrhea, epistaxis, hemoptysis,   has nausea,   No vomiting,hematemesis, diarrhea, constipation or hematchezia.  Denies cold or heat intolerance, polydipsia, polyuria, polyphagia. Denies hematuria, pyuria, dysuria, hesitancy, frequency or urgency.  Denies fever, chills, sweats, night sweats.  Denies missing any routine medications. Remainder of ROS is negative.      Vitals:   There were no vitals taken for this visit.  I/O: No intake or output data in the 24 hours ending 03/15/17 1329  Exam:  General Appearance:    Alert, cooperative, no distress, appears stated age   Head:    Normocephalic, without obvious abnormality, atraumatic   Eyes:    PERRL, conjunctiva/corneas clear, EOM's intact, both eyes   Ears:    Normal external ear canals, both ears   Nose:   Nares normal, septum midline, mucosa normal, no drainage    or sinus tenderness   Throat:   Lips, tongue, gums normal; oral mucosa pink and moist   Neck:   Supple, symmetrical, trachea midline, no adenopathy;     thyroid:  no enlargement/tenderness/nodules; no carotid    bruit or JVD   Back:     Symmetric, no curvature, ROM normal, no CVA tenderness   Lungs:     Clear to auscultation bilaterally, respirations unlabored   Chest Wall:    No tenderness or deformity    Heart:    Regular rate and rhythm, S1 and S2 normal, no murmur, rub   or gallop   Abdomen:     Soft, non-tender, bowel sounds active all four quadrants,     no masses, no hepatomegaly, no splenomegaly   Extremities:   profuse drainage from the right hip surgical incision with surrounding erythema, left arm PICC line in place    Pulses:   Pulses palpable in all extremities; symmetric all extremities   Skin:   Skin color normal, Skin is warm and dry,  no rashes or palpable lesions       Data Review:    I reviewed the patient's new clinical results.            Assessment:  Principal Problem:     Mixed infection  Active Problems:    Hematoma of right hip    DVT (deep venous thrombosis), hx of    Hypertension    Hyperlipidemia    Coronary artery disease, hx of stent    Arthritis    Bipolar disorder    Infected prosthesis of right hip    MRSA (methicillin resistant Staphylococcus aureus) infection    Bacterial infection due to Morganella morganii      Plan:  Admit the patient.  Send Gram stain and culture from the draining wound.  Orthopedic surgery consultation.  Nothing by mouth after midnight and further management as her condition evolves  Krysta Benito MD   3/15/2017  1:29 PM    Much of this encounter note is an electronic transcription/translation of spoken language to printed text. The electronic translation of spoken language may permit erroneous, or at times, nonsensical words or phrases to be inadvertently transcribed; Although I have reviewed the note for such errors, some may still exist

## 2017-03-16 PROBLEM — E87.6 HYPOKALEMIA: Status: ACTIVE | Noted: 2017-03-16

## 2017-03-16 LAB
ALBUMIN SERPL-MCNC: 2.7 G/DL (ref 3.5–5.2)
ALBUMIN/GLOB SERPL: 1 G/DL
ALP SERPL-CCNC: 195 U/L (ref 39–117)
ALT SERPL W P-5'-P-CCNC: 6 U/L (ref 1–33)
ANION GAP SERPL CALCULATED.3IONS-SCNC: 10.6 MMOL/L
AST SERPL-CCNC: 9 U/L (ref 1–32)
BACTERIA UR QL AUTO: ABNORMAL /HPF
BASOPHILS # BLD AUTO: 0.08 10*3/MM3 (ref 0–0.2)
BASOPHILS NFR BLD AUTO: 1.3 % (ref 0–1.5)
BILIRUB SERPL-MCNC: 0.2 MG/DL (ref 0.1–1.2)
BILIRUB UR QL STRIP: NEGATIVE
BUN BLD-MCNC: 6 MG/DL (ref 8–23)
BUN/CREAT SERPL: 9.1 (ref 7–25)
CALCIUM SPEC-SCNC: 8.9 MG/DL (ref 8.6–10.5)
CHLORIDE SERPL-SCNC: 102 MMOL/L (ref 98–107)
CLARITY UR: ABNORMAL
CO2 SERPL-SCNC: 30.4 MMOL/L (ref 22–29)
COLOR UR: YELLOW
CREAT BLD-MCNC: 0.66 MG/DL (ref 0.57–1)
DEPRECATED RDW RBC AUTO: 52.4 FL (ref 37–54)
EOSINOPHIL # BLD AUTO: 0.37 10*3/MM3 (ref 0–0.7)
EOSINOPHIL NFR BLD AUTO: 6.2 % (ref 0.3–6.2)
ERYTHROCYTE [DISTWIDTH] IN BLOOD BY AUTOMATED COUNT: 15.4 % (ref 11.7–13)
GFR SERPL CREATININE-BSD FRML MDRD: 90 ML/MIN/1.73
GLOBULIN UR ELPH-MCNC: 2.8 GM/DL
GLUCOSE BLD-MCNC: 90 MG/DL (ref 65–99)
GLUCOSE UR STRIP-MCNC: NEGATIVE MG/DL
HCT VFR BLD AUTO: 29.3 % (ref 35.6–45.5)
HGB BLD-MCNC: 9.2 G/DL (ref 11.9–15.5)
HGB UR QL STRIP.AUTO: NEGATIVE
HYALINE CASTS UR QL AUTO: ABNORMAL /LPF
IMM GRANULOCYTES # BLD: 0.02 10*3/MM3 (ref 0–0.03)
IMM GRANULOCYTES NFR BLD: 0.3 % (ref 0–0.5)
INR PPP: 1.18 (ref 0.9–1.1)
KETONES UR QL STRIP: NEGATIVE
LEUKOCYTE ESTERASE UR QL STRIP.AUTO: ABNORMAL
LYMPHOCYTES # BLD AUTO: 2.36 10*3/MM3 (ref 0.9–4.8)
LYMPHOCYTES NFR BLD AUTO: 39.5 % (ref 19.6–45.3)
MCH RBC QN AUTO: 29.4 PG (ref 26.9–32)
MCHC RBC AUTO-ENTMCNC: 31.4 G/DL (ref 32.4–36.3)
MCV RBC AUTO: 93.6 FL (ref 80.5–98.2)
MONOCYTES # BLD AUTO: 0.57 10*3/MM3 (ref 0.2–1.2)
MONOCYTES NFR BLD AUTO: 9.5 % (ref 5–12)
MUCOUS THREADS URNS QL MICRO: ABNORMAL /HPF
NEUTROPHILS # BLD AUTO: 2.58 10*3/MM3 (ref 1.9–8.1)
NEUTROPHILS NFR BLD AUTO: 43.2 % (ref 42.7–76)
NITRITE UR QL STRIP: NEGATIVE
NRBC BLD MANUAL-RTO: 0 /100 WBC (ref 0–0)
PH UR STRIP.AUTO: 6.5 [PH] (ref 5–8)
PLATELET # BLD AUTO: 265 10*3/MM3 (ref 140–500)
PMV BLD AUTO: 10 FL (ref 6–12)
POTASSIUM BLD-SCNC: 3.4 MMOL/L (ref 3.5–5.2)
PROT SERPL-MCNC: 5.5 G/DL (ref 6–8.5)
PROT UR QL STRIP: NEGATIVE
PROTHROMBIN TIME: 14.6 SECONDS (ref 11.7–14.2)
RBC # BLD AUTO: 3.13 10*6/MM3 (ref 3.9–5.2)
RBC # UR: ABNORMAL /HPF
REF LAB TEST METHOD: ABNORMAL
SODIUM BLD-SCNC: 143 MMOL/L (ref 136–145)
SP GR UR STRIP: 1.01 (ref 1–1.03)
SQUAMOUS #/AREA URNS HPF: ABNORMAL /HPF
TRANS CELLS #/AREA URNS HPF: ABNORMAL /HPF
UROBILINOGEN UR QL STRIP: ABNORMAL
WBC NRBC COR # BLD: 5.98 10*3/MM3 (ref 4.5–10.7)
WBC UR QL AUTO: ABNORMAL /HPF

## 2017-03-16 PROCEDURE — 25010000002 ENOXAPARIN PER 10 MG: Performed by: HOSPITALIST

## 2017-03-16 PROCEDURE — 94799 UNLISTED PULMONARY SVC/PX: CPT

## 2017-03-16 PROCEDURE — 25010000002 ALTEPLASE 2 MG RECONSTITUTED SOLUTION: Performed by: HOSPITALIST

## 2017-03-16 PROCEDURE — 80053 COMPREHEN METABOLIC PANEL: CPT | Performed by: INTERNAL MEDICINE

## 2017-03-16 PROCEDURE — 25010000002 ONDANSETRON PER 1 MG: Performed by: HOSPITALIST

## 2017-03-16 PROCEDURE — 25010000002 VANCOMYCIN: Performed by: INTERNAL MEDICINE

## 2017-03-16 PROCEDURE — 85610 PROTHROMBIN TIME: CPT | Performed by: INTERNAL MEDICINE

## 2017-03-16 PROCEDURE — 85025 COMPLETE CBC W/AUTO DIFF WBC: CPT | Performed by: INTERNAL MEDICINE

## 2017-03-16 RX ORDER — ONDANSETRON 2 MG/ML
4 INJECTION INTRAMUSCULAR; INTRAVENOUS EVERY 6 HOURS PRN
Status: DISCONTINUED | OUTPATIENT
Start: 2017-03-16 | End: 2017-03-18

## 2017-03-16 RX ORDER — POTASSIUM CHLORIDE 750 MG/1
40 CAPSULE, EXTENDED RELEASE ORAL ONCE
Status: COMPLETED | OUTPATIENT
Start: 2017-03-16 | End: 2017-03-16

## 2017-03-16 RX ADMIN — FERROUS SULFATE TAB 325 MG (65 MG ELEMENTAL FE) 325 MG: 325 (65 FE) TAB at 09:30

## 2017-03-16 RX ADMIN — LEVOTHYROXINE SODIUM 100 MCG: 100 TABLET ORAL at 09:29

## 2017-03-16 RX ADMIN — VANCOMYCIN HYDROCHLORIDE 1500 MG: 1 INJECTION, POWDER, LYOPHILIZED, FOR SOLUTION INTRAVENOUS at 16:59

## 2017-03-16 RX ADMIN — VANCOMYCIN HYDROCHLORIDE 1500 MG: 1 INJECTION, POWDER, LYOPHILIZED, FOR SOLUTION INTRAVENOUS at 05:11

## 2017-03-16 RX ADMIN — ALTEPLASE 2 MG: 2.2 INJECTION, POWDER, LYOPHILIZED, FOR SOLUTION INTRAVENOUS at 12:30

## 2017-03-16 RX ADMIN — OXYBUTYNIN CHLORIDE 10 MG: 10 TABLET, EXTENDED RELEASE ORAL at 09:29

## 2017-03-16 RX ADMIN — VILAZODONE HYDROCHLORIDE 40 MG: 40 TABLET ORAL at 09:29

## 2017-03-16 RX ADMIN — ONDANSETRON 4 MG: 2 INJECTION INTRAMUSCULAR; INTRAVENOUS at 18:53

## 2017-03-16 RX ADMIN — DOCUSATE SODIUM 100 MG: 100 CAPSULE, LIQUID FILLED ORAL at 17:00

## 2017-03-16 RX ADMIN — PANTOPRAZOLE SODIUM 40 MG: 40 TABLET, DELAYED RELEASE ORAL at 05:11

## 2017-03-16 RX ADMIN — CARVEDILOL 12.5 MG: 12.5 TABLET, FILM COATED ORAL at 17:00

## 2017-03-16 RX ADMIN — POTASSIUM CHLORIDE 40 MEQ: 750 CAPSULE, EXTENDED RELEASE ORAL at 14:59

## 2017-03-16 RX ADMIN — ISOSORBIDE MONONITRATE 30 MG: 30 TABLET ORAL at 09:30

## 2017-03-16 RX ADMIN — FLUTICASONE PROPIONATE 2 SPRAY: 50 SPRAY, METERED NASAL at 09:30

## 2017-03-16 RX ADMIN — PREGABALIN 200 MG: 100 CAPSULE ORAL at 09:28

## 2017-03-16 RX ADMIN — ENOXAPARIN SODIUM 40 MG: 40 INJECTION SUBCUTANEOUS at 15:00

## 2017-03-16 RX ADMIN — FERROUS SULFATE TAB 325 MG (65 MG ELEMENTAL FE) 325 MG: 325 (65 FE) TAB at 17:00

## 2017-03-16 RX ADMIN — HYDROCODONE BITARTRATE AND ACETAMINOPHEN 1 TABLET: 10; 325 TABLET ORAL at 09:30

## 2017-03-16 RX ADMIN — DOCUSATE SODIUM 100 MG: 100 CAPSULE, LIQUID FILLED ORAL at 09:30

## 2017-03-16 RX ADMIN — MODAFINIL 200 MG: 100 TABLET ORAL at 09:28

## 2017-03-16 RX ADMIN — CARVEDILOL 12.5 MG: 12.5 TABLET, FILM COATED ORAL at 09:29

## 2017-03-16 RX ADMIN — HYDROCODONE BITARTRATE AND ACETAMINOPHEN 1 TABLET: 10; 325 TABLET ORAL at 16:59

## 2017-03-16 RX ADMIN — AMLODIPINE BESYLATE 10 MG: 10 TABLET ORAL at 09:28

## 2017-03-16 RX ADMIN — HYDROCODONE BITARTRATE AND ACETAMINOPHEN 1 TABLET: 10; 325 TABLET ORAL at 05:25

## 2017-03-16 RX ADMIN — ATORVASTATIN CALCIUM 40 MG: 40 TABLET, FILM COATED ORAL at 09:29

## 2017-03-16 RX ADMIN — PREGABALIN 200 MG: 100 CAPSULE ORAL at 17:00

## 2017-03-16 RX ADMIN — HYDROCODONE BITARTRATE AND ACETAMINOPHEN 1 TABLET: 10; 325 TABLET ORAL at 21:10

## 2017-03-16 RX ADMIN — TIOTROPIUM BROMIDE 1 CAPSULE: 18 CAPSULE ORAL; RESPIRATORY (INHALATION) at 13:59

## 2017-03-16 RX ADMIN — ERTAPENEM SODIUM 1 G: 1 INJECTION, POWDER, LYOPHILIZED, FOR SOLUTION INTRAMUSCULAR; INTRAVENOUS at 14:59

## 2017-03-16 NOTE — PROGRESS NOTES
"  Infectious Diseases Progress Note    Krysta Benito MD     [facility]  [place]: 1 day  Patient Identification:  Name: Frances Downs  Age: 66 y.o.  Sex: female  :  1950  MRN: [id number]         Primary Care Physician: Talha Echeverria MD            Subjective: No new complaints.  Interval History: See admission history and physical     Objective:    Scheduled Meds:  alteplase 2 mg Intravenous Once   amLODIPine 10 mg Oral Daily   atorvastatin 40 mg Oral Daily   carvedilol 12.5 mg Oral BID With Meals   docusate sodium 100 mg Oral BID   enoxaparin 40 mg Subcutaneous Once   ertapenem 1 g Intravenous Q24H   ferrous sulfate 325 mg Oral BID   fluticasone 2 spray Nasal Daily   isosorbide mononitrate 30 mg Oral Daily   levothyroxine 100 mcg Oral Daily   modafinil 200 mg Oral Daily   oxybutynin XL 10 mg Oral Daily   pantoprazole 40 mg Oral Q AM   Pharmacy to dose vancomycin  Does not apply Daily   potassium chloride 40 mEq Oral Once   pregabalin 200 mg Oral BID   tiotropium 1 capsule Inhalation Daily - RT   vancomycin 1,500 mg Intravenous Q12H   vilazodone 40 mg Oral Daily     Continuous Infusions:     Vital signs in last 24 hours:  Temp:  [97.8 °F (36.6 °C)-99.3 °F (37.4 °C)] 98.7 °F (37.1 °C)  Heart Rate:  [58-98] 66  Resp:  [16] 16  BP: (118-152)/(62-82) 138/82    Intake/Output:    Intake/Output Summary (Last 24 hours) at 17 1226  Last data filed at 17 0830   Gross per 24 hour   Intake    910 ml   Output   1300 ml   Net   -390 ml       Exam:  Visit Vitals   • /82 (BP Location: Right arm, Patient Position: Lying)   • Pulse 66   • Temp 98.7 °F (37.1 °C) (Oral)   • Resp 16   • Ht 65\" (165.1 cm)   • Wt 226 lb (103 kg)   • SpO2 97%   • BMI 37.61 kg/m2       General Appearance:    Alert, cooperative, no distress, AAOx3                          Head:    Normocephalic, without obvious abnormality, atraumatic                           Eyes:    PERRL, conjunctiva/corneas clear, EOM's " intact, both eyes                         Throat:   Lips, tongue, gums normal; oral mucosa pink and moist                           Neck:   Supple, symmetrical, trachea midline, no JVD                         Lungs:    Clear to auscultation bilaterally, respirations unlabored                 Chest Wall:    No tenderness or deformity                          Heart:    Regular rate and rhythm, S1 and S2 normal, no murmur,no  Rub                                      or gallop                  Abdomen:     Soft, non-tender, bowel sounds active, no masses, no                                                        organomegaly                  Extremities:   Right hip surgical site drainage is significantly decreased decrease erythema of the wound edges.                        Pulses:   Pulses palpable in all extremities                            Skin:   Skin is warm and dry,  no rashes or palpable lesions                  Neurologic:   CNII-XII intact, motor strength grossly intact, sensation grossly                                         intact to light touch, no focal deficits noted       Data Review:    I reviewed the patient's new clinical results.    Results from last 7 days  Lab Units 03/16/17  0529 03/15/17  1424   WBC 10*3/mm3 5.98 6.88   HEMOGLOBIN g/dL 9.2* 9.1*   PLATELETS 10*3/mm3 265 292       Results from last 7 days  Lab Units 03/16/17  0529 03/15/17  1424   SODIUM mmol/L 143 142   POTASSIUM mmol/L 3.4* 3.8   CHLORIDE mmol/L 102 101   TOTAL CO2 mmol/L 30.4* 31.0*   BUN mg/dL 6* 6*   CREATININE mg/dL 0.66 0.71   CALCIUM mg/dL 8.9 8.9   GLUCOSE mg/dL 90 89       Wound Culture [[id number]] (Abnormal) Collected: 03/15/17 1725       Lab Status: Preliminary result Specimen: Wound from Hip, Right Updated: 03/16/17 0809        Wound Culture Scant growth (1+) Gram Negative Bacilli (A)         Gram Stain Result Few (2+) WBCs seen          Rare (1+) Gram negative bacilli                Assessment:  Principal  Problem:    Mixed infection  Active Problems:    Hematoma of right hip    DVT (deep venous thrombosis), hx of    Hypertension    Hyperlipidemia    Coronary artery disease, hx of stent    Arthritis    Bipolar disorder    Infected prosthesis of right hip    MRSA (methicillin resistant Staphylococcus aureus) infection    Bacterial infection due to Morganella morganii    Hypokalemia      Plan:  Continue Invanz and vancomycin.  Plans for surgery from orthopedic surgery service reviewed.  Appreciate internal medicine service help in managing underlying coronary artery disease anticoagulation for underlying DVT and hypertension and bipolar disorder.    Krysta Benito MD  3/16/2017  12:26 PM    Much of this encounter note is an electronic transcription/translation of spoken language to printed text. The electronic translation of spoken language may permit erroneous, or at times, nonsensical words or phrases to be inadvertently transcribed; Although I have reviewed the note for such errors, some may still exist

## 2017-03-16 NOTE — PLAN OF CARE
Problem: Patient Care Overview (Adult)  Goal: Plan of Care Review  Outcome: Ongoing (interventions implemented as appropriate)    03/16/17 0436   Coping/Psychosocial Response Interventions   Plan Of Care Reviewed With patient   Outcome Evaluation   Outcome Summary/Follow up Plan VSS. Pain is controlled with po pain medication. Pt receiving IV antibiotic for infection.        Goal: Adult Individualization and Mutuality  Outcome: Ongoing (interventions implemented as appropriate)  Goal: Discharge Needs Assessment  Outcome: Ongoing (interventions implemented as appropriate)    Problem: Infection, Risk/Actual (Adult)  Goal: Identify Related Risk Factors and Signs and Symptoms  Outcome: Outcome(s) achieved Date Met:  03/16/17  Goal: Infection Prevention/Resolution  Outcome: Ongoing (interventions implemented as appropriate)    Problem: Fall Risk (Adult)  Goal: Identify Related Risk Factors and Signs and Symptoms  Outcome: Outcome(s) achieved Date Met:  03/16/17  Goal: Absence of Falls  Outcome: Ongoing (interventions implemented as appropriate)    Problem: Pain, Acute (Adult)  Goal: Identify Related Risk Factors and Signs and Symptoms  Outcome: Ongoing (interventions implemented as appropriate)    Problem: Pressure Ulcer (Adult)  Goal: Signs and Symptoms of Listed Potential Problems Will be Absent or Manageable (Pressure Ulcer)  Outcome: Ongoing (interventions implemented as appropriate)

## 2017-03-16 NOTE — PROGRESS NOTES
"    DAILY PROGRESS NOTE  [facility]    Patient Identification:  Name: Frances Downs  Age: 66 y.o.  Sex: female  :  1950  MRN: [id number]         Primary Care Physician: Talha Echeverria MD    Subjective:  Interval History: no new issues. No cp/n/v/d - pain control adequate     Objective:no fm. D/w rn    Scheduled Meds:    alteplase 2 mg Intravenous Once   amLODIPine 10 mg Oral Daily   atorvastatin 40 mg Oral Daily   carvedilol 12.5 mg Oral BID With Meals   docusate sodium 100 mg Oral BID   ertapenem 1 g Intravenous Q24H   ferrous sulfate 325 mg Oral BID   fluticasone 2 spray Nasal Daily   isosorbide mononitrate 30 mg Oral Daily   levothyroxine 100 mcg Oral Daily   modafinil 200 mg Oral Daily   oxybutynin XL 10 mg Oral Daily   pantoprazole 40 mg Oral Q AM   Pharmacy to dose vancomycin  Does not apply Daily   pregabalin 200 mg Oral BID   tiotropium 1 capsule Inhalation Daily - RT   vancomycin 1,500 mg Intravenous Q12H   vilazodone 40 mg Oral Daily     Continuous Infusions:     Vital signs in last 24 hours:  Temp:  [97.8 °F (36.6 °C)-99.3 °F (37.4 °C)] 98.7 °F (37.1 °C)  Heart Rate:  [58-98] 66  Resp:  [16] 16  BP: (118-152)/(62-82) 138/82    Intake/Output:    Intake/Output Summary (Last 24 hours) at 17 1216  Last data filed at 17 0830   Gross per 24 hour   Intake    910 ml   Output   1300 ml   Net   -390 ml       Exam:  Visit Vitals   • /82 (BP Location: Right arm, Patient Position: Lying)   • Pulse 66   • Temp 98.7 °F (37.1 °C) (Oral)   • Resp 16   • Ht 65\" (165.1 cm)   • Wt 226 lb (103 kg)   • SpO2 97%   • BMI 37.61 kg/m2       General Appearance:    Alert, cooperative, no distress, AAOx3, not toxic                          Head:    Normocephalic, without obvious abnormality, atraumatic                         Throat:   Lips, tongue, gums normal; oral mucosa pink and moist                         Lungs:    Clear to auscultation bilaterally, respirations unlabored      "                     Heart:    Regular rate and rhythm, S1 and S2 normal                  Abdomen:     Soft, non-tender, bowel sounds active, obese                 Extremities:   Right hip draining w/ surrounding cellulitis                       Pulses:   Pulses palpable in lower extremities                              Data Review:  Labs in chart were reviewed.    Assessment:  Active Hospital Problems (** Indicates Principal Problem)    Diagnosis Date Noted   • **Mixed infection [A49.9] 03/15/2017   • Hypokalemia [E87.6] 03/16/2017   • Bacterial infection due to Morganella morganii [B96.4] 03/15/2017   • Infected prosthesis of right hip [T84.51XA] 02/10/2017   • MRSA (methicillin resistant Staphylococcus aureus) infection [A49.02] 02/10/2017   • Hypertension [I10] 02/10/2017   • DVT (deep venous thrombosis), hx of [I82.409] 02/10/2017   • Bipolar disorder [F31.9] 02/10/2017   • Arthritis [M19.90] 02/10/2017   • Hyperlipidemia [E78.5] 02/10/2017   • Coronary artery disease, hx of stent [I25.10] 02/10/2017   • Hematoma of right hip [S70.01XA] 02/08/2017      Resolved Hospital Problems    Diagnosis Date Noted Date Resolved   No resolved problems to display.       Plan:  R hip infection - MRSA/Vanc per ID - ortho to I/D in am   -d/w Dr Benito   -PICC and cathflo ordered    Hx DVT/PE - 2/17 doppler negative - resume AC when OK w/ surgery   -give one time dose of Lovenox today for prophylaxis    HTN/CAD - stable    COPD w/out AE    DENISE    Replace K - check Mg    Navarro Haddad MD  3/16/2017  12:16 PM

## 2017-03-16 NOTE — NURSING NOTE
03/16/17 1525   Pressure Ulcer 03/15/17 2000 Right medial other (see comments)   Date first assessed/Time first assessed: 03/15/17 2000   Present On Admission (Pressure Ulcer): yes;picture taken  Side: Right  Orientation: medial  Location: other (see comments)  Additional Comments: right buttock    Dressing Appearance moist drainage   Pressure Ulcer Appearance moist;pink;reddened   Periwound Area pink  (appears to have newly epithelialized skin)   Dressing Dressing applied;low-adherent  (6x6 silicone border)   Pressure Ulcer 03/15/17 2000 Right lateral heel   Date first assessed/Time first assessed: 03/15/17 2000   Present On Admission (Pressure Ulcer): yes;picture taken  Side: Right  Orientation: lateral  Location: heel   Pressure Ulcer Appearance black eschar;dry   Periwound Area intact;blanchable   Pressure Ulcer Therapeutic Interventions other (see comments)  (off load with waffle boots)     Patient has 2 present on admission pressure ulcers as noted. Right buttock is irregularly shaped- appears to be related to pressure and friction. Recommend silicone border dressing or barrier ointment, whichever stays in place more effectively.   Right heel is unstageable and intact. Due to patient's comorbidities, recommend to leave the right heel open to air and dry. She is aware of the importance of keeping heels off bed and currently has waffle boots. She tolerates turning as well.

## 2017-03-17 ENCOUNTER — ANESTHESIA EVENT (OUTPATIENT)
Dept: PERIOP | Facility: HOSPITAL | Age: 67
End: 2017-03-17

## 2017-03-17 ENCOUNTER — ANESTHESIA (OUTPATIENT)
Dept: PERIOP | Facility: HOSPITAL | Age: 67
End: 2017-03-17

## 2017-03-17 LAB
ANION GAP SERPL CALCULATED.3IONS-SCNC: 11.6 MMOL/L
BACTERIA SPEC AEROBE CULT: ABNORMAL
BUN BLD-MCNC: 7 MG/DL (ref 8–23)
BUN/CREAT SERPL: 9 (ref 7–25)
CALCIUM SPEC-SCNC: 8.6 MG/DL (ref 8.6–10.5)
CHLORIDE SERPL-SCNC: 102 MMOL/L (ref 98–107)
CO2 SERPL-SCNC: 28.4 MMOL/L (ref 22–29)
CREAT BLD-MCNC: 0.78 MG/DL (ref 0.57–1)
DEPRECATED RDW RBC AUTO: 54.9 FL (ref 37–54)
ERYTHROCYTE [DISTWIDTH] IN BLOOD BY AUTOMATED COUNT: 15.4 % (ref 11.7–13)
GFR SERPL CREATININE-BSD FRML MDRD: 74 ML/MIN/1.73
GLUCOSE BLD-MCNC: 100 MG/DL (ref 65–99)
GRAM STN SPEC: ABNORMAL
GRAM STN SPEC: ABNORMAL
HCT VFR BLD AUTO: 29.6 % (ref 35.6–45.5)
HGB BLD-MCNC: 9.3 G/DL (ref 11.9–15.5)
MAGNESIUM SERPL-MCNC: 1.8 MG/DL (ref 1.6–2.4)
MCH RBC QN AUTO: 30.5 PG (ref 26.9–32)
MCHC RBC AUTO-ENTMCNC: 31.4 G/DL (ref 32.4–36.3)
MCV RBC AUTO: 97 FL (ref 80.5–98.2)
PLATELET # BLD AUTO: 270 10*3/MM3 (ref 140–500)
PMV BLD AUTO: 10 FL (ref 6–12)
POTASSIUM BLD-SCNC: 3.7 MMOL/L (ref 3.5–5.2)
RBC # BLD AUTO: 3.05 10*6/MM3 (ref 3.9–5.2)
SODIUM BLD-SCNC: 142 MMOL/L (ref 136–145)
VANCOMYCIN TROUGH SERPL-MCNC: 29.3 MCG/ML (ref 5–20)
WBC NRBC COR # BLD: 6.34 10*3/MM3 (ref 4.5–10.7)

## 2017-03-17 PROCEDURE — 87070 CULTURE OTHR SPECIMN AEROBIC: CPT | Performed by: ORTHOPAEDIC SURGERY

## 2017-03-17 PROCEDURE — 25010000002 VANCOMYCIN: Performed by: INTERNAL MEDICINE

## 2017-03-17 PROCEDURE — 25010000002 HYDROMORPHONE PER 4 MG: Performed by: ANESTHESIOLOGY

## 2017-03-17 PROCEDURE — 80048 BASIC METABOLIC PNL TOTAL CA: CPT | Performed by: HOSPITALIST

## 2017-03-17 PROCEDURE — 80202 ASSAY OF VANCOMYCIN: CPT | Performed by: INTERNAL MEDICINE

## 2017-03-17 PROCEDURE — 94799 UNLISTED PULMONARY SVC/PX: CPT

## 2017-03-17 PROCEDURE — 87077 CULTURE AEROBIC IDENTIFY: CPT | Performed by: ORTHOPAEDIC SURGERY

## 2017-03-17 PROCEDURE — 0JBC0ZZ EXCISION OF PELVIC REGION SUBCUTANEOUS TISSUE AND FASCIA, OPEN APPROACH: ICD-10-PCS | Performed by: ORTHOPAEDIC SURGERY

## 2017-03-17 PROCEDURE — 25010000002 CEFEPIME: Performed by: INTERNAL MEDICINE

## 2017-03-17 PROCEDURE — 87075 CULTR BACTERIA EXCEPT BLOOD: CPT | Performed by: ORTHOPAEDIC SURGERY

## 2017-03-17 PROCEDURE — 25010000002 DEXAMETHASONE PER 1 MG: Performed by: ANESTHESIOLOGY

## 2017-03-17 PROCEDURE — 25010000002 FENTANYL CITRATE (PF) 100 MCG/2ML SOLUTION: Performed by: ANESTHESIOLOGY

## 2017-03-17 PROCEDURE — 25010000002 MIDAZOLAM PER 1 MG: Performed by: ANESTHESIOLOGY

## 2017-03-17 PROCEDURE — 0SP904Z REMOVAL OF INTERNAL FIXATION DEVICE FROM RIGHT HIP JOINT, OPEN APPROACH: ICD-10-PCS | Performed by: ORTHOPAEDIC SURGERY

## 2017-03-17 PROCEDURE — 25010000002 ONDANSETRON PER 1 MG: Performed by: ANESTHESIOLOGY

## 2017-03-17 PROCEDURE — 85027 COMPLETE CBC AUTOMATED: CPT | Performed by: HOSPITALIST

## 2017-03-17 PROCEDURE — 25010000002 ONDANSETRON PER 1 MG: Performed by: INTERNAL MEDICINE

## 2017-03-17 PROCEDURE — 83735 ASSAY OF MAGNESIUM: CPT | Performed by: HOSPITALIST

## 2017-03-17 PROCEDURE — 25010000002 PROPOFOL 10 MG/ML EMULSION: Performed by: ANESTHESIOLOGY

## 2017-03-17 PROCEDURE — 87186 SC STD MICRODIL/AGAR DIL: CPT | Performed by: ORTHOPAEDIC SURGERY

## 2017-03-17 PROCEDURE — 87205 SMEAR GRAM STAIN: CPT | Performed by: ORTHOPAEDIC SURGERY

## 2017-03-17 PROCEDURE — 94660 CPAP INITIATION&MGMT: CPT

## 2017-03-17 RX ORDER — ASPIRIN 325 MG
325 TABLET, DELAYED RELEASE (ENTERIC COATED) ORAL 2 TIMES DAILY
Status: DISCONTINUED | OUTPATIENT
Start: 2017-03-17 | End: 2017-03-18

## 2017-03-17 RX ORDER — HYDROMORPHONE HYDROCHLORIDE 1 MG/ML
0.5 INJECTION, SOLUTION INTRAMUSCULAR; INTRAVENOUS; SUBCUTANEOUS
Status: DISCONTINUED | OUTPATIENT
Start: 2017-03-17 | End: 2017-03-17 | Stop reason: HOSPADM

## 2017-03-17 RX ORDER — FENTANYL CITRATE 50 UG/ML
50 INJECTION, SOLUTION INTRAMUSCULAR; INTRAVENOUS
Status: DISCONTINUED | OUTPATIENT
Start: 2017-03-17 | End: 2017-03-17 | Stop reason: HOSPADM

## 2017-03-17 RX ORDER — HYDROCODONE BITARTRATE AND ACETAMINOPHEN 10; 325 MG/1; MG/1
1 TABLET ORAL EVERY 4 HOURS PRN
Status: DISCONTINUED | OUTPATIENT
Start: 2017-03-17 | End: 2017-03-22 | Stop reason: HOSPADM

## 2017-03-17 RX ORDER — ALBUTEROL SULFATE 2.5 MG/3ML
2.5 SOLUTION RESPIRATORY (INHALATION) EVERY 4 HOURS PRN
Status: DISCONTINUED | OUTPATIENT
Start: 2017-03-17 | End: 2017-03-21

## 2017-03-17 RX ORDER — FENTANYL CITRATE 50 UG/ML
INJECTION, SOLUTION INTRAMUSCULAR; INTRAVENOUS AS NEEDED
Status: DISCONTINUED | OUTPATIENT
Start: 2017-03-17 | End: 2017-03-17 | Stop reason: SURG

## 2017-03-17 RX ORDER — BUDESONIDE AND FORMOTEROL FUMARATE DIHYDRATE 160; 4.5 UG/1; UG/1
2 AEROSOL RESPIRATORY (INHALATION)
Status: DISCONTINUED | OUTPATIENT
Start: 2017-03-17 | End: 2017-03-22 | Stop reason: HOSPADM

## 2017-03-17 RX ORDER — PROMETHAZINE HYDROCHLORIDE 25 MG/1
25 TABLET ORAL EVERY 4 HOURS PRN
Status: DISCONTINUED | OUTPATIENT
Start: 2017-03-17 | End: 2017-03-22 | Stop reason: HOSPADM

## 2017-03-17 RX ORDER — METHYLPHENIDATE HYDROCHLORIDE 10 MG/1
10 TABLET ORAL 2 TIMES DAILY PRN
Status: DISCONTINUED | OUTPATIENT
Start: 2017-03-17 | End: 2017-03-18

## 2017-03-17 RX ORDER — MAGNESIUM HYDROXIDE 1200 MG/15ML
LIQUID ORAL AS NEEDED
Status: DISCONTINUED | OUTPATIENT
Start: 2017-03-17 | End: 2017-03-17 | Stop reason: HOSPADM

## 2017-03-17 RX ORDER — HYDROCODONE BITARTRATE AND ACETAMINOPHEN 10; 325 MG/1; MG/1
2 TABLET ORAL EVERY 4 HOURS PRN
Status: DISCONTINUED | OUTPATIENT
Start: 2017-03-17 | End: 2017-03-18

## 2017-03-17 RX ORDER — MIDAZOLAM HYDROCHLORIDE 1 MG/ML
1 INJECTION INTRAMUSCULAR; INTRAVENOUS
Status: DISCONTINUED | OUTPATIENT
Start: 2017-03-17 | End: 2017-03-17 | Stop reason: HOSPADM

## 2017-03-17 RX ORDER — LAMOTRIGINE 25 MG/1
25 TABLET ORAL 2 TIMES DAILY
Status: DISCONTINUED | OUTPATIENT
Start: 2017-03-17 | End: 2017-03-22 | Stop reason: HOSPADM

## 2017-03-17 RX ORDER — HYDRALAZINE HYDROCHLORIDE 20 MG/ML
5 INJECTION INTRAMUSCULAR; INTRAVENOUS
Status: DISCONTINUED | OUTPATIENT
Start: 2017-03-17 | End: 2017-03-17 | Stop reason: HOSPADM

## 2017-03-17 RX ORDER — SODIUM CHLORIDE 0.9 % (FLUSH) 0.9 %
1-10 SYRINGE (ML) INJECTION AS NEEDED
Status: DISCONTINUED | OUTPATIENT
Start: 2017-03-17 | End: 2017-03-17 | Stop reason: HOSPADM

## 2017-03-17 RX ORDER — PROMETHAZINE HYDROCHLORIDE 25 MG/1
25 SUPPOSITORY RECTAL ONCE AS NEEDED
Status: DISCONTINUED | OUTPATIENT
Start: 2017-03-17 | End: 2017-03-17 | Stop reason: HOSPADM

## 2017-03-17 RX ORDER — PROPOFOL 10 MG/ML
VIAL (ML) INTRAVENOUS AS NEEDED
Status: DISCONTINUED | OUTPATIENT
Start: 2017-03-17 | End: 2017-03-17 | Stop reason: SURG

## 2017-03-17 RX ORDER — OXYCODONE AND ACETAMINOPHEN 7.5; 325 MG/1; MG/1
1 TABLET ORAL ONCE AS NEEDED
Status: DISCONTINUED | OUTPATIENT
Start: 2017-03-17 | End: 2017-03-17 | Stop reason: HOSPADM

## 2017-03-17 RX ORDER — POVIDONE-IODINE 10 MG/G
OINTMENT TOPICAL AS NEEDED
Status: DISCONTINUED | OUTPATIENT
Start: 2017-03-17 | End: 2017-03-17 | Stop reason: HOSPADM

## 2017-03-17 RX ORDER — WARFARIN SODIUM 6 MG/1
6 TABLET ORAL
Status: DISCONTINUED | OUTPATIENT
Start: 2017-03-17 | End: 2017-03-18

## 2017-03-17 RX ORDER — PROMETHAZINE HYDROCHLORIDE 25 MG/1
25 TABLET ORAL ONCE AS NEEDED
Status: DISCONTINUED | OUTPATIENT
Start: 2017-03-17 | End: 2017-03-17 | Stop reason: HOSPADM

## 2017-03-17 RX ORDER — PROMETHAZINE HYDROCHLORIDE 25 MG/ML
12.5 INJECTION, SOLUTION INTRAMUSCULAR; INTRAVENOUS ONCE AS NEEDED
Status: DISCONTINUED | OUTPATIENT
Start: 2017-03-17 | End: 2017-03-17 | Stop reason: HOSPADM

## 2017-03-17 RX ORDER — FLUMAZENIL 0.1 MG/ML
0.2 INJECTION INTRAVENOUS AS NEEDED
Status: DISCONTINUED | OUTPATIENT
Start: 2017-03-17 | End: 2017-03-17 | Stop reason: HOSPADM

## 2017-03-17 RX ORDER — DOCUSATE SODIUM 100 MG/1
100 CAPSULE, LIQUID FILLED ORAL 2 TIMES DAILY PRN
Status: DISCONTINUED | OUTPATIENT
Start: 2017-03-17 | End: 2017-03-22 | Stop reason: HOSPADM

## 2017-03-17 RX ORDER — LABETALOL HYDROCHLORIDE 5 MG/ML
5 INJECTION, SOLUTION INTRAVENOUS
Status: DISCONTINUED | OUTPATIENT
Start: 2017-03-17 | End: 2017-03-17 | Stop reason: HOSPADM

## 2017-03-17 RX ORDER — LIDOCAINE HYDROCHLORIDE 20 MG/ML
INJECTION, SOLUTION INFILTRATION; PERINEURAL AS NEEDED
Status: DISCONTINUED | OUTPATIENT
Start: 2017-03-17 | End: 2017-03-17 | Stop reason: SURG

## 2017-03-17 RX ORDER — SODIUM CHLORIDE, SODIUM LACTATE, POTASSIUM CHLORIDE, CALCIUM CHLORIDE 600; 310; 30; 20 MG/100ML; MG/100ML; MG/100ML; MG/100ML
9 INJECTION, SOLUTION INTRAVENOUS CONTINUOUS
Status: DISCONTINUED | OUTPATIENT
Start: 2017-03-17 | End: 2017-03-18

## 2017-03-17 RX ORDER — FAMOTIDINE 10 MG/ML
20 INJECTION, SOLUTION INTRAVENOUS ONCE
Status: COMPLETED | OUTPATIENT
Start: 2017-03-17 | End: 2017-03-17

## 2017-03-17 RX ORDER — DEXAMETHASONE SODIUM PHOSPHATE 10 MG/ML
INJECTION INTRAMUSCULAR; INTRAVENOUS AS NEEDED
Status: DISCONTINUED | OUTPATIENT
Start: 2017-03-17 | End: 2017-03-17 | Stop reason: SURG

## 2017-03-17 RX ORDER — HYDROCODONE BITARTRATE AND ACETAMINOPHEN 7.5; 325 MG/1; MG/1
1 TABLET ORAL ONCE AS NEEDED
Status: DISCONTINUED | OUTPATIENT
Start: 2017-03-17 | End: 2017-03-17 | Stop reason: HOSPADM

## 2017-03-17 RX ORDER — ARIPIPRAZOLE 10 MG/1
10 TABLET ORAL DAILY
Status: DISCONTINUED | OUTPATIENT
Start: 2017-03-18 | End: 2017-03-22 | Stop reason: HOSPADM

## 2017-03-17 RX ORDER — PROMETHAZINE HYDROCHLORIDE 25 MG/1
12.5 TABLET ORAL ONCE AS NEEDED
Status: DISCONTINUED | OUTPATIENT
Start: 2017-03-17 | End: 2017-03-17 | Stop reason: HOSPADM

## 2017-03-17 RX ORDER — ONDANSETRON 2 MG/ML
4 INJECTION INTRAMUSCULAR; INTRAVENOUS ONCE AS NEEDED
Status: COMPLETED | OUTPATIENT
Start: 2017-03-17 | End: 2017-03-17

## 2017-03-17 RX ORDER — MIDAZOLAM HYDROCHLORIDE 1 MG/ML
2 INJECTION INTRAMUSCULAR; INTRAVENOUS
Status: DISCONTINUED | OUTPATIENT
Start: 2017-03-17 | End: 2017-03-17 | Stop reason: HOSPADM

## 2017-03-17 RX ORDER — ROCURONIUM BROMIDE 10 MG/ML
INJECTION, SOLUTION INTRAVENOUS AS NEEDED
Status: DISCONTINUED | OUTPATIENT
Start: 2017-03-17 | End: 2017-03-17 | Stop reason: SURG

## 2017-03-17 RX ORDER — DIPHENHYDRAMINE HYDROCHLORIDE 50 MG/ML
12.5 INJECTION INTRAMUSCULAR; INTRAVENOUS
Status: DISCONTINUED | OUTPATIENT
Start: 2017-03-17 | End: 2017-03-17 | Stop reason: HOSPADM

## 2017-03-17 RX ORDER — DIAZEPAM 2 MG/1
2 TABLET ORAL EVERY 12 HOURS PRN
Status: DISCONTINUED | OUTPATIENT
Start: 2017-03-17 | End: 2017-03-22 | Stop reason: HOSPADM

## 2017-03-17 RX ORDER — NALOXONE HCL 0.4 MG/ML
0.2 VIAL (ML) INJECTION AS NEEDED
Status: DISCONTINUED | OUTPATIENT
Start: 2017-03-17 | End: 2017-03-17 | Stop reason: HOSPADM

## 2017-03-17 RX ORDER — SODIUM CHLORIDE, SODIUM LACTATE, POTASSIUM CHLORIDE, CALCIUM CHLORIDE 600; 310; 30; 20 MG/100ML; MG/100ML; MG/100ML; MG/100ML
100 INJECTION, SOLUTION INTRAVENOUS CONTINUOUS
Status: DISCONTINUED | OUTPATIENT
Start: 2017-03-17 | End: 2017-03-18

## 2017-03-17 RX ADMIN — LIDOCAINE HYDROCHLORIDE 60 MG: 20 INJECTION, SOLUTION INFILTRATION; PERINEURAL at 16:41

## 2017-03-17 RX ADMIN — SODIUM CHLORIDE, POTASSIUM CHLORIDE, SODIUM LACTATE AND CALCIUM CHLORIDE 9 ML/HR: 600; 310; 30; 20 INJECTION, SOLUTION INTRAVENOUS at 16:19

## 2017-03-17 RX ADMIN — FENTANYL CITRATE 25 MCG: 50 INJECTION INTRAMUSCULAR; INTRAVENOUS at 16:40

## 2017-03-17 RX ADMIN — PANTOPRAZOLE SODIUM 40 MG: 40 TABLET, DELAYED RELEASE ORAL at 04:09

## 2017-03-17 RX ADMIN — MODAFINIL 200 MG: 100 TABLET ORAL at 09:18

## 2017-03-17 RX ADMIN — HYDROCODONE BITARTRATE AND ACETAMINOPHEN 2 TABLET: 10; 325 TABLET ORAL at 23:26

## 2017-03-17 RX ADMIN — HYDROCODONE BITARTRATE AND ACETAMINOPHEN 1 TABLET: 10; 325 TABLET ORAL at 13:20

## 2017-03-17 RX ADMIN — FENTANYL CITRATE 50 MCG: 50 INJECTION INTRAMUSCULAR; INTRAVENOUS at 17:30

## 2017-03-17 RX ADMIN — TIOTROPIUM BROMIDE 1 CAPSULE: 18 CAPSULE ORAL; RESPIRATORY (INHALATION) at 09:43

## 2017-03-17 RX ADMIN — VANCOMYCIN HYDROCHLORIDE 1500 MG: 1 INJECTION, POWDER, LYOPHILIZED, FOR SOLUTION INTRAVENOUS at 04:09

## 2017-03-17 RX ADMIN — OXYBUTYNIN CHLORIDE 10 MG: 10 TABLET, EXTENDED RELEASE ORAL at 09:14

## 2017-03-17 RX ADMIN — LAMOTRIGINE 25 MG: 25 TABLET ORAL at 21:00

## 2017-03-17 RX ADMIN — FAMOTIDINE 20 MG: 10 INJECTION, SOLUTION INTRAVENOUS at 16:18

## 2017-03-17 RX ADMIN — CARVEDILOL 12.5 MG: 12.5 TABLET, FILM COATED ORAL at 09:13

## 2017-03-17 RX ADMIN — SODIUM CHLORIDE, POTASSIUM CHLORIDE, SODIUM LACTATE AND CALCIUM CHLORIDE: 600; 310; 30; 20 INJECTION, SOLUTION INTRAVENOUS at 17:45

## 2017-03-17 RX ADMIN — MIDAZOLAM 1 MG: 1 INJECTION INTRAMUSCULAR; INTRAVENOUS at 16:18

## 2017-03-17 RX ADMIN — HYDROCODONE BITARTRATE AND ACETAMINOPHEN 1 TABLET: 10; 325 TABLET ORAL at 04:09

## 2017-03-17 RX ADMIN — DEXAMETHASONE SODIUM PHOSPHATE 4 MG: 10 INJECTION INTRAMUSCULAR; INTRAVENOUS at 16:30

## 2017-03-17 RX ADMIN — CEFEPIME 2 G: 2 INJECTION, POWDER, FOR SOLUTION INTRAVENOUS at 14:25

## 2017-03-17 RX ADMIN — ONDANSETRON 4 MG: 2 INJECTION, SOLUTION INTRAMUSCULAR; INTRAVENOUS at 18:23

## 2017-03-17 RX ADMIN — FLUTICASONE PROPIONATE 2 SPRAY: 50 SPRAY, METERED NASAL at 09:19

## 2017-03-17 RX ADMIN — FENTANYL CITRATE 25 MCG: 50 INJECTION INTRAMUSCULAR; INTRAVENOUS at 16:51

## 2017-03-17 RX ADMIN — HYDROCODONE BITARTRATE AND ACETAMINOPHEN 1 TABLET: 10; 325 TABLET ORAL at 09:13

## 2017-03-17 RX ADMIN — ONDANSETRON 4 MG: 2 INJECTION INTRAMUSCULAR; INTRAVENOUS at 17:50

## 2017-03-17 RX ADMIN — HYDROMORPHONE HYDROCHLORIDE 0.5 MG: 1 INJECTION, SOLUTION INTRAMUSCULAR; INTRAVENOUS; SUBCUTANEOUS at 18:23

## 2017-03-17 RX ADMIN — Medication: at 10:41

## 2017-03-17 RX ADMIN — PROPOFOL 200 MG: 10 INJECTION, EMULSION INTRAVENOUS at 16:41

## 2017-03-17 RX ADMIN — ASPIRIN 325 MG: 325 TABLET, DELAYED RELEASE ORAL at 21:00

## 2017-03-17 RX ADMIN — PREGABALIN 200 MG: 100 CAPSULE ORAL at 09:14

## 2017-03-17 RX ADMIN — VILAZODONE HYDROCHLORIDE 40 MG: 40 TABLET ORAL at 09:13

## 2017-03-17 RX ADMIN — ROCURONIUM BROMIDE 10 MG: 10 INJECTION INTRAVENOUS at 16:41

## 2017-03-17 RX ADMIN — HYDROMORPHONE HYDROCHLORIDE 0.5 MG: 1 INJECTION, SOLUTION INTRAMUSCULAR; INTRAVENOUS; SUBCUTANEOUS at 18:40

## 2017-03-17 NOTE — ANESTHESIA PREPROCEDURE EVALUATION
Anesthesia Evaluation     Patient summary reviewed   NPO Status: > 6 hours   Airway   Mallampati: III  TM distance: >3 FB  Neck ROM: full  no difficulty expected  Dental - normal exam     Pulmonary - normal exam   (+) pulmonary embolism, a smoker Former, COPD, sleep apnea on CPAP,   Cardiovascular - normal exam    ECG reviewed    (+) hypertension, CAD, cardiac stents (May 2016 on AC meds)     ROS comment: EF 60%    Neuro/Psych  (+) psychiatric history Bipolar,    GI/Hepatic/Renal/Endo    (+) obesity,  GERD,     Musculoskeletal         ROS comment: Had Right Hip hardware removed Feb 2017 admitted for wound infection  Abdominal    Substance History      OB/GYN          Other   (+) blood dyscrasia (Plavix on HOLD received Lovenox 40 mg yesterday at 15:00)                             Anesthesia Plan    ASA 3     general     Anesthetic plan and risks discussed with patient.

## 2017-03-17 NOTE — PROGRESS NOTES
"    DAILY PROGRESS NOTE  [facility]    Patient Identification:  Name: Frances Downs  Age: 66 y.o.  Sex: female  :  1950  MRN: [id number]         Primary Care Physician: Talha Echeverria MD    Subjective:  Interval History: no new issues. No cp/n/v/d    Objective:no fm. D/w rn    Scheduled Meds:  amLODIPine 10 mg Oral Daily   atorvastatin 40 mg Oral Daily   carvedilol 12.5 mg Oral BID With Meals   cefepime 2 g Intravenous Q8H   docusate sodium 100 mg Oral BID   ferrous sulfate 325 mg Oral BID   fluticasone 2 spray Nasal Daily   isosorbide mononitrate 30 mg Oral Daily   levothyroxine 100 mcg Oral Daily   modafinil 200 mg Oral Daily   oxybutynin XL 10 mg Oral Daily   pantoprazole 40 mg Oral Q AM   Pharmacy to dose vancomycin  Does not apply Daily   pregabalin 200 mg Oral BID   tiotropium 1 capsule Inhalation Daily - RT   vilazodone 40 mg Oral Daily     Continuous Infusions:     Vital signs in last 24 hours:  Temp:  [98.4 °F (36.9 °C)-99.2 °F (37.3 °C)] 98.9 °F (37.2 °C)  Heart Rate:  [61-70] 61  Resp:  [16-18] 16  BP: (118-132)/(56-70) 120/70    Intake/Output:    Intake/Output Summary (Last 24 hours) at 17 1309  Last data filed at 17 0857   Gross per 24 hour   Intake      0 ml   Output   2200 ml   Net  -2200 ml       Exam:  Visit Vitals   • /70 (BP Location: Right arm, Patient Position: Lying)   • Pulse 61   • Temp 98.9 °F (37.2 °C) (Oral)   • Resp 16   • Ht 65\" (165.1 cm)   • Wt 226 lb (103 kg)   • SpO2 91%   • BMI 37.61 kg/m2       General Appearance:    Alert, cooperative, no distress, AAOx3                          Head:    Normocephalic, without obvious abnormality, atraumatic                        Lungs:    Clear to auscultation bilaterally, respirations unlabored                          Heart:    Regular rate and rhythm, S1 and S2 normal                  Abdomen:     Soft, non-tender, bowel sounds active                 Extremities:   No cyanosis or edema        "                      Data Review:  Labs in chart were reviewed.    Assessment:  Active Hospital Problems (** Indicates Principal Problem)    Diagnosis Date Noted   • **Mixed infection [A49.9] 03/15/2017   • Hypokalemia [E87.6] 03/16/2017   • Bacterial infection due to Morganella morganii [B96.4] 03/15/2017   • Infected prosthesis of right hip [T84.51XA] 02/10/2017   • MRSA (methicillin resistant Staphylococcus aureus) infection [A49.02] 02/10/2017   • Hypertension [I10] 02/10/2017   • DVT (deep venous thrombosis), hx of [I82.409] 02/10/2017   • Bipolar disorder [F31.9] 02/10/2017   • Arthritis [M19.90] 02/10/2017   • Hyperlipidemia [E78.5] 02/10/2017   • Coronary artery disease, hx of stent [I25.10] 02/10/2017   • Hematoma of right hip [S70.01XA] 02/08/2017      Resolved Hospital Problems    Diagnosis Date Noted Date Resolved   No resolved problems to display.       Plan:  R hip infection due to MRSA and Pseudomonas - Vanc/Cefepime per ID - ortho to I/D today  -d/w Dr Benito     Hx DVT/PE - 2/17 doppler negative - resume AC when OK w/ surgery  -give one time dose of Lovenox today for prophylaxis 3/16     HTN/CAD - stable     COPD w/out AE     DENISE     Replaced K - 1.8 Mg    Navarro Haddad MD  3/17/2017  1:09 PM

## 2017-03-17 NOTE — BRIEF OP NOTE
HIP INCISION AND DRAINAGE  Procedure Note    Frances Downs  3/15/2017 - 3/17/2017    Pre-op Diagnosis:   Right  Hip wound infection  Post-op Diagnosis:     same  Procedure/CPT® Codes:   I&D  Procedure(s):  RIGHT HIP INCISION AND DRAINAGE    Surgeon(s):  Erlin Martin MD    Anesthesia: General    Staff:   Circulator: Saloni Alvarenga RN  Scrub Person: Franco Will; Sanjiv Whitlock  Assistant: Guillermo Smith CSA    Estimated Blood Loss: 200 cc's*  Urine Voided: * No values recorded between 3/17/2017  4:29 PM and 3/17/2017  5:46 PM *    Specimens:      Cultures right hip wound            ID Type Source Tests Collected by Time Destination   1 : RIGHT HIP Wound Hip, Right ANAEROBIC CULTURE, WOUND CULTURE Erlin Martin MD 3/17/2017 1714          Drains:   Drain/Device Site 03/17/17 1727 Right lateral hip collapsible closed device (Active)           Findings:infection right hip  Complications: none      Erlin Martin MD     Date: 3/17/2017  Time: 5:46 PM

## 2017-03-17 NOTE — ANESTHESIA PROCEDURE NOTES
Airway  Urgency: elective      General Information and Staff    Patient location during procedure: OR  Anesthesiologist: DIONICIO WHITAKER    Indications and Patient Condition    Preoxygenated: yes      Final Airway Details  Final airway type: endotracheal airway      Successful airway: ETT  Cuffed: yes   Successful intubation technique: direct laryngoscopy  Endotracheal tube insertion site: oral  Blade: Christopher  Blade size: #3  ETT size: 7.5 mm  Cormack-Lehane Classification: grade I - full view of glottis  Placement verified by: chest auscultation   Number of attempts at approach: 1

## 2017-03-17 NOTE — CONSULTS
Patient Care Team:  Talha Echeverria MD as PCP - General (Internal Medicine)    Chief complaint asked to see regarding postop hypoxia.    Subjective     Patient is a 66 y.o. female.  Whom is in the postanesthesia care unit status post I&D of right hip infection.  Patient had a right total hip replacement about 6 years ago it was functioning very well until she fell and dislocated it last year it's dislocated several times since and has become infected there have been cultures with MRSA and Pseudomonas.  She's been on the vancomycin and cefepime.  She went for I&D.  Postoperatively she has had some hypoxia.  It worsened after some Dilaudid.  She has a history of obstructive sleep apnea and uses a CPAP nightly and has for 6 years.  In fact she says last night she couldn't sleep because she didn't have it, she lives out of town and her family is not able to bring her home machine in.  He does have a history of COPD she quit smoking about 10 years ago.  She uses Spiriva and Symbicort at home she's been getting Spiriva but not Symbicort here.  Does have a little bit of chronic cough no sputum.  Doing her normal daily activity she does not get short of breath she's never required oxygen at home.  She has no chest pain or pressure now and she does not particularly feel short of breath at this time her saturations are in the mid 90s on 4 L nasal cannula O2.      Review of Systems  Constitution: She has had some recent fevers and chills  Eyes: No acute visual changes  ENT: No swelling or sore throat  Respiratory: As above no history of asthma  Cardiovascular: She does have a history of coronary artery disease she's had 2 stents she has a history of severe mitral calcification mild mitral regurgitation she has grade 2 diastolic dysfunction and pulmonary hypertension on echocardiogram with RVSP dictated between 35 and 40  Gastrointestinal: No bad heartburn or indigestion no history of ulcers no history of liver  disease or hepatitis no melena or hematochezia  Genitourinary: No history of kidney disease no dysuria or hematuria  Integument: Not reviewed  Hematologic/Lymphatic: No bleeding she does have a history of pulmonary embolus and left lower extremity DVT following her first left knee surgery years ago  Musculoskeletal: She has arthritis as noted  Neurological: Seizures no strokes she does have a history of narcolepsy she takes modafinil in the morning and that seems to work well for her  Behavioral/Psych: No alcohol or illicit drug again she is a former smoker but quit number of years ago  Endocrine: Denies any diabetes or thyroid problems  Allergies/Immunologic: No known medication allergies    History  Past Medical History   Diagnosis Date   • Anxiety    • Arthritis    • Bipolar disorder    • COPD (chronic obstructive pulmonary disease)    • Coronary artery disease    • Diastolic dysfunction      grade II per echocardiogram 11/18/2016   • Difficulty walking    • Disease of thyroid gland      hypothyroidism   • Dislocation of hip joint      recurrent dislocation right hip   • DVT (deep venous thrombosis)    • GERD without esophagitis    • Heartburn    • Hematoma    • Hyperlipidemia    • Hypertension    • LVH (left ventricular hypertrophy)      mild to moderate per echocardiogram 11/18/2016   • Major depressive disorder    • Mitral annular calcification      severe per echocardiogram 11/18/2016   • Mitral regurgitation      mild per echo 11/18/2016   • Mitral valve disease    • Muscle weakness    • Narcolepsy    • Overactive bladder    • Pneumonia    • Pulmonary emboli    • Pulmonary hypertension      mild per echo 11/18/2016   • Sleep apnea      obstructive   • Tricuspid regurgitation      mild to moderate per echo 11/18/2016     Past Surgical History   Procedure Laterality Date   • Total hip arthroplasty Right 2011   • Total hip arthroplasty revision Right 11/22/2016   • Knee arthroplasty Left 03/2010     TWICE   •  Total knee arthroplasty revision Left 09/2010   • Other surgical history       IVC filter placement   • Orif ankle fracture Left 06/16/2011   • Hardware removal foot / ankle Left 12/01/2011   • Quadriceps tendon repair Left 06/21/2010     also done 9-    • Joint replacement     • Coronary angioplasty with stent placement       x2 stents   • Cataract extraction w/ intraocular lens implant Right    • Eye surgery       cataract surgery bilateral   • Incision and drainage hip Right 2/8/2017     Procedure: RT HIP INCISION AND DRAINAGE;  Surgeon: Erlin Martin MD;  Location: [facility] OR;  Service:    • Hip spacer insertion with antibiotic cement Right 2/13/2017     Procedure: RIGHT TOTAL HIP REMOVAL;  Surgeon: Erlin Martin MD;  Location: [facility] OR;  Service:      History reviewed. No pertinent family history.  Social History     Social History   • Marital status:      Spouse name: N/A   • Number of children: N/A   • Years of education: N/A     Social History Main Topics   • Smoking status: Former Smoker     Packs/day: 1.00     Years: 19.00     Types: Cigarettes     Quit date: 11/21/2016   • Smokeless tobacco: Never Used   • Alcohol use No   • Drug use: No   • Sexual activity: Defer     Other Topics Concern   • None     Social History Narrative       Allergies:  Review of patient's allergies indicates no known allergies.    Medications:  Prior to Admission medications    Medication Sig Start Date End Date Taking? Authorizing Provider   ALBUTEROL IN Inhale 0.63 mg Every 6 (Six) Hours As Needed.   Yes Historical Provider, MD   amLODIPine (NORVASC) 10 MG tablet Take 10 mg by mouth Daily.   Yes Historical Provider, MD   ARIPiprazole (ABILIFY) 10 MG tablet Take 10 mg by mouth Daily.   Yes Historical Provider, MD   aspirin  MG tablet Take 325 mg by mouth Every 6 (Six) Hours As Needed.   Yes Historical Provider, MD   atorvastatin (LIPITOR) 40 MG tablet Take 40 mg by mouth daily.   Yes  Historical Provider, MD   carvedilol (COREG) 12.5 MG tablet Take 12.5 mg by mouth 2 (two) times a day with meals.   Yes Historical Provider, MD   clonazePAM (KlonoPIN) 0.5 MG tablet Take 0.5 mg by mouth 3 (three) times a day as needed for seizures.   Yes Historical Provider, MD   darifenacin (ENABLEX) 15 MG 24 hr tablet Take 15 mg by mouth daily.   Yes Historical Provider, MD   diazePAM (VALIUM) 2 MG tablet Take 2 mg by mouth Every 12 (Twelve) Hours As Needed for Anxiety.   Yes Historical Provider, MD   docusate sodium (COLACE) 100 MG capsule Take 100 mg by mouth 2 (Two) Times a Day.   Yes Historical Provider, MD   ferrous sulfate 325 (65 FE) MG tablet Take 325 mg by mouth 2 (Two) Times a Day.   Yes Historical Provider, MD   fluticasone (FLONASE) 50 MCG/ACT nasal spray 2 sprays into each nostril daily. Administer 2 sprays in each nostril for each dose.   Yes Historical Provider, MD   HYDROcodone-acetaminophen (NORCO)  MG per tablet Take 2 tablets by mouth Every 4 (Four) Hours As Needed for Moderate Pain (4-6).   Yes Historical Provider, MD   HYDROcodone-acetaminophen (NORCO)  MG per tablet Take 1 tablet by mouth Every 4 (Four) Hours As Needed for Mild Pain (1-3) or Moderate Pain (4-6).   Yes Historical Provider, MD   ipratropium-albuterol (DUO-NEB) 0.5-2.5 mg/mL nebulizer Take 3 mL by nebulization every 4 (four) hours as needed for wheezing.   Yes Historical Provider, MD   isosorbide mononitrate (IMDUR) 30 MG 24 hr tablet Take 30 mg by mouth Daily.   Yes Historical Provider, MD   lamoTRIgine (LaMICtal) 25 MG tablet Take 25 mg by mouth 2 (Two) Times a Day.   Yes Historical Provider, MD   levothyroxine (SYNTHROID, LEVOTHROID) 100 MCG tablet Take 100 mcg by mouth daily.   Yes Historical Provider, MD   methylphenidate (RITALIN) 10 MG tablet Take 10 mg by mouth 2 (Two) Times a Day As Needed.   Yes Historical Provider, MD   modafinil (PROVIGIL) 200 MG tablet Take 200 mg by mouth 2 (two) times a day.   Yes  Historical Provider, MD   omeprazole (PriLOSEC) 20 MG capsule Take 20 mg by mouth 2 (two) times a day.   Yes Historical Provider, MD   potassium chloride (K-DUR) 10 MEQ CR tablet Take 10 mEq by mouth daily.   Yes Historical Provider, MD   pregabalin (LYRICA) 200 MG capsule Take 100 mg by mouth 2 (Two) Times a Day.   Yes Historical Provider, MD   promethazine (PHENERGAN) 25 MG tablet Take 25 mg by mouth Every 4 (Four) Hours As Needed for Nausea or Vomiting.   Yes Historical Provider, MD   vilazodone (VIIBRYD) 40 MG tablet tablet Take 40 mg by mouth daily.   Yes Historical Provider, MD   aspirin 81 MG chewable tablet Chew 81 mg Daily.    Historical Provider, MD   clopidogrel (PLAVIX) 75 MG tablet Take 1 tablet by mouth daily. 8/8/16   Andres Oliveros MD   ertapenem (INVanz) 1 g/100 mL 0.9% NS VTB (mbp) Infuse 100 mL into a venous catheter Daily for 42 days. Indications: Bone and Joint Infection 2/18/17 4/1/17  Krysta Benito MD   oxyCODONE-acetaminophen (PERCOCET)  MG per tablet Take 1 tablet by mouth 4 (Four) Times a Day As Needed for moderate pain (4-6).    Historical Provider, MD   oxyCODONE-acetaminophen (PERCOCET)  MG per tablet Take 1 tablet by mouth 3 (Three) Times a Day As Needed for moderate pain (4-6).    Historical Provider, MD   tiotropium (SPIRIVA) 18 MCG per inhalation capsule Place 1 capsule into inhaler and inhale 1 (one) time daily.    Historical Provider, MD   Vancomycin HCl in Dextrose (PHARMACY TO DOSE VANCOMYCIN) Daily. 2/18/17   Krysta Benito MD   warfarin (COUMADIN) 6 MG tablet Take 6 mg by mouth Daily.    Historical Provider, MD       amLODIPine 10 mg Oral Daily   [MAR Hold] atorvastatin 40 mg Oral Daily   budesonide-formoterol 2 puff Inhalation BID - RT   carvedilol 12.5 mg Oral BID With Meals   [MAR Hold] cefepime 2 g Intravenous Q8H   [MAR Hold] docusate sodium 100 mg Oral BID   [MAR Hold] ferrous sulfate 325 mg Oral BID   [MAR Hold] fluticasone 2 spray Nasal Daily   [MAR Hold]  "isosorbide mononitrate 30 mg Oral Daily   [MAR Hold] levothyroxine 100 mcg Oral Daily   [MAR Hold] modafinil 200 mg Oral Daily   [MAR Hold] oxybutynin XL 10 mg Oral Daily   [MAR Hold] pantoprazole 40 mg Oral Q AM   [MAR Hold] Pharmacy to dose vancomycin  Does not apply Daily   pregabalin 200 mg Oral BID   [MAR Hold] tiotropium 1 capsule Inhalation Daily - RT   [MAR Hold] vilazodone 40 mg Oral Daily       lactated ringers 9 mL/hr Last Rate: 9 mL/hr (03/17/17 1619)       Objective     Vital Signs  Temp:  [98.4 °F (36.9 °C)-99.2 °F (37.3 °C)] 98.6 °F (37 °C)  Heart Rate:  [58-79] 67  Resp:  [14-18] 16  BP: (116-168)/(56-83) 168/78  Body mass index is 37.61 kg/(m^2).    Intake/Output Summary (Last 24 hours) at 03/17/17 1922  Last data filed at 03/17/17 1920   Gross per 24 hour   Intake   1300 ml   Output   1700 ml   Net   -400 ml     I/O this shift:  In: 200 [I.V.:200]  Out: 0     Flowsheet Rows         First Filed Value    Admission Height  65\" (165.1 cm) Documented at 03/15/2017 1500    Admission Weight  226 lb (103 kg) Documented at 03/15/2017 1500           Physical Exam:  General Appearance: Well-developed obese white female resting comfortably in bed and 4 L nasal cannula O2  Eyes: Conjunctiva are clear and anicteric  ENT: Oral mucous membranes are little dry she has a Mallampati type III airway  Neck: Short obese trachea midline I don't appreciate jugular venous distention  Lungs: Clear nonlabored symmetric expansion  Cardiac: Regular rate and rhythm no murmur  Abdomen: Obese soft no palpable organomegaly or masses  : Not examined  Musc/Skel: She has a large dressing over a right hip incision wound I did not take the dressing down it is clean and dry  Skin: No jaundice no petechiae  Neuro: Patient is sleepy but arouses well she is alert oriented cooperative  Extremities/P Vascular: No clubbing or cyanosis she does have edema of both lower extremities at least 2+ pitting she has palpable dorsalis pedis pulses " bilaterally  MSE: She seems to be in pretty good spirits      Labs:    Results from last 7 days  Lab Units 03/17/17  0335 03/16/17  0529 03/15/17  1424   WBC 10*3/mm3 6.34 5.98 6.88   HEMOGLOBIN g/dL 9.3* 9.2* 9.1*   PLATELETS 10*3/mm3 270 265 292         Radiographic Imaging:  Imaging Results (last 24 hours)     ** No results found for the last 24 hours. **          No films for review    Assessment/Plan     Impression #1 right hip wound with cultures growing MRSA and Pseudomonas status post 3/17/17 I&D.  Antibiotics per infectious disease  #2 postop hypoxia I think this is predominantly related to sleep apnea hypoxia worsened with pain medication narcotics.  She is saturating pretty well on 4 L now we could probably wean her lower.  I am going to get her an auto titrating CPAP machine since she doesn't know her home pressures and cannot get her home machine.  COPD may be playing a role as well we will get her back on her home inhalers and start some incentive spirometry she may have a little postop atelectasis.  If she has any worsening osteopenia we'll get a chest x-ray and ABG that apparently she has been improving rapidly since she has started to wake up.  #3 COPD continue Spiriva resume her home Symbicort and some when necessary albuterol  #4 obstructive sleep apnea again will get her home machine  #5 narcolepsy continue her home modafinil  #6 coronary artery disease  #7 pulmonary hypertension this is likely secondary due to diastolic dysfunction, as may contribute to hypoxia as well  #8 grade 2 diastolic dysfunction by echocardiogram  #9 severe mitral calcification with mild mitral regurgitation  #10 hypertension  #11 anemia mild follow H&H  #10 history of DVT and PE she has been anticoagulated agree with Dr. patel and get her back on prophylactic anticoagulant as soon as possible      Umang Balderrama MD  03/17/17  7:22 PM    Time:

## 2017-03-17 NOTE — PROGRESS NOTES
Discharge Planning Assessment  [facility]     Patient Name: Frances Downs  MRN: [id number]  Today's Date: 3/17/2017    Admit Date: 3/15/2017          Discharge Needs Assessment       03/17/17 1545    Living Environment    Lives With facility resident    Living Arrangements house    Discharge Needs Assessment    Concerns To Be Addressed basic needs concerns    Readmission Within The Last 30 Days no previous admission in last 30 days    Anticipated Changes Related to Illness inability to care for self    Equipment Currently Used at Home cane, straight;walker, rolling    Equipment Needed After Discharge walker, standard;raised toilet    Discharge Facility/Level Of Care Needs nursing facility, skilled    Transportation Available ambulance            Discharge Plan       03/17/17 1546    Case Management/Social Work Plan    Plan [facility]    Patient/Family In Agreement With Plan yes    Additional Comments Spoke with pt, pt is from [facility]. Spoke with Ally in admissions who staes they are able to accept back and will have a bed at d/c anytime. Partial packet will be on chart. Nursing or CCP to fax d/c summary/orders when pt d/c'ed. Please include Dr. Benito's orders in packet at d/c. CCP to follow for additional needs.        Discharge Placement     Facility/Agency Request Status Selected? Address Phone Number Fax Number    [facility] Accept[facility]    Yes [address] [place] [address] [phone]        Abbey Martinez RN

## 2017-03-17 NOTE — NURSING NOTE
Placing consult to pulmonary re: pt's sleep apnea.  She uses a machine nightly, but came to hospital from MD office and does not have machine here.  Waveform has been unreliable, sat readings can vary from  within a second or two.  Update called to [facility], will let them know what pulmonary decides re: pt.  Pt states no one here, all [place] in [facility] [place].

## 2017-03-17 NOTE — PLAN OF CARE
Problem: Patient Care Overview (Adult)  Goal: Plan of Care Review  Outcome: Ongoing (interventions implemented as appropriate)    03/17/17 0311   Coping/Psychosocial Response Interventions   Plan Of Care Reviewed With patient   Outcome Evaluation   Outcome Summary/Follow up Plan pain controlled with oral pain medication, new mattress on bed to assist with weight distribution off pressure areas, patient resting comfortably throughout night, surgery planned for 3/17/17   Patient Care Overview   Progress improving       Goal: Adult Individualization and Mutuality  Outcome: Ongoing (interventions implemented as appropriate)  Goal: Discharge Needs Assessment  Outcome: Ongoing (interventions implemented as appropriate)    Problem: Infection, Risk/Actual (Adult)  Goal: Infection Prevention/Resolution  Outcome: Ongoing (interventions implemented as appropriate)    Problem: Fall Risk (Adult)  Goal: Absence of Falls  Outcome: Ongoing (interventions implemented as appropriate)    Problem: Pain, Acute (Adult)  Goal: Identify Related Risk Factors and Signs and Symptoms  Outcome: Outcome(s) achieved Date Met:  03/17/17  Goal: Acceptable Pain Control/Comfort Level  Outcome: Ongoing (interventions implemented as appropriate)    Problem: Pressure Ulcer (Adult)  Goal: Signs and Symptoms of Listed Potential Problems Will be Absent or Manageable (Pressure Ulcer)  Outcome: Ongoing (interventions implemented as appropriate)

## 2017-03-17 NOTE — PROGRESS NOTES
"  Infectious Diseases Progress Note    Krysta Benito MD     [facility]  [place]: 2 days  Patient Identification:  Name: Frances Downs  Age: 66 y.o.  Sex: female  :  1950  MRN: [id number]         Primary Care Physician: Talha Echeverria MD            Subjective: Feeling better.  Wants to use her CPAP with her CPAP machine is at home.  Interval History: See admission history and physical.     Objective:    Scheduled Meds:  amLODIPine 10 mg Oral Daily   atorvastatin 40 mg Oral Daily   carvedilol 12.5 mg Oral BID With Meals   cefepime 2 g Intravenous Q8H   docusate sodium 100 mg Oral BID   ferrous sulfate 325 mg Oral BID   fluticasone 2 spray Nasal Daily   isosorbide mononitrate 30 mg Oral Daily   levothyroxine 100 mcg Oral Daily   modafinil 200 mg Oral Daily   oxybutynin XL 10 mg Oral Daily   pantoprazole 40 mg Oral Q AM   Pharmacy to dose vancomycin  Does not apply Daily   pregabalin 200 mg Oral BID   tiotropium 1 capsule Inhalation Daily - RT   vilazodone 40 mg Oral Daily     Continuous Infusions:     Vital signs in last 24 hours:  Temp:  [98.4 °F (36.9 °C)-99.2 °F (37.3 °C)] 98.9 °F (37.2 °C)  Heart Rate:  [61-70] 61  Resp:  [16-18] 16  BP: (118-132)/(56-70) 120/70    Intake/Output:    Intake/Output Summary (Last 24 hours) at 17 1151  Last data filed at 17 0857   Gross per 24 hour   Intake      0 ml   Output   2200 ml   Net  -2200 ml       Exam:  Visit Vitals   • /70 (BP Location: Right arm, Patient Position: Lying)   • Pulse 61   • Temp 98.9 °F (37.2 °C) (Oral)   • Resp 16   • Ht 65\" (165.1 cm)   • Wt 226 lb (103 kg)   • SpO2 91%   • BMI 37.61 kg/m2       General Appearance:    Alert, cooperative, no distress, AAOx3                          Head:    Normocephalic, without obvious abnormality, atraumatic                           Eyes:    PERRL, conjunctiva/corneas clear, EOM's intact, both eyes                         Throat:   Lips, tongue, gums normal; oral mucosa pink " and moist                           Neck:   Supple, symmetrical, trachea midline, no JVD                         Lungs:    Clear to auscultation bilaterally, respirations unlabored                 Chest Wall:    No tenderness or deformity                          Heart:    Regular rate and rhythm, S1 and S2 normal, no murmur,no  Rub                                      or gallop                  Abdomen:     Soft, non-tender, bowel sounds active, no masses, no                                                        organomegaly                  Extremities:   Right hip surgical site is dressed.                        Pulses:   Pulses palpable in all extremities                            Skin:   Skin is warm and dry,  no rashes or palpable lesions              Data Review:    I reviewed the patient's new clinical results.    Results from last 7 days  Lab Units 03/17/17  0335 03/16/17  0529 03/15/17  1424   WBC 10*3/mm3 6.34 5.98 6.88   HEMOGLOBIN g/dL 9.3* 9.2* 9.1*   PLATELETS 10*3/mm3 270 265 292       Results from last 7 days  Lab Units 03/17/17  0335 03/16/17  0529 03/15/17  1424   SODIUM mmol/L 142 143 142   POTASSIUM mmol/L 3.7 3.4* 3.8   CHLORIDE mmol/L 102 102 101   TOTAL CO2 mmol/L 28.4 30.4* 31.0*   BUN mg/dL 7* 6* 6*   CREATININE mg/dL 0.78 0.66 0.71   CALCIUM mg/dL 8.6 8.9 8.9   GLUCOSE mg/dL 100* 90 89       Culture positive for pseudomonas aeruginosa  Assessment:  Principal Problem:    Mixed infection  Active Problems:    Hematoma of right hip    DVT (deep venous thrombosis), hx of    Hypertension    Hyperlipidemia    Coronary artery disease, hx of stent    Arthritis    Bipolar disorder    Infected prosthesis of right hip    MRSA (methicillin resistant Staphylococcus aureus) infection    Bacterial infection due to Morganella morganii    Hypokalemia   pseudomonas aeruginosa infection of the wound    Plan:  DC ertapenem start cefepime 2 g IV every 8 hours for pseudomonas aeruginosa identified in the wound  culture.  Would like to have intraoperative sample sent for cultures to further clarify antibiotic therapy as the Pseudomonas grown in the culture could very well be due to superficial environmental contamination rather than true representative of deep infection.  Duration of antibiotic treatment would be reset after the surgery today.    Krysta Benito MD  3/17/2017  11:51 AM    Much of this encounter note is an electronic transcription/translation of spoken language to printed text. The electronic translation of spoken language may permit erroneous, or at times, nonsensical words or phrases to be inadvertently transcribed; Although I have reviewed the note for such errors, some may still exist

## 2017-03-17 NOTE — ANESTHESIA POSTPROCEDURE EVALUATION
Patient: Frances Downs    Procedure Summary     Date Anesthesia Start Anesthesia Stop Room / Location    03/17/17 [id number]  [facility] / [facility]       Procedure Diagnosis Surgeon Provider    RIGHT HIP INCISION AND DRAINAGE (Right Hip) No diagnosis on file. MD César La MD          Anesthesia Type: general  Last vitals  /83 (03/17/17 1810)    Temp 37 °C (98.6 °F) (03/17/17 1800)    Pulse 70 (03/17/17 1810)   Resp 16 (03/17/17 1810)    SpO2 98 % (03/17/17 1810)      Post Anesthesia Care and Evaluation    Patient location during evaluation: PACU  Patient participation: complete - patient participated  Level of consciousness: awake and alert  Pain management: adequate  Airway patency: patent  Anesthetic complications: No anesthetic complications    Cardiovascular status: acceptable  Respiratory status: acceptable  Hydration status: acceptable

## 2017-03-18 PROBLEM — D62 POSTOPERATIVE ANEMIA DUE TO ACUTE BLOOD LOSS: Status: ACTIVE | Noted: 2017-03-18

## 2017-03-18 LAB
ANION GAP SERPL CALCULATED.3IONS-SCNC: 8.8 MMOL/L
ARTERIAL PATENCY WRIST A: POSITIVE
ATMOSPHERIC PRESS: 752.1 MMHG
BASE EXCESS BLDA CALC-SCNC: 8.4 MMOL/L (ref 0–2)
BDY SITE: ABNORMAL
BUN BLD-MCNC: 6 MG/DL (ref 8–23)
BUN/CREAT SERPL: 8.5 (ref 7–25)
CALCIUM SPEC-SCNC: 8.2 MG/DL (ref 8.6–10.5)
CHLORIDE SERPL-SCNC: 102 MMOL/L (ref 98–107)
CO2 SERPL-SCNC: 30.2 MMOL/L (ref 22–29)
CREAT BLD-MCNC: 0.71 MG/DL (ref 0.57–1)
CREAT BLD-MCNC: 0.71 MG/DL (ref 0.57–1)
DEPRECATED RDW RBC AUTO: 51.3 FL (ref 37–54)
ERYTHROCYTE [DISTWIDTH] IN BLOOD BY AUTOMATED COUNT: 15.1 % (ref 11.7–13)
GFR SERPL CREATININE-BSD FRML MDRD: 83 ML/MIN/1.73
GFR SERPL CREATININE-BSD FRML MDRD: 83 ML/MIN/1.73
GLUCOSE BLD-MCNC: 119 MG/DL (ref 65–99)
HCO3 BLDA-SCNC: 34 MMOL/L (ref 22–28)
HCT VFR BLD AUTO: 25.8 % (ref 35.6–45.5)
HGB BLD-MCNC: 8.3 G/DL (ref 11.9–15.5)
MCH RBC QN AUTO: 30.2 PG (ref 26.9–32)
MCHC RBC AUTO-ENTMCNC: 32.2 G/DL (ref 32.4–36.3)
MCV RBC AUTO: 93.8 FL (ref 80.5–98.2)
MODALITY: ABNORMAL
PCO2 BLDA: 53.9 MM HG (ref 35–45)
PH BLDA: 7.41 PH UNITS (ref 7.35–7.45)
PLATELET # BLD AUTO: 256 10*3/MM3 (ref 140–500)
PMV BLD AUTO: 9.7 FL (ref 6–12)
PO2 BLDA: 41.8 MM HG (ref 80–100)
POTASSIUM BLD-SCNC: 3.8 MMOL/L (ref 3.5–5.2)
RBC # BLD AUTO: 2.75 10*6/MM3 (ref 3.9–5.2)
SAO2 % BLDCOA: 76.3 % (ref 92–99)
SET MECH RESP RATE: 16
SODIUM BLD-SCNC: 141 MMOL/L (ref 136–145)
VANCOMYCIN SERPL-MCNC: 12.4 MCG/ML (ref 5–40)
WBC NRBC COR # BLD: 6.57 10*3/MM3 (ref 4.5–10.7)

## 2017-03-18 PROCEDURE — 94799 UNLISTED PULMONARY SVC/PX: CPT

## 2017-03-18 PROCEDURE — 97162 PT EVAL MOD COMPLEX 30 MIN: CPT

## 2017-03-18 PROCEDURE — 25010000002 CEFEPIME: Performed by: ORTHOPAEDIC SURGERY

## 2017-03-18 PROCEDURE — 25010000002 VANCOMYCIN: Performed by: INTERNAL MEDICINE

## 2017-03-18 PROCEDURE — 25010000002 ONDANSETRON PER 1 MG: Performed by: ORTHOPAEDIC SURGERY

## 2017-03-18 PROCEDURE — 80048 BASIC METABOLIC PNL TOTAL CA: CPT | Performed by: ORTHOPAEDIC SURGERY

## 2017-03-18 PROCEDURE — 82803 BLOOD GASES ANY COMBINATION: CPT

## 2017-03-18 PROCEDURE — 80202 ASSAY OF VANCOMYCIN: CPT | Performed by: INTERNAL MEDICINE

## 2017-03-18 PROCEDURE — 36600 WITHDRAWAL OF ARTERIAL BLOOD: CPT

## 2017-03-18 PROCEDURE — 97110 THERAPEUTIC EXERCISES: CPT

## 2017-03-18 PROCEDURE — 82565 ASSAY OF CREATININE: CPT | Performed by: INTERNAL MEDICINE

## 2017-03-18 PROCEDURE — 85027 COMPLETE CBC AUTOMATED: CPT | Performed by: HOSPITALIST

## 2017-03-18 PROCEDURE — 25010000002 ENOXAPARIN PER 10 MG: Performed by: HOSPITALIST

## 2017-03-18 RX ORDER — WARFARIN SODIUM 6 MG/1
6 TABLET ORAL
Status: CANCELLED | OUTPATIENT
Start: 2017-03-18

## 2017-03-18 RX ORDER — ATORVASTATIN CALCIUM 40 MG/1
40 TABLET, FILM COATED ORAL NIGHTLY
Status: DISCONTINUED | OUTPATIENT
Start: 2017-03-18 | End: 2017-03-22 | Stop reason: HOSPADM

## 2017-03-18 RX ORDER — FLUTICASONE PROPIONATE 50 MCG
2 SPRAY, SUSPENSION (ML) NASAL DAILY
Status: DISCONTINUED | OUTPATIENT
Start: 2017-03-18 | End: 2017-03-22 | Stop reason: HOSPADM

## 2017-03-18 RX ORDER — ACETAMINOPHEN 325 MG/1
650 TABLET ORAL EVERY 4 HOURS PRN
Status: DISCONTINUED | OUTPATIENT
Start: 2017-03-18 | End: 2017-03-22 | Stop reason: HOSPADM

## 2017-03-18 RX ORDER — ONDANSETRON 2 MG/ML
4 INJECTION INTRAMUSCULAR; INTRAVENOUS EVERY 6 HOURS PRN
Status: COMPLETED | OUTPATIENT
Start: 2017-03-18 | End: 2017-03-18

## 2017-03-18 RX ORDER — WARFARIN SODIUM 6 MG/1
6 TABLET ORAL
Status: DISCONTINUED | OUTPATIENT
Start: 2017-03-18 | End: 2017-03-20

## 2017-03-18 RX ORDER — IPRATROPIUM BROMIDE AND ALBUTEROL SULFATE 2.5; .5 MG/3ML; MG/3ML
3 SOLUTION RESPIRATORY (INHALATION) EVERY 4 HOURS PRN
Status: DISCONTINUED | OUTPATIENT
Start: 2017-03-18 | End: 2017-03-22 | Stop reason: HOSPADM

## 2017-03-18 RX ORDER — LEVOTHYROXINE SODIUM 0.1 MG/1
100 TABLET ORAL
Status: DISCONTINUED | OUTPATIENT
Start: 2017-03-18 | End: 2017-03-22 | Stop reason: HOSPADM

## 2017-03-18 RX ORDER — DOCUSATE SODIUM 100 MG/1
100 CAPSULE, LIQUID FILLED ORAL DAILY
Status: DISCONTINUED | OUTPATIENT
Start: 2017-03-18 | End: 2017-03-22 | Stop reason: HOSPADM

## 2017-03-18 RX ORDER — MODAFINIL 100 MG/1
200 TABLET ORAL DAILY
Status: DISCONTINUED | OUTPATIENT
Start: 2017-03-18 | End: 2017-03-22 | Stop reason: HOSPADM

## 2017-03-18 RX ORDER — NALOXONE HYDROCHLORIDE 0.4 MG/ML
0.4 INJECTION, SOLUTION INTRAMUSCULAR; INTRAVENOUS; SUBCUTANEOUS
Status: DISCONTINUED | OUTPATIENT
Start: 2017-03-18 | End: 2017-03-22 | Stop reason: HOSPADM

## 2017-03-18 RX ORDER — PANTOPRAZOLE SODIUM 40 MG/1
40 TABLET, DELAYED RELEASE ORAL
Status: DISCONTINUED | OUTPATIENT
Start: 2017-03-18 | End: 2017-03-22 | Stop reason: HOSPADM

## 2017-03-18 RX ORDER — MORPHINE SULFATE 2 MG/ML
2 INJECTION, SOLUTION INTRAMUSCULAR; INTRAVENOUS EVERY 4 HOURS PRN
Status: DISCONTINUED | OUTPATIENT
Start: 2017-03-18 | End: 2017-03-22 | Stop reason: HOSPADM

## 2017-03-18 RX ORDER — FERROUS SULFATE 325(65) MG
325 TABLET ORAL
Status: DISCONTINUED | OUTPATIENT
Start: 2017-03-18 | End: 2017-03-22 | Stop reason: HOSPADM

## 2017-03-18 RX ORDER — VILAZODONE HYDROCHLORIDE 40 MG/1
40 TABLET ORAL DAILY
Status: DISCONTINUED | OUTPATIENT
Start: 2017-03-18 | End: 2017-03-22 | Stop reason: HOSPADM

## 2017-03-18 RX ORDER — ISOSORBIDE MONONITRATE 30 MG/1
30 TABLET, EXTENDED RELEASE ORAL
Status: DISCONTINUED | OUTPATIENT
Start: 2017-03-18 | End: 2017-03-22 | Stop reason: HOSPADM

## 2017-03-18 RX ORDER — OXYBUTYNIN CHLORIDE 10 MG/1
10 TABLET, EXTENDED RELEASE ORAL DAILY
Status: DISCONTINUED | OUTPATIENT
Start: 2017-03-18 | End: 2017-03-22 | Stop reason: HOSPADM

## 2017-03-18 RX ADMIN — VILAZODONE HYDROCHLORIDE 40 MG: 40 TABLET ORAL at 09:33

## 2017-03-18 RX ADMIN — FERROUS SULFATE TAB 325 MG (65 MG ELEMENTAL FE) 325 MG: 325 (65 FE) TAB at 09:34

## 2017-03-18 RX ADMIN — CEFEPIME 2 G: 2 INJECTION, POWDER, FOR SOLUTION INTRAVENOUS at 09:34

## 2017-03-18 RX ADMIN — LEVOTHYROXINE SODIUM 100 MCG: 100 TABLET ORAL at 06:13

## 2017-03-18 RX ADMIN — ENOXAPARIN SODIUM 40 MG: 40 INJECTION SUBCUTANEOUS at 13:17

## 2017-03-18 RX ADMIN — OXYBUTYNIN CHLORIDE 10 MG: 10 TABLET, EXTENDED RELEASE ORAL at 09:33

## 2017-03-18 RX ADMIN — LAMOTRIGINE 25 MG: 25 TABLET ORAL at 17:55

## 2017-03-18 RX ADMIN — TIOTROPIUM BROMIDE 1 CAPSULE: 18 CAPSULE ORAL; RESPIRATORY (INHALATION) at 09:52

## 2017-03-18 RX ADMIN — VANCOMYCIN HYDROCHLORIDE 2000 MG: 1 INJECTION, POWDER, LYOPHILIZED, FOR SOLUTION INTRAVENOUS at 13:13

## 2017-03-18 RX ADMIN — AMLODIPINE BESYLATE 10 MG: 10 TABLET ORAL at 09:33

## 2017-03-18 RX ADMIN — ISOSORBIDE MONONITRATE 30 MG: 30 TABLET, EXTENDED RELEASE ORAL at 09:33

## 2017-03-18 RX ADMIN — WARFARIN SODIUM 6 MG: 6 TABLET ORAL at 17:55

## 2017-03-18 RX ADMIN — HYDROCODONE BITARTRATE AND ACETAMINOPHEN 1 TABLET: 10; 325 TABLET ORAL at 12:08

## 2017-03-18 RX ADMIN — HYDROCODONE BITARTRATE AND ACETAMINOPHEN 1 TABLET: 10; 325 TABLET ORAL at 21:04

## 2017-03-18 RX ADMIN — MODAFINIL 200 MG: 100 TABLET ORAL at 09:33

## 2017-03-18 RX ADMIN — BUDESONIDE AND FORMOTEROL FUMARATE DIHYDRATE 2 PUFF: 160; 4.5 AEROSOL RESPIRATORY (INHALATION) at 09:51

## 2017-03-18 RX ADMIN — CEFEPIME 2 G: 2 INJECTION, POWDER, FOR SOLUTION INTRAVENOUS at 02:05

## 2017-03-18 RX ADMIN — PREGABALIN 200 MG: 100 CAPSULE ORAL at 09:33

## 2017-03-18 RX ADMIN — CEFEPIME 2 G: 2 INJECTION, POWDER, FOR SOLUTION INTRAVENOUS at 17:55

## 2017-03-18 RX ADMIN — PANTOPRAZOLE SODIUM 40 MG: 40 TABLET, DELAYED RELEASE ORAL at 06:13

## 2017-03-18 RX ADMIN — FLUTICASONE PROPIONATE 2 SPRAY: 50 SPRAY, METERED NASAL at 09:34

## 2017-03-18 RX ADMIN — CARVEDILOL 12.5 MG: 12.5 TABLET, FILM COATED ORAL at 09:34

## 2017-03-18 RX ADMIN — LAMOTRIGINE 25 MG: 25 TABLET ORAL at 09:33

## 2017-03-18 RX ADMIN — HYDROCODONE BITARTRATE AND ACETAMINOPHEN 1 TABLET: 10; 325 TABLET ORAL at 17:01

## 2017-03-18 RX ADMIN — ASPIRIN 325 MG: 325 TABLET, DELAYED RELEASE ORAL at 09:33

## 2017-03-18 RX ADMIN — BUDESONIDE AND FORMOTEROL FUMARATE DIHYDRATE 2 PUFF: 160; 4.5 AEROSOL RESPIRATORY (INHALATION) at 20:57

## 2017-03-18 RX ADMIN — ARIPIPRAZOLE 10 MG: 10 TABLET ORAL at 09:33

## 2017-03-18 RX ADMIN — HYDROCODONE BITARTRATE AND ACETAMINOPHEN 1 TABLET: 10; 325 TABLET ORAL at 06:13

## 2017-03-18 RX ADMIN — PREGABALIN 200 MG: 100 CAPSULE ORAL at 17:55

## 2017-03-18 RX ADMIN — DOCUSATE SODIUM 100 MG: 100 CAPSULE, LIQUID FILLED ORAL at 09:33

## 2017-03-18 RX ADMIN — ATORVASTATIN CALCIUM 40 MG: 40 TABLET, FILM COATED ORAL at 21:03

## 2017-03-18 RX ADMIN — ONDANSETRON 4 MG: 2 INJECTION INTRAMUSCULAR; INTRAVENOUS at 12:08

## 2017-03-18 RX ADMIN — CARVEDILOL 12.5 MG: 12.5 TABLET, FILM COATED ORAL at 17:55

## 2017-03-18 NOTE — PROGRESS NOTES
Orthopedic Total Progress Note        Patient: Frances Downs    Date of Admission: 3/15/2017  1:03 PM    YOB: 1950    Medical Record Number: [id number]    Attending Physician: Krysta Benito MD      POD # 1     Status post: RT HIP INCISION AND DRAINAGE      Systemic or Specific Complaints: No Complaints      No Known Allergies      Current Medications:  Scheduled Meds:  amLODIPine 10 mg Oral Daily   ARIPiprazole 10 mg Oral Daily   aspirin  mg Oral BID   atorvastatin 40 mg Oral Nightly   budesonide-formoterol 2 puff Inhalation BID - RT   carvedilol 12.5 mg Oral BID With Meals   cefepime 2 g Intravenous Q8H   docusate sodium 100 mg Oral Daily   ferrous sulfate 325 mg Oral Daily With Breakfast   fluticasone 2 spray Each Nare Daily   isosorbide mononitrate 30 mg Oral Q24H   lamoTRIgine 25 mg Oral BID   levothyroxine 100 mcg Oral Q AM   modafinil 200 mg Oral Daily   oxybutynin XL 10 mg Oral Daily   pantoprazole 40 mg Oral Q AM   Pharmacy to dose vancomycin  Does not apply Daily   pregabalin 200 mg Oral BID   tiotropium 1 capsule Inhalation Daily - RT   vilazodone 40 mg Oral Daily     Continuous Infusions:  lactated ringers 100 mL/hr     PRN Meds:.•  acetaminophen  •  albuterol  •  bisacodyl  •  diazePAM  •  docusate sodium  •  HYDROcodone-acetaminophen  •  ipratropium-albuterol  •  magnesium hydroxide  •  Morphine **AND** naloxone  •  [DISCONTINUED] Morphine **AND** naloxone  •  promethazine  •  sodium chloride      Physical Exam: 66 y.o. female  General Appearance:    alert and oriented                Pain Relief: Patient reports some relief       Vitals:    03/17/17 2315 03/18/17 0225 03/18/17 0401 03/18/17 0700   BP:  135/60 132/65 139/70   BP Location:  Right arm Right arm Right arm   Patient Position:  Lying Lying Lying   Pulse:  71 70 73   Resp:  18 16 16   Temp:  99 °F (37.2 °C) 99 °F (37.2 °C) 99.3 °F (37.4 °C)   TempSrc:  Oral Oral Oral   SpO2: 93% 97% 95% 96%   Weight:       Height:                Extremities:   Operative extremity neurovascular status intact. ROM intact.    Incision intact w/out signs or  symptoms of infection. No           edema, no cyanosis, no calf tenderness     Pulses:     Pulses palpable and equal bilaterally     Skin:     Skin Warm/Dry w/out ulceration, ecchymosis, rash, or   cyanosis     Activity: Mobilizing Per P.T.       Diagnostic Tests:   Admission on 03/15/2017   Component Date Value Ref Range Status   • Glucose 03/15/2017 89  65 - 99 mg/dL Final   • BUN 03/15/2017 6* 8 - 23 mg/dL Final   • Creatinine 03/15/2017 0.71  0.57 - 1.00 mg/dL Final   • Sodium 03/15/2017 142  136 - 145 mmol/L Final   • Potassium 03/15/2017 3.8  3.5 - 5.2 mmol/L Final   • Chloride 03/15/2017 101  98 - 107 mmol/L Final   • CO2 03/15/2017 31.0* 22.0 - 29.0 mmol/L Final   • Calcium 03/15/2017 8.9  8.6 - 10.5 mg/dL Final   • Total Protein 03/15/2017 5.4* 6.0 - 8.5 g/dL Final   • Albumin 03/15/2017 2.70* 3.50 - 5.20 g/dL Final   • ALT (SGPT) 03/15/2017 7  1 - 33 U/L Final   • AST (SGOT) 03/15/2017 9  1 - 32 U/L Final   • Alkaline Phosphatase 03/15/2017 196* 39 - 117 U/L Final   • Total Bilirubin 03/15/2017 0.2  0.1 - 1.2 mg/dL Final   • eGFR Non African Amer 03/15/2017 83  >60 mL/min/1.73 Final   • Globulin 03/15/2017 2.7  gm/dL Final   • A/G Ratio 03/15/2017 1.0  g/dL Final   • BUN/Creatinine Ratio 03/15/2017 8.5  7.0 - 25.0 Final   • Anion Gap 03/15/2017 10.0  mmol/L Final   • WBC 03/15/2017 6.88  4.50 - 10.70 10*3/mm3 Final   • RBC 03/15/2017 3.07* 3.90 - 5.20 10*6/mm3 Final   • Hemoglobin 03/15/2017 9.1* 11.9 - 15.5 g/dL Final   • Hematocrit 03/15/2017 28.9* 35.6 - 45.5 % Final   • MCV 03/15/2017 94.1  80.5 - 98.2 fL Final   • MCH 03/15/2017 29.6  26.9 - 32.0 pg Final   • MCHC 03/15/2017 31.5* 32.4 - 36.3 g/dL Final   • RDW 03/15/2017 15.4* 11.7 - 13.0 % Final   • RDW-SD 03/15/2017 53.2  37.0 - 54.0 fl Final   • MPV 03/15/2017 10.1  6.0 - 12.0 fL Final   • Platelets 03/15/2017 292  140 - 500  10*3/mm3 Final   • Neutrophil % 03/15/2017 51.8  42.7 - 76.0 % Final   • Lymphocyte % 03/15/2017 33.1  19.6 - 45.3 % Final   • Monocyte % 03/15/2017 9.3  5.0 - 12.0 % Final   • Eosinophil % 03/15/2017 4.8  0.3 - 6.2 % Final   • Basophil % 03/15/2017 0.7  0.0 - 1.5 % Final   • Immature Grans % 03/15/2017 0.3  0.0 - 0.5 % Final   • Neutrophils, Absolute 03/15/2017 3.56  1.90 - 8.10 10*3/mm3 Final   • Lymphocytes, Absolute 03/15/2017 2.28  0.90 - 4.80 10*3/mm3 Final   • Monocytes, Absolute 03/15/2017 0.64  0.20 - 1.20 10*3/mm3 Final   • Eosinophils, Absolute 03/15/2017 0.33  0.00 - 0.70 10*3/mm3 Final   • Basophils, Absolute 03/15/2017 0.05  0.00 - 0.20 10*3/mm3 Final   • Immature Grans, Absolute 03/15/2017 0.02  0.00 - 0.03 10*3/mm3 Final   • Protime 03/15/2017 15.0* 11.7 - 14.2 Seconds Final   • INR 03/15/2017 1.23* 0.90 - 1.10 Final   • Color, UA 03/15/2017 Yellow  Yellow, Straw Final   • Appearance, UA 03/15/2017 Cloudy* Clear Final   • pH, UA 03/15/2017 6.5  5.0 - 8.0 Final   • Specific Gravity, UA 03/15/2017 1.014  1.005 - 1.030 Final   • Glucose, UA 03/15/2017 Negative  Negative Final   • Ketones, UA 03/15/2017 Negative  Negative Final   • Bilirubin, UA 03/15/2017 Negative  Negative Final   • Blood, UA 03/15/2017 Negative  Negative Final   • Protein, UA 03/15/2017 Negative  Negative Final   • Leuk Esterase, UA 03/15/2017 Small (1+)* Negative Final   • Nitrite, UA 03/15/2017 Negative  Negative Final   • Urobilinogen, UA 03/15/2017 0.2 E.U./dL  0.2 - 1.0 E.U./dL Final   • Wound Culture 03/15/2017 Light growth (2+) Pseudomonas aeruginosa*  Final   • Gram Stain Result 03/15/2017 Few (2+) WBCs seen   Final   • Gram Stain Result 03/15/2017 Rare (1+) Gram negative bacilli   Final   • RBC,  03/15/2017 0-2  None Seen, 0-2 /HPF Final   • WBC,  03/15/2017 6-12* None Seen, 0-2 /HPF Final   • Bacteria,  03/15/2017 Trace* None Seen /HPF Final   • Squamous Epithelial Cells,  03/15/2017 0-2  None Seen, 0-2 /HPF Final    • Transitional Epithelial Cells, UA 03/15/2017 0-2  0 - 2 /HPF Final   • Hyaline Casts, UA 03/15/2017 3-6  None Seen /LPF Final   • Mucus, UA 03/15/2017 Trace  None Seen, Trace /HPF Final   • Methodology 03/15/2017 Manual Light Microscopy   Final   • Glucose 03/16/2017 90  65 - 99 mg/dL Final   • BUN 03/16/2017 6* 8 - 23 mg/dL Final   • Creatinine 03/16/2017 0.66  0.57 - 1.00 mg/dL Final   • Sodium 03/16/2017 143  136 - 145 mmol/L Final   • Potassium 03/16/2017 3.4* 3.5 - 5.2 mmol/L Final   • Chloride 03/16/2017 102  98 - 107 mmol/L Final   • CO2 03/16/2017 30.4* 22.0 - 29.0 mmol/L Final   • Calcium 03/16/2017 8.9  8.6 - 10.5 mg/dL Final   • Total Protein 03/16/2017 5.5* 6.0 - 8.5 g/dL Final   • Albumin 03/16/2017 2.70* 3.50 - 5.20 g/dL Final   • ALT (SGPT) 03/16/2017 6  1 - 33 U/L Final   • AST (SGOT) 03/16/2017 9  1 - 32 U/L Final   • Alkaline Phosphatase 03/16/2017 195* 39 - 117 U/L Final   • Total Bilirubin 03/16/2017 0.2  0.1 - 1.2 mg/dL Final   • eGFR Non African Amer 03/16/2017 90  >60 mL/min/1.73 Final   • Globulin 03/16/2017 2.8  gm/dL Final   • A/G Ratio 03/16/2017 1.0  g/dL Final   • BUN/Creatinine Ratio 03/16/2017 9.1  7.0 - 25.0 Final   • Anion Gap 03/16/2017 10.6  mmol/L Final   • Protime 03/16/2017 14.6* 11.7 - 14.2 Seconds Final   • INR 03/16/2017 1.18* 0.90 - 1.10 Final   • WBC 03/16/2017 5.98  4.50 - 10.70 10*3/mm3 Final   • RBC 03/16/2017 3.13* 3.90 - 5.20 10*6/mm3 Final   • Hemoglobin 03/16/2017 9.2* 11.9 - 15.5 g/dL Final   • Hematocrit 03/16/2017 29.3* 35.6 - 45.5 % Final   • MCV 03/16/2017 93.6  80.5 - 98.2 fL Final   • MCH 03/16/2017 29.4  26.9 - 32.0 pg Final   • MCHC 03/16/2017 31.4* 32.4 - 36.3 g/dL Final   • RDW 03/16/2017 15.4* 11.7 - 13.0 % Final   • RDW-SD 03/16/2017 52.4  37.0 - 54.0 fl Final   • MPV 03/16/2017 10.0  6.0 - 12.0 fL Final   • Platelets 03/16/2017 265  140 - 500 10*3/mm3 Final   • Neutrophil % 03/16/2017 43.2  42.7 - 76.0 % Final   • Lymphocyte % 03/16/2017 39.5  19.6  - 45.3 % Final   • Monocyte % 03/16/2017 9.5  5.0 - 12.0 % Final   • Eosinophil % 03/16/2017 6.2  0.3 - 6.2 % Final   • Basophil % 03/16/2017 1.3  0.0 - 1.5 % Final   • Immature Grans % 03/16/2017 0.3  0.0 - 0.5 % Final   • Neutrophils, Absolute 03/16/2017 2.58  1.90 - 8.10 10*3/mm3 Final   • Lymphocytes, Absolute 03/16/2017 2.36  0.90 - 4.80 10*3/mm3 Final   • Monocytes, Absolute 03/16/2017 0.57  0.20 - 1.20 10*3/mm3 Final   • Eosinophils, Absolute 03/16/2017 0.37  0.00 - 0.70 10*3/mm3 Final   • Basophils, Absolute 03/16/2017 0.08  0.00 - 0.20 10*3/mm3 Final   • Immature Grans, Absolute 03/16/2017 0.02  0.00 - 0.03 10*3/mm3 Final   • nRBC 03/16/2017 0.0  0.0 - 0.0 /100 WBC Final   • Vancomycin Trough 03/17/2017 29.30* 5.00 - 20.00 mcg/mL Final   • WBC 03/17/2017 6.34  4.50 - 10.70 10*3/mm3 Final   • RBC 03/17/2017 3.05* 3.90 - 5.20 10*6/mm3 Final   • Hemoglobin 03/17/2017 9.3* 11.9 - 15.5 g/dL Final   • Hematocrit 03/17/2017 29.6* 35.6 - 45.5 % Final   • MCV 03/17/2017 97.0  80.5 - 98.2 fL Final   • MCH 03/17/2017 30.5  26.9 - 32.0 pg Final   • MCHC 03/17/2017 31.4* 32.4 - 36.3 g/dL Final   • RDW 03/17/2017 15.4* 11.7 - 13.0 % Final   • RDW-SD 03/17/2017 54.9* 37.0 - 54.0 fl Final   • MPV 03/17/2017 10.0  6.0 - 12.0 fL Final   • Platelets 03/17/2017 270  140 - 500 10*3/mm3 Final   • Glucose 03/17/2017 100* 65 - 99 mg/dL Final   • BUN 03/17/2017 7* 8 - 23 mg/dL Final   • Creatinine 03/17/2017 0.78  0.57 - 1.00 mg/dL Final   • Sodium 03/17/2017 142  136 - 145 mmol/L Final   • Potassium 03/17/2017 3.7  3.5 - 5.2 mmol/L Final   • Chloride 03/17/2017 102  98 - 107 mmol/L Final   • CO2 03/17/2017 28.4  22.0 - 29.0 mmol/L Final   • Calcium 03/17/2017 8.6  8.6 - 10.5 mg/dL Final   • eGFR Non African Amer 03/17/2017 74  >60 mL/min/1.73 Final   • BUN/Creatinine Ratio 03/17/2017 9.0  7.0 - 25.0 Final   • Anion Gap 03/17/2017 11.6  mmol/L Final   • Magnesium 03/17/2017 1.8  1.6 - 2.4 mg/dL Final   • Wound Culture 03/17/2017  Culture in progress   Preliminary   • Gram Stain Result 03/17/2017 Moderate (3+) WBCs seen   Preliminary   • Gram Stain Result 03/17/2017 No organisms seen   Preliminary       No results found.      Assessment:  Patient Active Problem List   Diagnosis   • Hematoma of right hip   • DVT (deep venous thrombosis), hx of   • Hypertension   • Hyperlipidemia   • Coronary artery disease, hx of stent   • Arthritis   • Bipolar disorder   • Infected prosthesis of right hip   • MRSA (methicillin resistant Staphylococcus aureus) infection   • Mixed infection   • Bacterial infection due to Morganella morganii   • Hypokalemia     Post-operative Pain  Immobility    Plan:    Continue Physical Therapy, increase mobility as tolerated.  Continue SCDs, Continue DVT prophalaxis.  Continue Pain management efforts  Continue Incisional care      Discharge Plan: TBD to SNF    Date: 3/18/2017   Time: 8:29 AM    Erlin Martin MD

## 2017-03-18 NOTE — OP NOTE
DATE OF PROCEDURE: 03/17/2017    PREOPERATIVE DIAGNOSIS: Deep, right hip wound infection.     POSTOPERATIVE DIAGNOSIS: Deep, right hip wound infection.     PROCEDURE: Incision and drainage and debridement of right hip wound.     SURGEON: Erlin Martin MD     ASSISTANT: АЛЕКСАНДР Ness     ANESTHESIA: General.     ESTIMATED BLOOD LOSS: 200 mL     DESCRIPTION OF PROCEDURE: The patient is brought to the operating room. She was already on scheduled IV antibiotics. Given a general anesthetic, placed in decubitus position with the right side up. The right hip was prepped and draped in a sterile fashion. After this was done, the previous skin incision was excised down to the fascia. The fascia had a rent in it where the infection came from below. The fascia was opened, this area was debrided completely, 2 cerclage wires were removed and had the area curetted out, irrigated with bacitracin, soaked with a Betadine soak and then irrigated once again with bacitracin. The wound appeared to be fairly clean at this point with no obvious infection. We placed a drain, closed the wound with 0 Vicryl in the deep wound and 2-0 Vicryl in the subcutaneous. The skin was closed with staples. Her dressing was applied. Abduction pillow positioned and her general anesthetic reversed.          LEEANN Oh:co  D:   03/17/2017 18:01:51  T:   03/17/2017 23:36:51  Job ID:   [id number]  Document ID:   [id number]  cc:

## 2017-03-18 NOTE — PROGRESS NOTES
"Pharmacokinetic Consult - Vancomycin Dosing (Follow-up Note)    Frances Dowsn is a 66 y.o. female who is on day 4 pharmacy to dose vancomycin for bone and joint infection.  Pharmacy dosing vancomycin per Dr. Benito's request.   Other antimicrobials: Ceftriaxone 2 gram IV q24h  Goal trough: 15-20 mg/L    POD#1 R hip incision and drainage    Dr. Benito noted 3/18:  Continue vancomycin and cefepime. Follow-up on the intra-aortic culture results to see if this combination is going to be the regimen although when orally need to change. Appreciate pulmonary input regarding postop hypoxemia and recommendations regarding CPAP device for underlying sleep apnea management.  Appreciate internal medicine services help.    Relevant clinical data and objective history reviewed:  65\" (165.1 cm)  226 lb (103 kg)  Body mass index is 37.61 kg/(m^2).     She has a past medical history of Anxiety; Arthritis; Bipolar disorder; COPD (chronic obstructive pulmonary disease); Coronary artery disease; Diastolic dysfunction; Difficulty walking; Disease of thyroid gland; Dislocation of hip joint; DVT (deep venous thrombosis); GERD without esophagitis; Heartburn; Hematoma; Hyperlipidemia; Hypertension; LVH (left ventricular hypertrophy); Major depressive disorder; Mitral annular calcification; Mitral regurgitation; Mitral valve disease; Muscle weakness; Narcolepsy; Overactive bladder; Pneumonia; Pulmonary emboli; Pulmonary hypertension; Sleep apnea; and Tricuspid regurgitation.    Allergies as of 03/15/2017   • (No Known Allergies)     Vital Signs (last 24 hours)       03/17 0700  -  03/18 0659 03/18 0700  -  03/18 1143   Most Recent    Temp (°F) 98.4 -  99    99.3 -  100     100 (37.8)    Heart Rate 58 -  79    72 -  73     72    Resp 14 -  18      16     16    /68 -  168/78    138/68 -  139/70     138/68    SpO2 (%) 91 -  100    92 -  96     92        Estimated Creatinine Clearance: 83.5 mL/min (by C-G formula based on Cr of " 0.71).    Results from last 7 days  Lab Units 03/18/17  1046 03/18/17  0953 03/17/17  0335   CREATININE mg/dL 0.71 0.71 0.78       Results from last 7 days  Lab Units 03/18/17  0953 03/17/17  0335 03/16/17  0529   WBC 10*3/mm3 6.57 6.34 5.98     Baseline culture/source/susceptibility:     Microbiology Results (last 21 days)        Procedure Component Value - Date/Time       Anaerobic Culture [[id number]] Collected: 03/17/17 1714       Lab Status: In process Specimen: Wound from Hip, Right Updated: 03/17/17 1729       Wound Culture [[id number]] (Normal) Collected: 03/17/17 1714       Lab Status: Preliminary result Specimen: Wound from Hip, Right Updated: 03/18/17 0605        Wound Culture Culture in progress         Gram Stain Result Moderate (3+) WBCs seen          No organisms seen        Wound Culture [[id number]] (Abnormal)  Collected: 03/15/17 1725       Lab Status: Final result Specimen: Wound from Hip, Right Updated: 03/17/17 0745        Wound Culture Light growth (2+) Pseudomonas aeruginosa (A)         Gram Stain Result Few (2+) WBCs seen          Rare (1+) Gram negative bacilli          Susceptibility         Pseudomonas aeruginosa         ROSSY         Cefepime 8  Susceptible         Ceftazidime 4  Susceptible         Ciprofloxacin <=0.25  Susceptible         Levofloxacin 2  Susceptible         Meropenem >=16  Resistant         Piperacillin + Tazobactam 16  Susceptible         Tobramycin <=1  Susceptible               Lab Results   Component Value Date    VANCOTROUGH 29.30 (C) 03/17/2017     Lab Results   Component Value Date    VANCORANDOM 12.40 03/18/2017     Recent Vancomycin dosing history        Assessment/Plan  1) Unclear how much Vancomycin was infused 3/17 AM.  Medication scanned at 0409 and charted as stopped at 0435 (26 minutes) which is approximately 250 mg IV Vancomycin.  Preceding level supratherapeutic at 29.3 mcg/mL (3/17 at 0335).  Vancomycin held 3/17 AM and random level obtained 3/18 at 1046 =  12.4 mcg/mL (~31H between two levels, however an unclear portion of Vancomycin may have infused in between the two).  Prior Vancomycin trough level on 1500mg IV q12h = 18.8 mcg/mL.  SCr 0.71  2) Given supratherapeutic level on 3/17 and Vancomycin clearance between 3/17 to 3/18, will temporarily dose by level with 2 gram x1 today and random level planned ~24h after dose started.     3) Will monitor serum creatinine at least every 48 hours per dosing recommendations.    4) Encourage hydration as allowed by MD to help prevent toxic accumulation; monitor for s/sxn of toxicity including increase in SCr and decrease in UOP. (~0.55 mL/kg/hr 3/17 to 3/18 at 0700)    Pharmacy will continue to follow daily while on vancomycin and adjust as needed.     Thanks, Joni Johnson, PharmD, BCPS

## 2017-03-18 NOTE — PLAN OF CARE
Problem: Patient Care Overview (Adult)  Goal: Plan of Care Review  Outcome: Ongoing (interventions implemented as appropriate)    03/18/17 0348 03/18/17 1012 03/18/17 1522   Coping/Psychosocial Response Interventions   Plan Of Care Reviewed With --  patient --    Outcome Evaluation   Outcome Summary/Follow up Plan --  --  Pt worked with PT in room. Voiding per bedpan, pain controlled with PO meds. Continued IV antibiotics.   Patient Care Overview   Progress progress toward functional goals as expected --  --        Goal: Adult Individualization and Mutuality  Outcome: Ongoing (interventions implemented as appropriate)  Goal: Discharge Needs Assessment  Outcome: Ongoing (interventions implemented as appropriate)    Problem: Infection, Risk/Actual (Adult)  Goal: Infection Prevention/Resolution  Outcome: Ongoing (interventions implemented as appropriate)    Problem: Fall Risk (Adult)  Goal: Absence of Falls  Outcome: Ongoing (interventions implemented as appropriate)    Problem: Pain, Acute (Adult)  Goal: Acceptable Pain Control/Comfort Level  Outcome: Ongoing (interventions implemented as appropriate)    Problem: Pressure Ulcer (Adult)  Goal: Signs and Symptoms of Listed Potential Problems Will be Absent or Manageable (Pressure Ulcer)  Outcome: Ongoing (interventions implemented as appropriate)    Problem: Perioperative Period (Adult)  Goal: Signs and Symptoms of Listed Potential Problems Will be Absent or Manageable (Perioperative Period)  Outcome: Ongoing (interventions implemented as appropriate)

## 2017-03-18 NOTE — PLAN OF CARE
Problem: Patient Care Overview (Adult)  Goal: Plan of Care Review  Outcome: Ongoing (interventions implemented as appropriate)    03/18/17 0348   Coping/Psychosocial Response Interventions   Plan Of Care Reviewed With patient   Outcome Evaluation   Outcome Summary/Follow up Plan Pt is a post of a Incision and Drainage of the rt hip. Pt continues on regimen of IV Cefepime Q8hrs and IV Vanc daily rt diagnosis of MRSA in wound. Pt continues with abductor pillow. Pt has a hemovac drain in place. Moderate mt of drainage noted on arrival, but no further drainage noted. Pt continues on 4L O2 ported into BIPAP machine. Pt is resting at tthis time.   Patient Care Overview   Progress progress toward functional goals as expected       Goal: Adult Individualization and Mutuality  Outcome: Ongoing (interventions implemented as appropriate)  Goal: Discharge Needs Assessment  Outcome: Ongoing (interventions implemented as appropriate)    Problem: Infection, Risk/Actual (Adult)  Goal: Infection Prevention/Resolution  Outcome: Ongoing (interventions implemented as appropriate)    Problem: Fall Risk (Adult)  Goal: Absence of Falls  Outcome: Ongoing (interventions implemented as appropriate)    Problem: Pain, Acute (Adult)  Goal: Acceptable Pain Control/Comfort Level  Outcome: Ongoing (interventions implemented as appropriate)    Problem: Pressure Ulcer (Adult)  Goal: Signs and Symptoms of Listed Potential Problems Will be Absent or Manageable (Pressure Ulcer)  Outcome: Ongoing (interventions implemented as appropriate)    Problem: Perioperative Period (Adult)  Goal: Signs and Symptoms of Listed Potential Problems Will be Absent or Manageable (Perioperative Period)  Outcome: Ongoing (interventions implemented as appropriate)

## 2017-03-18 NOTE — PLAN OF CARE
Problem: Patient Care Overview (Adult)  Goal: Plan of Care Review    03/18/17 1012   Coping/Psychosocial Response Interventions   Plan Of Care Reviewed With patient   Outcome Evaluation   Outcome Summary/Follow up Plan Pt. will benefit from skilled inpt. P.T. to address her functional deficits and to assist pt. in regaining her maximum level of independence with functional mobility.         Problem: Inpatient Physical Therapy  Goal: Bed Mobility Goal LTG- PT    03/18/17 1012   Bed Mobility PT LTG   Bed Mobility PT LTG, Date Established 03/18/17   Bed Mobility PT LTG, Time to Achieve 5 - 7 days   Bed Mobility PT LTG, Activity Type all bed mobility   Bed Mobility PT LTG, [place] Level contact guard assist       Goal: Transfer Training Goal 1 LTG- PT    03/18/17 1012   Transfer Training PT LTG   Transfer Training PT LTG, Date Established 03/18/17   Transfer Training PT LTG, Time to Achieve 5 - 7 days   Transfer Training PT LTG, Activity Type sit to stand/stand to sit   Transfer Training PT LTG, [place] Level minimum assist (75% patient effort);2 person assist required   Transfer Training PT LTG, Assist Device walker, rolling       Goal: Transfer Training Goal 2 LTG- PT    03/18/17 1012   Transfer Training 2 PT LTG   Transfer Training PT 2 LTG, Date Established 03/18/17   Transfer Training PT 2 LTG, Time to Achieve 5 - 7 days   Transfer Training PT 2 LTG, Activity Type bed to chair /chair to bed   Transfer Training PT 2 LTG, [place] Level minimum assist (75% patient effort);2 person assist required   Transfer Training PT 2 LTG, Assist Device walker, rolling       Goal: Dynamic Sitting Balance Goal LTG- PT    03/18/17 1012   Dynamic Sitting Balance PT LTG   Dynamic Sitting Balance PT LTG, Date Established 03/18/17   Dynamic Sitting Balance PT LTG, Time to Achieve 5 - 7 days   Dynamic Sitting Balance PT LTG, [place] Level independent   Dynamic Sitting Balance PT LTG, Assist Device UE Support

## 2017-03-18 NOTE — PROGRESS NOTES
Infectious Diseases Progress Note    Krysta Benito MD     [facility]  [place]: 3 days  Patient Identification:  Name: Frances Downs  Age: 66 y.o.  Sex: female  :  1950  MRN: [id number]         Primary Care Physician: Talha Echeverria MD            Subjective: No complaints underwent surgery without any issues except for transient hypoxemia.  Interval History: See admission history and physical.     Objective:    Scheduled Meds:    amLODIPine 10 mg Oral Daily   ARIPiprazole 10 mg Oral Daily   aspirin  mg Oral BID   [MAR Hold] atorvastatin 40 mg Oral Daily   atorvastatin 40 mg Oral Nightly   budesonide-formoterol 2 puff Inhalation BID - RT   carvedilol 12.5 mg Oral BID With Meals   [MAR Hold] cefepime 2 g Intravenous Q8H   cefepime 2 g Intravenous Q8H   [MAR Hold] docusate sodium 100 mg Oral BID   docusate sodium 100 mg Oral Daily   [MAR Hold] ferrous sulfate 325 mg Oral BID   ferrous sulfate 325 mg Oral Daily With Breakfast   [MAR Hold] fluticasone 2 spray Nasal Daily   fluticasone 2 spray Each Nare Daily   [MAR Hold] isosorbide mononitrate 30 mg Oral Daily   isosorbide mononitrate 30 mg Oral Q24H   lamoTRIgine 25 mg Oral BID   [MAR Hold] levothyroxine 100 mcg Oral Daily   levothyroxine 100 mcg Oral Q AM   [MAR Hold] modafinil 200 mg Oral Daily   modafinil 200 mg Oral Daily   [MAR Hold] oxybutynin XL 10 mg Oral Daily   oxybutynin XL 10 mg Oral Daily   [MAR Hold] pantoprazole 40 mg Oral Q AM   pantoprazole 40 mg Oral Q AM   [MAR Hold] Pharmacy to dose vancomycin  Does not apply Daily   pregabalin 200 mg Oral BID   [MAR Hold] tiotropium 1 capsule Inhalation Daily - RT   tiotropium 1 capsule Inhalation Daily - RT   [MAR Hold] vilazodone 40 mg Oral Daily   vilazodone 40 mg Oral Daily   warfarin 6 mg Oral Daily     Continuous Infusions:    lactated ringers 9 mL/hr Last Rate: 9 mL/hr (17 1619)   lactated ringers 100 mL/hr        Vital signs in last 24 hours:  Temp:  [98.5 °F (36.9  "°C)-99 °F (37.2 °C)] 99 °F (37.2 °C)  Heart Rate:  [58-79] 70  Resp:  [14-18] 16  BP: (116-168)/(60-83) 132/65    Intake/Output:    Intake/Output Summary (Last 24 hours) at 03/18/17 0787  Last data filed at 03/18/17 0401   Gross per 24 hour   Intake   1400 ml   Output    875 ml   Net    525 ml       Exam:  Visit Vitals   • /65 (BP Location: Right arm, Patient Position: Lying)   • Pulse 70   • Temp 99 °F (37.2 °C) (Oral)   • Resp 16   • Ht 65\" (165.1 cm)   • Wt 226 lb (103 kg)   • SpO2 95%   • BMI 37.61 kg/m2       General Appearance:    Alert, cooperative, no distress, AAOx3                          Head:    Normocephalic, without obvious abnormality, atraumatic                           Eyes:    PERRL, conjunctiva/corneas clear, EOM's intact, both eyes                         Throat:   Lips, tongue, gums normal; oral mucosa pink and moist                           Neck:   Supple, symmetrical, trachea midline, no JVD                         Lungs:    Clear to auscultation bilaterally, respirations unlabored                 Chest Wall:    No tenderness or deformity                          Heart:    Regular rate and rhythm, S1 and S2 normal, no murmur,no  Rub                                      or gallop                  Abdomen:     Soft, non-tender, bowel sounds active, no masses, no                                                        organomegaly                  Extremities:   Surgical site dressed with a drain in place.                        Pulses:   Pulses palpable in all extremities                            Skin:   Skin is warm and dry,  no rashes or palpable lesions              Data Review:    I reviewed the patient's new clinical results.    Results from last 7 days  Lab Units 03/17/17 0335 03/16/17  0529 03/15/17  1424   WBC 10*3/mm3 6.34 5.98 6.88   HEMOGLOBIN g/dL 9.3* 9.2* 9.1*   PLATELETS 10*3/mm3 270 265 292       Results from last 7 days  Lab Units 03/17/17 0335 03/16/17  0529 " 03/15/17  1424   SODIUM mmol/L 142 143 142   POTASSIUM mmol/L 3.7 3.4* 3.8   CHLORIDE mmol/L 102 102 101   TOTAL CO2 mmol/L 28.4 30.4* 31.0*   BUN mg/dL 7* 6* 6*   CREATININE mg/dL 0.78 0.66 0.71   CALCIUM mg/dL 8.6 8.9 8.9   GLUCOSE mg/dL 100* 90 89     Anaerobic Culture [[id number]] Collected: 03/17/17 1714        Lab Status: In process Specimen: Wound from Hip, Right Updated: 03/17/17 1729       Wound Culture [[id number]] (Normal) Collected: 03/17/17 1714       Lab Status: Preliminary result Specimen: Wound from Hip, Right Updated: 03/18/17 0605        Wound Culture Culture in progress         Gram Stain Result Moderate (3+) WBCs seen          No organisms seen        Wound Culture [[id number]] (Abnormal)  Collected: 03/15/17 1725       Lab Status: Final result Specimen: Wound from Hip, Right Updated: 03/17/17 0745        Wound Culture Light growth (2+) Pseudomonas aeruginosa (A)         Gram Stain Result Few (2+) WBCs seen          Rare (1+) Gram negative bacilli          Susceptibility         Pseudomonas aeruginosa         ROSSY         Cefepime 8  Susceptible         Ceftazidime 4  Susceptible         Ciprofloxacin <=0.25  Susceptible         Levofloxacin 2  Susceptible         Meropenem >=16  Resistant         Piperacillin + Tazobactam 16  Susceptible         Tobramycin <=1  Susceptible                                       Assessment:  Principal Problem:    Mixed infection  Active Problems:    Hematoma of right hip    DVT (deep venous thrombosis), hx of    Hypertension    Hyperlipidemia    Coronary artery disease, hx of stent    Arthritis    Bipolar disorder    Infected prosthesis of right hip    MRSA (methicillin resistant Staphylococcus aureus) infection    Bacterial infection due to Morganella morganii    Hypokalemia   pseudomonas aeruginosa infection of the wound    Plan:  Continue vancomycin and cefepime.  Follow-up on the intra-aortic culture results to see if this combination is going to be the regimen  although when orally need to change.  Appreciate pulmonary input regarding postop hypoxemia and recommendations regarding CPAP device for underlying sleep apnea management.  Appreciate internal medicine services help.    Krysta Benito MD  3/18/2017  7:27 AM    Much of this encounter note is an electronic transcription/translation of spoken language to printed text. The electronic translation of spoken language may permit erroneous, or at times, nonsensical words or phrases to be inadvertently transcribed; Although I have reviewed the note for such errors, some may still exist

## 2017-03-18 NOTE — PROGRESS NOTES
"    DAILY PROGRESS NOTE  [facility]    Patient Identification:  Name: Frances Downs  Age: 66 y.o.  Sex: female  :  1950  MRN: [id number]         Primary Care Physician: Talha Echeverria MD    Subjective:  Interval History: no new issues o/t some soreness in hip. No cp/n/v/d    Objective:no fm    Scheduled Meds:    amLODIPine 10 mg Oral Daily   ARIPiprazole 10 mg Oral Daily   aspirin  mg Oral BID   atorvastatin 40 mg Oral Nightly   budesonide-formoterol 2 puff Inhalation BID - RT   carvedilol 12.5 mg Oral BID With Meals   cefepime 2 g Intravenous Q8H   docusate sodium 100 mg Oral Daily   ferrous sulfate 325 mg Oral Daily With Breakfast   fluticasone 2 spray Each Nare Daily   isosorbide mononitrate 30 mg Oral Q24H   lamoTRIgine 25 mg Oral BID   levothyroxine 100 mcg Oral Q AM   modafinil 200 mg Oral Daily   oxybutynin XL 10 mg Oral Daily   pantoprazole 40 mg Oral Q AM   Pharmacy to dose vancomycin  Does not apply Daily   pregabalin 200 mg Oral BID   tiotropium 1 capsule Inhalation Daily - RT   vancomycin 20 mg/kg Intravenous Once   Vancomycin Pharmacy Intermittent Dosing  Does not apply Daily   vilazodone 40 mg Oral Daily     Continuous Infusions:    lactated ringers 100 mL/hr       Vital signs in last 24 hours:  Temp:  [98.5 °F (36.9 °C)-100 °F (37.8 °C)] 100 °F (37.8 °C)  Heart Rate:  [58-79] 72  Resp:  [14-18] 16  BP: (116-168)/(60-83) 138/68    Intake/Output:    Intake/Output Summary (Last 24 hours) at 17 1237  Last data filed at 17 0900   Gross per 24 hour   Intake   2120 ml   Output    975 ml   Net   1145 ml       Exam:  Visit Vitals   • /68 (BP Location: Right arm, Patient Position: Lying)   • Pulse 72   • Temp 100 °F (37.8 °C) (Oral)   • Resp 16   • Ht 65\" (165.1 cm)   • Wt 226 lb (103 kg)   • SpO2 92%   • BMI 37.61 kg/m2       General Appearance:    Alert, cooperative, no distress, AAOx3                          Head:    Normocephalic, without obvious " abnormality, atraumatic                         Lungs:    Clear to auscultation bilaterally, respirations unlabored                          Heart:    Regular rate and rhythm, S1 and S2 normal                  Abdomen:     Soft, non-tender, bowel sounds active                 Extremities:   Incision looks good and clean and dry, no calf ttp                        Pulses:   Pulses palpable in lower extremities                                 Data Review:  Labs in chart were reviewed.    Assessment:  Active Hospital Problems (** Indicates Principal Problem)    Diagnosis Date Noted   • **Mixed infection [A49.9] 03/15/2017   • Postoperative anemia due to acute blood loss [D62] 03/18/2017   • Hypokalemia [E87.6] 03/16/2017   • Bacterial infection due to Morganella morganii [B96.4] 03/15/2017   • Infected prosthesis of right hip [T84.51XA] 02/10/2017   • MRSA (methicillin resistant Staphylococcus aureus) infection [A49.02] 02/10/2017   • Hypertension [I10] 02/10/2017   • DVT (deep venous thrombosis), hx of [I82.409] 02/10/2017   • Bipolar disorder [F31.9] 02/10/2017   • Arthritis [M19.90] 02/10/2017   • Hyperlipidemia [E78.5] 02/10/2017   • Coronary artery disease, hx of stent [I25.10] 02/10/2017   • Hematoma of right hip [S70.01XA] 02/08/2017      Resolved Hospital Problems    Diagnosis Date Noted Date Resolved   No resolved problems to display.       Plan:  R hip infection due to MRSA and Pseudomonas - Vanc/Cefepime per ID - POD1  -d/w Dr Benito  -mild abla - monitor at 8.3 today  -DC bid ASA and use Lovenox for prophylaxis until INR therapeutic   -SLIVF      Hx DVT/PE - 2/17 doppler negative  -Lovenox for prophylaxis and resume AC today      HTN/CAD - stable      COPD w/out AE      DENISE - per Pulm      Replaced K - 1.8 Mg       Navarro Haddad MD  3/18/2017  12:37 PM

## 2017-03-18 NOTE — PROGRESS NOTES
Acute Care - Physical Therapy Initial Evaluation  [facility]     Patient Name: Frances Downs  : 1950  MRN: [id number]  Today's Date: 3/18/2017   Onset of Illness/Injury or Date of Surgery Date: 03/15/17  Date of Referral to PT: 17  Referring Physician: Erlin Damico      Admit Date: 3/15/2017     Visit Dx:    ICD-10-CM ICD-9-CM   1. Wound infection T14.8 958.3    L08.9      Patient Active Problem List   Diagnosis   • Hematoma of right hip   • DVT (deep venous thrombosis), hx of   • Hypertension   • Hyperlipidemia   • Coronary artery disease, hx of stent   • Arthritis   • Bipolar disorder   • Infected prosthesis of right hip   • MRSA (methicillin resistant Staphylococcus aureus) infection   • Mixed infection   • Bacterial infection due to Morganella morganii   • Hypokalemia     Past Medical History   Diagnosis Date   • Anxiety    • Arthritis    • Bipolar disorder    • COPD (chronic obstructive pulmonary disease)    • Coronary artery disease    • Diastolic dysfunction      grade II per echocardiogram 2016   • Difficulty walking    • Disease of thyroid gland      hypothyroidism   • Dislocation of hip joint      recurrent dislocation right hip   • DVT (deep venous thrombosis)    • GERD without esophagitis    • Heartburn    • Hematoma    • Hyperlipidemia    • Hypertension    • LVH (left ventricular hypertrophy)      mild to moderate per echocardiogram 2016   • Major depressive disorder    • Mitral annular calcification      severe per echocardiogram 2016   • Mitral regurgitation      mild per echo 2016   • Mitral valve disease    • Muscle weakness    • Narcolepsy    • Overactive bladder    • Pneumonia    • Pulmonary emboli    • Pulmonary hypertension      mild per echo 2016   • Sleep apnea      obstructive   • Tricuspid regurgitation      mild to moderate per echo 2016     Past Surgical History   Procedure Laterality Date   • Total hip arthroplasty Right  "2011   • Total hip arthroplasty revision Right 11/22/2016   • Knee arthroplasty Left 03/2010     TWICE   • Total knee arthroplasty revision Left 09/2010   • Other surgical history       IVC filter placement   • Orif ankle fracture Left 06/16/2011   • Hardware removal foot / ankle Left 12/01/2011   • Quadriceps tendon repair Left 06/21/2010     also done 9-    • Joint replacement     • Coronary angioplasty with stent placement       x2 stents   • Cataract extraction w/ intraocular lens implant Right    • Eye surgery       cataract surgery bilateral   • Incision and drainage hip Right 2/8/2017     Procedure: RT HIP INCISION AND DRAINAGE;  Surgeon: Erlin Martin MD;  Location: [facility] OR;  Service:    • Hip spacer insertion with antibiotic cement Right 2/13/2017     Procedure: RIGHT TOTAL HIP REMOVAL;  Surgeon: Erlin Martin MD;  Location: [facility] OR;  Service:           PT ASSESSMENT (last 72 hours)      PT Evaluation       03/18/17 1005 03/18/17 0348    Rehab Evaluation    Document Type evaluation  -MS     Subjective Information agree to therapy;complains of;weakness;fatigue;pain;dizziness  -MS     Patient Effort, Rehab Treatment adequate  -MS     Symptoms Noted Comment Pt. reports \"burning\" pain in her Right hip as well as dizziness with sitting upright and overall fatigue/weakness  -MS     General Information    Onset of Illness/Injury or Date of Surgery Date 03/15/17  -MS     Referring Physician Erlin Damico  -MS     Pertinent History Of Current Problem Pt. s/p Right hip I&D (3/15/17) as well as (3/17/17);  Recent h/o Spacer placement in Right hip leading to infection.  -MS     Precautions/Limitations fall precautions;non-weight bearing status   NWB Right L.E.;  Contact Isolation  -MS     Equipment Currently Used at Home --   Working with PT on sitting EOB activities at SNF  -MS cane, straight;walker, rolling  -JF    Plans/Goals Discussed With patient;agreed upon  -MS     Risks " Reviewed patient:  -MS     Benefits Reviewed patient:  -MS     Barriers to Rehab none identified  -MS     Living Environment    Transportation Available  ambulance  -DEDRA    Clinical Impression    Date of Referral to PT 03/17/17  -MS     Criteria for Skilled Therapeutic Interventions Met treatment indicated  -MS     Rehab Potential good, to achieve stated therapy goals  -MS     Pain Assessment    Pain Assessment 0-10  -MS     Pain Score 5  -MS     Post Pain Score 5  -MS     Pain Type Acute pain;Surgical pain  -MS     Pain Location Hip  -MS     Pain Orientation Right  -MS     Pain Descriptors Burning  -MS     Pain Intervention(s) Medication (See MAR);Repositioned;Elevated;Rest  -MS     Cognitive Assessment/Intervention    Current Cognitive/Communication Assessment functional  -MS     Orientation Status oriented x 4  -MS     Follows Commands/Answers Questions 100% of the time  -MS     Personal Safety WNL/WFL  -MS     Personal Safety Interventions fall prevention program maintained;gait belt;nonskid shoes/slippers when out of bed;supervised activity  -MS     ROM (Range of Motion)    General ROM --   BUE/LLE (WFL's)  -MS     MMT (Manual Muscle Testing)    General MMT Assessment --   BUE/LLE (3+/5)  -MS     Mobility Assessment/Training    Extremity Weight-Bearing Status right lower extremity  -MS     Right Lower Extremity Weight-Bearing non weight-bearing  -MS     Bed Mobility, Assessment/Treatment    Bed Mobility, Assistive Device bed rails  -MS     Bed Mobility, Roll Left, Sargeant minimum assist (75% patient effort)  -MS     Bed Mobility, Roll Right, Sargeant minimum assist (75% patient effort)  -MS     Bed Mob, Supine to Sit, Sargeant moderate assist (50% patient effort);2 person assist required  -MS     Bed Mob, Sit to Supine, Sargeant moderate assist (50% patient effort);2 person assist required  -MS     Bed Mobility, Comment Once sitting EOB, pt. c/o dizziness.    -MS     Transfer  Assessment/Treatment    Transfers, Sit-Stand [place] unable to perform   Pt. unable to maintain NWB status Right L.E. with weak LLE  -MS     Balance Skills Training    Sitting-Level of Assistance Contact guard  -MS     Sitting-Balance Support Feet supported;Right upper extremity supported;Left upper extremity supported  -MS     Sitting-Balance Activities Lateral lean;Forward lean  -MS     Sitting # of Minutes 11  -MS     Therapy Exercises    Right Lower Extremity AROM:;20 reps;sitting;ankle pumps/circles;LAQ  -MS     Left Lower Extremity AROM:;20 reps;sitting;ankle pumps/circles;hip flexion;LAQ  -MS     Positioning and Restraints    Pre-Treatment Position in bed  -MS     Post Treatment Position bed  -MS     In Bed notified nsg;supine;call light within reach;encouraged to call for assist;exit alarm on;with nsg;SCD pump applied;R waffle boot;L waffle boot   All lines intact.  -MS       03/17/17 1545 03/17/17 0311    General Information    Equipment Currently Used at Home cane, straight;walker, rolling  -AT     Living Environment    Lives With facility resident  -AT     Living Arrangements house  -AT     Transportation Available ambulance  -AT ambulance  -      03/15/17 1437 03/15/17 1427    General Information    Equipment Currently Used at Home  cane, straight;walker, rolling  -SL    Living Environment    Lives With facility resident  -SL     Living Arrangements house  -SL     Home Accessibility no concerns  -SL     Stair Railings at Home none  -SL     Type of Financial/Environmental Concern none  -SL     Transportation Available none  -SL       User Key  (r) = Recorded By, (t) = Taken By, (c) = Cosigned By    Initials Name Provider Type     Paula Sanders, RN Registered Nurse    MS Yon Elizabeth, PT Physical Therapist     Sarah M. Lombardi, RN Registered Nurse    AT Abeby Martinez RN Case Manager    DEDRA Bower, RN Registered Nurse          Physical Therapy Education     Title: PT OT SLP  Therapies (Done)     Topic: Physical Therapy (Done)     Point: Mobility training (Done)    Learning Progress Summary    Learner Readiness Method Response Comment Documented by Status   Patient Acceptance ECAROLINA,NR  MS 03/18/17 1012 Done               Point: Home exercise program (Done)    Learning Progress Summary    Learner Readiness Method Response Comment Documented by Status   Patient Acceptance CAROLINA MONTES,NR  MS 03/18/17 1012 Done               Point: Body mechanics (Done)    Learning Progress Summary    Learner Readiness Method Response Comment Documented by Status   Patient Acceptance ECAROLINA,NR  MS 03/18/17 1012 Done               Point: Precautions (Done)    Learning Progress Summary    Learner Readiness Method Response Comment Documented by Status   Patient Acceptance ECAROLINA,NR  MS 03/18/17 1012 Done                      User Key     Initials Effective Dates Name Provider Type Discipline    MS 12/01/15 -  Yon Elizabeth, PT Physical Therapist PT                PT Recommendation and Plan  Anticipated Discharge Disposition: skilled nursing facility  Planned Therapy Interventions: balance training, bed mobility training, postural re-education, prosthetic fitting/training, strengthening, transfer training  PT Frequency: daily  Plan of Care Review  Plan Of Care Reviewed With: patient  Outcome Summary/Follow up Plan: Pt. will benefit from skilled inpt. P.T. to address her functional deficits and to assist pt. in regaining her maximum level of independence with functional mobility.          IP PT Goals       03/18/17 1012          Bed Mobility PT LTG    Bed Mobility PT LTG, Date Established 03/18/17  -MS      Bed Mobility PT LTG, Time to Achieve 5 - 7 days  -MS      Bed Mobility PT LTG, Activity Type all bed mobility  -MS      Bed Mobility PT LTG, [place] Level contact guard assist  -MS      Transfer Training PT LTG    Transfer Training PT LTG, Date Established 03/18/17  -MS      Transfer Training PT LTG, Time  to Achieve 5 - 7 days  -MS      Transfer Training PT LTG, Activity Type sit to stand/stand to sit  -MS      Transfer Training PT LTG, [place] Level minimum assist (75% patient effort);2 person assist required  -MS      Transfer Training PT LTG, Assist Device walker, rolling  -MS      Transfer Training 2 PT LTG    Transfer Training PT 2 LTG, Date Established 03/18/17  -MS      Transfer Training PT 2 LTG, Time to Achieve 5 - 7 days  -MS      Transfer Training PT 2 LTG, Activity Type bed to chair /chair to bed  -MS      Transfer Training PT 2 LTG, [place] Level minimum assist (75% patient effort);2 person assist required  -MS      Transfer Training PT 2 LTG, Assist Device walker, rolling  -MS      Dynamic Sitting Balance PT LTG    Dynamic Sitting Balance PT LTG, Date Established 03/18/17  -MS      Dynamic Sitting Balance PT LTG, Time to Achieve 5 - 7 days  -MS      Dynamic Sitting Balance PT LTG, [place] Level independent  -MS      Dynamic Sitting Balance PT LTG, Assist Device UE Support  -MS        User Key  (r) = Recorded By, (t) = Taken By, (c) = Cosigned By    Initials Name Provider Type    MS Yon Elizabeth, PT Physical Therapist                Outcome Measures       03/18/17 1000          How much help from another person do you currently need...    Turning from your back to your side while in flat bed without using bedrails? 2  -MS      Moving from lying on back to sitting on the side of a flat bed without bedrails? 2  -MS      Moving to and from a bed to a chair (including a wheelchair)? 1  -MS      Standing up from a chair using your arms (e.g., wheelchair, bedside chair)? 1  -MS      Climbing 3-5 steps with a railing? 1  -MS      To walk in hospital room? 1  -MS      AM-PAC 6 Clicks Score 8  -MS      Functional Assessment    Outcome Measure Options AM-PAC 6 Clicks Basic Mobility (PT)  -MS        User Key  (r) = Recorded By, (t) = Taken By, (c) = Cosigned By    Initials Name Provider Type     MS Yon Elizabeth, PT Physical Therapist           Time Calculation:         PT Charges       03/18/17 1014          Time Calculation    Start Time 0907  -MS      Stop Time 0925  -MS      Time Calculation (min) 18 min  -MS      PT Received On 03/18/17  -MS      PT - Next Appointment 03/19/17  -MS      PT Goal Re-Cert Due Date 03/25/17  -MS        User Key  (r) = Recorded By, (t) = Taken By, (c) = Cosigned By    Initials Name Provider Type    MS Yon Elizabeth, PT Physical Therapist          Therapy Charges for Today     Code Description Service Date Service Provider Modifiers Qty    88713433362 HC PT EVAL MOD COMPLEXITY 2 3/18/2017 Yon Elizabeth, PT GP 1    76546939649 HC PT THER PROC EA 15 MIN 3/18/2017 Yon Elizabeth, PT GP 1    10413155865 HC PT THER SUPP EA 15 MIN 3/18/2017 Yon Elizabeth, PT GP 1          PT G-Codes  Outcome Measure Options: AM-PAC 6 Clicks Basic Mobility (PT)      Yon Elizabeth, PT  3/18/2017

## 2017-03-18 NOTE — NURSING NOTE
Pt arrived to unit at 755pm. Pt had orders on the MAR on hold. This nurse called Dr Balderrama initially for orders for either a straight CPAP or a auto BIPAP, because an auto CPAP was ordered at respiratory did not have it available. Orders for an auto BIPAP was given and pt was started on the BIPAP with 4L O2 ported in. O2 sat: 94-97% Pt IV antibiotics were on hold on the MAR. This nurse called Dr Benito's answering service and was told to call A. This nurse paged A and received a call back from Dr Oscar who said he could not reconcile the orders. This nurse then called DR Martin who said to reorder the meds as given before the OR. IV Cefepime was given at 2am and dosed for Q8hrs after. Vancomycin is still being dosed by pharmacy as is to be given at 9am. Dr Martin also said to hold Coumadin since patient had some bleeding after surgery.

## 2017-03-18 NOTE — PROGRESS NOTES
[facility]  Phone: [phone]  Tera Stokes MD    Subjective:  LOS: 3    She does not use oxygen at home.  Postoperatively he has been hypoxic.  When I walked in she was 100% on 4 L nasal cannula.  I took her off the oxygen and she appeared to be in the 80s but with a poor head control.  Occasionally in the 90s.  I requested a finger probe and sats are in the 70s 80s.  Therefore back on 3 L oxygen flow.  I will request a blood gas.  She subjectively does not report any shortness of breath.  She has COPD.  She does not see a regular lung doctor.    Objective   Vital Signs past 24hrs  BP range: BP: (128-168)/(60-83) 138/68  Pulse range: Heart Rate:  [62-79] 72  Resp rate range: Resp:  [14-18] 16  Temp range: Temp (24hrs), Av °F (37.2 °C), Min:98.5 °F (36.9 °C), Max:100 °F (37.8 °C)    O2 Device: BiPAPFlow (L/min):  [4] 4  Oxygen range:SpO2:  [91 %-100 %] 92 %   226 lb (103 kg); Body mass index is 37.61 kg/(m^2).    Intake/Output Summary (Last 24 hours) at 17 1556  Last data filed at 17 0900   Gross per 24 hour   Intake   2020 ml   Output    575 ml   Net   1445 ml       Physical Exam   HENT:   Head: Normocephalic.   Eyes: Pupils are equal, round, and reactive to light.   Neck: Normal range of motion.   Cardiovascular: Normal rate and regular rhythm.    No murmur heard.  Pulmonary/Chest: Effort normal. She has decreased breath sounds. She has no wheezes. She has no rales.   Abdominal:   Obese   Musculoskeletal: She exhibits no edema.   Neurological: She is alert.     Results Review:    I have reviewed the laboratory and imaging data since the last note by LPC physician.  My annotations are noted in assessment and plan.    Medication Review:  I have reviewed the current MAR.  My annotations are noted in assessment and plan.       Plan   PCCM Problems  Acute hypoxic respiratory failure  Obstructive sleep apnea with likely obesity hypoventilation  Morbid obesity  COPD without  exacerbation  Relevant Medical Diagnoses  Right hip wound on antibiotics and I&D on 3/17/17  Diastolic dysfunction  History DVT and PE  Hypertension  Coronary artery disease  Pulmonary hypertension likely WHO class II  Bipolar disorder    Plan of Treatment  Patient appears very comfortable even off the oxygen and saturation meter reading 80s.  I will get a blood gas on room air.  She could be hypercapnic due to multiple reasons.  She may still require low-flow oxygen.  Will assess after blood gas.  I will also check a proBNP in the morning.    COPD without exacerbation.  Continue bronchodilators.    Obstructive sleep apnea, now using hospital auto BiPAP.  She states she is compliant with her home CPAP machine.    She remains on modafinil for hypersomnolence persisting despite appropriate treatment of sleep apnea with a CPAP device.    She remains on antibiotics per infectious disease.    Tera Stokes MD  03/18/17  3:56 PM    EMR Dragon/Transcription disclaimer:   Some of this note may be an electronic transcription/translation of spoken language to printed text. The electronic translation of spoken language may permit erroneous, or at times, nonsensical words or phrases to be inadvertently transcribed; Although I have reviewed the note for such errors, some may still exist.

## 2017-03-19 ENCOUNTER — APPOINTMENT (OUTPATIENT)
Dept: GENERAL RADIOLOGY | Facility: HOSPITAL | Age: 67
End: 2017-03-19

## 2017-03-19 LAB
ABO GROUP BLD: NORMAL
BASOPHILS # BLD AUTO: 0.04 10*3/MM3 (ref 0–0.2)
BASOPHILS NFR BLD AUTO: 0.7 % (ref 0–1.5)
BLD GP AB SCN SERPL QL: NEGATIVE
CREAT BLD-MCNC: 0.31 MG/DL (ref 0.57–1)
DEPRECATED RDW RBC AUTO: 53.2 FL (ref 37–54)
EOSINOPHIL # BLD AUTO: 0.32 10*3/MM3 (ref 0–0.7)
EOSINOPHIL NFR BLD AUTO: 5.9 % (ref 0.3–6.2)
ERYTHROCYTE [DISTWIDTH] IN BLOOD BY AUTOMATED COUNT: 15.2 % (ref 11.7–13)
GFR SERPL CREATININE-BSD FRML MDRD: >150 ML/MIN/1.73
HCT VFR BLD AUTO: 21.6 % (ref 35.6–45.5)
HGB BLD-MCNC: 6.8 G/DL (ref 11.9–15.5)
IMM GRANULOCYTES # BLD: 0 10*3/MM3 (ref 0–0.03)
IMM GRANULOCYTES NFR BLD: 0 % (ref 0–0.5)
INR PPP: 1.49 (ref 0.9–1.1)
LYMPHOCYTES # BLD AUTO: 2.08 10*3/MM3 (ref 0.9–4.8)
LYMPHOCYTES NFR BLD AUTO: 38.5 % (ref 19.6–45.3)
MCH RBC QN AUTO: 30.1 PG (ref 26.9–32)
MCHC RBC AUTO-ENTMCNC: 31.5 G/DL (ref 32.4–36.3)
MCV RBC AUTO: 95.6 FL (ref 80.5–98.2)
MONOCYTES # BLD AUTO: 0.56 10*3/MM3 (ref 0.2–1.2)
MONOCYTES NFR BLD AUTO: 10.4 % (ref 5–12)
NEUTROPHILS # BLD AUTO: 2.4 10*3/MM3 (ref 1.9–8.1)
NEUTROPHILS NFR BLD AUTO: 44.5 % (ref 42.7–76)
NT-PROBNP SERPL-MCNC: 913.1 PG/ML (ref 5–900)
PLATELET # BLD AUTO: 231 10*3/MM3 (ref 140–500)
PMV BLD AUTO: 10 FL (ref 6–12)
PROTHROMBIN TIME: 17.5 SECONDS (ref 11.7–14.2)
RBC # BLD AUTO: 2.26 10*6/MM3 (ref 3.9–5.2)
RH BLD: POSITIVE
VANCOMYCIN SERPL-MCNC: 13.7 MCG/ML (ref 5–40)
WBC NRBC COR # BLD: 5.4 10*3/MM3 (ref 4.5–10.7)

## 2017-03-19 PROCEDURE — 71010 HC CHEST PA OR AP: CPT

## 2017-03-19 PROCEDURE — 86901 BLOOD TYPING SEROLOGIC RH(D): CPT | Performed by: ORTHOPAEDIC SURGERY

## 2017-03-19 PROCEDURE — 94799 UNLISTED PULMONARY SVC/PX: CPT

## 2017-03-19 PROCEDURE — 85610 PROTHROMBIN TIME: CPT | Performed by: HOSPITALIST

## 2017-03-19 PROCEDURE — 85025 COMPLETE CBC W/AUTO DIFF WBC: CPT | Performed by: ORTHOPAEDIC SURGERY

## 2017-03-19 PROCEDURE — 82565 ASSAY OF CREATININE: CPT | Performed by: INTERNAL MEDICINE

## 2017-03-19 PROCEDURE — P9016 RBC LEUKOCYTES REDUCED: HCPCS

## 2017-03-19 PROCEDURE — 86900 BLOOD TYPING SEROLOGIC ABO: CPT | Performed by: ORTHOPAEDIC SURGERY

## 2017-03-19 PROCEDURE — 86923 COMPATIBILITY TEST ELECTRIC: CPT

## 2017-03-19 PROCEDURE — 86850 RBC ANTIBODY SCREEN: CPT | Performed by: ORTHOPAEDIC SURGERY

## 2017-03-19 PROCEDURE — 25010000002 CEFEPIME: Performed by: ORTHOPAEDIC SURGERY

## 2017-03-19 PROCEDURE — 25010000002 VANCOMYCIN: Performed by: INTERNAL MEDICINE

## 2017-03-19 PROCEDURE — 36430 TRANSFUSION BLD/BLD COMPNT: CPT

## 2017-03-19 PROCEDURE — 25010000002 ENOXAPARIN PER 10 MG: Performed by: HOSPITALIST

## 2017-03-19 PROCEDURE — 86900 BLOOD TYPING SEROLOGIC ABO: CPT

## 2017-03-19 PROCEDURE — 80202 ASSAY OF VANCOMYCIN: CPT | Performed by: INTERNAL MEDICINE

## 2017-03-19 PROCEDURE — 25010000002 ONDANSETRON PER 1 MG: Performed by: ORTHOPAEDIC SURGERY

## 2017-03-19 PROCEDURE — 83880 ASSAY OF NATRIURETIC PEPTIDE: CPT | Performed by: INTERNAL MEDICINE

## 2017-03-19 RX ORDER — ONDANSETRON 2 MG/ML
4 INJECTION INTRAMUSCULAR; INTRAVENOUS EVERY 6 HOURS PRN
Status: DISCONTINUED | OUTPATIENT
Start: 2017-03-19 | End: 2017-03-22 | Stop reason: HOSPADM

## 2017-03-19 RX ORDER — WARFARIN SODIUM 7.5 MG/1
7.5 TABLET ORAL
Status: DISCONTINUED | OUTPATIENT
Start: 2017-03-19 | End: 2017-03-19

## 2017-03-19 RX ADMIN — FLUTICASONE PROPIONATE 2 SPRAY: 50 SPRAY, METERED NASAL at 12:14

## 2017-03-19 RX ADMIN — VILAZODONE HYDROCHLORIDE 40 MG: 40 TABLET ORAL at 12:13

## 2017-03-19 RX ADMIN — CEFEPIME 2 G: 2 INJECTION, POWDER, FOR SOLUTION INTRAVENOUS at 02:01

## 2017-03-19 RX ADMIN — HYDROCODONE BITARTRATE AND ACETAMINOPHEN 1 TABLET: 10; 325 TABLET ORAL at 06:23

## 2017-03-19 RX ADMIN — FERROUS SULFATE TAB 325 MG (65 MG ELEMENTAL FE) 325 MG: 325 (65 FE) TAB at 12:13

## 2017-03-19 RX ADMIN — ARIPIPRAZOLE 10 MG: 10 TABLET ORAL at 12:13

## 2017-03-19 RX ADMIN — LAMOTRIGINE 25 MG: 25 TABLET ORAL at 18:13

## 2017-03-19 RX ADMIN — DOCUSATE SODIUM 100 MG: 100 CAPSULE, LIQUID FILLED ORAL at 12:12

## 2017-03-19 RX ADMIN — CARVEDILOL 12.5 MG: 12.5 TABLET, FILM COATED ORAL at 12:13

## 2017-03-19 RX ADMIN — TIOTROPIUM BROMIDE 1 CAPSULE: 18 CAPSULE ORAL; RESPIRATORY (INHALATION) at 08:13

## 2017-03-19 RX ADMIN — MODAFINIL 200 MG: 100 TABLET ORAL at 12:13

## 2017-03-19 RX ADMIN — ATORVASTATIN CALCIUM 40 MG: 40 TABLET, FILM COATED ORAL at 20:26

## 2017-03-19 RX ADMIN — HYDROCODONE BITARTRATE AND ACETAMINOPHEN 1 TABLET: 10; 325 TABLET ORAL at 14:38

## 2017-03-19 RX ADMIN — HYDROCODONE BITARTRATE AND ACETAMINOPHEN 1 TABLET: 10; 325 TABLET ORAL at 20:27

## 2017-03-19 RX ADMIN — OXYBUTYNIN CHLORIDE 10 MG: 10 TABLET, EXTENDED RELEASE ORAL at 12:18

## 2017-03-19 RX ADMIN — BUDESONIDE AND FORMOTEROL FUMARATE DIHYDRATE 2 PUFF: 160; 4.5 AEROSOL RESPIRATORY (INHALATION) at 21:00

## 2017-03-19 RX ADMIN — AMLODIPINE BESYLATE 10 MG: 10 TABLET ORAL at 12:13

## 2017-03-19 RX ADMIN — PREGABALIN 200 MG: 100 CAPSULE ORAL at 18:13

## 2017-03-19 RX ADMIN — CEFEPIME 2 G: 2 INJECTION, POWDER, FOR SOLUTION INTRAVENOUS at 18:15

## 2017-03-19 RX ADMIN — CARVEDILOL 12.5 MG: 12.5 TABLET, FILM COATED ORAL at 18:12

## 2017-03-19 RX ADMIN — LAMOTRIGINE 25 MG: 25 TABLET ORAL at 12:13

## 2017-03-19 RX ADMIN — WARFARIN SODIUM 6 MG: 6 TABLET ORAL at 18:13

## 2017-03-19 RX ADMIN — VANCOMYCIN HYDROCHLORIDE 2000 MG: 1 INJECTION, POWDER, LYOPHILIZED, FOR SOLUTION INTRAVENOUS at 14:02

## 2017-03-19 RX ADMIN — BUDESONIDE AND FORMOTEROL FUMARATE DIHYDRATE 2 PUFF: 160; 4.5 AEROSOL RESPIRATORY (INHALATION) at 08:12

## 2017-03-19 RX ADMIN — ISOSORBIDE MONONITRATE 30 MG: 30 TABLET, EXTENDED RELEASE ORAL at 12:13

## 2017-03-19 RX ADMIN — ONDANSETRON 4 MG: 2 INJECTION INTRAMUSCULAR; INTRAVENOUS at 12:18

## 2017-03-19 RX ADMIN — HYDROCODONE BITARTRATE AND ACETAMINOPHEN 1 TABLET: 10; 325 TABLET ORAL at 02:07

## 2017-03-19 RX ADMIN — PREGABALIN 200 MG: 100 CAPSULE ORAL at 12:13

## 2017-03-19 RX ADMIN — PANTOPRAZOLE SODIUM 40 MG: 40 TABLET, DELAYED RELEASE ORAL at 06:23

## 2017-03-19 RX ADMIN — ONDANSETRON 4 MG: 2 INJECTION INTRAMUSCULAR; INTRAVENOUS at 18:12

## 2017-03-19 RX ADMIN — CEFEPIME 2 G: 2 INJECTION, POWDER, FOR SOLUTION INTRAVENOUS at 12:12

## 2017-03-19 RX ADMIN — LEVOTHYROXINE SODIUM 100 MCG: 100 TABLET ORAL at 06:23

## 2017-03-19 RX ADMIN — ENOXAPARIN SODIUM 40 MG: 40 INJECTION SUBCUTANEOUS at 14:38

## 2017-03-19 NOTE — SIGNIFICANT NOTE
03/19/17 1414   Rehab Treatment   Discipline physical therapist   Treatment Not Performed other (see comments)  (pt with Hgb of 6.8, notes say she will be getting 2 units of PRBC today, will check on her again tomorrow.)   Recommendation   PT - Next Appointment 03/20/17

## 2017-03-19 NOTE — PROGRESS NOTES
"Pharmacokinetic Consult - Vancomycin Dosing (Follow-up Note)    Frances Downs is a 66 y.o. female who is on day 4 pharmacy to dose vancomycin for bone and joint infection.  Pharmacy dosing vancomycin per Dr. Benito's request.   Other antimicrobials: Ceftriaxone 2 gram IV q24h  Goal trough: 15-20 mg/L     POD#2 R hip incision and drainage    Dr. Benito noted    Continue present treatment follow-up on the sensitivity of the gram-negative can on the operative sample. Continue present treatment.     2 units of packed RBCs per orthopedic surgery service  Appreciate internal medicine and pulmonary services input.  Appreciate orthopedic surgery services intervention.  Disposition the next 24-48 hours once cleared by orthopedic surgery service and once on the culture data is available proper logistical arrangements are made for excellent out of hospital care    Relevant clinical data and objective history reviewed:  65\" (165.1 cm)  226 lb (103 kg)  Body mass index is 37.61 kg/(m^2).     She has a past medical history of Anxiety; Arthritis; Bipolar disorder; COPD (chronic obstructive pulmonary disease); Coronary artery disease; Diastolic dysfunction; Difficulty walking; Disease of thyroid gland; Dislocation of hip joint; DVT (deep venous thrombosis); GERD without esophagitis; Heartburn; Hematoma; Hyperlipidemia; Hypertension; LVH (left ventricular hypertrophy); Major depressive disorder; Mitral annular calcification; Mitral regurgitation; Mitral valve disease; Muscle weakness; Narcolepsy; Overactive bladder; Pneumonia; Pulmonary emboli; Pulmonary hypertension; Sleep apnea; and Tricuspid regurgitation.    Allergies as of 03/15/2017   • (No Known Allergies)     Vital Signs (last 24 hours)       03/18 0700  -  03/19 0659 03/19 0700  -  03/19 1314   Most Recent    Temp (°F) 98.7 -  100    97.9 -  99.3     99.3 (37.4)    Heart Rate 65 -  73    60 -  65     62    Resp   16    16 -  18     16    /70 -  139/70    122/62 " -  132/72     122/62    SpO2 (%) 92 -  97    95 -  98     98        Estimated Creatinine Clearance: 83.5 mL/min (by C-G formula based on Cr of 0.31).    Results from last 7 days  Lab Units 03/19/17  0544 03/18/17  1046 03/18/17  0953   CREATININE mg/dL 0.31* 0.71 0.71       Results from last 7 days  Lab Units 03/19/17  0542 03/18/17  0953 03/17/17  0335   WBC 10*3/mm3 5.40 6.57 6.34     Baseline culture/source/susceptibility:     Microbiology Results (last 21 days)        Procedure Component Value - Date/Time       Anaerobic Culture [[id number]] Collected: 03/17/17 1714       Lab Status: In process Specimen: Wound from Hip, Right Updated: 03/17/17 1729       Wound Culture [[id number]] (Abnormal) Collected: 03/17/17 1714       Lab Status: Preliminary result Specimen: Wound from Hip, Right Updated: 03/19/17 0647        Wound Culture Scant growth (1+) Gram Negative Bacilli (A)         Gram Stain Result Moderate (3+) WBCs seen          No organisms seen        Wound Culture [[id number]] (Abnormal)  Collected: 03/15/17 1725       Lab Status: Final result Specimen: Wound from Hip, Right Updated: 03/17/17 0745        Wound Culture Light growth (2+) Pseudomonas aeruginosa (A)         Gram Stain Result Few (2+) WBCs seen          Rare (1+) Gram negative bacilli          Susceptibility         Pseudomonas aeruginosa         ROSSY         Cefepime 8  Susceptible         Ceftazidime 4  Susceptible         Ciprofloxacin <=0.25  Susceptible         Levofloxacin 2  Susceptible         Meropenem >=16  Resistant         Piperacillin + Tazobactam 16  Susceptible         Tobramycin <=1  Susceptible              Lab Results   Component Value Date    VANCOTROUGH 29.30 (C) 03/17/2017     Lab Results   Component Value Date    VANCORANDOM 13.70 03/19/2017       Assessment/Plan  1) Vancomycin random level drawn ~23h after prior Vancomycin 2 gram IV dose = 13.7 mcg/mL.  Level before that dose = 12.4 mcg/mL.  As level increased slightly on this  regimen and was recently supratherapeutic on a q12h regimen, will redose with Vancomycin 2 gram IV today (~20mg/kg) and recheck another random level ~ 24h from this dose.     2) Will monitor serum creatinine at least every 48 hours per dosing recommendations.    3) Encourage hydration as allowed by MD to help prevent toxic accumulation; monitor for s/sxn of toxicity including increase in SCr and decrease in UOP.    Pharmacy will continue to follow daily while on vancomycin and adjust as needed.     Thanks, Joni Johnson, PharmD, BCPS

## 2017-03-19 NOTE — PLAN OF CARE
Problem: Patient Care Overview (Adult)  Goal: Plan of Care Review  Outcome: Ongoing (interventions implemented as appropriate)    03/19/17 0326 03/19/17 8505   Coping/Psychosocial Response Interventions   Plan Of Care Reviewed With patient --    Outcome Evaluation   Outcome Summary/Follow up Plan --  Continued IV antibiotics for MRSA infection, monitored incision and drain output. Transfusing 2 units of PRBCs on unit.   Patient Care Overview   Progress progress toward functional goals as expected --        Goal: Adult Individualization and Mutuality  Outcome: Ongoing (interventions implemented as appropriate)  Goal: Discharge Needs Assessment  Outcome: Ongoing (interventions implemented as appropriate)    Problem: Infection, Risk/Actual (Adult)  Goal: Infection Prevention/Resolution  Outcome: Ongoing (interventions implemented as appropriate)    Problem: Fall Risk (Adult)  Goal: Absence of Falls  Outcome: Ongoing (interventions implemented as appropriate)    Problem: Pain, Acute (Adult)  Goal: Acceptable Pain Control/Comfort Level  Outcome: Ongoing (interventions implemented as appropriate)    Problem: Pressure Ulcer (Adult)  Goal: Signs and Symptoms of Listed Potential Problems Will be Absent or Manageable (Pressure Ulcer)  Outcome: Ongoing (interventions implemented as appropriate)    Problem: Perioperative Period (Adult)  Goal: Signs and Symptoms of Listed Potential Problems Will be Absent or Manageable (Perioperative Period)  Outcome: Ongoing (interventions implemented as appropriate)

## 2017-03-19 NOTE — PROGRESS NOTES
[facility]  Phone: [phone]  Tera Stokes MD    Subjective:  LOS: 4    Clear evidence hypoxia on yesterday's blood gas.  She remains on nasal cannula.  Note evidence for anemia today.  Will get PRBC.    Objective   Vital Signs past 24hrs  BP range: BP: (122-132)/(60-72) 122/62  Pulse range: Heart Rate:  [60-70] 62  Resp rate range: Resp:  [16-18] 16  Temp range: Temp (24hrs), Av.1 °F (37.3 °C), Min:97.9 °F (36.6 °C), Max:99.7 °F (37.6 °C)    O2 Device: nasal cannulaFlow (L/min):  [3-4] 4  Oxygen range:SpO2:  [92 %-98 %] 98 %   226 lb (103 kg); Body mass index is 37.61 kg/(m^2).    Intake/Output Summary (Last 24 hours) at 17 1221  Last data filed at 17 1118   Gross per 24 hour   Intake    760 ml   Output      0 ml   Net    760 ml       Physical Exam   HENT:   Head: Normocephalic.   Eyes: Pupils are equal, round, and reactive to light.   Neck: Normal range of motion.   Cardiovascular: Normal rate and regular rhythm.    No murmur heard.  Pulmonary/Chest: Effort normal. She has decreased breath sounds. She has no wheezes. She has no rales.   Abdominal:   Obese   Musculoskeletal: She exhibits no edema.   Right thigh appears somewhat firm and consistent with possible bleeding.   Neurological: She is alert.     Results Review:    I have reviewed the laboratory and imaging data since the last note by [facility] physician.  My annotations are noted in assessment and plan.    Medication Review:  I have reviewed the current MAR.  My annotations are noted in assessment and plan.       Plan   PCCM Problems  Acute hypoxic respiratory failure  Obstructive sleep apnea with likely obesity hypoventilation  Morbid obesity  COPD without exacerbation  Relevant Medical Diagnoses  Right hip wound on antibiotics and I&D on 3/17/17  Diastolic dysfunction  History DVT and PE  Hypertension  Coronary artery disease  Pulmonary hypertension likely WHO class II  Bipolar disorder    Plan of Treatment  Blood gas was  reviewed and shows hypoxia.  Also element of hypercapnia.  Await improvement in anemia and then trial her on nasal cannula again. Definitely an element of atelectasis along with her underlying COPD.I will repeat her chest x-ray since I don't see a film from the middle of February.    COPD without exacerbation.  Continue bronchodilators.    Obstructive sleep apnea, now using hospital auto BiPAP.  She states she is compliant with her home CPAP machine.    She remains on modafinil for hypersomnolence persisting despite appropriate treatment of sleep apnea with a CPAP device.    She remains on antibiotics per infectious disease.    Noted that patient is still on anticoagulation.  Please consider discontinuation if further evidence of bleeding.    Tera Stokes MD  03/19/17  12:21 PM    EMR Dragon/Transcription disclaimer:   Some of this note may be an electronic transcription/translation of spoken language to printed text. The electronic translation of spoken language may permit erroneous, or at times, nonsensical words or phrases to be inadvertently transcribed; Although I have reviewed the note for such errors, some may still exist.

## 2017-03-19 NOTE — PROGRESS NOTES
Orthopedic Total Progress Note        Patient: Frances Downs    Date of Admission: 3/15/2017  1:03 PM    YOB: 1950    Medical Record Number: [id number]    Attending Physician: Krysta Benito MD      POD # 2     Status post: RT HIP INCISION AND DRAINAGE      Systemic or Specific Complaints: No Complaints      No Known Allergies      Current Medications:  Scheduled Meds:  amLODIPine 10 mg Oral Daily   ARIPiprazole 10 mg Oral Daily   atorvastatin 40 mg Oral Nightly   budesonide-formoterol 2 puff Inhalation BID - RT   carvedilol 12.5 mg Oral BID With Meals   cefepime 2 g Intravenous Q8H   docusate sodium 100 mg Oral Daily   enoxaparin 40 mg Subcutaneous Q24H   ferrous sulfate 325 mg Oral Daily With Breakfast   fluticasone 2 spray Each Nare Daily   isosorbide mononitrate 30 mg Oral Q24H   lamoTRIgine 25 mg Oral BID   levothyroxine 100 mcg Oral Q AM   modafinil 200 mg Oral Daily   oxybutynin XL 10 mg Oral Daily   pantoprazole 40 mg Oral Q AM   Pharmacy to dose vancomycin  Does not apply Daily   pregabalin 200 mg Oral BID   tiotropium 1 capsule Inhalation Daily - RT   Vancomycin Pharmacy Intermittent Dosing  Does not apply Daily   vilazodone 40 mg Oral Daily   warfarin 6 mg Oral Daily     Continuous Infusions:   PRN Meds:.•  acetaminophen  •  albuterol  •  bisacodyl  •  diazePAM  •  docusate sodium  •  HYDROcodone-acetaminophen  •  ipratropium-albuterol  •  magnesium hydroxide  •  Morphine **AND** naloxone  •  [DISCONTINUED] Morphine **AND** naloxone  •  promethazine  •  sodium chloride      Physical Exam: 66 y.o. female  General Appearance:    alert and oriented                Pain Relief: Patient reports complete resolution       Vitals:    03/18/17 1930 03/18/17 2057 03/18/17 2346 03/19/17 0350   BP: 131/60  128/65 125/70   BP Location: Right arm  Right arm Right arm   Patient Position: Lying  Lying Lying   Pulse: 70 69 69 67   Resp: 16 16 16 16   Temp: 99.2 °F (37.3 °C)  98.7 °F (37.1 °C) 99.6 °F  (37.6 °C)   TempSrc: Oral  Oral Oral   SpO2: 92% 94% 96% 94%   Weight:       Height:               Extremities:   Operative extremity neurovascular status intact. ROM intact.    Incision intact w/out signs or  symptoms of infection. No           edema, no cyanosis, no calf tenderness     Pulses:     Pulses palpable and equal bilaterally     Skin:     Skin Warm/Dry w/out ulceration, ecchymosis, rash, or   cyanosis     Activity: Mobilizing Per P.T.       Diagnostic Tests:   Admission on 03/15/2017   No results displayed because visit has over 200 results.          No results found.      Assessment:  Patient Active Problem List   Diagnosis   • Hematoma of right hip   • DVT (deep venous thrombosis), hx of   • Hypertension   • Hyperlipidemia   • Coronary artery disease, hx of stent   • Arthritis   • Bipolar disorder   • Infected prosthesis of right hip   • MRSA (methicillin resistant Staphylococcus aureus) infection   • Mixed infection   • Bacterial infection due to Morganella morganii   • Hypokalemia   • Postoperative anemia due to acute blood loss     Post-operative Pain  Immobility    Plan:    Continue Physical Therapy, increase mobility as tolerated.  Continue SCDs, Continue DVT prophalaxis.  Continue Pain management efforts  Continue Incisional care      Discharge Plan: TBD to SNF    Date: 3/19/2017   Time: 7:17 AM    Erlin Martin MD

## 2017-03-19 NOTE — PROGRESS NOTES
"  Infectious Diseases Progress Note    Krysta Benito MD     [facility]  [place]: 4 days  Patient Identification:  Name: Frances Downs  Age: 66 y.o.  Sex: female  :  1950  MRN: [id number]         Primary Care Physician: Talha Echeverria MD            Subjective: Better denies any specific complaints.  Breathing better  Interval History: See admission history and physical.     Objective:    Scheduled Meds:    amLODIPine 10 mg Oral Daily   ARIPiprazole 10 mg Oral Daily   atorvastatin 40 mg Oral Nightly   budesonide-formoterol 2 puff Inhalation BID - RT   carvedilol 12.5 mg Oral BID With Meals   cefepime 2 g Intravenous Q8H   docusate sodium 100 mg Oral Daily   enoxaparin 40 mg Subcutaneous Q24H   ferrous sulfate 325 mg Oral Daily With Breakfast   fluticasone 2 spray Each Nare Daily   isosorbide mononitrate 30 mg Oral Q24H   lamoTRIgine 25 mg Oral BID   levothyroxine 100 mcg Oral Q AM   modafinil 200 mg Oral Daily   oxybutynin XL 10 mg Oral Daily   pantoprazole 40 mg Oral Q AM   Pharmacy to dose vancomycin  Does not apply Daily   pregabalin 200 mg Oral BID   tiotropium 1 capsule Inhalation Daily - RT   Vancomycin Pharmacy Intermittent Dosing  Does not apply Daily   vilazodone 40 mg Oral Daily   warfarin 6 mg Oral Daily   warfarin 7.5 mg Oral Once     Continuous Infusions:       Vital signs in last 24 hours:  Temp:  [97.9 °F (36.6 °C)-100 °F (37.8 °C)] 97.9 °F (36.6 °C)  Heart Rate:  [60-72] 65  Resp:  [16-18] 18  BP: (125-138)/(60-72) 132/72    Intake/Output:    Intake/Output Summary (Last 24 hours) at 17 0829  Last data filed at 17 0201   Gross per 24 hour   Intake   1180 ml   Output      0 ml   Net   1180 ml       Exam:  Visit Vitals   • /72 (BP Location: Right arm, Patient Position: Lying)   • Pulse 65   • Temp 97.9 °F (36.6 °C) (Oral)   • Resp 18   • Ht 65\" (165.1 cm)   • Wt 226 lb (103 kg)   • SpO2 96%   • BMI 37.61 kg/m2       General Appearance:    Alert, cooperative, " no distress, AAOx3                          Head:    Normocephalic, without obvious abnormality, atraumatic                           Eyes:    PERRL, conjunctiva/corneas clear, EOM's intact, both eyes                         Throat:   Lips, tongue, gums normal; oral mucosa pink and moist                           Neck:   Supple, symmetrical, trachea midline, no JVD                         Lungs:    Clear to auscultation bilaterally, respirations unlabored                 Chest Wall:    No tenderness or deformity                          Heart:    Regular rate and rhythm, S1 and S2 normal, no murmur,no  Rub                                      or gallop                  Abdomen:     Soft, non-tender, bowel sounds active, no masses, no                                                        organomegaly                  Extremities:   Erythema at the surgical incision surgical incision is dressed  with a drain in place.                        Pulses:   Pulses palpable in all extremities                            Skin:   Skin is warm and dry,  no rashes or palpable lesions              Data Review:    I reviewed the patient's new clinical results.    Results from last 7 days  Lab Units 03/19/17  0542 03/18/17  0953 03/17/17  0335 03/16/17  0529 03/15/17  1424   WBC 10*3/mm3 5.40 6.57 6.34 5.98 6.88   HEMOGLOBIN g/dL 6.8* 8.3* 9.3* 9.2* 9.1*   PLATELETS 10*3/mm3 231 256 270 265 292       Results from last 7 days  Lab Units 03/19/17  0544 03/18/17  1046 03/18/17  0953 03/17/17  0335 03/16/17  0529 03/15/17  1424   SODIUM mmol/L  --   --  141 142 143 142   POTASSIUM mmol/L  --   --  3.8 3.7 3.4* 3.8   CHLORIDE mmol/L  --   --  102 102 102 101   TOTAL CO2 mmol/L  --   --  30.2* 28.4 30.4* 31.0*   BUN mg/dL  --   --  6* 7* 6* 6*   CREATININE mg/dL 0.31* 0.71 0.71 0.78 0.66 0.71   CALCIUM mg/dL  --   --  8.2* 8.6 8.9 8.9   GLUCOSE mg/dL  --   --  119* 100* 90 89     Anaerobic Culture [[id number]] Collected: 03/17/17 1661         Lab Status: In process Specimen: Wound from Hip, Right Updated: 03/17/17 1729       Wound Culture [[id number]] (Normal) Collected: 03/17/17 1714       Lab Status: Preliminary result Specimen: Wound from Hip, Right Updated: 03/18/17 0605        Wound Culture Culture in progress         Gram Stain Result Moderate (3+) WBCs seen          No organisms seen        Wound Culture [[id number]] (Abnormal)  Collected: 03/15/17 1725       Lab Status: Final result Specimen: Wound from Hip, Right Updated: 03/17/17 0745        Wound Culture Light growth (2+) Pseudomonas aeruginosa (A)         Gram Stain Result Few (2+) WBCs seen          Rare (1+) Gram negative bacilli          Susceptibility         Pseudomonas aeruginosa         ROSSY         Cefepime 8  Susceptible         Ceftazidime 4  Susceptible         Ciprofloxacin <=0.25  Susceptible         Levofloxacin 2  Susceptible         Meropenem >=16  Resistant         Piperacillin + Tazobactam 16  Susceptible         Tobramycin <=1  Susceptible                                       Assessment:  Principal Problem:    Mixed infection  Active Problems:    Hematoma of right hip    DVT (deep venous thrombosis), hx of    Hypertension    Hyperlipidemia    Coronary artery disease, hx of stent    Arthritis    Bipolar disorder    Infected prosthesis of right hip    MRSA (methicillin resistant Staphylococcus aureus) infection    Bacterial infection due to Morganella morganii    Hypokalemia    Postoperative anemia due to acute blood loss   pseudomonas aeruginosa infection of the wound    Plan:  Continue present treatment follow-up on the sensitivity of the gram-negative can on the operative sample.  Continue present treatment.    2 units of packed RBCs per orthopedic surgery service  Appreciate internal medicine and pulmonary services input.  Appreciate orthopedic surgery services intervention.  Disposition the next 24-48 hours once cleared by orthopedic surgery service and once on the  culture data is available proper logistical arrangements are made for excellent out of hospital care.    Krysta Benito MD  3/19/2017  8:29 AM    Much of this encounter note is an electronic transcription/translation of spoken language to printed text. The electronic translation of spoken language may permit erroneous, or at times, nonsensical words or phrases to be inadvertently transcribed; Although I have reviewed the note for such errors, some may still exist

## 2017-03-19 NOTE — PLAN OF CARE
Problem: Patient Care Overview (Adult)  Goal: Plan of Care Review  Outcome: Ongoing (interventions implemented as appropriate)    03/19/17 0326   Coping/Psychosocial Response Interventions   Plan Of Care Reviewed With patient   Outcome Evaluation   Outcome Summary/Follow up Plan Pt is a post op day 2 of a incision and drainage of the rt hip. Pt continues to have moderate amounts of serosanguineous drainage. Dressing changed earlier in the shift. Pt continues to void via bedpan. Pt continues on IV antibiotics. Pt is resting at this time.   Patient Care Overview   Progress progress toward functional goals as expected       Goal: Adult Individualization and Mutuality  Outcome: Ongoing (interventions implemented as appropriate)  Goal: Discharge Needs Assessment  Outcome: Ongoing (interventions implemented as appropriate)    Problem: Infection, Risk/Actual (Adult)  Goal: Infection Prevention/Resolution  Outcome: Ongoing (interventions implemented as appropriate)    Problem: Fall Risk (Adult)  Goal: Absence of Falls  Outcome: Ongoing (interventions implemented as appropriate)    Problem: Pain, Acute (Adult)  Goal: Acceptable Pain Control/Comfort Level  Outcome: Ongoing (interventions implemented as appropriate)    Problem: Pressure Ulcer (Adult)  Goal: Signs and Symptoms of Listed Potential Problems Will be Absent or Manageable (Pressure Ulcer)  Outcome: Ongoing (interventions implemented as appropriate)    Problem: Perioperative Period (Adult)  Goal: Signs and Symptoms of Listed Potential Problems Will be Absent or Manageable (Perioperative Period)  Outcome: Ongoing (interventions implemented as appropriate)

## 2017-03-19 NOTE — PROGRESS NOTES
"    DAILY PROGRESS NOTE  [facility]    Patient Identification:  Name: Frances Downs  Age: 66 y.o.  Sex: female  :  1950  MRN: [id number]         Primary Care Physician: Talha Echeverria MD    Subjective:  Interval History: some nausea w/out emesis or abd pains. No cp/sob. Some fatigue    Objective:no fm    Scheduled Meds:  amLODIPine 10 mg Oral Daily   ARIPiprazole 10 mg Oral Daily   atorvastatin 40 mg Oral Nightly   budesonide-formoterol 2 puff Inhalation BID - RT   carvedilol 12.5 mg Oral BID With Meals   cefepime 2 g Intravenous Q8H   docusate sodium 100 mg Oral Daily   enoxaparin 40 mg Subcutaneous Q24H   ferrous sulfate 325 mg Oral Daily With Breakfast   fluticasone 2 spray Each Nare Daily   isosorbide mononitrate 30 mg Oral Q24H   lamoTRIgine 25 mg Oral BID   levothyroxine 100 mcg Oral Q AM   modafinil 200 mg Oral Daily   oxybutynin XL 10 mg Oral Daily   pantoprazole 40 mg Oral Q AM   Pharmacy to dose vancomycin  Does not apply Daily   pregabalin 200 mg Oral BID   tiotropium 1 capsule Inhalation Daily - RT   vancomycin 20 mg/kg Intravenous Once   Vancomycin Pharmacy Intermittent Dosing  Does not apply Daily   vilazodone 40 mg Oral Daily   warfarin 6 mg Oral Daily   warfarin 7.5 mg Oral Once     Continuous Infusions:     Vital signs in last 24 hours:  Temp:  [97.9 °F (36.6 °C)-99.7 °F (37.6 °C)] 99.3 °F (37.4 °C)  Heart Rate:  [60-70] 62  Resp:  [16-18] 16  BP: (122-132)/(60-72) 122/62    Intake/Output:    Intake/Output Summary (Last 24 hours) at 17 1342  Last data filed at 17 1321   Gross per 24 hour   Intake   1180 ml   Output      0 ml   Net   1180 ml       Exam:  Visit Vitals   • /62 (BP Location: Right arm, Patient Position: Lying)   • Pulse 62   • Temp 99.3 °F (37.4 °C) (Oral)   • Resp 16   • Ht 65\" (165.1 cm)   • Wt 226 lb (103 kg)   • SpO2 98%   • BMI 37.61 kg/m2       General Appearance:    Alert, cooperative, no distress, AAOx3                          " Head:    Normocephalic, without obvious abnormality, atraumatic                         Throat:   Lips, tongue, gums normal; oral mucosa pink and moist                         Lungs:    Clear to auscultation bilaterally, respirations unlabored                          Heart:    Regular rate and rhythm, S1 and S2 normal                  Abdomen:     Soft, non-tender, bowel sounds active                  Extremities:   No cyanosis or edema                           Data Review:  Labs in chart were reviewed.    Assessment:  Active Hospital Problems (** Indicates Principal Problem)    Diagnosis Date Noted   • **Mixed infection [A49.9] 03/15/2017   • Postoperative anemia due to acute blood loss [D62] 03/18/2017   • Hypokalemia [E87.6] 03/16/2017   • Bacterial infection due to Morganella morganii [B96.4] 03/15/2017   • Infected prosthesis of right hip [T84.51XA] 02/10/2017   • MRSA (methicillin resistant Staphylococcus aureus) infection [A49.02] 02/10/2017   • Hypertension [I10] 02/10/2017   • DVT (deep venous thrombosis), hx of [I82.409] 02/10/2017   • Bipolar disorder [F31.9] 02/10/2017   • Arthritis [M19.90] 02/10/2017   • Hyperlipidemia [E78.5] 02/10/2017   • Coronary artery disease, hx of stent [I25.10] 02/10/2017   • Hematoma of right hip [S70.01XA] 02/08/2017      Resolved Hospital Problems    Diagnosis Date Noted Date Resolved   No resolved problems to display.       Plan:  R hip infection due to MRSA and Pseudomonas - Vanc/Cefepime per ID - POD2  -abla - Hgb 8.3-6.8 - agree w/ 2 u pRBCs  -Lovenox for prophylaxis until INR therapeutic - 1.49  -SLIVF      Hx DVT/PE - 2/17 doppler negative  -Lovenox for prophylaxis and resume AC today      HTN/CAD - stable      COPD w/out AE      DENISE - per Pulm      Replaced K - 1.8 Mg    Navarro Haddad MD  3/19/2017  1:42 PM

## 2017-03-20 LAB
ABO + RH BLD: NORMAL
ABO + RH BLD: NORMAL
BH BB BLOOD EXPIRATION DATE: NORMAL
BH BB BLOOD EXPIRATION DATE: NORMAL
BH BB BLOOD TYPE BARCODE: 6200
BH BB BLOOD TYPE BARCODE: 6200
BH BB DISPENSE STATUS: NORMAL
BH BB DISPENSE STATUS: NORMAL
BH BB PRODUCT CODE: NORMAL
BH BB PRODUCT CODE: NORMAL
BH BB UNIT NUMBER: NORMAL
BH BB UNIT NUMBER: NORMAL
DEPRECATED RDW RBC AUTO: 57.2 FL (ref 37–54)
ERYTHROCYTE [DISTWIDTH] IN BLOOD BY AUTOMATED COUNT: 16.5 % (ref 11.7–13)
HCT VFR BLD AUTO: 28.1 % (ref 35.6–45.5)
HGB BLD-MCNC: 8.9 G/DL (ref 11.9–15.5)
INR PPP: 1.42 (ref 0.9–1.1)
MCH RBC QN AUTO: 30 PG (ref 26.9–32)
MCHC RBC AUTO-ENTMCNC: 31.7 G/DL (ref 32.4–36.3)
MCV RBC AUTO: 94.6 FL (ref 80.5–98.2)
PLATELET # BLD AUTO: 213 10*3/MM3 (ref 140–500)
PMV BLD AUTO: 10.9 FL (ref 6–12)
PROTHROMBIN TIME: 16.8 SECONDS (ref 11.7–14.2)
RBC # BLD AUTO: 2.97 10*6/MM3 (ref 3.9–5.2)
UNIT  ABO: NORMAL
UNIT  ABO: NORMAL
UNIT  RH: NORMAL
UNIT  RH: NORMAL
WBC NRBC COR # BLD: 4.69 10*3/MM3 (ref 4.5–10.7)

## 2017-03-20 PROCEDURE — 94799 UNLISTED PULMONARY SVC/PX: CPT

## 2017-03-20 PROCEDURE — 25010000002 VANCOMYCIN: Performed by: INTERNAL MEDICINE

## 2017-03-20 PROCEDURE — 25010000002 ONDANSETRON PER 1 MG: Performed by: ORTHOPAEDIC SURGERY

## 2017-03-20 PROCEDURE — 85027 COMPLETE CBC AUTOMATED: CPT | Performed by: HOSPITALIST

## 2017-03-20 PROCEDURE — 25010000002 CEFEPIME: Performed by: ORTHOPAEDIC SURGERY

## 2017-03-20 PROCEDURE — 85610 PROTHROMBIN TIME: CPT | Performed by: HOSPITALIST

## 2017-03-20 PROCEDURE — 97110 THERAPEUTIC EXERCISES: CPT

## 2017-03-20 PROCEDURE — 25010000002 ENOXAPARIN PER 10 MG: Performed by: HOSPITALIST

## 2017-03-20 RX ORDER — WARFARIN SODIUM 7.5 MG/1
7.5 TABLET ORAL
Status: COMPLETED | OUTPATIENT
Start: 2017-03-20 | End: 2017-03-20

## 2017-03-20 RX ORDER — FUROSEMIDE 20 MG/1
20 TABLET ORAL DAILY
Status: DISCONTINUED | OUTPATIENT
Start: 2017-03-20 | End: 2017-03-22 | Stop reason: HOSPADM

## 2017-03-20 RX ADMIN — ATORVASTATIN CALCIUM 40 MG: 40 TABLET, FILM COATED ORAL at 21:07

## 2017-03-20 RX ADMIN — LAMOTRIGINE 25 MG: 25 TABLET ORAL at 09:00

## 2017-03-20 RX ADMIN — BUDESONIDE AND FORMOTEROL FUMARATE DIHYDRATE 2 PUFF: 160; 4.5 AEROSOL RESPIRATORY (INHALATION) at 23:46

## 2017-03-20 RX ADMIN — FERROUS SULFATE TAB 325 MG (65 MG ELEMENTAL FE) 325 MG: 325 (65 FE) TAB at 09:00

## 2017-03-20 RX ADMIN — TIOTROPIUM BROMIDE 1 CAPSULE: 18 CAPSULE ORAL; RESPIRATORY (INHALATION) at 07:37

## 2017-03-20 RX ADMIN — VILAZODONE HYDROCHLORIDE 40 MG: 40 TABLET ORAL at 09:01

## 2017-03-20 RX ADMIN — AMLODIPINE BESYLATE 10 MG: 10 TABLET ORAL at 09:01

## 2017-03-20 RX ADMIN — WARFARIN SODIUM 7.5 MG: 7.5 TABLET ORAL at 16:37

## 2017-03-20 RX ADMIN — HYDROCODONE BITARTRATE AND ACETAMINOPHEN 1 TABLET: 10; 325 TABLET ORAL at 16:37

## 2017-03-20 RX ADMIN — LAMOTRIGINE 25 MG: 25 TABLET ORAL at 17:20

## 2017-03-20 RX ADMIN — OXYBUTYNIN CHLORIDE 10 MG: 10 TABLET, EXTENDED RELEASE ORAL at 09:00

## 2017-03-20 RX ADMIN — ARIPIPRAZOLE 10 MG: 10 TABLET ORAL at 09:00

## 2017-03-20 RX ADMIN — MODAFINIL 200 MG: 100 TABLET ORAL at 09:01

## 2017-03-20 RX ADMIN — DOCUSATE SODIUM 100 MG: 100 CAPSULE, LIQUID FILLED ORAL at 09:02

## 2017-03-20 RX ADMIN — ENOXAPARIN SODIUM 40 MG: 40 INJECTION SUBCUTANEOUS at 14:59

## 2017-03-20 RX ADMIN — HYDROCODONE BITARTRATE AND ACETAMINOPHEN 1 TABLET: 10; 325 TABLET ORAL at 22:04

## 2017-03-20 RX ADMIN — CEFEPIME 2 G: 2 INJECTION, POWDER, FOR SOLUTION INTRAVENOUS at 10:16

## 2017-03-20 RX ADMIN — ONDANSETRON 4 MG: 2 INJECTION INTRAMUSCULAR; INTRAVENOUS at 13:06

## 2017-03-20 RX ADMIN — HYDROCODONE BITARTRATE AND ACETAMINOPHEN 1 TABLET: 10; 325 TABLET ORAL at 06:41

## 2017-03-20 RX ADMIN — BUDESONIDE AND FORMOTEROL FUMARATE DIHYDRATE 2 PUFF: 160; 4.5 AEROSOL RESPIRATORY (INHALATION) at 07:37

## 2017-03-20 RX ADMIN — Medication: at 09:18

## 2017-03-20 RX ADMIN — CEFEPIME 2 G: 2 INJECTION, POWDER, FOR SOLUTION INTRAVENOUS at 02:23

## 2017-03-20 RX ADMIN — VANCOMYCIN HYDROCHLORIDE 2000 MG: 1 INJECTION, POWDER, LYOPHILIZED, FOR SOLUTION INTRAVENOUS at 16:29

## 2017-03-20 RX ADMIN — PREGABALIN 200 MG: 100 CAPSULE ORAL at 09:00

## 2017-03-20 RX ADMIN — CARVEDILOL 12.5 MG: 12.5 TABLET, FILM COATED ORAL at 08:57

## 2017-03-20 RX ADMIN — PANTOPRAZOLE SODIUM 40 MG: 40 TABLET, DELAYED RELEASE ORAL at 06:41

## 2017-03-20 RX ADMIN — PREGABALIN 200 MG: 100 CAPSULE ORAL at 17:20

## 2017-03-20 RX ADMIN — CEFEPIME 2 G: 2 INJECTION, POWDER, FOR SOLUTION INTRAVENOUS at 19:51

## 2017-03-20 RX ADMIN — CARVEDILOL 12.5 MG: 12.5 TABLET, FILM COATED ORAL at 17:20

## 2017-03-20 RX ADMIN — FLUTICASONE PROPIONATE 2 SPRAY: 50 SPRAY, METERED NASAL at 09:02

## 2017-03-20 RX ADMIN — FUROSEMIDE 20 MG: 20 TABLET ORAL at 14:59

## 2017-03-20 RX ADMIN — LEVOTHYROXINE SODIUM 100 MCG: 100 TABLET ORAL at 06:41

## 2017-03-20 RX ADMIN — ISOSORBIDE MONONITRATE 30 MG: 30 TABLET, EXTENDED RELEASE ORAL at 09:00

## 2017-03-20 NOTE — PLAN OF CARE
Problem: Patient Care Overview (Adult)  Goal: Plan of Care Review    03/20/17 0936   Coping/Psychosocial Response Interventions   Plan Of Care Reviewed With patient   Outcome Evaluation   Outcome Summary/Follow up Plan Pt demonstrated improved ability to perform transfers. Pt was able to transfer from bed to BSC, and from BSC to chair with max/dep Ax2 and rolling walker. Pt requires frequent cues for sequencing, and is impulsive to sit before properly aligned with chair. Pt would benefit from continued inpt. physical therapy intervention to improve strength and functional transfer ability.   Patient Care Overview   Progress improving

## 2017-03-20 NOTE — PLAN OF CARE
Problem: Patient Care Overview (Adult)  Goal: Plan of Care Review  Outcome: Ongoing (interventions implemented as appropriate)    03/20/17 0459   Coping/Psychosocial Response Interventions   Plan Of Care Reviewed With patient   Outcome Evaluation   Outcome Summary/Follow up Plan S/P right hip I and D, pain well controlled with pain medicine. Received 2 units PRBC yesterday, hgb this morning is 8.9.    Patient Care Overview   Progress improving       Goal: Adult Individualization and Mutuality  Outcome: Ongoing (interventions implemented as appropriate)  Goal: Discharge Needs Assessment  Outcome: Ongoing (interventions implemented as appropriate)    Problem: Fall Risk (Adult)  Goal: Absence of Falls  Outcome: Ongoing (interventions implemented as appropriate)    Problem: Pain, Acute (Adult)  Goal: Acceptable Pain Control/Comfort Level  Outcome: Ongoing (interventions implemented as appropriate)

## 2017-03-20 NOTE — PROGRESS NOTES
"Pharmacokinetic Consult - Vancomycin Dosing (Follow-up Note)    Frances Downs is on day 5 pharmacy to dose vancomycin for bone & joint infection  Pharmacy dosing vancomycin per Dr. Benito's request.   Goal trough: 15-20 mg/L     Relevant clinical data and objective history reviewed:  66 y.o. female 65\" (165.1 cm) 226 lb (103 kg)    Past Medical History   Diagnosis Date   • Anxiety    • Arthritis    • Bipolar disorder    • COPD (chronic obstructive pulmonary disease)    • Coronary artery disease    • Diastolic dysfunction      grade II per echocardiogram 11/18/2016   • Difficulty walking    • Disease of thyroid gland      hypothyroidism   • Dislocation of hip joint      recurrent dislocation right hip   • DVT (deep venous thrombosis)    • GERD without esophagitis    • Heartburn    • Hematoma    • Hyperlipidemia    • Hypertension    • LVH (left ventricular hypertrophy)      mild to moderate per echocardiogram 11/18/2016   • Major depressive disorder    • Mitral annular calcification      severe per echocardiogram 11/18/2016   • Mitral regurgitation      mild per echo 11/18/2016   • Mitral valve disease    • Muscle weakness    • Narcolepsy    • Overactive bladder    • Pneumonia    • Pulmonary emboli    • Pulmonary hypertension      mild per echo 11/18/2016   • Sleep apnea      obstructive   • Tricuspid regurgitation      mild to moderate per echo 11/18/2016     CREATININE   Date Value Ref Range Status   03/19/2017 0.31 (L) 0.57 - 1.00 mg/dL Final   03/18/2017 0.71 0.57 - 1.00 mg/dL Final   03/18/2017 0.71 0.57 - 1.00 mg/dL Final     BUN   Date Value Ref Range Status   03/18/2017 6 (L) 8 - 23 mg/dL Final     Estimated Creatinine Clearance: 83.5 mL/min (by C-G formula based on Cr of 0.31).    Lab Results   Component Value Date    WBC 4.69 03/20/2017          IV Anti-Infectives     Ordered     Dose/Rate Route Frequency Start Stop    03/20/17 1426  vancomycin 2000 mg/500 mL 0.9% NS IVPB ([facility])     Ordering Provider:  " Krysta Benito MD    2,000 mg Intravenous Every 24 Hours 03/20/17 1700      03/19/17 1313  vancomycin 2000 mg/500 mL 0.9% NS IVPB ([facility])     Ordering Provider:  Krysta Benito MD    20 mg/kg × 103 kg  over 200 Minutes Intravenous Once 03/19/17 1345 03/19/17 1722    03/18/17 1157  vancomycin 2000 mg/500 mL 0.9% NS IVPB ([facility])     Ordering Provider:  Krysta Benito MD    20 mg/kg × 103 kg  over 200 Minutes Intravenous Once 03/18/17 1230 03/18/17 1633    03/18/17 0127  cefepime (MAXIPIME) 2 g/100 mL 0.9% NS (mbp)     Ordering Provider:  Erlin Martin MD    2 g Intravenous Every 8 Hours 03/18/17 0200      03/15/17 1437  vancomycin 2000 mg/500 mL 0.9% NS IVPB ([facility])     Ordering Provider:  Krysta Benito MD    2,000 mg  over 200 Minutes Intravenous Once 03/15/17 1600 03/15/17 1938    03/15/17 1340  Pharmacy to dose vancomycin     Ordering Provider:  Krysta Benito MD     Does not apply Daily 03/15/17 1415           Lab Results   Component Value Date    [facility] 03/17/2017     Lab Results   Component Value Date    [facility]       Assessment/Plan  Random vancomycin level missed this afternoon. Therefore will start a scheduled dose of  2 gm iv q24h with next dose at 1700 today. Trough level prior to the 1700 dose tomorrow. This will be the 3rd dose of 2 gm tomorrow. Pharmacy will continue to follow daily while on the vancomycin and adjust dose if level is not therapeutic.     Joni Hale, [facility]

## 2017-03-20 NOTE — PROGRESS NOTES
"  Infectious Diseases Progress Note    Krysta Benito MD     [facility]  [place]: 5 days  Patient Identification:  Name: Frances Downs  Age: 66 y.o.  Sex: female  :  1950  MRN: [id number]         Primary Care Physician: Talha Echeverria MD            Subjective: Better denies any specific complaints.  Breathing better  Interval History: See admission history and physical.     Objective:    Scheduled Meds:    amLODIPine 10 mg Oral Daily   ARIPiprazole 10 mg Oral Daily   atorvastatin 40 mg Oral Nightly   budesonide-formoterol 2 puff Inhalation BID - RT   carvedilol 12.5 mg Oral BID With Meals   cefepime 2 g Intravenous Q8H   docusate sodium 100 mg Oral Daily   enoxaparin 40 mg Subcutaneous Q24H   ferrous sulfate 325 mg Oral Daily With Breakfast   fluticasone 2 spray Each Nare Daily   isosorbide mononitrate 30 mg Oral Q24H   lamoTRIgine 25 mg Oral BID   levothyroxine 100 mcg Oral Q AM   modafinil 200 mg Oral Daily   oxybutynin XL 10 mg Oral Daily   pantoprazole 40 mg Oral Q AM   Pharmacy to dose vancomycin  Does not apply Daily   pregabalin 200 mg Oral BID   tiotropium 1 capsule Inhalation Daily - RT   Vancomycin Pharmacy Intermittent Dosing  Does not apply Daily   vilazodone 40 mg Oral Daily   warfarin 6 mg Oral Daily     Continuous Infusions:       Vital signs in last 24 hours:  Temp:  [96.7 °F (35.9 °C)-99.7 °F (37.6 °C)] 98.8 °F (37.1 °C)  Heart Rate:  [49-70] 65  Resp:  [16-20] 16  BP: (110-142)/(56-70) 122/65    Intake/Output:    Intake/Output Summary (Last 24 hours) at 17 0956  Last data filed at 17 0500   Gross per 24 hour   Intake   1429 ml   Output   1150 ml   Net    279 ml       Exam:  Visit Vitals   • /65 (BP Location: Right arm, Patient Position: Lying)   • Pulse 65   • Temp 98.8 °F (37.1 °C) (Oral)   • Resp 16   • Ht 65\" (165.1 cm)   • Wt 226 lb (103 kg)   • SpO2 96%   • BMI 37.61 kg/m2       General Appearance:    Alert, cooperative, no distress, AAOx3           "                Head:    Normocephalic, without obvious abnormality, atraumatic                           Eyes:    PERRL, conjunctiva/corneas clear, EOM's intact, both eyes                         Throat:   Lips, tongue, gums normal; oral mucosa pink and moist                           Neck:   Supple, symmetrical, trachea midline, no JVD                         Lungs:    Clear to auscultation bilaterally, respirations unlabored                 Chest Wall:    No tenderness or deformity                          Heart:    Regular rate and rhythm, S1 and S2 normal, no murmur,no  Rub                                      or gallop                  Abdomen:     Soft, non-tender, bowel sounds active, no masses, no                                                        organomegaly                  Extremities:   Incision clean, drain output                        Pulses:   Pulses palpable in all extremities                            Skin:   Skin is warm and dry,  no rashes or palpable lesions              Data Review:    I reviewed the patient's new clinical results.    Results from last 7 days  Lab Units 03/20/17  0355 03/19/17  0542 03/18/17  0953 03/17/17  0335 03/16/17  0529 03/15/17  1424   WBC 10*3/mm3 4.69 5.40 6.57 6.34 5.98 6.88   HEMOGLOBIN g/dL 8.9* 6.8* 8.3* 9.3* 9.2* 9.1*   PLATELETS 10*3/mm3 213 231 256 270 265 292       Results from last 7 days  Lab Units 03/19/17  0544 03/18/17  1046 03/18/17  0953 03/17/17  0335 03/16/17  0529 03/15/17  1424   SODIUM mmol/L  --   --  141 142 143 142   POTASSIUM mmol/L  --   --  3.8 3.7 3.4* 3.8   CHLORIDE mmol/L  --   --  102 102 102 101   TOTAL CO2 mmol/L  --   --  30.2* 28.4 30.4* 31.0*   BUN mg/dL  --   --  6* 7* 6* 6*   CREATININE mg/dL 0.31* 0.71 0.71 0.78 0.66 0.71   CALCIUM mg/dL  --   --  8.2* 8.6 8.9 8.9   GLUCOSE mg/dL  --   --  119* 100* 90 89     Anaerobic Culture [[id number]] Collected: 03/17/17 0693        Lab Status: In process Specimen: Wound from Hip, Right  Updated: 03/17/17 1729       Wound Culture [[id number]] (Normal) Collected: 03/17/17 1714       Lab Status: Preliminary result Specimen: Wound from Hip, Right Updated: 03/18/17 0605        Wound Culture Culture in progress         Gram Stain Result Moderate (3+) WBCs seen          No organisms seen        Wound Culture [[id number]] (Abnormal)  Collected: 03/15/17 1725       Lab Status: Final result Specimen: Wound from Hip, Right Updated: 03/17/17 0745        Wound Culture Light growth (2+) Pseudomonas aeruginosa (A)         Gram Stain Result Few (2+) WBCs seen          Rare (1+) Gram negative bacilli          Susceptibility         Pseudomonas aeruginosa         ROSSY         Cefepime 8  Susceptible         Ceftazidime 4  Susceptible         Ciprofloxacin <=0.25  Susceptible         Levofloxacin 2  Susceptible         Meropenem >=16  Resistant         Piperacillin + Tazobactam 16  Susceptible         Tobramycin <=1  Susceptible                                       Assessment:  Principal Problem:    Mixed infection  Active Problems:    Hematoma of right hip    DVT (deep venous thrombosis), hx of    Hypertension    Hyperlipidemia    Coronary artery disease, hx of stent    Arthritis    Bipolar disorder    Infected prosthesis of right hip    MRSA (methicillin resistant Staphylococcus aureus) infection    Bacterial infection due to Morganella morganii    Hypokalemia    Postoperative anemia due to acute blood loss   pseudomonas aeruginosa infection of the wound    Plan:  Continue present treatment follow-up on the sensitivity of the gram-negative can on the operative sample.  Continue present treatment.    2 units of packed RBCs per orthopedic surgery service  Appreciate internal medicine and pulmonary services input.  Appreciate orthopedic surgery services intervention.  We will make arrangements for cefepime and vancomycin for 6 weeks from 3/18/2017 assuming that all the cultures are finalized as pseudomonas and  MRSA.    Krysta Benito MD  3/20/2017  9:56 AM    Much of this encounter note is an electronic transcription/translation of spoken language to printed text. The electronic translation of spoken language may permit erroneous, or at times, nonsensical words or phrases to be inadvertently transcribed; Although I have reviewed the note for such errors, some may still exist

## 2017-03-20 NOTE — PROGRESS NOTES
Acute Care - Physical Therapy Treatment Note  [facility]     Patient Name: Frances Downs  : 1950  MRN: [id number]  Today's Date: 3/20/2017  Onset of Illness/Injury or Date of Surgery Date: 03/15/17  Date of Referral to PT: 17  Referring Physician: Erlin Damico    Admit Date: 3/15/2017    Visit Dx:    ICD-10-CM ICD-9-CM   1. Wound infection T14.8 958.3    L08.9      Patient Active Problem List   Diagnosis   • Hematoma of right hip   • DVT (deep venous thrombosis), hx of   • Hypertension   • Hyperlipidemia   • Coronary artery disease, hx of stent   • Arthritis   • Bipolar disorder   • Infected prosthesis of right hip   • MRSA (methicillin resistant Staphylococcus aureus) infection   • Mixed infection   • Bacterial infection due to Morganella morganii   • Hypokalemia   • Postoperative anemia due to acute blood loss               Adult Rehabilitation Note       17 0900          Rehab Assessment/Intervention    Discipline physical therapist  -DR BETANCUR KH2      Document Type therapy note (daily note)  -DR BETANCUR KH2      Subjective Information agree to therapy;no complaints  -DR BETANCUR KH2      Patient Effort, Rehab Treatment adequate  -DR BETANCUR KH2      Precautions/Limitations fall precautions   NWB Right LE; contact isolation.  -DR BETANCUR KH2      Recorded by [DR BETANCUR KH2] Angeles Gilmore, PT (r) Chalino Portillo, PT Student (t) Angeles Gilmore, PT (c)      Pain Assessment    Pain Assessment 0-10  -DR BETANCUR KH2      Pain Score 5  -DR BETANCUR KH2      Pain Type Acute pain;Surgical pain  -DR BETANCUR KH2      Pain Location Hip  -DR BETANCUR KH2      Pain Orientation Right  -DR BETANCUR KH2      Pain Intervention(s) Ambulation/increased activity;Repositioned  -DR BETANCUR KH2      Response to Interventions tolerated  -DR BETANCUR KH2      Recorded by [DR BETANCUR KH2] Angeles Gilmore, PT (r) Chalino Portillo, PORTILLO Student (t) Angeles Gilmore, PT (c)      Mobility Assessment/Training    Extremity Weight-Bearing Status right lower  extremity  -DR BETANCUR KH2      Right Lower Extremity Weight-Bearing non weight-bearing  -DR BETANCUR KH2      Recorded by [DR BETANCUR KH2] Angeles Gilmore, PT (r) Chalino Portillo, PT Student (t) Angeles Gilmore, PT (c)      Bed Mobility, Assessment/Treatment    Bed Mobility, Assistive Device bed rails;head of bed elevated  -DR BETANCUR KH2      Bed Mobility, Roll Left, [place] minimum assist (75% patient effort)  -DR BETANCUR KH2      Bed Mob, Supine to Sit, [place] minimum assist (75% patient effort)  -DR BETANCUR KH2      Bed Mob, Sit to Supine, [place] not tested  -DR BETANCUR KH2      Recorded by [DR BETANCUR KH2] Angeles Gilmore, PT (r) Chalino Portillo, PT Student (t) Angeles Gilmore, PT (c)      Transfer Assessment/Treatment    Transfers, Bed-Chair [place] maximum assist (25% patient effort);dependent (less than 25% patient effort);2 person assist required   from sitting EOB to bedside commode  -DR BETANCUR KH2      Transfers, Chair-Bed [place] maximum assist (25% patient effort);dependent (less than 25% patient effort);2 person assist required   from bedside commode to chair  -DR BETANCUR KH2      Transfers, Bed-Chair-Bed, Assist Device rolling walker  -DR BETANCUR KH2      Transfers, Sit-Stand [place] maximum assist (25% patient effort);dependent (less than 25% patient effort);2 person assist required  -DR BETANCUR KH2      Transfer, Safety Issues sequencing ability decreased;step length decreased;knees buckling;impulsivity   Pt becomes impulsive to sit down secondary to fear of falls.  -DR BETANCUR KH2      Transfer, Impairments strength decreased  -DR BETANCUR KH2      Transfer, Comment Pt requires maximal verbal cues for sequencing and encouragement. Pt becomes very fearful when standing and gets impulsive to sit before in proper position.  -DR BETANCUR KH2      Recorded by [DR BETANCUR KH2] Angeles Gilmore, PT (r) Chalino Portillo PT Student (t) Angeles Gilmore, PT (c)      Gait Assessment/Treatment    Gait, [place] Level not  tested;unable to perform  -DR GYPSY,GYPSY2      Recorded by [DR GYPSY,KH2] Angeles Gilmore, PT (r) Chalino Portillo, PT Student (t) Angeles Gilmore, PT (c)      Stairs Assessment/Treatment    Stairs, [place] Level unable to perform;not tested  -DR GYPSY,GYPSY2      Recorded by [DR GYPSY,GYPSY2] Angeles Gilmore, PT (r) Chalino Portillo, PT Student (t) Angeles Gilmore, PT (c)      Positioning and Restraints    Pre-Treatment Position in bed  -DR BETANCUR KH2      Post Treatment Position chair  -DR BETANCUR KH2      In Chair notified nsg;reclined;sitting;call light within reach;encouraged to call for assist  -DR GYPSY,GYPSY2      Recorded by [DR GYPSY,KH2] Angeles Gilmore, PT (r) Chalino Portillo, PT Student (t) Angeles Gilmore, PT (c)        User Key  (r) = Recorded By, (t) = Taken By, (c) = Cosigned By    Initials Name Effective Dates    GYPSY Gilmore, PT 05/18/15 -     DR Chalino Portillo, PT Student 03/07/17 -                 IP PT Goals       03/18/17 1012          Bed Mobility PT LTG    Bed Mobility PT LTG, Date Established 03/18/17  -MS      Bed Mobility PT LTG, Time to Achieve 5 - 7 days  -MS      Bed Mobility PT LTG, Activity Type all bed mobility  -MS      Bed Mobility PT LTG, [place] Level contact guard assist  -MS      Transfer Training PT LTG    Transfer Training PT LTG, Date Established 03/18/17  -MS      Transfer Training PT LTG, Time to Achieve 5 - 7 days  -MS      Transfer Training PT LTG, Activity Type sit to stand/stand to sit  -MS      Transfer Training PT LTG, [place] Level minimum assist (75% patient effort);2 person assist required  -MS      Transfer Training PT LTG, Assist Device walker, rolling  -MS      Transfer Training 2 PT LTG    Transfer Training PT 2 LTG, Date Established 03/18/17  -MS      Transfer Training PT 2 LTG, Time to Achieve 5 - 7 days  -MS      Transfer Training PT 2 LTG, Activity Type bed to chair /chair to bed  -MS      Transfer Training PT 2 LTG, [place] Level minimum  assist (75% patient effort);2 person assist required  -MS      Transfer Training PT 2 LTG, Assist Device walker, rolling  -MS      Dynamic Sitting Balance PT LTG    Dynamic Sitting Balance PT LTG, Date Established 03/18/17  -MS      Dynamic Sitting Balance PT LTG, Time to Achieve 5 - 7 days  -MS      Dynamic Sitting Balance PT LTG, [place] Level independent  -MS      Dynamic Sitting Balance PT LTG, Assist Device UE Support  -MS        User Key  (r) = Recorded By, (t) = Taken By, (c) = Cosigned By    Initials Name Provider Type    MS Yon Elizabeth, PT Physical Therapist          Physical Therapy Education     Title: PT OT SLP Therapies (Done)     Topic: Physical Therapy (Done)     Point: Mobility training (Done)    Learning Progress Summary    Learner Readiness Method Response Comment Documented by Status   Patient Acceptance E CÉSAR,NR   03/20/17 0936 Done    Acceptance ED CÉSAR,NR  MS 03/18/17 1012 Done               Point: Home exercise program (Done)    Learning Progress Summary    Learner Readiness Method Response Comment Documented by Status   Patient Acceptance ECAROLINA,NR  MS 03/18/17 1012 Done               Point: Body mechanics (Done)    Learning Progress Summary    Learner Readiness Method Response Comment Documented by Status   Patient Acceptance E CÉSAR,NR   03/20/17 0936 Done    Acceptance ED VU,NR  MS 03/18/17 1012 Done               Point: Precautions (Done)    Learning Progress Summary    Learner Readiness Method Response Comment Documented by Status   Patient Acceptance E VU,NR   03/20/17 0936 Done    Acceptance ED VU,NR  MS 03/18/17 1012 Done                      User Key     Initials Effective Dates Name Provider Type Discipline    MS 12/01/15 -  Yon Elizabeth, PT Physical Therapist PT     03/07/17 -  Chalino Portillo, PT Student PT Student PT                    PT Recommendation and Plan  Anticipated Discharge Disposition: skilled nursing facility  Planned Therapy Interventions: balance  training, bed mobility training, postural re-education, prosthetic fitting/training, strengthening, transfer training  PT Frequency: daily  Plan of Care Review  Plan Of Care Reviewed With: patient  Progress: improving  Outcome Summary/Follow up Plan: Pt demonstrated improved ability to perform transfers. Pt was able to transfer from bed to BSC, and from BSC to chair with max/dep Ax2 and rolling walker. Pt requires frequent cues for sequencing, and is impulsive to sit before properly aligned with chair. Pt would benefit from continued inpt. physical therapy intervention to improve strength and functional transfer ability.          Outcome Measures       03/20/17 1100 03/18/17 1000       How much help from another person do you currently need...    Turning from your back to your side while in flat bed without using bedrails? 2  -GYPSY (linnette) DR GYPSY laura (t)) 2  -MS     Moving from lying on back to sitting on the side of a flat bed without bedrails? 2  -GYPSY laura (r t)) 2  -MS     Moving to and from a bed to a chair (including a wheelchair)? 1  -GYPSY (torsten laura (t)) 1  -MS     Standing up from a chair using your arms (e.g., wheelchair, bedside chair)? 1  -GYPSY (torsten laura (t)) 1  -MS     Climbing 3-5 steps with a railing? 1  -GYPSY (linnette) DR GYPSY laura (t)) 1  -MS     To walk in hospital room? 1  -GYPSY (torsten laura (t)) 1  -MS     AM-PAC 6 Clicks Score 8  -GYPSY ANN (r t) 8  -MS     Functional Assessment    Outcome Measure Options AM-PAC 6 Clicks Basic Mobility (PT)  -GYPSY laura (r t)) AM-PAC 6 Clicks Basic Mobility (PT)  -MS       User Key  (r) = Recorded By, (t) = Taken By, (c) = Cosigned By    Initials Name Provider Type    GYPSY Gilmore, PT Physical Therapist    MS Yon Elizabeth, PT Physical Therapist    DR Chalino Portillo, PT Student PT Student           Time Calculation:         PT Charges       03/20/17 0984          Time Calculation    Start Time 0905  -GYPSY laura (r t))      Stop Time 0927  -GYPSY laura (r t))       Time Calculation (min) 22 min  -GYPSY prado)  (arben)      PT Received On 03/20/17  -GYPSY prado) DR wadsworth) GYPSY (yeyo)      PT - Next Appointment 03/21/17  -GYPSY prado) DR wadsworth) GYPSY (yeyo)        User Key  (r) = Recorded By, (t) = Taken By, (c) = Cosigned By    Initials Name Provider Type    GYPSY Gilmore, PT Physical Therapist    DR Chalino Portillo, PT Student PT Student          Therapy Charges for Today     Code Description Service Date Service Provider Modifiers Qty    79724988290 HC PT THER PROC EA 15 MIN 3/20/2017 Chalino Portillo, PT Student GP 1    25275529334 HC PT THER SUPP EA 15 MIN 3/20/2017 Chalino Portillo, PT Student GP 1          PT G-Codes  Outcome Measure Options: AM-PAC 6 Clicks Basic Mobility (PT)    Chalino Portillo PT Student  3/20/2017

## 2017-03-20 NOTE — PROGRESS NOTES
"[facility] PULMONARY CARE         Dr Ted Elena   LOS: 5 days   Patient Care Team:  Talha Echeverria MD as PCP - General (Internal Medicine)    Chief Complaint:  Follow up on Hypoxia, DENISE    Interval History:   On AutoBiPAP at night. Tolerating well. Denies dyspnea at rest. She's on O2 3-4L/min. No cough.     REVIEW OF SYSTEMS:   CARDIOVASCULAR: No chest pain, chest pressure or chest discomfort. No palpitations or edema.   GASTROINTESTINAL: No anorexia, nausea, vomiting or diarrhea.     Ventilator/Non-Invasive Ventilation Settings     Start     Ordered    03/17/17 2339  . AutoBIPAP; Max IPAP: 22; Initial LPM: 4; Titrate for spO2: 90%  Until Discontinued     Question Answer Comment   . AutoBIPAP    Max IPAP 22    Initial LPM 4    Titrate for spO2 90%        03/17/17 2338 03/17/17 2337  . AutoBIPAP; Max IPAP: 22; Initial LPM: 4; Titrate for spO2: 90%  Until Discontinued,   Status:  Canceled     Comments:  RN called to let me know Dr. Balderrama said we could use our auto BiPAP since we don't have auto CPAP.   Question Answer Comment   . AutoBIPAP    Max IPAP 22    Initial LPM 4    Titrate for spO2 90%        03/17/17 2337 03/17/17 1920  . AutoCPAP; Max Pressure: 22; Initial LPM: 4; Titrate for spO2: 90%  Until Discontinued,   Status:  Canceled     Question Answer Comment   . AutoCPAP    Max Pressure 22    Initial LPM 4    Titrate for spO2 90%        03/17/17 1922          Objective   Vital Signs  Temp:  [96.7 °F (35.9 °C)-99.7 °F (37.6 °C)] 98.8 °F (37.1 °C)  Heart Rate:  [49-70] 65  Resp:  [16-20] 16  BP: (110-142)/(56-70) 122/65    Intake/Output Summary (Last 24 hours) at 03/20/17 1010  Last data filed at 03/20/17 0500   Gross per 24 hour   Intake   1429 ml   Output   1150 ml   Net    279 ml     Flowsheet Rows         First Filed Value    Admission Height  65\" (165.1 cm) Documented at 03/15/2017 1500    Admission Weight  226 lb (103 kg) Documented at 03/15/2017 1500          Physical Exam:   General " Appearance:    Alert, cooperative, in no acute distress   Lungs:     Decreased air entry bilaterally overall. No wheezing or crackles.     Heart:    Regular rhythm and normal rate, normal S1 and S2, no            murmur, no gallop, no rub, no click   Chest Wall:    No abnormalities observed   Abdomen:     Obese, soft, no tenderness   Neuro:   Conscious, alert, oriented x3   Extremities:   Moves all extremities well, mild edema, no cyanosis, no             Redness          Results Review:          Results from last 7 days  Lab Units 03/19/17  0544 03/18/17  1046 03/18/17  0953 03/17/17  0335 03/16/17  0529   SODIUM mmol/L  --   --  141 142 143   POTASSIUM mmol/L  --   --  3.8 3.7 3.4*   CHLORIDE mmol/L  --   --  102 102 102   TOTAL CO2 mmol/L  --   --  30.2* 28.4 30.4*   BUN mg/dL  --   --  6* 7* 6*   CREATININE mg/dL 0.31* 0.71 0.71 0.78 0.66   GLUCOSE mg/dL  --   --  119* 100* 90   CALCIUM mg/dL  --   --  8.2* 8.6 8.9           Results from last 7 days  Lab Units 03/20/17  0355 03/19/17  0542 03/18/17  0953   WBC 10*3/mm3 4.69 5.40 6.57   HEMOGLOBIN g/dL 8.9* 6.8* 8.3*   HEMATOCRIT % 28.1* 21.6* 25.8*   PLATELETS 10*3/mm3 213 231 256       Results from last 7 days  Lab Units 03/20/17  0355 03/19/17  0542 03/16/17  0529   INR  1.42* 1.49* 1.18*         @LABRCNT(bnp)@  I reviewed the patient's new clinical results.  I personally viewed and interpreted the patient's CXR        Medication Review:     amLODIPine 10 mg Oral Daily   ARIPiprazole 10 mg Oral Daily   atorvastatin 40 mg Oral Nightly   budesonide-formoterol 2 puff Inhalation BID - RT   carvedilol 12.5 mg Oral BID With Meals   cefepime 2 g Intravenous Q8H   docusate sodium 100 mg Oral Daily   enoxaparin 40 mg Subcutaneous Q24H   ferrous sulfate 325 mg Oral Daily With Breakfast   fluticasone 2 spray Each Nare Daily   isosorbide mononitrate 30 mg Oral Q24H   lamoTRIgine 25 mg Oral BID   levothyroxine 100 mcg Oral Q AM   modafinil 200 mg Oral Daily   oxybutynin XL  10 mg Oral Daily   pantoprazole 40 mg Oral Q AM   Pharmacy to dose vancomycin  Does not apply Daily   pregabalin 200 mg Oral BID   tiotropium 1 capsule Inhalation Daily - RT   Vancomycin Pharmacy Intermittent Dosing  Does not apply Daily   vilazodone 40 mg Oral Daily   warfarin 6 mg Oral Daily     Diagnostic imaging:  I personally and independently reviewed the CXR dated  3/19/17:  Possible mild vascular engorgement.        PCCM Problems  -Acute hypoxic respiratory failure: mild volume overload/atelectasis  -Obstructive sleep apnea with likely   -Obesity hypoventilation with chronic compensated hypercapnia  -Morbid obesity  -COPD without exacerbation  Relevant Medical Diagnoses  Right hip wound on antibiotics and I&D on 3/17/17  Diastolic heart dysfunction, grade II  History DVT and PE  Hypertension  Coronary artery disease  Pulmonary hypertension likely WHO class II  Bipolar disorder     Plan of Treatment  - Initiate low dose lasix  - Daily weight  - Incentive spirometry  -Continue AutoBiPAP when asleep       Ted Elena MD  03/20/17  10:10 AM

## 2017-03-20 NOTE — PROGRESS NOTES
Orthopedic Progress Note        Patient: Frances Downs    Date of Admission: 3/15/2017  1:03 PM    YOB: 1950    Medical Record Number: [id number]    Attending Physician: Krysta Benito MD      POD # 5  LOS: 5 days     Status post-RT HIP INCISION AND DRAINAGE        Systemic or Specific Complaints: Pain is controlled.  Patient is up to chair now for lunch.  No numbness.      No Known Allergies      Current Medications:  Scheduled Meds:  amLODIPine 10 mg Oral Daily   ARIPiprazole 10 mg Oral Daily   atorvastatin 40 mg Oral Nightly   budesonide-formoterol 2 puff Inhalation BID - RT   carvedilol 12.5 mg Oral BID With Meals   cefepime 2 g Intravenous Q8H   docusate sodium 100 mg Oral Daily   enoxaparin 40 mg Subcutaneous Q24H   ferrous sulfate 325 mg Oral Daily With Breakfast   fluticasone 2 spray Each Nare Daily   isosorbide mononitrate 30 mg Oral Q24H   lamoTRIgine 25 mg Oral BID   levothyroxine 100 mcg Oral Q AM   modafinil 200 mg Oral Daily   oxybutynin XL 10 mg Oral Daily   pantoprazole 40 mg Oral Q AM   Pharmacy to dose vancomycin  Does not apply Daily   pregabalin 200 mg Oral BID   tiotropium 1 capsule Inhalation Daily - RT   Vancomycin Pharmacy Intermittent Dosing  Does not apply Daily   vilazodone 40 mg Oral Daily   warfarin 7.5 mg Oral Once     Continuous Infusions:   PRN Meds:.•  acetaminophen  •  albuterol  •  bisacodyl  •  diazePAM  •  docusate sodium  •  HYDROcodone-acetaminophen  •  ipratropium-albuterol  •  magnesium hydroxide  •  Morphine **AND** naloxone  •  [DISCONTINUED] Morphine **AND** naloxone  •  ondansetron  •  promethazine  •  sodium chloride      Physical Exam: 66 y.o. female  General Appearance:    alert and oriented                Pain Relief: Patient reports some relief   Vitals:    03/20/17 0300 03/20/17 0737 03/20/17 0809 03/20/17 1105   BP: 114/62  122/65 118/68   BP Location: Right arm  Right arm Right arm   Patient Position: Lying  Lying Sitting   Pulse: 58 61 65 60    Resp: 16 18 16 18   Temp: 98.3 °F (36.8 °C)  98.8 °F (37.1 °C) 97.6 °F (36.4 °C)   TempSrc: Oral  Oral Oral   SpO2: 97% 96% 96% 97%   Weight:       Height:              HEENT:    Normocephalic, without obvious abnormality, atraumatic.          PERRLA; EOMI; Neck supple with trachea midline. No JVD.    No lymphadenopathy     Lungs:     Clear to auscultation,respirations regular, even and                   Unlabored      Heart:    Regular rhythm and normal rate, normal S1 and S2, no            murmur, no gallop, no rub, no click     Abdomen:     Normal bowel sounds, no masses, no organomegaly, soft        non-tender, non-distended, no guarding, no rebound                 tenderness       Extremities:   Operative extremity neurovascular status intact. ROM intact.    Incision intact w/out signs or  symptoms of infection. No           edema, no cyanosis, no calf tenderness     Pulses:     Pulses palpable and equal bilaterally     Skin:     Skin Warm/Dry w/out ulceration, ecchymosis, rash, or   cyanosis     Activity: Mobilizing Per P.T.   Weight Bearing: As Tolerated    Diagnostic Tests:  Admission on 03/15/2017   No results displayed because visit has over 200 results.        Xr Chest 1 View    Result Date: 3/19/2017  Narrative: PORTABLE RADIOGRAPHIC VIEW OF THE CHEST  CLINICAL HISTORY: Hypoxia and dyspnea.  Portable radiographic view of the chest demonstrates a left upper extremity PICC line which terminates at the level of the SVC. The lungs are clear of acute infiltrates. There is cardiomegaly. Osseous structures are unremarkable.  This report was finalized on 3/19/2017 1:49 PM by Dr. Benjy Jimenez MD.              Assessment:  Patient Active Problem List   Diagnosis   • Hematoma of right hip   • DVT (deep venous thrombosis), hx of   • Hypertension   • Hyperlipidemia   • Coronary artery disease, hx of stent   • Arthritis   • Bipolar disorder   • Infected prosthesis of right hip   • MRSA (methicillin resistant  Staphylococcus aureus) infection   • Mixed infection   • Bacterial infection due to Morganella morganii   • Hypokalemia   • Postoperative anemia due to acute blood loss      Acute Blood Loss Anemia  Post-operative Pain  Immobility      Plan:    Continue Physical Therapy, increase mobility as tolerated.  Continue SCDs, Continue DVT prophalaxis.  Continue Pain management efforts  Continue Incisional care      Discharge Plan: pending Rehab placment     Date: 3/20/2017   Time: 12:15 PM    TREASURE Valle

## 2017-03-21 PROBLEM — A49.1 VRE INFECTION (VANCOMYCIN RESISTANT ENTEROCOCCUS): Status: ACTIVE | Noted: 2017-03-21

## 2017-03-21 PROBLEM — E87.6 HYPOKALEMIA: Status: RESOLVED | Noted: 2017-03-16 | Resolved: 2017-03-21

## 2017-03-21 PROBLEM — G47.30 SLEEP APNEA: Status: ACTIVE | Noted: 2017-03-21

## 2017-03-21 PROBLEM — J44.9 COPD (CHRONIC OBSTRUCTIVE PULMONARY DISEASE) (HCC): Status: ACTIVE | Noted: 2017-03-21

## 2017-03-21 PROBLEM — E66.9 OBESITY (BMI 30-39.9): Status: ACTIVE | Noted: 2017-03-21

## 2017-03-21 PROBLEM — G47.33 OBSTRUCTIVE SLEEP APNEA SYNDROME: Status: ACTIVE | Noted: 2017-03-21

## 2017-03-21 PROBLEM — A49.8 PSEUDOMONAS AERUGINOSA INFECTION: Status: ACTIVE | Noted: 2017-03-21

## 2017-03-21 PROBLEM — Z16.21 VRE INFECTION (VANCOMYCIN RESISTANT ENTEROCOCCUS): Status: ACTIVE | Noted: 2017-03-21

## 2017-03-21 LAB
CREAT BLD-MCNC: 0.59 MG/DL (ref 0.57–1)
GFR SERPL CREATININE-BSD FRML MDRD: 102 ML/MIN/1.73
HCT VFR BLD AUTO: 29.7 % (ref 35.6–45.5)
HGB BLD-MCNC: 9.2 G/DL (ref 11.9–15.5)
INR PPP: 1.65 (ref 0.9–1.1)
PROTHROMBIN TIME: 18.9 SECONDS (ref 11.7–14.2)

## 2017-03-21 PROCEDURE — 25010000002 ENOXAPARIN PER 10 MG: Performed by: HOSPITALIST

## 2017-03-21 PROCEDURE — 25010000002 ONDANSETRON PER 1 MG: Performed by: ORTHOPAEDIC SURGERY

## 2017-03-21 PROCEDURE — 94799 UNLISTED PULMONARY SVC/PX: CPT

## 2017-03-21 PROCEDURE — 25010000002 DAPTOMYCIN PER 1 MG: Performed by: INTERNAL MEDICINE

## 2017-03-21 PROCEDURE — 85610 PROTHROMBIN TIME: CPT | Performed by: PHYSICIAN ASSISTANT

## 2017-03-21 PROCEDURE — 85014 HEMATOCRIT: CPT | Performed by: PHYSICIAN ASSISTANT

## 2017-03-21 PROCEDURE — 85018 HEMOGLOBIN: CPT | Performed by: PHYSICIAN ASSISTANT

## 2017-03-21 PROCEDURE — 82565 ASSAY OF CREATININE: CPT | Performed by: INTERNAL MEDICINE

## 2017-03-21 PROCEDURE — 25010000002 CEFEPIME: Performed by: ORTHOPAEDIC SURGERY

## 2017-03-21 PROCEDURE — 94660 CPAP INITIATION&MGMT: CPT

## 2017-03-21 RX ORDER — ACETAMINOPHEN 325 MG/1
650 TABLET ORAL EVERY 4 HOURS PRN
Qty: 90 TABLET | Refills: 0 | Status: SHIPPED | OUTPATIENT
Start: 2017-03-21 | End: 2017-04-20

## 2017-03-21 RX ORDER — BUDESONIDE AND FORMOTEROL FUMARATE DIHYDRATE 160; 4.5 UG/1; UG/1
2 AEROSOL RESPIRATORY (INHALATION)
Qty: 1 INHALER | Refills: 12 | Status: ON HOLD | OUTPATIENT
Start: 2017-03-21 | End: 2017-07-26

## 2017-03-21 RX ORDER — FUROSEMIDE 20 MG/1
20 TABLET ORAL DAILY
Qty: 30 TABLET | Refills: 0 | Status: SHIPPED | OUTPATIENT
Start: 2017-03-21 | End: 2018-04-27 | Stop reason: ALTCHOICE

## 2017-03-21 RX ORDER — WARFARIN SODIUM 10 MG/1
10 TABLET ORAL
Status: COMPLETED | OUTPATIENT
Start: 2017-03-21 | End: 2017-03-21

## 2017-03-21 RX ADMIN — ATORVASTATIN CALCIUM 40 MG: 40 TABLET, FILM COATED ORAL at 20:48

## 2017-03-21 RX ADMIN — DOCUSATE SODIUM 100 MG: 100 CAPSULE, LIQUID FILLED ORAL at 08:49

## 2017-03-21 RX ADMIN — HYDROCODONE BITARTRATE AND ACETAMINOPHEN 1 TABLET: 10; 325 TABLET ORAL at 14:28

## 2017-03-21 RX ADMIN — PREGABALIN 200 MG: 100 CAPSULE ORAL at 08:49

## 2017-03-21 RX ADMIN — FLUTICASONE PROPIONATE 2 SPRAY: 50 SPRAY, METERED NASAL at 09:26

## 2017-03-21 RX ADMIN — ARIPIPRAZOLE 10 MG: 10 TABLET ORAL at 08:49

## 2017-03-21 RX ADMIN — VILAZODONE HYDROCHLORIDE 40 MG: 40 TABLET ORAL at 08:49

## 2017-03-21 RX ADMIN — BUDESONIDE AND FORMOTEROL FUMARATE DIHYDRATE 2 PUFF: 160; 4.5 AEROSOL RESPIRATORY (INHALATION) at 10:35

## 2017-03-21 RX ADMIN — LEVOTHYROXINE SODIUM 100 MCG: 100 TABLET ORAL at 06:35

## 2017-03-21 RX ADMIN — PANTOPRAZOLE SODIUM 40 MG: 40 TABLET, DELAYED RELEASE ORAL at 06:35

## 2017-03-21 RX ADMIN — HYDROCODONE BITARTRATE AND ACETAMINOPHEN 1 TABLET: 10; 325 TABLET ORAL at 07:37

## 2017-03-21 RX ADMIN — CEFEPIME 2 G: 2 INJECTION, POWDER, FOR SOLUTION INTRAVENOUS at 08:49

## 2017-03-21 RX ADMIN — LAMOTRIGINE 25 MG: 25 TABLET ORAL at 17:59

## 2017-03-21 RX ADMIN — LAMOTRIGINE 25 MG: 25 TABLET ORAL at 08:49

## 2017-03-21 RX ADMIN — DAPTOMYCIN 625 MG: 500 INJECTION, POWDER, LYOPHILIZED, FOR SOLUTION INTRAVENOUS at 14:28

## 2017-03-21 RX ADMIN — AMLODIPINE BESYLATE 10 MG: 10 TABLET ORAL at 08:50

## 2017-03-21 RX ADMIN — ENOXAPARIN SODIUM 40 MG: 40 INJECTION SUBCUTANEOUS at 14:28

## 2017-03-21 RX ADMIN — WARFARIN SODIUM 10 MG: 10 TABLET ORAL at 17:59

## 2017-03-21 RX ADMIN — PREGABALIN 200 MG: 100 CAPSULE ORAL at 17:59

## 2017-03-21 RX ADMIN — FUROSEMIDE 20 MG: 20 TABLET ORAL at 08:49

## 2017-03-21 RX ADMIN — CEFEPIME 2 G: 2 INJECTION, POWDER, FOR SOLUTION INTRAVENOUS at 02:42

## 2017-03-21 RX ADMIN — BUDESONIDE AND FORMOTEROL FUMARATE DIHYDRATE 2 PUFF: 160; 4.5 AEROSOL RESPIRATORY (INHALATION) at 21:43

## 2017-03-21 RX ADMIN — ISOSORBIDE MONONITRATE 30 MG: 30 TABLET, EXTENDED RELEASE ORAL at 08:49

## 2017-03-21 RX ADMIN — MODAFINIL 200 MG: 100 TABLET ORAL at 08:49

## 2017-03-21 RX ADMIN — CARVEDILOL 12.5 MG: 12.5 TABLET, FILM COATED ORAL at 08:50

## 2017-03-21 RX ADMIN — TIOTROPIUM BROMIDE 1 CAPSULE: 18 CAPSULE ORAL; RESPIRATORY (INHALATION) at 10:35

## 2017-03-21 RX ADMIN — CEFEPIME 2 G: 2 INJECTION, POWDER, FOR SOLUTION INTRAVENOUS at 17:59

## 2017-03-21 RX ADMIN — ONDANSETRON 4 MG: 2 INJECTION INTRAMUSCULAR; INTRAVENOUS at 13:13

## 2017-03-21 RX ADMIN — OXYBUTYNIN CHLORIDE 10 MG: 10 TABLET, EXTENDED RELEASE ORAL at 08:49

## 2017-03-21 RX ADMIN — CARVEDILOL 12.5 MG: 12.5 TABLET, FILM COATED ORAL at 17:59

## 2017-03-21 RX ADMIN — FERROUS SULFATE TAB 325 MG (65 MG ELEMENTAL FE) 325 MG: 325 (65 FE) TAB at 08:50

## 2017-03-21 NOTE — PROGRESS NOTES
"    Name: Frances Downs ADMIT: 3/15/2017   : 1950  PCP: Talha Echeverria MD    MRN: [id number] LOS: 6 days   AGE/SEX: 66 y.o. female  ROOM: [facility]     Subjective   No new c/o.  Frustrated having to go to [place] for SNU.    Objective   Vital Signs  Temp:  [97.6 °F (36.4 °C)-98.8 °F (37.1 °C)] 98.4 °F (36.9 °C)  Heart Rate:  [53-66] 54  Resp:  [16-20] 16  BP: (110-132)/(62-74) 110/62  SpO2:  [94 %-98 %] 96 %  on  Flow (L/min):  [2-4] 3;   O2 Device: BiPAP  Body mass index is 38.31 kg/(m^2).    Objective:  General Appearance:  Comfortable and in no acute distress.    Vital signs: (most recent): Blood pressure 110/62, pulse 54, temperature 98.4 °F (36.9 °C), temperature source Oral, resp. rate 16, height 65\" (165.1 cm), weight 230 lb 3.2 oz (104 kg), SpO2 96 %.    Lungs:  Normal effort.  Breath sounds clear to auscultation.    Heart: Normal rate.  Regular rhythm.  Positive for murmur.    Abdomen: Abdomen is soft and non-distended.  (Obese)Bowel sounds are normal.   There is no abdominal tenderness.     Extremities: There is no dependent edema.    Neurological: Patient is alert and oriented to person, place and time.    Skin:  Warm and dry.          Results Review:       I reviewed the patient's new clinical results.    Results from last 7 days  Lab Units 17  0635 17  0355 17  0542 17  0953 17  0335 17  0529 03/15/17  1424   WBC 10*3/mm3  --  4.69 5.40 6.57 6.34 5.98 6.88   HEMOGLOBIN g/dL 9.2* 8.9* 6.8* 8.3* 9.3* 9.2* 9.1*   PLATELETS 10*3/mm3  --  213 231 256 270 265 292       Results from last 7 days  Lab Units 17  0600 17  0544 17  1046 17  0953 17  0335 17  0529 03/15/17  1424   SODIUM mmol/L  --   --   --  141 142 143 142   POTASSIUM mmol/L  --   --   --  3.8 3.7 3.4* 3.8   CHLORIDE mmol/L  --   --   --  102 102 102 101   TOTAL CO2 mmol/L  --   --   --  30.2* 28.4 30.4* 31.0*   BUN mg/dL  --   --   --  6* 7* 6* 6*   CREATININE mg/dL " 0.59 0.31* 0.71 0.71 0.78 0.66 0.71   GLUCOSE mg/dL  --   --   --  119* 100* 90 89   Estimated Creatinine Clearance: 83.9 mL/min (by C-G formula based on Cr of 0.59).    Results from last 7 days  Lab Units 03/18/17  0953 03/17/17  0335 03/16/17  0529 03/15/17  1424   CALCIUM mg/dL 8.2* 8.6 8.9 8.9   ALBUMIN g/dL  --   --  2.70* 2.70*   MAGNESIUM mg/dL  --  1.8  --   --        Results from last 7 days  Lab Units 03/21/17  0634 03/20/17  0355 03/19/17  0542 03/16/17  0529 03/15/17  1424   PROTIME Seconds 18.9* 16.8* 17.5* 14.6* 15.0*   INR  1.65* 1.42* 1.49* 1.18* 1.23*       amLODIPine 10 mg Oral Daily   ARIPiprazole 10 mg Oral Daily   atorvastatin 40 mg Oral Nightly   budesonide-formoterol 2 puff Inhalation BID - RT   carvedilol 12.5 mg Oral BID With Meals   cefepime 2 g Intravenous Q8H   docusate sodium 100 mg Oral Daily   enoxaparin 40 mg Subcutaneous Q24H   ferrous sulfate 325 mg Oral Daily With Breakfast   fluticasone 2 spray Each Nare Daily   furosemide 20 mg Oral Daily   isosorbide mononitrate 30 mg Oral Q24H   lamoTRIgine 25 mg Oral BID   levothyroxine 100 mcg Oral Q AM   modafinil 200 mg Oral Daily   oxybutynin XL 10 mg Oral Daily   pantoprazole 40 mg Oral Q AM   Pharmacy to dose vancomycin  Does not apply Daily   pregabalin 200 mg Oral BID   tiotropium 1 capsule Inhalation Daily - RT   vancomycin 2,000 mg Intravenous Q24H   vilazodone 40 mg Oral Daily   warfarin 10 mg Oral Once            Assessment/Plan   Assessment:     Active Hospital Problems (** Indicates Principal Problem)    Diagnosis Date Noted   • **Mixed infection [A49.9] 03/15/2017   • Obstructive sleep apnea [G47.33] 03/21/2017   • COPD (chronic obstructive pulmonary disease) [J44.9] 03/21/2017   • Postoperative anemia due to acute blood loss [D62] 03/18/2017   • Bacterial infection due to Morganella morganii [B96.4] 03/15/2017   • Infected prosthesis of right hip [T84.51XA] 02/10/2017   • MRSA (methicillin resistant Staphylococcus aureus)  infection [A49.02] 02/10/2017   • Hypertension [I10] 02/10/2017   • DVT (deep venous thrombosis), hx of [I82.409] 02/10/2017   • Bipolar disorder [F31.9] 02/10/2017   • Arthritis [M19.90] 02/10/2017   • Coronary artery disease, hx of stent [I25.10] 02/10/2017   • Hematoma of right hip [S70.01XA] 02/08/2017      Resolved Hospital Problems    Diagnosis Date Noted Date Resolved   • Hypokalemia [E87.6] 03/16/2017 03/21/2017       Plan:   - POD#4 RT HIP INCISION AND DRAINAGE  - R hip infection due to MRSA and Pseudomonas - Vanc/Cefepime per ID   - Hemoglobin stable since transfusion.  - Lovenox for DVT prophylaxis until INR therapeutic.  Hx DVT/PE - 2/17 doppler negative.  - HTN/CAD - stable  - COPD and DENISE per pulmonary.    Discharge okay with me.  Will sign off.      Dennis Bejarano MD  [facility]  03/21/17  7:39 AM

## 2017-03-21 NOTE — PROGRESS NOTES
Orthopedic Progress Note        Patient: Frances Downs    Date of Admission: 3/15/2017  1:03 PM    YOB: 1950    Medical Record Number: [id number]    Attending Physician: Krysta Benito MD      POD # 6  LOS: 6 days     Status post-RT HIP INCISION AND DRAINAGE        Systemic or Specific Complaints: Pain controlled.  No fevers or chills.  No numbness.  Patient reports transfer to chair yesterday with physical therapy.      No Known Allergies      Current Medications:  Scheduled Meds:  amLODIPine 10 mg Oral Daily   ARIPiprazole 10 mg Oral Daily   atorvastatin 40 mg Oral Nightly   budesonide-formoterol 2 puff Inhalation BID - RT   carvedilol 12.5 mg Oral BID With Meals   cefepime 2 g Intravenous Q8H   docusate sodium 100 mg Oral Daily   enoxaparin 40 mg Subcutaneous Q24H   ferrous sulfate 325 mg Oral Daily With Breakfast   fluticasone 2 spray Each Nare Daily   furosemide 20 mg Oral Daily   isosorbide mononitrate 30 mg Oral Q24H   lamoTRIgine 25 mg Oral BID   levothyroxine 100 mcg Oral Q AM   modafinil 200 mg Oral Daily   oxybutynin XL 10 mg Oral Daily   pantoprazole 40 mg Oral Q AM   Pharmacy to dose vancomycin  Does not apply Daily   pregabalin 200 mg Oral BID   tiotropium 1 capsule Inhalation Daily - RT   vancomycin 2,000 mg Intravenous Q24H   vilazodone 40 mg Oral Daily     Continuous Infusions:   PRN Meds:.•  acetaminophen  •  albuterol  •  bisacodyl  •  diazePAM  •  docusate sodium  •  HYDROcodone-acetaminophen  •  ipratropium-albuterol  •  magnesium hydroxide  •  Morphine **AND** naloxone  •  [DISCONTINUED] Morphine **AND** naloxone  •  ondansetron  •  promethazine  •  sodium chloride      Physical Exam: 66 y.o. female  General Appearance:    alert and oriented                Pain Relief: Patient reports some relief   Vitals:    03/20/17 2041 03/20/17 2346 03/21/17 0021 03/21/17 0440   BP: 116/72  132/74 110/62   BP Location: Right arm  Right arm Right arm   Patient Position: Lying  Lying Lying    Pulse: 59 54 53 54   Resp: 16 20 16 16   Temp: 98.3 °F (36.8 °C)   98.4 °F (36.9 °C)   TempSrc: Oral   Oral   SpO2: 95% 94% 96% 96%   Weight:    230 lb 3.2 oz (104 kg)   Height:              HEENT:    Normocephalic, without obvious abnormality, atraumatic.          PERRLA; EOMI; Neck supple with trachea midline. No JVD.    No lymphadenopathy     Lungs:     Clear to auscultation,respirations regular, even and                   Unlabored      Heart:    Regular rhythm and normal rate, normal S1 and S2, no            murmur, no gallop, no rub, no click     Abdomen:     Normal bowel sounds, no masses, no organomegaly, soft        non-tender, non-distended, no guarding, no rebound                 tenderness       Extremities:   Operative extremity neurovascular status intact. ROM intact.    Incision clean. No           edema, no cyanosis, no calf tenderness     Pulses:     Pulses palpable and equal bilaterally     Skin:     Skin Warm/Dry w/out ulceration, ecchymosis, rash, or   cyanosis     Activity: Mobilizing Per P.T.   Weight Bearing: As Tolerated    Diagnostic Tests:  Admission on 03/15/2017   No results displayed because visit has over 200 results.        Xr Chest 1 View    Result Date: 3/19/2017  Narrative: PORTABLE RADIOGRAPHIC VIEW OF THE CHEST  CLINICAL HISTORY: Hypoxia and dyspnea.  Portable radiographic view of the chest demonstrates a left upper extremity PICC line which terminates at the level of the SVC. The lungs are clear of acute infiltrates. There is cardiomegaly. Osseous structures are unremarkable.  This report was finalized on 3/19/2017 1:49 PM by Dr. Benjy Jimenez MD.              Assessment:  Patient Active Problem List   Diagnosis   • Hematoma of right hip   • DVT (deep venous thrombosis), hx of   • Hypertension   • Hyperlipidemia   • Coronary artery disease, hx of stent   • Arthritis   • Bipolar disorder   • Infected prosthesis of right hip   • MRSA (methicillin resistant Staphylococcus aureus)  infection   • Mixed infection   • Bacterial infection due to Morganella morganii   • Hypokalemia   • Postoperative anemia due to acute blood loss      Acute Blood Loss Anemia  Post-operative Pain  Immobility      Plan:    Continue Physical Therapy, increase mobility as tolerated.  Continue SCDs, Continue DVT prophalaxis.  Continue Pain management efforts  Continue Incisional care  Coumadin  Rehab anytime from ortho standpoint (patient needs different rehab than the one she was at before)  F/U in 4 wks with Dr Martin in office.      Discharge Plan: possibly today pending rehab availability and insurance approval    Date: 3/21/2017   Time: 7:21 AM    TREASURE Valle

## 2017-03-21 NOTE — PROGRESS NOTES
"[facility] PULMONARY CARE         Dr Ted Elena   LOS: 6 days   Patient Care Team:  Talha Echeverria MD as PCP - General (Internal Medicine)    Chief Complaint:  Follow up on Hypoxia, DENISE    Interval History:   On AutoBiPAP at night. Tolerating well. Denies dyspnea at rest. She's on O2 3-4L/min. No cough. Breathing improved today. Diuresing well with Lasix.     REVIEW OF SYSTEMS:   CARDIOVASCULAR: No chest pain, chest pressure or chest discomfort. No palpitations or edema.   GASTROINTESTINAL: No anorexia, nausea, vomiting or diarrhea.     Ventilator/Non-Invasive Ventilation Settings     Start     Ordered    03/17/17 2339  . AutoBIPAP; Max IPAP: 22; Initial LPM: 4; Titrate for spO2: 90%  Until Discontinued     Question Answer Comment   . AutoBIPAP    Max IPAP 22    Initial LPM 4    Titrate for spO2 90%        03/17/17 2338 03/17/17 2337  . AutoBIPAP; Max IPAP: 22; Initial LPM: 4; Titrate for spO2: 90%  Until Discontinued,   Status:  Canceled     Comments:  RN called to let me know Dr. Balderrama said we could use our auto BiPAP since we don't have auto CPAP.   Question Answer Comment   . AutoBIPAP    Max IPAP 22    Initial LPM 4    Titrate for spO2 90%        03/17/17 2337 03/17/17 1920  . AutoCPAP; Max Pressure: 22; Initial LPM: 4; Titrate for spO2: 90%  Until Discontinued,   Status:  Canceled     Question Answer Comment   . AutoCPAP    Max Pressure 22    Initial LPM 4    Titrate for spO2 90%        03/17/17 1922          Objective   Vital Signs  Temp:  [98.1 °F (36.7 °C)-98.7 °F (37.1 °C)] 98.1 °F (36.7 °C)  Heart Rate:  [53-67] 60  Resp:  [16-20] 16  BP: (110-132)/(55-89) 118/59    Intake/Output Summary (Last 24 hours) at 03/21/17 1531  Last data filed at 03/21/17 1327   Gross per 24 hour   Intake   1460 ml   Output   4173 ml   Net  -2713 ml     Flowsheet Rows         First Filed Value    Admission Height  65\" (165.1 cm) Documented at 03/15/2017 1500    Admission Weight  226 lb (103 kg) Documented at " 03/15/2017 1500          Physical Exam:   General Appearance:    Alert, cooperative, in no acute distress   Lungs:     Decreased air entry bilaterally overall. No wheezing or crackles.     Heart:    Regular rhythm and normal rate, normal S1 and S2, no            murmur, no gallop, no rub, no click   Chest Wall:    No abnormalities observed   Abdomen:     Obese, soft, no tenderness   Neuro:   Conscious, alert, oriented x3   Extremities:   Moves all extremities well, +2 pitting edema, no cyanosis, no             Redness          Results Review:          Results from last 7 days  Lab Units 03/21/17  0600 03/19/17  0544 03/18/17  1046 03/18/17  0953 03/17/17  0335 03/16/17  0529   SODIUM mmol/L  --   --   --  141 142 143   POTASSIUM mmol/L  --   --   --  3.8 3.7 3.4*   CHLORIDE mmol/L  --   --   --  102 102 102   TOTAL CO2 mmol/L  --   --   --  30.2* 28.4 30.4*   BUN mg/dL  --   --   --  6* 7* 6*   CREATININE mg/dL 0.59 0.31* 0.71 0.71 0.78 0.66   GLUCOSE mg/dL  --   --   --  119* 100* 90   CALCIUM mg/dL  --   --   --  8.2* 8.6 8.9           Results from last 7 days  Lab Units 03/21/17  0635 03/20/17  0355 03/19/17  0542 03/18/17  0953   WBC 10*3/mm3  --  4.69 5.40 6.57   HEMOGLOBIN g/dL 9.2* 8.9* 6.8* 8.3*   HEMATOCRIT % 29.7* 28.1* 21.6* 25.8*   PLATELETS 10*3/mm3  --  213 231 256       Results from last 7 days  Lab Units 03/21/17  0634 03/20/17  0355 03/19/17  0542   INR  1.65* 1.42* 1.49*         @LABRCNT(bnp)@  I reviewed the patient's new clinical results.  I personally viewed and interpreted the patient's CXR        Medication Review:     amLODIPine 10 mg Oral Daily   ARIPiprazole 10 mg Oral Daily   atorvastatin 40 mg Oral Nightly   budesonide-formoterol 2 puff Inhalation BID - RT   carvedilol 12.5 mg Oral BID With Meals   cefepime 2 g Intravenous Q8H   DAPTOmycin (CUBICIN)  IV 6 mg/kg Intravenous Q24H   docusate sodium 100 mg Oral Daily   enoxaparin 40 mg Subcutaneous Q24H   ferrous sulfate 325 mg Oral Daily  With Breakfast   fluticasone 2 spray Each Nare Daily   furosemide 20 mg Oral Daily   isosorbide mononitrate 30 mg Oral Q24H   lamoTRIgine 25 mg Oral BID   levothyroxine 100 mcg Oral Q AM   modafinil 200 mg Oral Daily   oxybutynin XL 10 mg Oral Daily   pantoprazole 40 mg Oral Q AM   pregabalin 200 mg Oral BID   tiotropium 1 capsule Inhalation Daily - RT   vilazodone 40 mg Oral Daily   warfarin 10 mg Oral Once     Diagnostic imaging:  I personally and independently reviewed the CXR dated  3/19/17:  Possible mild vascular engorgement.        Assessment:  1) Acute hypoxic respiratory failure: mild volume overload/atelectasis  2) Obstructive sleep apnea   3) Obesity hypoventilation with chronic compensated hypercapnia  4) Morbid obesity  5) COPD without exacerbation  6) Diastolic heart dysfunction, grade II, with exacerbation; Weight is 230 lB (4 Lb increase since admission)    Relevant Medical Diagnoses  Right hip wound on antibiotics and I&D on 3/17/17  History DVT and PE  Hypertension  Coronary artery disease  Pulmonary hypertension likely WHO class II  Bipolar disorder     Plan of Treatment  - Continue Lasix.   -Check a BMP tomorrow  - Incentive spirometry  -Continue AutoBiPAP when asleep. I checked the compliance and she has no significant residual apnea       Ted Elena MD  03/21/17  3:31 PM

## 2017-03-21 NOTE — THERAPY DISCHARGE NOTE
Acute Care - Physical Therapy Discharge Summary  [facility]       Patient Name: Frances Downs  : 1950  MRN: [id number]    Today's Date: 3/21/2017  Onset of Illness/Injury or Date of Surgery Date: 03/15/17    Date of Referral to PT: 17  Referring Physician: Erlin Damico      Admit Date: 3/15/2017      PT Recommendation and Plan    Visit Dx:    ICD-10-CM ICD-9-CM   1. Wound infection T14.8 958.3    L08.9              Outcome Measures       17 1100          How much help from another person do you currently need...    Turning from your back to your side while in flat bed without using bedrails? 2  -GYPSY prado) DR GYPSY laura (t))      Moving from lying on back to sitting on the side of a flat bed without bedrails? 2  -GYPSY prado) DR GYPSY laura (t))      Moving to and from a bed to a chair (including a wheelchair)? 1  -GYPSY laura (r t))      Standing up from a chair using your arms (e.g., wheelchair, bedside chair)? 1  -GYPSY (torsten laura (t))      Climbing 3-5 steps with a railing? 1  -GYPSY (linnette) DR GYPSY laura (t))      To walk in hospital room? 1  -GYPSY (linnette) DR GYPSY laura (t))      AM-PAC 6 Clicks Score 8  -GYPSY ANN (r t)      Functional Assessment    Outcome Measure Options AM-PAC 6 Clicks Basic Mobility (PT)  -GYPSY laura (r t))        User Key  (r) = Recorded By, (t) = Taken By, (c) = Cosigned By    Initials Name Provider Type    GYPSY Gilmore, PT Physical Therapist    DR Chalino Portillo, PT Student PT Student                      IP PT Goals       17 1257 17 1012       Bed Mobility PT LTG    Bed Mobility PT LTG, Date Established  17  -MS     Bed Mobility PT LTG, Time to Achieve  5 - 7 days  -MS     Bed Mobility PT LTG, Activity Type  all bed mobility  -MS     Bed Mobility PT LTG, [place] Level  contact guard assist  -MS     Bed Mobility PT LTG, Outcome (P)  goal not met  -      Bed Mobility PT LTG, Reason Goal Not Met (P)  discharged from facility  -      Transfer Training PT LTG     Transfer Training PT LTG, Date Established  03/18/17  -MS     Transfer Training PT LTG, Time to Achieve  5 - 7 days  -MS     Transfer Training PT LTG, Activity Type  sit to stand/stand to sit  -MS     Transfer Training PT LTG, [place] Level  minimum assist (75% patient effort);2 person assist required  -MS     Transfer Training PT LTG, Assist Device  walker, rolling  -MS     Transfer Training PT LTG, Outcome (P)  goal not met  -      Transfer Training PT LTG, Reason Goal Not Met (P)  discharged from facility  -      Transfer Training 2 PT LTG    Transfer Training PT 2 LTG, Date Established  03/18/17  -MS     Transfer Training PT 2 LTG, Time to Achieve  5 - 7 days  -MS     Transfer Training PT 2 LTG, Activity Type  bed to chair /chair to bed  -MS     Transfer Training PT 2 LTG, [place] Level  minimum assist (75% patient effort);2 person assist required  -MS     Transfer Training PT 2 LTG, Assist Device  walker, rolling  -MS     Transfer Training PT 2 LTG, Outcome (P)  goal not met  -      Transfer Training PT 2 LTG, Reason Goal Not Met (P)  discharged from facility  -      Dynamic Sitting Balance PT LTG    Dynamic Sitting Balance PT LTG, Date Established  03/18/17  -MS     Dynamic Sitting Balance PT LTG, Time to Achieve  5 - 7 days  -MS     Dynamic Sitting Balance PT LTG, [place] Level  independent  -MS     Dynamic Sitting Balance PT LTG, Assist Device  UE Support  -MS     Dynamic Sitting Balance PT LTG, Outcome (P)  goal not met  -      Dynamic Sitting Balance PT LTG, Reason Goal Not Met (P)  discharged from facility  -        User Key  (r) = Recorded By, (t) = Taken By, (c) = Cosigned By    Initials Name Provider Type    GYPSY Gilmore, PT Physical Therapist    MS Yon CLAIRE Elizabeth, PT Physical Therapist              PT Discharge Summary  Anticipated Discharge Disposition: skilled nursing facility  Reason for Discharge: Discharge from facility  Outcomes Achieved: Refer to  plan of care for updates on goals achieved  Discharge Destination: SNF      Angeles Gilmore, PT   3/21/2017

## 2017-03-21 NOTE — DISCHARGE SUMMARY
PHYSICIAN DISCHARGE SUMMARY                                                                        [facility]    Patient Identification:  Name: Frances Downs  Age: 66 y.o.  Sex: female  :  1950  MRN: [id number]  Primary Care Physician: Talha Echeverria MD    Admit date: 3/15/2017  Discharge date and time: 3/21/2017  Discharged Condition: good    Discharge Diagnoses:Principal Problem:    Mixed infection  Active Problems:    Hematoma of right hip    DVT (deep venous thrombosis), hx of    Hypertension    Coronary artery disease, hx of stent    Arthritis    Bipolar disorder    Infected prosthesis of right hip    MRSA (methicillin resistant Staphylococcus aureus) infection    Bacterial infection due to Morganella morganii    Postoperative anemia due to acute blood loss    Obstructive sleep apnea    COPD (chronic obstructive pulmonary disease)    Obesity (BMI 30-39.9)    Pseudomonas aeruginosa infection    VRE infection (vancomycin resistant Enterococcus)   acute blood loss anemia requiring transfusion    PMHX:   Past Medical History   Diagnosis Date   • Anxiety    • Arthritis    • Bipolar disorder    • COPD (chronic obstructive pulmonary disease)    • Coronary artery disease    • Diastolic dysfunction      grade II per echocardiogram 2016   • Difficulty walking    • Disease of thyroid gland      hypothyroidism   • Dislocation of hip joint      recurrent dislocation right hip   • DVT (deep venous thrombosis)    • GERD without esophagitis    • Heartburn    • Hematoma    • Hyperlipidemia    • Hypertension    • LVH (left ventricular hypertrophy)      mild to moderate per echocardiogram 2016   • Major depressive disorder    • Mitral annular calcification      severe per echocardiogram 2016   • Mitral regurgitation      mild per echo 2016   • Mitral valve disease    • Muscle weakness    • Narcolepsy     • Overactive bladder    • Pneumonia    • Pulmonary emboli    • Pulmonary hypertension      mild per echo 11/18/2016   • Sleep apnea      obstructive   • Tricuspid regurgitation      mild to moderate per echo 11/18/2016     PSHX:   Past Surgical History   Procedure Laterality Date   • Total hip arthroplasty Right 2011   • Total hip arthroplasty revision Right 11/22/2016   • Knee arthroplasty Left 03/2010     TWICE   • Total knee arthroplasty revision Left 09/2010   • Other surgical history       IVC filter placement   • Orif ankle fracture Left 06/16/2011   • Hardware removal foot / ankle Left 12/01/2011   • Quadriceps tendon repair Left 06/21/2010     also done 9-    • Joint replacement     • Coronary angioplasty with stent placement       x2 stents   • Cataract extraction w/ intraocular lens implant Right    • Eye surgery       cataract surgery bilateral   • Incision and drainage hip Right 2/8/2017     Procedure: RT HIP INCISION AND DRAINAGE;  Surgeon: Erlin Martin MD;  Location: [facility];  Service:    • Hip spacer insertion with antibiotic cement Right 2/13/2017     Procedure: RIGHT TOTAL HIP REMOVAL;  Surgeon: Erlin Martin MD;  Location: [facility] OR;  Service:    • Incision and drainage hip Right 3/17/2017     Procedure: RIGHT HIP INCISION AND DRAINAGE;  Surgeon: Erlin Martin MD;  Location: [facility] OR;  Service:        Hospital Course: Frances Downs   is a 66-year-old female who was admitted from infectious disease clinic on 3/15/2017 when she presented for follow-up and was found to have progressive and perfuse drainage from her right hip surgical site.  Patient was discharged 4 weeks prior.  Facility for completion of IV antibiotics for mixed infection of the right hip.  Patient has had removal of prostheses in February followed by conversion to [place] of the right hip.  Patient had grown MRSA and Morganella from the intraoperative sample at that time.  Patient  was sent home on IV vancomycin and ertapenem.  At the time of follow-up the way her wound appeared it looked like she needed hospitalization and further debridement.  Patient was admitted on 3:15 as mentioned above and she was reviewed by orthopedic surgery service and underwent surgery on 3/17/2017.  Intraoperative cultures are positive for pseudomonas aeruginosa Morganella morganii and  VRE.  The sensitivity for daptomycin for VRE is pending at the time of this dictation.  It has been requested to microbiology to VRE sensitivity to daptomycin.  It appears it is going to take another 24 hours before the sensitivities available.  Patient seems to be going home or rehabilitation facility on combination of daptomycin and cefepime.  During her stay internal medicine service helped us manage her underlying medical issues including management of anticoagulation.  She did have issues with postop hypoxemia due to underlying COPD and obstructive sleep apnea and pulmonary service managed the condition with the introduction of BiPAP nocturnal oxygen and low-dose diuretic.  Patient did have hemoglobin down to 6.8 which required 2 units of packed RBC transfusion and on the day of discharge her hemoglobin is stable at 9.2.  Once arrangements are are made for a facility she will be discharged.    Consults:     Consults     Date and Time Order Name Status Description    3/17/2017 0645 Inpatient Consult to Orthopedic Surgery In process     3/15/2017 1811 Inpatient Consult to Internal Medicine Completed           Results from last 7 days  Lab Units 03/21/17  0635 03/20/17  0355   WBC 10*3/mm3  --  4.69   HEMOGLOBIN g/dL 9.2* 8.9*   HEMATOCRIT % 29.7* 28.1*   PLATELETS 10*3/mm3  --  213       Results from last 7 days  Lab Units 03/21/17  0600  03/18/17  0953   SODIUM mmol/L  --   --  141   POTASSIUM mmol/L  --   --  3.8   CHLORIDE mmol/L  --   --  102   TOTAL CO2 mmol/L  --   --  30.2*   BUN mg/dL  --   --  6*   CREATININE mg/dL 0.59   < > 0.71   GLUCOSE mg/dL  --   --  119*   CALCIUM mg/dL  --   --  8.2*   < > = values in this interval not displayed.  Significant Diagnostic Studies: Labs in chart were reviewed.  Imaging Results (all)     Procedure Component Value Units Date/Time    XR Chest 1 View [[id number]] Collected:  03/19/17 1349     Updated:  03/19/17 1352    Narrative:       PORTABLE RADIOGRAPHIC VIEW OF THE CHEST     CLINICAL HISTORY: Hypoxia and dyspnea.     Portable radiographic view of the chest demonstrates a left upper  extremity PICC line which terminates at the level of the SVC. The lungs  are clear of acute infiltrates. There is cardiomegaly. Osseous  structures are unremarkable.     This report was finalized on 3/19/2017 1:49 PM by Dr. Benjy Jimenez MD.               Discharge Exam:  General Appearance:    Alert, cooperative, no distress, AAOx3                          Head:    Normocephalic, without obvious abnormality, atraumatic                          Eyes:    PERRL, conjunctiva/corneas clear, EOM's intact, both eyes                        Throat:   Lips, tongue, gums normal; oral mucosa pink and moist                          Neck:   Supple, symmetrical, trachea midline, no JVD                        Lungs:     Clear to auscultation bilaterally, respirations unlabored                Chest Wall:    No tenderness or deformity                        Heart:    Regular rate and rhythm, S1 and S2 normal, no murmur,no  Rub                                       or gallop                  Abdomen:     Soft, non-tender, bowel sounds active, no masses, no                                                        organomegaly                  Extremities:   Right hip surgical incision is dressed proximal portion of the incision is stained with bloody drainage.                            Skin:   Skin is warm and dry,  no rashes or palpable lesions                  Neurologic:   CNII-XII intact, motor strength grossly intact, sensation  grossly                                           intact to light touch, no focal deficits noted     Disposition:  Skilled nursing facility    Patient Instructions:    Frances Downs   Home Medication Instructions ROBBIE:[id number]    Printed on:03/21/17 1006   Medication Information                      acetaminophen (TYLENOL) 325 MG tablet  Take 2 tablets by mouth Every 4 (Four) Hours As Needed for Mild Pain (1-3) for up to 30 days.             ALBUTEROL IN  Inhale 0.63 mg Every 6 (Six) Hours As Needed.             amLODIPine (NORVASC) 10 MG tablet  Take 10 mg by mouth Daily.             ARIPiprazole (ABILIFY) 10 MG tablet  Take 10 mg by mouth Daily.             aspirin 81 MG chewable tablet  Chew 81 mg Daily.             aspirin  MG tablet  Take 325 mg by mouth Every 6 (Six) Hours As Needed.             atorvastatin (LIPITOR) 40 MG tablet  Take 40 mg by mouth daily.             bisacodyl (DULCOLAX) 5 MG EC tablet  Take 2 tablets by mouth Daily As Needed for Constipation.             budesonide-formoterol (SYMBICORT) 160-4.5 MCG/ACT inhaler  Inhale 2 puffs 2 (Two) Times a Day.             carvedilol (COREG) 12.5 MG tablet  Take 12.5 mg by mouth 2 (two) times a day with meals.             cefepime (MAXIPIME) 2 g/100 mL 0.9% NS (mbp)  Infuse 100 mL into a venous catheter Every 8 (Eight) Hours for 39 days. Indications: Bone and Joint Infection             clonazePAM (KlonoPIN) 0.5 MG tablet  Take 0.5 mg by mouth 3 (three) times a day as needed for seizures.             clopidogrel (PLAVIX) 75 MG tablet  Take 1 tablet by mouth daily.             DAPTOmycin 625 mg in sodium chloride 0.9 % 50 mL  Infuse 625 mg into a venous catheter Daily for 39 days. Indications: Bone and Joint Infection             darifenacin (ENABLEX) 15 MG 24 hr tablet  Take 15 mg by mouth daily.             diazePAM (VALIUM) 2 MG tablet  Take 2 mg by mouth Every 12 (Twelve) Hours As Needed for Anxiety.             docusate sodium  (COLACE) 100 MG capsule  Take 100 mg by mouth 2 (Two) Times a Day.             ferrous sulfate 325 (65 FE) MG tablet  Take 325 mg by mouth 2 (Two) Times a Day.             fluticasone (FLONASE) 50 MCG/ACT nasal spray  2 sprays into each nostril daily. Administer 2 sprays in each nostril for each dose.             furosemide (LASIX) 20 MG tablet  Take 1 tablet by mouth Daily.             HYDROcodone-acetaminophen (NORCO)  MG per tablet  Take 2 tablets by mouth Every 4 (Four) Hours As Needed for Moderate Pain (4-6).             HYDROcodone-acetaminophen (NORCO)  MG per tablet  Take 1 tablet by mouth Every 4 (Four) Hours As Needed for Mild Pain (1-3) or Moderate Pain (4-6).             ipratropium-albuterol (DUO-NEB) 0.5-2.5 mg/mL nebulizer  Take 3 mL by nebulization every 4 (four) hours as needed for wheezing.             isosorbide mononitrate (IMDUR) 30 MG 24 hr tablet  Take 30 mg by mouth Daily.             lamoTRIgine (LaMICtal) 25 MG tablet  Take 25 mg by mouth 2 (Two) Times a Day.             levothyroxine (SYNTHROID, LEVOTHROID) 100 MCG tablet  Take 100 mcg by mouth daily.             methylphenidate (RITALIN) 10 MG tablet  Take 10 mg by mouth 2 (Two) Times a Day As Needed.             modafinil (PROVIGIL) 200 MG tablet  Take 200 mg by mouth 2 (two) times a day.             omeprazole (PriLOSEC) 20 MG capsule  Take 20 mg by mouth 2 (two) times a day.             oxyCODONE-acetaminophen (PERCOCET)  MG per tablet  Take 1 tablet by mouth 4 (Four) Times a Day As Needed for moderate pain (4-6).             oxyCODONE-acetaminophen (PERCOCET)  MG per tablet  Take 1 tablet by mouth 3 (Three) Times a Day As Needed for moderate pain (4-6).             potassium chloride (K-DUR) 10 MEQ CR tablet  Take 10 mEq by mouth daily.             pregabalin (LYRICA) 200 MG capsule  Take 100 mg by mouth 2 (Two) Times a Day.             promethazine (PHENERGAN) 25 MG tablet  Take 25 mg by mouth Every 4 (Four)  Hours As Needed for Nausea or Vomiting.             tiotropium (SPIRIVA) 18 MCG per inhalation capsule  Place 1 capsule into inhaler and inhale 1 (one) time daily.             vilazodone (VIIBRYD) 40 MG tablet tablet  Take 40 mg by mouth daily.             warfarin (COUMADIN) 6 MG tablet  Take 6 mg by mouth Daily.               Future Appointments  Date Time Provider Department Center   4/20/2017 9:00 AM Andres Oliveros MD [facility] None     Additional Instructions for the Follow-ups that You Need to Schedule     Discharge Follow-Up With Specified Provider    As directed    To:  Dr. Figueroa   Follow Up:  2 Weeks   Follow Up Details:  call [phone] to make appointment and send all the labs with the patient             Follow-up Information     Follow up with [facility]NA[facility] .    Specialty:  Assisted Living Facility    Contact information:    [address] [place] [id number] [phone]        Follow up with Talha Echeverria MD .    Specialty:  Internal Medicine    Contact information:    [facility] [address] [address] [phone]          Follow up with Krysta Figueroa MD. Schedule an appointment as soon as possible for a visit today.    Specialty:  Infectious Diseases    Why:  call [phone] to make appointment to see dr. figueroa in 2 weeks.    Contact information:    [address] [facility] [id number] [phone]          Follow up with Erlin Martin MD .    Specialty:  Orthopedic Surgery    Contact information:    [address] [id number] [phone]          Discharge Order     Start     Ordered    03/21/17 0935  Discharge patient  Once     Expected Discharge Date:  03/21/17    Discharge Disposition:  Skilled Nursing Facility (DC - External)    Please choose which facility the patient is currently admitted if they are being discharged to another facility or unit.:  [facility]    Mode:  Ambulance (medically necessary)       Question Answer  Comment   Please choose which facility the patient is currently admitted if they are being discharged to another facility or unit. [facility]    Mode: Ambulance (medically necessary)        03/21/17 0939        Follow-up Information     Follow up with [facility] .    Specialty:  Assisted Living Facility    Contact information:    [address]  [facility] [address]  [phone]        Follow up with Talha Echeverria MD .    Specialty:  Internal Medicine    Contact information:    [address] [facility] [address]  [place] [address]  [phone]          Follow up with Krysta Figueroa MD. Schedule an appointment as soon as possible for a visit today.    Specialty:  Infectious Diseases    Why:  call [phone] to make appointment to see dr. figueroa in 2 weeks.    Contact information:    [address] [id number] [phone]          Follow up with Erlin Martin MD .    Specialty:  Orthopedic Surgery    Contact information:    [address] [facility] [address] [address]  [place] [id number]  [phone]          Total time spent discharging patient including evaluation,post hospitalization follow up,  medication and post hospitalization instructions and education total time exceeds 30 minutes.    Signed:  Krysta Figueroa MD  3/21/2017  10:04 AM    EMR Dragon/Transcription disclaimer:   Much of this encounter note is an electronic transcription/translation of spoken language to printed text. The electronic translation of spoken language may permit erroneous, or at times, nonsensical words or phrases to be inadvertently transcribed; Although I have reviewed the note for such errors, some may still exist.

## 2017-03-21 NOTE — PLAN OF CARE
Problem: Inpatient Physical Therapy  Goal: Bed Mobility Goal LTG- PT    03/21/17 1257   Bed Mobility PT LTG   Bed Mobility PT LTG, Outcome goal not met   Bed Mobility PT LTG, Reason Goal Not Met discharged from facility       Goal: Transfer Training Goal 1 LTG- PT    03/21/17 1257   Transfer Training PT LTG   Transfer Training PT LTG, Outcome goal not met   Transfer Training PT LTG, Reason Goal Not Met discharged from facility       Goal: Transfer Training Goal 2 LTG- PT    03/21/17 1257   Transfer Training 2 PT LTG   Transfer Training PT 2 LTG, Outcome goal not met   Transfer Training PT 2 LTG, Reason Goal Not Met discharged from facility       Goal: Dynamic Sitting Balance Goal LTG- PT    03/21/17 1257   Dynamic Sitting Balance PT LTG   Dynamic Sitting Balance PT LTG, Outcome goal not met   Dynamic Sitting Balance PT LTG, Reason Goal Not Met discharged from facility

## 2017-03-21 NOTE — PLAN OF CARE
Problem: Patient Care Overview (Adult)  Goal: Plan of Care Review  Outcome: Ongoing (interventions implemented as appropriate)    03/20/17 2013   Coping/Psychosocial Response Interventions   Plan Of Care Reviewed With patient   Outcome Evaluation   Outcome Summary/Follow up Plan patient was unable to ambulate with x 3 assist. encouraged her and explained that it will take time for her to get to where she was before in November. patient seemed to understand that it is going to be a day-by - day process. pain well controlled.   Patient Care Overview   Progress improving       Goal: Adult Individualization and Mutuality  Outcome: Ongoing (interventions implemented as appropriate)  Goal: Discharge Needs Assessment  Outcome: Ongoing (interventions implemented as appropriate)    Problem: Infection, Risk/Actual (Adult)  Goal: Infection Prevention/Resolution  Outcome: Ongoing (interventions implemented as appropriate)    Problem: Fall Risk (Adult)  Goal: Absence of Falls  Outcome: Ongoing (interventions implemented as appropriate)    Problem: Pain, Acute (Adult)  Goal: Acceptable Pain Control/Comfort Level  Outcome: Ongoing (interventions implemented as appropriate)

## 2017-03-21 NOTE — PROGRESS NOTES
Continued Stay Note  [facility]     Patient Name: Frances Downs  MRN: [id number]  Today's Date: 3/21/2017    Admit Date: 3/15/2017          Discharge Plan       03/21/17 1533    Case Management/Social Work Plan    Additional Comments Discussed with pt in great detail in regards to d/c planning, pt would like to d/c to [facility] , referral given to [facility] [phone], [facility] unable to accept due to cost of IV antibiotics. Discussed options with Dr. Benito. Called [facility] back to see if pt allowed to go to [facility] at [facility] for IV antibiotics, they said they asked their corperate and they are unable to accept because they will still be responsible for all costs, Called and spoke with pts sister to see if able to d/c home and if the pt would have support. Sister has recently been injured and says it will be a challenge to help. Sister is attempting to call other family to see if they can arrange to get pt enough help at home, Discussed case with Ashly Ca, advised to look at [facility] in [place] to see if bed available and able to accept. Called Rocio with [facility] [phone], referral given, awaiting call back with approval/bed availability. CCP to follow.              Discharge Codes     None        Expected Discharge Date and Time     Expected Discharge Date Expected Discharge Time    Mar 21, 2017             Abbey Martinez RN

## 2017-03-21 NOTE — PLAN OF CARE
Problem: Patient Care Overview (Adult)  Goal: Plan of Care Review  Outcome: Ongoing (interventions implemented as appropriate)    03/21/17 1610   Coping/Psychosocial Response Interventions   Plan Of Care Reviewed With patient   Outcome Evaluation   Outcome Summary/Follow up Plan pain well conttrolled with po pain meds. Medically stable to discharge to facility once one accepts. see CCP notes. Dressing changed to right hip and right buttock. Buttock wound blanchable. Low airloss mattress in place. IV ABT continue. nausea treated with iv zofran, well tolerated. will continue to monitor pain and encourage movement in bed to prevent further breakdown   Patient Care Overview   Progress improving       Goal: Adult Individualization and Mutuality  Outcome: Ongoing (interventions implemented as appropriate)    Problem: Infection, Risk/Actual (Adult)  Goal: Infection Prevention/Resolution  Outcome: Ongoing (interventions implemented as appropriate)    Problem: Fall Risk (Adult)  Goal: Absence of Falls  Outcome: Ongoing (interventions implemented as appropriate)    Problem: Pain, Acute (Adult)  Goal: Acceptable Pain Control/Comfort Level  Outcome: Ongoing (interventions implemented as appropriate)    Problem: Pressure Ulcer (Adult)  Goal: Signs and Symptoms of Listed Potential Problems Will be Absent or Manageable (Pressure Ulcer)  Outcome: Ongoing (interventions implemented as appropriate)

## 2017-03-21 NOTE — PLAN OF CARE
Problem: Patient Care Overview (Adult)  Goal: Plan of Care Review  Outcome: Ongoing (interventions implemented as appropriate)    03/21/17 0404   Coping/Psychosocial Response Interventions   Plan Of Care Reviewed With patient   Outcome Evaluation   Outcome Summary/Follow up Plan VSS. Pain controlled with PO pain medication. IV antibiotics administered without adverse reaction noted. PICC dressing changed by IV team. PICC flushes well with blood return. Dressing to right hip changed due to moderate drainage. Pt tolerated well.    Patient Care Overview   Progress improving       Goal: Adult Individualization and Mutuality  Outcome: Ongoing (interventions implemented as appropriate)  Goal: Discharge Needs Assessment  Outcome: Ongoing (interventions implemented as appropriate)    Problem: Infection, Risk/Actual (Adult)  Goal: Infection Prevention/Resolution  Outcome: Ongoing (interventions implemented as appropriate)    Problem: Fall Risk (Adult)  Goal: Absence of Falls  Outcome: Ongoing (interventions implemented as appropriate)    Problem: Pain, Acute (Adult)  Goal: Acceptable Pain Control/Comfort Level  Outcome: Ongoing (interventions implemented as appropriate)    Problem: Pressure Ulcer (Adult)  Goal: Signs and Symptoms of Listed Potential Problems Will be Absent or Manageable (Pressure Ulcer)  Outcome: Ongoing (interventions implemented as appropriate)

## 2017-03-22 VITALS
RESPIRATION RATE: 16 BRPM | TEMPERATURE: 97.9 F | WEIGHT: 225.6 LBS | HEIGHT: 65 IN | OXYGEN SATURATION: 93 % | DIASTOLIC BLOOD PRESSURE: 88 MMHG | HEART RATE: 60 BPM | BODY MASS INDEX: 37.59 KG/M2 | SYSTOLIC BLOOD PRESSURE: 128 MMHG

## 2017-03-22 LAB
ANION GAP SERPL CALCULATED.3IONS-SCNC: 9.4 MMOL/L
BACTERIA SPEC AEROBE CULT: ABNORMAL
BACTERIA SPEC AEROBE CULT: ABNORMAL
BACTERIA SPEC ANAEROBE CULT: NORMAL
BUN BLD-MCNC: 9 MG/DL (ref 8–23)
BUN/CREAT SERPL: 15.8 (ref 7–25)
CALCIUM SPEC-SCNC: 8.7 MG/DL (ref 8.6–10.5)
CHLORIDE SERPL-SCNC: 103 MMOL/L (ref 98–107)
CO2 SERPL-SCNC: 31.6 MMOL/L (ref 22–29)
CREAT BLD-MCNC: 0.57 MG/DL (ref 0.57–1)
GFR SERPL CREATININE-BSD FRML MDRD: 106 ML/MIN/1.73
GLUCOSE BLD-MCNC: 94 MG/DL (ref 65–99)
GRAM STN SPEC: ABNORMAL
GRAM STN SPEC: ABNORMAL
INR PPP: 2.07 (ref 0.9–1.1)
POTASSIUM BLD-SCNC: 3.5 MMOL/L (ref 3.5–5.2)
PROTHROMBIN TIME: 22.6 SECONDS (ref 11.7–14.2)
SODIUM BLD-SCNC: 144 MMOL/L (ref 136–145)

## 2017-03-22 PROCEDURE — 25010000002 ENOXAPARIN PER 10 MG: Performed by: HOSPITALIST

## 2017-03-22 PROCEDURE — 80048 BASIC METABOLIC PNL TOTAL CA: CPT | Performed by: INTERNAL MEDICINE

## 2017-03-22 PROCEDURE — 25010000002 CEFEPIME: Performed by: ORTHOPAEDIC SURGERY

## 2017-03-22 PROCEDURE — 85610 PROTHROMBIN TIME: CPT | Performed by: PHYSICIAN ASSISTANT

## 2017-03-22 RX ORDER — LINEZOLID 600 MG/1
600 TABLET, FILM COATED ORAL EVERY 12 HOURS SCHEDULED
Qty: 76 TABLET | Refills: 0 | Status: SHIPPED | OUTPATIENT
Start: 2017-03-22 | End: 2017-04-29

## 2017-03-22 RX ORDER — LINEZOLID 600 MG/1
600 TABLET, FILM COATED ORAL EVERY 12 HOURS SCHEDULED
Status: DISCONTINUED | OUTPATIENT
Start: 2017-03-22 | End: 2017-03-22 | Stop reason: HOSPADM

## 2017-03-22 RX ADMIN — TIOTROPIUM BROMIDE 1 CAPSULE: 18 CAPSULE ORAL; RESPIRATORY (INHALATION) at 07:58

## 2017-03-22 RX ADMIN — CARVEDILOL 12.5 MG: 12.5 TABLET, FILM COATED ORAL at 08:57

## 2017-03-22 RX ADMIN — AMLODIPINE BESYLATE 10 MG: 10 TABLET ORAL at 08:56

## 2017-03-22 RX ADMIN — CEFEPIME 2 G: 2 INJECTION, POWDER, FOR SOLUTION INTRAVENOUS at 08:56

## 2017-03-22 RX ADMIN — FLUTICASONE PROPIONATE 2 SPRAY: 50 SPRAY, METERED NASAL at 09:00

## 2017-03-22 RX ADMIN — LINEZOLID 600 MG: 600 TABLET, FILM COATED ORAL at 14:41

## 2017-03-22 RX ADMIN — LAMOTRIGINE 25 MG: 25 TABLET ORAL at 17:36

## 2017-03-22 RX ADMIN — FUROSEMIDE 20 MG: 20 TABLET ORAL at 08:56

## 2017-03-22 RX ADMIN — CEFEPIME 2 G: 2 INJECTION, POWDER, FOR SOLUTION INTRAVENOUS at 01:50

## 2017-03-22 RX ADMIN — FERROUS SULFATE TAB 325 MG (65 MG ELEMENTAL FE) 325 MG: 325 (65 FE) TAB at 08:56

## 2017-03-22 RX ADMIN — HYDROCODONE BITARTRATE AND ACETAMINOPHEN 1 TABLET: 10; 325 TABLET ORAL at 18:04

## 2017-03-22 RX ADMIN — BUDESONIDE AND FORMOTEROL FUMARATE DIHYDRATE 2 PUFF: 160; 4.5 AEROSOL RESPIRATORY (INHALATION) at 07:57

## 2017-03-22 RX ADMIN — CARVEDILOL 12.5 MG: 12.5 TABLET, FILM COATED ORAL at 17:36

## 2017-03-22 RX ADMIN — ENOXAPARIN SODIUM 40 MG: 40 INJECTION SUBCUTANEOUS at 13:43

## 2017-03-22 RX ADMIN — PREGABALIN 100 MG: 100 CAPSULE ORAL at 17:59

## 2017-03-22 RX ADMIN — OXYBUTYNIN CHLORIDE 10 MG: 10 TABLET, EXTENDED RELEASE ORAL at 08:56

## 2017-03-22 RX ADMIN — DOCUSATE SODIUM 100 MG: 100 CAPSULE, LIQUID FILLED ORAL at 08:56

## 2017-03-22 RX ADMIN — ISOSORBIDE MONONITRATE 30 MG: 30 TABLET, EXTENDED RELEASE ORAL at 08:56

## 2017-03-22 RX ADMIN — LEVOTHYROXINE SODIUM 100 MCG: 100 TABLET ORAL at 07:02

## 2017-03-22 RX ADMIN — CEFEPIME 2 G: 2 INJECTION, POWDER, FOR SOLUTION INTRAVENOUS at 17:37

## 2017-03-22 RX ADMIN — PANTOPRAZOLE SODIUM 40 MG: 40 TABLET, DELAYED RELEASE ORAL at 07:01

## 2017-03-22 RX ADMIN — HYDROCODONE BITARTRATE AND ACETAMINOPHEN 1 TABLET: 10; 325 TABLET ORAL at 13:43

## 2017-03-22 RX ADMIN — LAMOTRIGINE 25 MG: 25 TABLET ORAL at 08:56

## 2017-03-22 RX ADMIN — PREGABALIN 200 MG: 100 CAPSULE ORAL at 08:56

## 2017-03-22 RX ADMIN — PREGABALIN 100 MG: 100 CAPSULE ORAL at 17:36

## 2017-03-22 RX ADMIN — MODAFINIL 200 MG: 100 TABLET ORAL at 08:56

## 2017-03-22 RX ADMIN — ARIPIPRAZOLE 10 MG: 10 TABLET ORAL at 08:56

## 2017-03-22 RX ADMIN — VILAZODONE HYDROCHLORIDE 40 MG: 40 TABLET ORAL at 08:56

## 2017-03-22 NOTE — PLAN OF CARE
Problem: Patient Care Overview (Adult)  Goal: Plan of Care Review  Outcome: Ongoing (interventions implemented as appropriate)    03/22/17 0508   Coping/Psychosocial Response Interventions   Plan Of Care Reviewed With patient   Outcome Evaluation   Outcome Summary/Follow up Plan patient having no pain at this time, unable to get out of bed with assistance, plans to d/c to rehab 3/22/17   Patient Care Overview   Progress improving       Goal: Adult Individualization and Mutuality  Outcome: Ongoing (interventions implemented as appropriate)  Goal: Discharge Needs Assessment  Outcome: Ongoing (interventions implemented as appropriate)    Problem: Infection, Risk/Actual (Adult)  Goal: Infection Prevention/Resolution  Outcome: Ongoing (interventions implemented as appropriate)    Problem: Fall Risk (Adult)  Goal: Absence of Falls  Outcome: Ongoing (interventions implemented as appropriate)    Problem: Pain, Acute (Adult)  Goal: Acceptable Pain Control/Comfort Level  Outcome: Ongoing (interventions implemented as appropriate)    Problem: Pressure Ulcer (Adult)  Goal: Signs and Symptoms of Listed Potential Problems Will be Absent or Manageable (Pressure Ulcer)  Outcome: Ongoing (interventions implemented as appropriate)

## 2017-03-22 NOTE — DISCHARGE SUMMARY
PHYSICIAN DISCHARGE SUMMARY                                                                        [facility]    Patient Identification:  Name: Frances Downs  Age: 66 y.o.  Sex: female  :  1950  MRN: [id number]  Primary Care Physician: Talha Echeverria MD    Admit date: 3/15/2017  Discharge date and time: 3/22/2017  Discharged Condition: good    Discharge Diagnoses:Principal Problem:    Mixed infection  Active Problems:    Hematoma of right hip    DVT (deep venous thrombosis), hx of    Hypertension    Coronary artery disease, hx of stent    Arthritis    Bipolar disorder    Infected prosthesis of right hip    MRSA (methicillin resistant Staphylococcus aureus) infection    Bacterial infection due to Morganella morganii    Acute blood loss anemia    Obstructive sleep apnea    COPD (chronic obstructive pulmonary disease)    Obesity (BMI 30-39.9)    Pseudomonas aeruginosa infection    VRE infection (vancomycin resistant Enterococcus)       PMHX:   Past Medical History:   Diagnosis Date   • Anxiety    • Arthritis    • Bipolar disorder    • COPD (chronic obstructive pulmonary disease)    • Coronary artery disease    • Diastolic dysfunction     grade II per echocardiogram 2016   • Difficulty walking    • Disease of thyroid gland     hypothyroidism   • Dislocation of hip joint     recurrent dislocation right hip   • DVT (deep venous thrombosis)    • GERD without esophagitis    • Heartburn    • Hematoma    • Hyperlipidemia    • Hypertension    • LVH (left ventricular hypertrophy)     mild to moderate per echocardiogram 2016   • Major depressive disorder    • Mitral annular calcification     severe per echocardiogram 2016   • Mitral regurgitation     mild per echo 2016   • Mitral valve disease    • Muscle weakness    • Narcolepsy    • Overactive bladder    • Pneumonia    • Pulmonary emboli    •  Pulmonary hypertension     mild per echo 11/18/2016   • Sleep apnea     obstructive   • Tricuspid regurgitation     mild to moderate per echo 11/18/2016     PSHX:   Past Surgical History:   Procedure Laterality Date   • CATARACT EXTRACTION W/ INTRAOCULAR LENS IMPLANT Right    • CORONARY ANGIOPLASTY WITH STENT PLACEMENT      x2 stents   • EYE SURGERY      cataract surgery bilateral   • HARDWARE REMOVAL FOOT / ANKLE Left 12/01/2011   • HIP SPACER INSERTION WITH ANTIBIOTIC CEMENT Right 2/13/2017    Procedure: RIGHT TOTAL HIP REMOVAL;  Surgeon: Erlin Martin MD;  Location: [facility] OR;  Service:    • INCISION AND DRAINAGE HIP Right 2/8/2017    Procedure: RT HIP INCISION AND DRAINAGE;  Surgeon: Erlin Martin MD;  Location: [facility] OR;  Service:    • INCISION AND DRAINAGE HIP Right 3/17/2017    Procedure: RIGHT HIP INCISION AND DRAINAGE;  Surgeon: Erlin Martin MD;  Location: [facility] OR;  Service:    • JOINT REPLACEMENT     • KNEE ARTHROPLASTY Left 03/2010    TWICE   • ORIF ANKLE FRACTURE Left 06/16/2011   • OTHER SURGICAL HISTORY      IVC filter placement   • QUADRICEPS TENDON REPAIR Left 06/21/2010    also done 9-    • TOTAL HIP ARTHROPLASTY Right 2011   • TOTAL HIP ARTHROPLASTY REVISION Right 11/22/2016   • TOTAL KNEE ARTHROPLASTY REVISION Left 09/2010       Hospital Course: Frances Downs   is a 66-year-old female who was admitted from infectious disease clinic on 3/15/2017 when she presented for follow-up and was found to have progressive and perfuse drainage from her right hip surgical site.  Patient was discharged 4 weeks prior.  Facility for completion of IV antibiotics for mixed infection of the right hip.  Patient has had removal of prostheses in February followed by conversion to [place] of the right hip.  Patient had grown MRSA and Morganella from the intraoperative sample at that time.  Patient was sent home on IV vancomycin and ertapenem.  At the time of follow-up the  way her wound appeared it looked like she needed hospitalization and further debridement.  Patient was admitted on 3:15 as mentioned above and she was reviewed by orthopedic surgery service and underwent surgery on 3/17/2017.  Intraoperative cultures are positive for pseudomonas aeruginosa Morganella morganii and  VRE.  VRE is resistant to daptomycin and sensitive to Tygacil, linezolid, Synercid.  It appears that her combination treatment for this mixed infection would be IV cefepime as planned earlier along with by mouth Zyvox until 4/29/2017.  During her stay internal medicine service helped us manage her underlying medical issues including management of anticoagulation.  She did have issues with postop hypoxemia due to underlying COPD and obstructive sleep apnea and pulmonary service managed the condition with the introduction of BiPAP nocturnal oxygen and low-dose diuretic.  Patient did have hemoglobin down to 6.8 which required 2 units of packed RBC transfusion and on the day of discharge her hemoglobin is stable at 9.2.  Once arrangements are are made for a facility she will be discharged.      Consults:     Consults     Date and Time Order Name Status Description    3/17/2017 0645 Inpatient Consult to Orthopedic Surgery In process     3/15/2017 1811 Inpatient Consult to Internal Medicine Completed           Results from last 7 days  Lab Units 03/21/17  0635 03/20/17  0355   WBC 10*3/mm3  --  4.69   HEMOGLOBIN g/dL 9.2* 8.9*   HEMATOCRIT % 29.7* 28.1*   PLATELETS 10*3/mm3  --  213       Results from last 7 days  Lab Units 03/22/17  0530   SODIUM mmol/L 144   POTASSIUM mmol/L 3.5   CHLORIDE mmol/L 103   TOTAL CO2 mmol/L 31.6*   BUN mg/dL 9   CREATININE mg/dL 0.57   GLUCOSE mg/dL 94   CALCIUM mg/dL 8.7     Significant Diagnostic Studies: Labs in chart were reviewed.  Imaging Results (all)     Procedure Component Value Units Date/Time    XR Chest 1 View [[id number]] Collected:  03/19/17 1349     Updated:  03/19/17  1352    Narrative:       PORTABLE RADIOGRAPHIC VIEW OF THE CHEST     CLINICAL HISTORY: Hypoxia and dyspnea.     Portable radiographic view of the chest demonstrates a left upper  extremity PICC line which terminates at the level of the SVC. The lungs  are clear of acute infiltrates. There is cardiomegaly. Osseous  structures are unremarkable.     This report was finalized on 3/19/2017 1:49 PM by Dr. Benjy Jimenez MD.               Discharge Exam:  General Appearance:    Alert, cooperative, no distress, AAOx3                          Head:    Normocephalic, without obvious abnormality, atraumatic                          Eyes:    PERRL, conjunctiva/corneas clear, EOM's intact, both eyes                        Throat:   Lips, tongue, gums normal; oral mucosa pink and moist                          Neck:   Supple, symmetrical, trachea midline, no JVD                        Lungs:     Clear to auscultation bilaterally, respirations unlabored                Chest Wall:    No tenderness or deformity                        Heart:    Regular rate and rhythm, S1 and S2 normal, no murmur,no  Rub                                       or gallop                  Abdomen:     Soft, non-tender, bowel sounds active, no masses, no                                                        organomegaly                  Extremities:   Surgical site is dressed significant resolution of drainage and no staining of the dressing.                            Skin:   Skin is warm and dry,  no rashes or palpable lesions                  Neurologic:   CNII-XII intact, motor strength grossly intact, sensation grossly                                           intact to light touch, no focal deficits noted     Disposition:  Skilled nursing facility    Patient Instructions:    Frances Downs   Home Medication Instructions ROBBIE:[id number]    Printed on:03/22/17 1143   Medication Information                      acetaminophen (TYLENOL) 325 MG  tablet  Take 2 tablets by mouth Every 4 (Four) Hours As Needed for Mild Pain (1-3) for up to 30 days.             ALBUTEROL IN  Inhale 0.63 mg Every 6 (Six) Hours As Needed.             amLODIPine (NORVASC) 10 MG tablet  Take 10 mg by mouth Daily.             ARIPiprazole (ABILIFY) 10 MG tablet  Take 10 mg by mouth Daily.             aspirin 81 MG chewable tablet  Chew 81 mg Daily.             aspirin  MG tablet  Take 325 mg by mouth Every 6 (Six) Hours As Needed.             atorvastatin (LIPITOR) 40 MG tablet  Take 40 mg by mouth daily.             bisacodyl (DULCOLAX) 5 MG EC tablet  Take 2 tablets by mouth Daily As Needed for Constipation.             budesonide-formoterol (SYMBICORT) 160-4.5 MCG/ACT inhaler  Inhale 2 puffs 2 (Two) Times a Day.             carvedilol (COREG) 12.5 MG tablet  Take 12.5 mg by mouth 2 (two) times a day with meals.             cefepime (MAXIPIME) 2 g/100 mL 0.9% NS (mbp)  Infuse 100 mL into a venous catheter Every 8 (Eight) Hours for 39 days. Indications: Bone and Joint Infection             clonazePAM (KlonoPIN) 0.5 MG tablet  Take 0.5 mg by mouth 3 (three) times a day as needed for seizures.             clopidogrel (PLAVIX) 75 MG tablet  Take 1 tablet by mouth daily.             darifenacin (ENABLEX) 15 MG 24 hr tablet  Take 15 mg by mouth daily.             diazePAM (VALIUM) 2 MG tablet  Take 2 mg by mouth Every 12 (Twelve) Hours As Needed for Anxiety.             docusate sodium (COLACE) 100 MG capsule  Take 100 mg by mouth 2 (Two) Times a Day.             ferrous sulfate 325 (65 FE) MG tablet  Take 325 mg by mouth 2 (Two) Times a Day.             fluticasone (FLONASE) 50 MCG/ACT nasal spray  2 sprays into each nostril daily. Administer 2 sprays in each nostril for each dose.             furosemide (LASIX) 20 MG tablet  Take 1 tablet by mouth Daily.             HYDROcodone-acetaminophen (NORCO)  MG per tablet  Take 2 tablets by mouth Every 4 (Four) Hours As Needed for  Moderate Pain (4-6).             HYDROcodone-acetaminophen (NORCO)  MG per tablet  Take 1 tablet by mouth Every 4 (Four) Hours As Needed for Mild Pain (1-3) or Moderate Pain (4-6).             ipratropium-albuterol (DUO-NEB) 0.5-2.5 mg/mL nebulizer  Take 3 mL by nebulization every 4 (four) hours as needed for wheezing.             isosorbide mononitrate (IMDUR) 30 MG 24 hr tablet  Take 30 mg by mouth Daily.             lamoTRIgine (LaMICtal) 25 MG tablet  Take 25 mg by mouth 2 (Two) Times a Day.             levothyroxine (SYNTHROID, LEVOTHROID) 100 MCG tablet  Take 100 mcg by mouth daily.             linezolid (ZYVOX) 600 MG tablet  Take 1 tablet by mouth Every 12 (Twelve) Hours for 38 days. Indications: Bone and Joint Infection             methylphenidate (RITALIN) 10 MG tablet  Take 10 mg by mouth 2 (Two) Times a Day As Needed.             modafinil (PROVIGIL) 200 MG tablet  Take 200 mg by mouth 2 (two) times a day.             omeprazole (PriLOSEC) 20 MG capsule  Take 20 mg by mouth 2 (two) times a day.             oxyCODONE-acetaminophen (PERCOCET)  MG per tablet  Take 1 tablet by mouth 4 (Four) Times a Day As Needed for moderate pain (4-6).             oxyCODONE-acetaminophen (PERCOCET)  MG per tablet  Take 1 tablet by mouth 3 (Three) Times a Day As Needed for moderate pain (4-6).             potassium chloride (K-DUR) 10 MEQ CR tablet  Take 10 mEq by mouth daily.             pregabalin (LYRICA) 200 MG capsule  Take 100 mg by mouth 2 (Two) Times a Day.             promethazine (PHENERGAN) 25 MG tablet  Take 25 mg by mouth Every 4 (Four) Hours As Needed for Nausea or Vomiting.             tiotropium (SPIRIVA) 18 MCG per inhalation capsule  Place 1 capsule into inhaler and inhale 1 (one) time daily.             vilazodone (VIIBRYD) 40 MG tablet tablet  Take 40 mg by mouth daily.             warfarin (COUMADIN) 6 MG tablet  Take 6 mg by mouth Daily.               Future Appointments  Date Time  Provider Department [facility]   4/20/2017 9:00 AM Andres Oliveros MD [facility] None     Additional Instructions for the Follow-ups that You Need to Schedule     Discharge Follow-Up With Specified Provider    As directed    To:  Dr. Figueroa   Follow Up:  2 Weeks   Follow Up Details:  call [phone] to make appointment and send all the labs with the patient             Follow-up Information     Follow up with ISAIAS THAKKAR .    Specialty:  Assisted Living Facility    Contact information:    [address]  [facility] [address]  [phone]        Follow up with Talha Echeverria MD .    Specialty:  Internal Medicine    Contact information:    [address]  [place] [address]  [phone]          Follow up with Krysta Figueroa MD. Schedule an appointment as soon as possible for a visit today.    Specialty:  Infectious Diseases    Why:  call [phone] to make appointment to see dr. figueroa in 2 weeks.    Contact information:    [address] [place] [id number]  [phone]          Follow up with Erlin Martin MD .    Specialty:  Orthopedic Surgery    Contact information:    [address] [id number] [phone]          Discharge Order     Start     Ordered    03/21/17 0935  Discharge patient  Once     Expected Discharge Date:  03/21/17    Discharge Disposition:  Skilled Nursing Facility (DC - External)    Please choose which facility the patient is currently admitted if they are being discharged to another facility or unit.:  [facility]    Mode:  Ambulance (medically necessary)       Question Answer Comment   Please choose which facility the patient is currently admitted if they are being discharged to another facility or unit. [facility]    Mode: Ambulance (medically necessary)        03/21/17 0939        Follow-up Information     Follow up with ISAIAS THAKKAR .    Specialty:  Assisted Living Facility    Contact information:    [address]  [address]  [facility] [address]  [phone]        Follow up with Talha Echeverria MD .    Specialty:  Internal Medicine    Contact information:    [address]  [place] [address]  [phone]          Follow up with Krysta Figueroa MD. Schedule an appointment as soon as possible for a visit today.    Specialty:  Infectious Diseases    Why:  call [phone] to make appointment to see dr. figueroa in 2 weeks.    Contact information:    [address] [place] [address] [phone]          Follow up with Erlin Martin MD .    Specialty:  Orthopedic Surgery    Contact information:    [address]  [place] [id number]  [phone]          Total time spent discharging patient including evaluation,post hospitalization follow up,  medication and post hospitalization instructions and education total time exceeds 30 minutes.    Signed:  Krysta Figueroa MD  3/22/2017  11:43 AM    EMR Dragon/Transcription disclaimer:   Much of this encounter note is an electronic transcription/translation of spoken language to printed text. The electronic translation of spoken language may permit erroneous, or at times, nonsensical words or phrases to be inadvertently transcribed; Although I have reviewed the note for such errors, some may still exist.

## 2017-03-22 NOTE — DISCHARGE INSTRUCTIONS
iv cefepime 2 gm q 8 starting 3/18/2017 and Zyvox 600 mg by mouth twice a day Antibiotics to finish on 4/29/2016    check weekly cbc,cmp, ua and crp and call abnormal results to Dr. Benito at  [phone].  Diagnosis: Pseudomonas, Morganella and mrsa and VRE-enterococcal infection    Institutions provider to monitor coumadin administration with periodic INR and adjusting Coumadin to achieve INR between 2-1/2-3.  Indication for Coumadin: Deep venous thrombosis and pulmonary thromboembolism

## 2017-03-22 NOTE — PROGRESS NOTES
"  Infectious Diseases Progress Note    Krysta Benito MD     [facility]  Los: 7 days  Patient Identification:  Name: Frances Downs  Age: 66 y.o.  Sex: female  :  1950  MRN: [id number]         Primary Care Physician: Talha Echeverria MD            Subjective: Better denies any specific complaints  Interval History: See discharge summary hospital course further detail.    Objective:    Scheduled Meds:  amLODIPine 10 mg Oral Daily   ARIPiprazole 10 mg Oral Daily   atorvastatin 40 mg Oral Nightly   budesonide-formoterol 2 puff Inhalation BID - RT   carvedilol 12.5 mg Oral BID With Meals   cefepime 2 g Intravenous Q8H   docusate sodium 100 mg Oral Daily   enoxaparin 40 mg Subcutaneous Q24H   ferrous sulfate 325 mg Oral Daily With Breakfast   fluticasone 2 spray Each Nare Daily   furosemide 20 mg Oral Daily   isosorbide mononitrate 30 mg Oral Q24H   lamoTRIgine 25 mg Oral BID   levothyroxine 100 mcg Oral Q AM   linezolid 600 mg Oral Q12H   modafinil 200 mg Oral Daily   oxybutynin XL 10 mg Oral Daily   pantoprazole 40 mg Oral Q AM   pregabalin 200 mg Oral BID   tiotropium 1 capsule Inhalation Daily - RT   vilazodone 40 mg Oral Daily     Continuous Infusions:     Vital signs in last 24 hours:  Temp:  [97.8 °F (36.6 °C)-98.8 °F (37.1 °C)] 97.8 °F (36.6 °C)  Heart Rate:  [58-68] 66  Resp:  [16-20] 16  BP: (112-142)/(55-90) 138/82    Intake/Output:    Intake/Output Summary (Last 24 hours) at 17 1048  Last data filed at 17 0900   Gross per 24 hour   Intake             1100 ml   Output             2900 ml   Net            -1800 ml       Exam:  /82 (BP Location: Right arm, Patient Position: Lying)  Pulse 66  Temp 97.8 °F (36.6 °C) (Oral)   Resp 16  Ht 65\" (165.1 cm)  Wt 225 lb 9.6 oz (102 kg)  SpO2 92%  BMI 37.54 kg/m2    General Appearance:    Alert, cooperative, no distress, AAOx3                          Head:    Normocephalic, without obvious abnormality, atraumatic        "                    Eyes:    PERRL, conjunctiva/corneas clear, EOM's intact, both eyes                         Throat:   Lips, tongue, gums normal; oral mucosa pink and moist                           Neck:   Supple, symmetrical, trachea midline, no JVD                         Lungs:    Clear to auscultation bilaterally, respirations unlabored                 Chest Wall:    No tenderness or deformity                          Heart:    Regular rate and rhythm, S1 and S2 normal, no murmur,no  Rub                                      or gallop                  Abdomen:     Soft, non-tender, bowel sounds active, no masses, no                                                        organomegaly                  Extremities:   Surgical site is dressed no staining of the dressing noted.  Significant decrease in the drainage.                        Pulses:   Pulses palpable in all extremities                            Skin:   Skin is warm and dry,  no rashes or palpable lesions                  Neurologic:   CNII-XII intact, motor strength grossly intact, sensation grossly                                         intact to light touch, no focal deficits noted       Data Review:    I reviewed the patient's new clinical results.    Results from last 7 days  Lab Units 03/21/17  0635 03/20/17  0355 03/19/17  0542 03/18/17  0953 03/17/17  0335 03/16/17  0529 03/15/17  1424   WBC 10*3/mm3  --  4.69 5.40 6.57 6.34 5.98 6.88   HEMOGLOBIN g/dL 9.2* 8.9* 6.8* 8.3* 9.3* 9.2* 9.1*   PLATELETS 10*3/mm3  --  213 231 256 270 265 292       Results from last 7 days  Lab Units 03/22/17  0530 03/21/17  0600 03/19/17  0544 03/18/17  1046 03/18/17  0953 03/17/17  0335 03/16/17  0529 03/15/17  1424   SODIUM mmol/L 144  --   --   --  141 142 143 142   POTASSIUM mmol/L 3.5  --   --   --  3.8 3.7 3.4* 3.8   CHLORIDE mmol/L 103  --   --   --  102 102 102 101   TOTAL CO2 mmol/L 31.6*  --   --   --  30.2* 28.4 30.4* 31.0*   BUN mg/dL 9  --   --   --  6*  7* 6* 6*   CREATININE mg/dL 0.57 0.59 0.31* 0.71 0.71 0.78 0.66 0.71   CALCIUM mg/dL 8.7  --   --   --  8.2* 8.6 8.9 8.9   GLUCOSE mg/dL 94  --   --   --  119* 100* 90 89       Wound Culture [[id number]] (Abnormal)  Collected: 03/17/17 1714       Lab Status: Edited Result - FINAL Specimen: Wound from Hip, Right Updated: 03/22/17 1103        Wound Culture Scant growth (1+) Morganella morganii ssp morganii (A)         Scant growth (1+) Enterococcus faecium, VRE (C)           Vancomycin Resistant Enterococcus species. Patient may be an isolation risk.           Gram Stain Result Moderate (3+) WBCs seen         No organisms seen       Narrative:         3/21/17 Dr Benito requested Daptomycin  3/22/17 Dr Benito requests all Sensitive antibiotics to be reported.         Susceptibility         Morganella morganii ssp morganii Enterococcus faecium, VRE         ROSSY ROSSY         Ampicillin >=32  Resistant >=32  Resistant         Ampicillin + Sulbactam >=32  Resistant          Cefazolin >=64  Resistant          Cefepime <=1  Susceptible          Cefoxitin >=64  Resistant          Ceftriaxone 32  Intermediate          Daptomycin  4  Resistant         Ertapenem <=0.5  Susceptible          Gentamicin 2  Susceptible          Gentamicin High Level Synergy  SYN-S  Susceptible         Levofloxacin >=8  Resistant          Linezolid  2  Susceptible 1         Meropenem <=0.25  Susceptible          Piperacillin + Tazobactam 32  Intermediate          Quinupristin + Dalfopristin  0.5  Susceptible 1         Tigecycline  <=0.12  Susceptible 1         Trimethoprim + Sulfamethoxazole >=320  Resistant          Vancomycin  >=32  Resistant                     1 Appended report.  These results have been appended to a previously final verified report.                       Susceptibility Comments        Enterococcus faecium, VRE       3/21/17 Dr Benito requests Daptomycin                     Wound Culture [[id number]] (Abnormal)  Collected: 03/15/17 7307        Lab Status: Final result Specimen: Wound from Hip, Right Updated: 03/17/17 0745        Wound Culture Light growth (2+) Pseudomonas aeruginosa (A)        Gram Stain Result Few (2+) WBCs seen         Rare (1+) Gram negative bacilli         Susceptibility         Pseudomonas aeruginosa         ROSSY         Cefepime 8  Susceptible         Ceftazidime 4  Susceptible         Ciprofloxacin <=0.25  Susceptible         Levofloxacin 2  Susceptible         Meropenem >=16  Resistant         Piperacillin + Tazobactam 16  Susceptible         Tobramycin <=1  Susceptible                                  Assessment:  Principal Problem:    Mixed infection  Active Problems:    Hematoma of right hip    DVT (deep venous thrombosis), hx of    Hypertension    Coronary artery disease, hx of stent    Arthritis    Bipolar disorder    Infected prosthesis of right hip    MRSA (methicillin resistant Staphylococcus aureus) infection    Bacterial infection due to Morganella morganii    Acute blood loss anemia    Obstructive sleep apnea    COPD (chronic obstructive pulmonary disease)    Obesity (BMI 30-39.9)    Pseudomonas aeruginosa infection    VRE infection (vancomycin resistant Enterococcus)      Plan:  See amended discharge summary.  Given all the logistical hardships and her mixed infection with multi drug resistant pathogens, the most practical combination of antibiotic regimen would be by mouth Zyvox and IV cefepime.  See discharge summary and AVS for complete details.    Krysta Benito MD  3/22/2017  10:48 AM    Much of this encounter note is an electronic transcription/translation of spoken language to printed text. The electronic translation of spoken language may permit erroneous, or at times, nonsensical words or phrases to be inadvertently transcribed; Although I have reviewed the note for such errors, some may still exist

## 2017-04-12 ENCOUNTER — DOCUMENTATION (OUTPATIENT)
Dept: INTERNAL MEDICINE | Facility: HOSPITAL | Age: 67
End: 2017-04-12

## 2017-04-12 NOTE — PROGRESS NOTES
Infectious Diseases Progress Note    Krysta Benito MD     [facility]  Los: 0 days  Patient Identification:  Name: Frances Downs  Age: 66 y.o.  Sex: female  :  1950  MRN: [id number]         Primary Care Physician: Talha Echeverria MD            Subjective: Feeling better denies any fever and chills denies any drainage from the right hip surgical site.  Interval History: Admitted on 3/15/2017 from the office for persistent drainage from the right hip surgical site.  Patient was found to have Morganella and VRE along with MRSA culture positive from the drainage.  Patient underwent  Of hardware and Girdlestone procedure by Dr. Martin during that hospitalization.    Objective:    Afebrile vital signs stable  Exam:  There were no vitals taken for this visit.    General Appearance:    Alert, cooperative, no distress, AAOx3, on the stretcher.                          Head:    Normocephalic, without obvious abnormality, atraumatic                           Eyes:    PERRL, conjunctiva/corneas clear, EOM's intact, both eyes                         Throat:   Lips, tongue, gums normal; oral mucosa pink and moist                           Neck:   Supple, symmetrical, trachea midline, no JVD                         Lungs:    Clear to auscultation bilaterally, respirations unlabored                 Chest Wall:    No tenderness or deformity                          Heart:    Regular rate and rhythm, S1 and S2 normal, no murmur,no  Rub                                      or gallop                  Abdomen:     Soft, non-tender, bowel sounds active, no masses, no                                                        organomegaly                  Extremities:   Right hip surgical site is well-healed no obvious warmth or drainage noted                        Pulses:   Pulses palpable in all extremities                            Skin:   Skin is warm and dry,  no rashes or palpable lesions                   Neurologic:   CNII-XII intact, motor strength grossly intact, sensation grossly                                         intact to light touch, no focal deficits noted       Data Review:    I reviewed the patient's new clinical results.              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  Plan:  Continue cefepime and Zyvox.  Check weekly CBC CMP.  Follow-up on 4/26/2017.  Patient was educated to keep follow-up with Dr. Martin.  Message given to the rehabilitation and nursing facility where she is residing at to fax us her latest lab results.    Krysta Benito MD  4/12/2017  11:34 AM    Much of this encounter note is an electronic transcription/translation of spoken language to printed text. The electronic translation of spoken language may permit erroneous, or at times, nonsensical words or phrases to be inadvertently transcribed; Although I have reviewed the note for such errors, some may still exist

## 2017-04-26 ENCOUNTER — DOCUMENTATION (OUTPATIENT)
Dept: INTERNAL MEDICINE | Facility: HOSPITAL | Age: 67
End: 2017-04-26

## 2017-04-26 NOTE — PROGRESS NOTES
Infectious Diseases Progress Note    Krysta Benito MD     [facility]  Los: 0 days  Patient Identification:  Name: Frances Downs  Age: 66 y.o.  Sex: female  :  1950  MRN: [id number]         Primary Care Physician: Talha Echeverria MD            Subjective: Feeling better denies any fever and chills.  No issues with antibiotic therapy.  No pain and discomfort in her PICC line in the left arm.  Claims that her right hip is feeling much better and there is no oozing drainage or pain in that area.  Complaining of itching and vaginal area for the last week or so.  Interval History:Admitted on 3/15/2017 from the office for persistent drainage from the right hip surgical site. Patient was found to have Pseudomonas, Morganella and VRE along with MRSA culture positive from the drainage. Patient underwent explant of hardware and Girdlestone procedure by Dr. Martin during that hospitalization.  She was discharged on Zyvox and cefepime on 3/22/2017, to finish the treatment on 2017.       Objective: Comfortable does not appear to be in any acute distress does not appear toxic and septic.    Scheduled Meds:  Cefepime 2 g every 8 hours  Zyvox 600 mg by mouth twice a day  Exam:  Afebrile, vital signs stable  General Appearance:    Alert, cooperative, no distress, AAOx3                          Head:    Normocephalic, without obvious abnormality, atraumatic                           Eyes:    PERRL, conjunctiva/corneas clear, EOM's intact, both eyes                         Throat:   Lips, tongue, gums normal; oral mucosa pink and moist                           Neck:   Supple, symmetrical, trachea midline, no JVD                         Lungs:    Clear to auscultation bilaterally, respirations unlabored                 Chest Wall:    No tenderness or deformity                          Heart:    Regular rate and rhythm, S1 and S2 normal, no murmur,no  Rub                                        or  gallop                  Abdomen:     Soft, non-tender, bowel sounds active, no masses, no                                                          organomegaly                  Extremities:   Left arm PICC line in place with no surrounding inflammation or changes at the incision site.  Right hip surgical site appears to be clean and well approximated no drainage or tenderness.                        Pulses:   Pulses palpable in all extremities                            Skin:   Skin is warm and dry,  no rashes or palpable lesions                  Neurologic:   CNII-XII intact, motor strength grossly intact, sensation grossly                                           intact to light touch, no focal deficits noted       Data Review:    I reviewed the patient's new clinical results.      Assessment: Improved.  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  Vaginal yeast infection secondary to broad-spectrum antibiotic usage    Plan:  Diflucan 100 mg by mouth daily for 7 days  Continue cefepime and Zyvox through 4/29/2017.  DC PICC line after completion of antibiotic therapy.  Strongly encouraged to keep follow-up appointment with Dr. Martin.  Follow-up with myself in 2 weeks.    Krysta Benito MD  4/26/2017  12:34 PM    Much of this encounter note is an electronic transcription/translation of spoken language to printed text. The electronic translation of spoken language may permit erroneous, or at times, nonsensical words or phrases to be inadvertently transcribed; Although I have reviewed the note for such errors, some may still exist

## 2017-05-10 ENCOUNTER — DOCUMENTATION (OUTPATIENT)
Dept: INTERNAL MEDICINE | Facility: HOSPITAL | Age: 67
End: 2017-05-10

## 2017-06-08 ENCOUNTER — TRANSCRIBE ORDERS (OUTPATIENT)
Dept: ADMINISTRATIVE | Facility: HOSPITAL | Age: 67
End: 2017-06-08

## 2017-06-08 DIAGNOSIS — T84.51XD INFECTION ASSOCIATED WITH INTERNAL RIGHT HIP PROSTHESIS, SUBSEQUENT ENCOUNTER: Primary | ICD-10-CM

## 2017-06-08 DIAGNOSIS — T84.51XD INFLAMMATORY REACTION DUE TO INTERNAL PROSTHESIS OF RIGHT HIP, SUBSEQUENT ENCOUNTER: Primary | ICD-10-CM

## 2017-06-13 ENCOUNTER — APPOINTMENT (OUTPATIENT)
Dept: CT IMAGING | Facility: HOSPITAL | Age: 67
End: 2017-06-13
Attending: ORTHOPAEDIC SURGERY

## 2017-06-13 ENCOUNTER — HOSPITAL ENCOUNTER (OUTPATIENT)
Dept: CT IMAGING | Facility: HOSPITAL | Age: 67
Discharge: HOME | End: 2017-06-13
Admitting: ORTHOPAEDIC SURGERY

## 2017-06-13 DIAGNOSIS — T84.51XD INFLAMMATORY REACTION DUE TO INTERNAL PROSTHESIS OF RIGHT HIP, SUBSEQUENT ENCOUNTER: ICD-10-CM

## 2017-06-13 PROCEDURE — 76376 3D RENDER W/INTRP POSTPROCES: CPT

## 2017-06-13 PROCEDURE — 73700 CT LOWER EXTREMITY W/O DYE: CPT

## 2017-06-29 ENCOUNTER — OFFICE VISIT (OUTPATIENT)
Dept: CARDIOLOGY | Facility: CLINIC | Age: 67
End: 2017-06-29

## 2017-06-29 VITALS
BODY MASS INDEX: 33.43 KG/M2 | DIASTOLIC BLOOD PRESSURE: 68 MMHG | WEIGHT: 208 LBS | HEART RATE: 72 BPM | SYSTOLIC BLOOD PRESSURE: 138 MMHG | HEIGHT: 66 IN

## 2017-06-29 DIAGNOSIS — I10 ESSENTIAL HYPERTENSION: ICD-10-CM

## 2017-06-29 DIAGNOSIS — I25.10 CORONARY ARTERY DISEASE INVOLVING NATIVE CORONARY ARTERY OF NATIVE HEART WITHOUT ANGINA PECTORIS: Primary | ICD-10-CM

## 2017-06-29 PROCEDURE — 93000 ELECTROCARDIOGRAM COMPLETE: CPT | Performed by: INTERNAL MEDICINE

## 2017-06-29 PROCEDURE — 99213 OFFICE O/P EST LOW 20 MIN: CPT | Performed by: INTERNAL MEDICINE

## 2017-06-29 RX ORDER — NICOTINE 21 MG/24HR
1 PATCH, TRANSDERMAL 24 HOURS TRANSDERMAL EVERY 24 HOURS
COMMUNITY
End: 2018-04-27 | Stop reason: ALTCHOICE

## 2017-06-29 RX ORDER — DULOXETIN HYDROCHLORIDE 30 MG/1
30 CAPSULE, DELAYED RELEASE ORAL 2 TIMES DAILY
COMMUNITY
End: 2018-04-27 | Stop reason: ALTCHOICE

## 2017-06-29 RX ORDER — ASPIRIN 81 MG/1
325 TABLET ORAL DAILY
COMMUNITY
End: 2020-01-03 | Stop reason: DRUGHIGH

## 2017-06-29 NOTE — PROGRESS NOTES
Subjective:     Encounter Date:06/29/2017      Patient ID: Frances Downs is a 66 y.o. female.    Chief Complaint: CAD, HTN, Hx of DVT/PE    History of Present Illness    Dear Dr. Echeverria:    I had the pleasure of seeing the patient in cardiac followup today. As you well know, she is a rob 66-year-old woman with history of obesity. She has a past history of DVT and pulmonary embolism. She has a history of coronary disease, status post stenting.     She has had recurrent hip dislocation and MRSA infection. She eventually had complete removal of her hip and has been living in a facility since. Now that she has been cleared from her infection, she has plans to have her hip reinstalled next week.    She is currently on triple therapy including aspirin, clopidogrel and warfarin. She is tolerating this well without any bleeding.               Review of Systems   All other systems reviewed and are negative.        ECG 12 Lead  Date/Time: 6/29/2017 1:57 PM  Performed by: ASHLEY UMANA  Authorized by: ASHLEY UMANA   Comparison: compared with previous ECG   Similar to previous ECG  Rhythm: sinus rhythm  BPM: 72  Other findings: PRWP               Objective:     Physical Exam   Constitutional: She is oriented to person, place, and time. She appears well-developed and well-nourished.   HENT:   Head: Normocephalic and atraumatic.   Neck: Normal range of motion. Neck supple.   Cardiovascular: Normal rate, regular rhythm and normal heart sounds.    Pulmonary/Chest: Effort normal and breath sounds normal.   Abdominal: Soft. Bowel sounds are normal.   Musculoskeletal: Normal range of motion.   Neurological: She is alert and oriented to person, place, and time.   Skin: Skin is warm and dry.   Psychiatric: She has a normal mood and affect. Her behavior is normal. Thought content normal.   Vitals reviewed.      Lab Review:       Assessment:          Diagnosis Plan   1. Coronary artery disease involving native coronary artery of  native heart without angina pectoris     2. Essential hypertension            Plan:       It was a pleasure to see the patient in cardiac followup today. She is stable from a cardiac standpoint without any angina. Her blood pressure is well controlled. I see no problems with her proceeding with her hip surgery next week.    In order to prepare her, I have asked her to hold her dual antiplatelet therapy for 5 days prior to the surgery.    She should hold her warfarin for 3 days prior to the surgery. Dr. Martin has given her instructions to take 1 extra mg of her normal dose of warfarin the night prior to surgery to get her started on reanticoagulation. She will likely see me at the time of her surgery.     Coronary Artery Disease  Assessment  • The patient has no angina    Plan  • Lifestyle modifications discussed include adhering to a heart healthy diet, avoidance of tobacco products, maintenance of a healthy weight, medication compliance, regular exercise and regular monitoring of cholesterol and blood pressure    Subjective - Objective  • There has been a previous stent procedure using MICHAEL  • Current antiplatelet therapy includes aspirin 81 mg and clopidogrel 75 mg

## 2017-07-26 ENCOUNTER — ANESTHESIA (OUTPATIENT)
Dept: PERIOP | Facility: HOSPITAL | Age: 67
End: 2017-07-26

## 2017-07-26 ENCOUNTER — ANESTHESIA EVENT (OUTPATIENT)
Dept: PERIOP | Facility: HOSPITAL | Age: 67
End: 2017-07-26

## 2017-07-26 ENCOUNTER — HOSPITAL ENCOUNTER (INPATIENT)
Facility: HOSPITAL | Age: 67
LOS: 3 days | Discharge: NURSING, REHAB OR LONG-TERM CARE | End: 2017-07-29
Attending: ORTHOPAEDIC SURGERY | Admitting: ORTHOPAEDIC SURGERY

## 2017-07-26 ENCOUNTER — APPOINTMENT (OUTPATIENT)
Dept: GENERAL RADIOLOGY | Facility: HOSPITAL | Age: 67
End: 2017-07-26

## 2017-07-26 ENCOUNTER — APPOINTMENT (OUTPATIENT)
Dept: GENERAL RADIOLOGY | Facility: HOSPITAL | Age: 67
End: 2017-07-26
Attending: ORTHOPAEDIC SURGERY

## 2017-07-26 DIAGNOSIS — T84.51XA INFECTION OF RIGHT PROSTHETIC HIP JOINT (HCC): ICD-10-CM

## 2017-07-26 LAB
ABO GROUP BLD: NORMAL
ANTI-K: NORMAL
BLD GP AB SCN SERPL QL: POSITIVE
CRP SERPL-MCNC: 0.06 MG/DL (ref 0–0.5)
DAT POLY-SP REAG RBC QL: NEGATIVE
ERYTHROCYTE [SEDIMENTATION RATE] IN BLOOD: 12 MM/HR (ref 0–30)
HCT VFR BLD AUTO: 43.7 % (ref 35.6–45.5)
HGB BLD-MCNC: 13.9 G/DL (ref 11.9–15.5)
INR PPP: 1.13 (ref 0.9–1.1)
KELL: NEGATIVE
PROTHROMBIN TIME: 14.1 SECONDS (ref 11.7–14.2)
RH BLD: POSITIVE

## 2017-07-26 PROCEDURE — 86140 C-REACTIVE PROTEIN: CPT | Performed by: ORTHOPAEDIC SURGERY

## 2017-07-26 PROCEDURE — 85652 RBC SED RATE AUTOMATED: CPT | Performed by: ORTHOPAEDIC SURGERY

## 2017-07-26 PROCEDURE — 86922 COMPATIBILITY TEST ANTIGLOB: CPT

## 2017-07-26 PROCEDURE — C1776 JOINT DEVICE (IMPLANTABLE): HCPCS | Performed by: ORTHOPAEDIC SURGERY

## 2017-07-26 PROCEDURE — 86900 BLOOD TYPING SEROLOGIC ABO: CPT | Performed by: ORTHOPAEDIC SURGERY

## 2017-07-26 PROCEDURE — 25010000003 CEFAZOLIN IN DEXTROSE 2-4 GM/100ML-% SOLUTION: Performed by: ORTHOPAEDIC SURGERY

## 2017-07-26 PROCEDURE — 85610 PROTHROMBIN TIME: CPT | Performed by: ORTHOPAEDIC SURGERY

## 2017-07-26 PROCEDURE — 25010000002 ROPIVACAINE PER 1 MG: Performed by: ORTHOPAEDIC SURGERY

## 2017-07-26 PROCEDURE — C1713 ANCHOR/SCREW BN/BN,TIS/BN: HCPCS | Performed by: ORTHOPAEDIC SURGERY

## 2017-07-26 PROCEDURE — 25010000002 PROPOFOL 10 MG/ML EMULSION: Performed by: NURSE ANESTHETIST, CERTIFIED REGISTERED

## 2017-07-26 PROCEDURE — 94799 UNLISTED PULMONARY SVC/PX: CPT

## 2017-07-26 PROCEDURE — P9041 ALBUMIN (HUMAN),5%, 50ML: HCPCS | Performed by: NURSE ANESTHETIST, CERTIFIED REGISTERED

## 2017-07-26 PROCEDURE — 86880 COOMBS TEST DIRECT: CPT | Performed by: ORTHOPAEDIC SURGERY

## 2017-07-26 PROCEDURE — 73501 X-RAY EXAM HIP UNI 1 VIEW: CPT

## 2017-07-26 PROCEDURE — 86850 RBC ANTIBODY SCREEN: CPT

## 2017-07-26 PROCEDURE — 25010000002 HYDROMORPHONE PER 4 MG: Performed by: ANESTHESIOLOGY

## 2017-07-26 PROCEDURE — 86920 COMPATIBILITY TEST SPIN: CPT

## 2017-07-26 PROCEDURE — 86901 BLOOD TYPING SEROLOGIC RH(D): CPT | Performed by: ORTHOPAEDIC SURGERY

## 2017-07-26 PROCEDURE — 86905 BLOOD TYPING RBC ANTIGENS: CPT | Performed by: ORTHOPAEDIC SURGERY

## 2017-07-26 PROCEDURE — 25010000002 DEXAMETHASONE PER 1 MG: Performed by: NURSE ANESTHETIST, CERTIFIED REGISTERED

## 2017-07-26 PROCEDURE — 86850 RBC ANTIBODY SCREEN: CPT | Performed by: ORTHOPAEDIC SURGERY

## 2017-07-26 PROCEDURE — 25010000002 CLONIDINE PER 1 MG: Performed by: ORTHOPAEDIC SURGERY

## 2017-07-26 PROCEDURE — 87075 CULTR BACTERIA EXCEPT BLOOD: CPT | Performed by: ORTHOPAEDIC SURGERY

## 2017-07-26 PROCEDURE — 25010000002 EPINEPHRINE 0.1 MG/ML SOLUTION PREFILLED SYRINGE: Performed by: NURSE ANESTHETIST, CERTIFIED REGISTERED

## 2017-07-26 PROCEDURE — 25010000002 KETOROLAC TROMETHAMINE PER 15 MG: Performed by: ORTHOPAEDIC SURGERY

## 2017-07-26 PROCEDURE — 25010000002 NEOSTIGMINE PER 0.5 MG: Performed by: ANESTHESIOLOGY

## 2017-07-26 PROCEDURE — 87205 SMEAR GRAM STAIN: CPT | Performed by: ORTHOPAEDIC SURGERY

## 2017-07-26 PROCEDURE — 86870 RBC ANTIBODY IDENTIFICATION: CPT | Performed by: ORTHOPAEDIC SURGERY

## 2017-07-26 PROCEDURE — 25010000002 ALBUMIN HUMAN 5% PER 50 ML: Performed by: NURSE ANESTHETIST, CERTIFIED REGISTERED

## 2017-07-26 PROCEDURE — 25010000002 EPINEPHRINE PER 0.1 MG: Performed by: ORTHOPAEDIC SURGERY

## 2017-07-26 PROCEDURE — 85018 HEMOGLOBIN: CPT | Performed by: ORTHOPAEDIC SURGERY

## 2017-07-26 PROCEDURE — 72170 X-RAY EXAM OF PELVIS: CPT

## 2017-07-26 PROCEDURE — 93010 ELECTROCARDIOGRAM REPORT: CPT | Performed by: INTERNAL MEDICINE

## 2017-07-26 PROCEDURE — 93005 ELECTROCARDIOGRAM TRACING: CPT | Performed by: ANESTHESIOLOGY

## 2017-07-26 PROCEDURE — 87070 CULTURE OTHR SPECIMN AEROBIC: CPT | Performed by: ORTHOPAEDIC SURGERY

## 2017-07-26 PROCEDURE — 85014 HEMATOCRIT: CPT | Performed by: ORTHOPAEDIC SURGERY

## 2017-07-26 PROCEDURE — 25010000002 FENTANYL CITRATE (PF) 100 MCG/2ML SOLUTION: Performed by: NURSE ANESTHETIST, CERTIFIED REGISTERED

## 2017-07-26 PROCEDURE — 25010000002 MIDAZOLAM PER 1 MG: Performed by: ANESTHESIOLOGY

## 2017-07-26 PROCEDURE — 0SR902A REPLACEMENT OF RIGHT HIP JOINT WITH METAL ON POLYETHYLENE SYNTHETIC SUBSTITUTE, UNCEMENTED, OPEN APPROACH: ICD-10-PCS | Performed by: ORTHOPAEDIC SURGERY

## 2017-07-26 DEVICE — RECLAIM REVISION HIP SYSTEM DISTAL TAPERED STEM 18MM X 190MM STRAIGHT
Type: IMPLANTABLE DEVICE | Site: HIP | Status: FUNCTIONAL
Brand: RECLAIM

## 2017-07-26 DEVICE — PINNACLE GRIPTION ACETABULAR SHELL MULTI-HOLE 52MM OD
Type: IMPLANTABLE DEVICE | Site: HIP | Status: FUNCTIONAL
Brand: PINNACLE GRIPTION

## 2017-07-26 DEVICE — RECLAIM REVISION HIP SYSTEM CONICAL PROXIMAL BODY CEMENTLESS TENSILE BAR 24MM X 85MM 85MM LOCKING BOLT
Type: IMPLANTABLE DEVICE | Site: HIP | Status: FUNCTIONAL
Brand: RECLAIM

## 2017-07-26 DEVICE — PINNACLE HIP SOLUTIONS ALTRX POLYETHYLENE ACETABULAR LINER NEUTRAL 36MM ID 52MM OD
Type: IMPLANTABLE DEVICE | Site: HIP | Status: FUNCTIONAL
Brand: PINNACLE ALTRX

## 2017-07-26 DEVICE — M-SPEC METAL FEMORAL HEAD 12/14 TAPER DIAMETER 36MM +5: Type: IMPLANTABLE DEVICE | Site: HIP | Status: FUNCTIONAL

## 2017-07-26 DEVICE — PINNACLE CANCELLOUS BONE SCREW 6.5MM X 25MM
Type: IMPLANTABLE DEVICE | Site: HIP | Status: FUNCTIONAL
Brand: PINNACLE

## 2017-07-26 DEVICE — BONE CANC CRUSHED 1/4MM 30CC: Type: IMPLANTABLE DEVICE | Site: HIP | Status: FUNCTIONAL

## 2017-07-26 DEVICE — PINNACLE CANCELLOUS BONE SCREW 6.5MM X 30MM
Type: IMPLANTABLE DEVICE | Site: HIP | Status: FUNCTIONAL
Brand: PINNACLE

## 2017-07-26 RX ORDER — MIDAZOLAM HYDROCHLORIDE 1 MG/ML
2 INJECTION INTRAMUSCULAR; INTRAVENOUS
Status: DISCONTINUED | OUTPATIENT
Start: 2017-07-26 | End: 2017-07-26 | Stop reason: HOSPADM

## 2017-07-26 RX ORDER — NALOXONE HCL 0.4 MG/ML
0.2 VIAL (ML) INJECTION AS NEEDED
Status: DISCONTINUED | OUTPATIENT
Start: 2017-07-26 | End: 2017-07-26 | Stop reason: HOSPADM

## 2017-07-26 RX ORDER — DEXAMETHASONE SODIUM PHOSPHATE 10 MG/ML
INJECTION INTRAMUSCULAR; INTRAVENOUS AS NEEDED
Status: DISCONTINUED | OUTPATIENT
Start: 2017-07-26 | End: 2017-07-26 | Stop reason: SURG

## 2017-07-26 RX ORDER — HYDROCODONE BITARTRATE AND ACETAMINOPHEN 7.5; 325 MG/1; MG/1
2 TABLET ORAL EVERY 4 HOURS PRN
Status: DISCONTINUED | OUTPATIENT
Start: 2017-07-26 | End: 2017-07-29 | Stop reason: HOSPADM

## 2017-07-26 RX ORDER — FLUTICASONE PROPIONATE 50 MCG
2 SPRAY, SUSPENSION (ML) NASAL DAILY
Status: DISCONTINUED | OUTPATIENT
Start: 2017-07-26 | End: 2017-07-29 | Stop reason: HOSPADM

## 2017-07-26 RX ORDER — FENTANYL CITRATE 50 UG/ML
50 INJECTION, SOLUTION INTRAMUSCULAR; INTRAVENOUS
Status: DISCONTINUED | OUTPATIENT
Start: 2017-07-26 | End: 2017-07-26 | Stop reason: HOSPADM

## 2017-07-26 RX ORDER — SODIUM CHLORIDE 0.9 % (FLUSH) 0.9 %
1-10 SYRINGE (ML) INJECTION AS NEEDED
Status: DISCONTINUED | OUTPATIENT
Start: 2017-07-26 | End: 2017-07-29 | Stop reason: HOSPADM

## 2017-07-26 RX ORDER — PROMETHAZINE HYDROCHLORIDE 25 MG/1
25 TABLET ORAL ONCE AS NEEDED
Status: DISCONTINUED | OUTPATIENT
Start: 2017-07-26 | End: 2017-07-26 | Stop reason: HOSPADM

## 2017-07-26 RX ORDER — PROMETHAZINE HYDROCHLORIDE 25 MG/1
25 TABLET ORAL EVERY 4 HOURS PRN
Status: DISCONTINUED | OUTPATIENT
Start: 2017-07-26 | End: 2017-07-29 | Stop reason: HOSPADM

## 2017-07-26 RX ORDER — PANTOPRAZOLE SODIUM 40 MG/1
40 TABLET, DELAYED RELEASE ORAL EVERY MORNING
Status: DISCONTINUED | OUTPATIENT
Start: 2017-07-27 | End: 2017-07-29 | Stop reason: HOSPADM

## 2017-07-26 RX ORDER — HYDROCODONE BITARTRATE AND ACETAMINOPHEN 7.5; 325 MG/1; MG/1
1 TABLET ORAL EVERY 4 HOURS PRN
Status: DISCONTINUED | OUTPATIENT
Start: 2017-07-26 | End: 2017-07-29 | Stop reason: HOSPADM

## 2017-07-26 RX ORDER — DIPHENHYDRAMINE HYDROCHLORIDE 50 MG/ML
12.5 INJECTION INTRAMUSCULAR; INTRAVENOUS
Status: DISCONTINUED | OUTPATIENT
Start: 2017-07-26 | End: 2017-07-26 | Stop reason: HOSPADM

## 2017-07-26 RX ORDER — LOPERAMIDE HYDROCHLORIDE 2 MG/1
2 CAPSULE ORAL EVERY 6 HOURS PRN
Status: DISCONTINUED | OUTPATIENT
Start: 2017-07-26 | End: 2017-07-29 | Stop reason: HOSPADM

## 2017-07-26 RX ORDER — PROMETHAZINE HYDROCHLORIDE 25 MG/ML
12.5 INJECTION, SOLUTION INTRAMUSCULAR; INTRAVENOUS ONCE AS NEEDED
Status: DISCONTINUED | OUTPATIENT
Start: 2017-07-26 | End: 2017-07-26 | Stop reason: HOSPADM

## 2017-07-26 RX ORDER — SODIUM CHLORIDE 0.9 % (FLUSH) 0.9 %
1-10 SYRINGE (ML) INJECTION AS NEEDED
Status: DISCONTINUED | OUTPATIENT
Start: 2017-07-26 | End: 2017-07-26 | Stop reason: HOSPADM

## 2017-07-26 RX ORDER — HYDROMORPHONE HCL 110MG/55ML
PATIENT CONTROLLED ANALGESIA SYRINGE INTRAVENOUS AS NEEDED
Status: DISCONTINUED | OUTPATIENT
Start: 2017-07-26 | End: 2017-07-26 | Stop reason: SURG

## 2017-07-26 RX ORDER — FAMOTIDINE 10 MG/ML
20 INJECTION, SOLUTION INTRAVENOUS ONCE
Status: COMPLETED | OUTPATIENT
Start: 2017-07-26 | End: 2017-07-26

## 2017-07-26 RX ORDER — FUROSEMIDE 20 MG/1
20 TABLET ORAL DAILY
Status: DISCONTINUED | OUTPATIENT
Start: 2017-07-26 | End: 2017-07-29 | Stop reason: HOSPADM

## 2017-07-26 RX ORDER — PROMETHAZINE HYDROCHLORIDE 25 MG/1
25 SUPPOSITORY RECTAL ONCE AS NEEDED
Status: DISCONTINUED | OUTPATIENT
Start: 2017-07-26 | End: 2017-07-26 | Stop reason: HOSPADM

## 2017-07-26 RX ORDER — MIDAZOLAM HYDROCHLORIDE 1 MG/ML
1 INJECTION INTRAMUSCULAR; INTRAVENOUS
Status: DISCONTINUED | OUTPATIENT
Start: 2017-07-26 | End: 2017-07-26 | Stop reason: HOSPADM

## 2017-07-26 RX ORDER — DIAZEPAM 2 MG/1
2 TABLET ORAL EVERY 12 HOURS PRN
Status: DISCONTINUED | OUTPATIENT
Start: 2017-07-26 | End: 2017-07-29 | Stop reason: HOSPADM

## 2017-07-26 RX ORDER — ASPIRIN 81 MG/1
81 TABLET ORAL DAILY
Status: DISCONTINUED | OUTPATIENT
Start: 2017-07-26 | End: 2017-07-29 | Stop reason: HOSPADM

## 2017-07-26 RX ORDER — ROCURONIUM BROMIDE 10 MG/ML
INJECTION, SOLUTION INTRAVENOUS AS NEEDED
Status: DISCONTINUED | OUTPATIENT
Start: 2017-07-26 | End: 2017-07-26 | Stop reason: SURG

## 2017-07-26 RX ORDER — DIAZEPAM 2 MG/1
2 TABLET ORAL EVERY 12 HOURS PRN
COMMUNITY
End: 2018-04-27 | Stop reason: ALTCHOICE

## 2017-07-26 RX ORDER — ATORVASTATIN CALCIUM 40 MG/1
40 TABLET, FILM COATED ORAL NIGHTLY
COMMUNITY
End: 2020-12-16 | Stop reason: SDUPTHER

## 2017-07-26 RX ORDER — AMLODIPINE BESYLATE 10 MG/1
10 TABLET ORAL DAILY
Status: DISCONTINUED | OUTPATIENT
Start: 2017-07-26 | End: 2017-07-29 | Stop reason: HOSPADM

## 2017-07-26 RX ORDER — BISACODYL 5 MG/1
10 TABLET, DELAYED RELEASE ORAL DAILY PRN
Status: DISCONTINUED | OUTPATIENT
Start: 2017-07-26 | End: 2017-07-29 | Stop reason: HOSPADM

## 2017-07-26 RX ORDER — SODIUM CHLORIDE, SODIUM LACTATE, POTASSIUM CHLORIDE, CALCIUM CHLORIDE 600; 310; 30; 20 MG/100ML; MG/100ML; MG/100ML; MG/100ML
100 INJECTION, SOLUTION INTRAVENOUS CONTINUOUS
Status: DISCONTINUED | OUTPATIENT
Start: 2017-07-26 | End: 2017-07-29 | Stop reason: HOSPADM

## 2017-07-26 RX ORDER — CLOPIDOGREL BISULFATE 75 MG/1
75 TABLET ORAL DAILY
Status: DISCONTINUED | OUTPATIENT
Start: 2017-07-26 | End: 2017-07-29 | Stop reason: HOSPADM

## 2017-07-26 RX ORDER — ACETAMINOPHEN 325 MG/1
325 TABLET ORAL EVERY 4 HOURS PRN
Status: DISCONTINUED | OUTPATIENT
Start: 2017-07-26 | End: 2017-07-29 | Stop reason: HOSPADM

## 2017-07-26 RX ORDER — NALOXONE HCL 0.4 MG/ML
0.1 VIAL (ML) INJECTION
Status: DISCONTINUED | OUTPATIENT
Start: 2017-07-26 | End: 2017-07-29 | Stop reason: HOSPADM

## 2017-07-26 RX ORDER — CHLORHEXIDINE GLUCONATE 4 G/100ML
1 SOLUTION TOPICAL DAILY PRN
COMMUNITY
End: 2017-07-29 | Stop reason: HOSPADM

## 2017-07-26 RX ORDER — LAMOTRIGINE 25 MG/1
25 TABLET ORAL 2 TIMES DAILY
Status: DISCONTINUED | OUTPATIENT
Start: 2017-07-26 | End: 2017-07-29 | Stop reason: HOSPADM

## 2017-07-26 RX ORDER — ISOSORBIDE MONONITRATE 30 MG/1
30 TABLET, EXTENDED RELEASE ORAL DAILY
Status: DISCONTINUED | OUTPATIENT
Start: 2017-07-26 | End: 2017-07-29 | Stop reason: HOSPADM

## 2017-07-26 RX ORDER — ONDANSETRON 2 MG/ML
4 INJECTION INTRAMUSCULAR; INTRAVENOUS ONCE AS NEEDED
Status: DISCONTINUED | OUTPATIENT
Start: 2017-07-26 | End: 2017-07-26 | Stop reason: HOSPADM

## 2017-07-26 RX ORDER — CARVEDILOL 12.5 MG/1
12.5 TABLET ORAL 2 TIMES DAILY WITH MEALS
Status: DISCONTINUED | OUTPATIENT
Start: 2017-07-26 | End: 2017-07-29 | Stop reason: HOSPADM

## 2017-07-26 RX ORDER — LIDOCAINE HYDROCHLORIDE 20 MG/ML
INJECTION, SOLUTION INFILTRATION; PERINEURAL AS NEEDED
Status: DISCONTINUED | OUTPATIENT
Start: 2017-07-26 | End: 2017-07-26 | Stop reason: SURG

## 2017-07-26 RX ORDER — CEFAZOLIN SODIUM 2 G/100ML
INJECTION, SOLUTION INTRAVENOUS
Status: DISPENSED
Start: 2017-07-26 | End: 2017-07-26

## 2017-07-26 RX ORDER — WARFARIN SODIUM 10 MG/1
10 TABLET ORAL ONCE
COMMUNITY
End: 2017-07-29 | Stop reason: HOSPADM

## 2017-07-26 RX ORDER — GLYCOPYRROLATE 0.2 MG/ML
INJECTION INTRAMUSCULAR; INTRAVENOUS AS NEEDED
Status: DISCONTINUED | OUTPATIENT
Start: 2017-07-26 | End: 2017-07-26 | Stop reason: SURG

## 2017-07-26 RX ORDER — HYDROCODONE BITARTRATE AND ACETAMINOPHEN 7.5; 325 MG/1; MG/1
1 TABLET ORAL EVERY 4 HOURS PRN
Status: DISCONTINUED | OUTPATIENT
Start: 2017-07-26 | End: 2017-07-26

## 2017-07-26 RX ORDER — HYDRALAZINE HYDROCHLORIDE 20 MG/ML
5 INJECTION INTRAMUSCULAR; INTRAVENOUS
Status: DISCONTINUED | OUTPATIENT
Start: 2017-07-26 | End: 2017-07-26 | Stop reason: HOSPADM

## 2017-07-26 RX ORDER — LEVOTHYROXINE SODIUM 0.1 MG/1
100 TABLET ORAL DAILY
Status: DISCONTINUED | OUTPATIENT
Start: 2017-07-26 | End: 2017-07-29 | Stop reason: HOSPADM

## 2017-07-26 RX ORDER — DIPHENHYDRAMINE HCL 25 MG
25 CAPSULE ORAL EVERY 6 HOURS PRN
Status: DISCONTINUED | OUTPATIENT
Start: 2017-07-26 | End: 2017-07-29 | Stop reason: HOSPADM

## 2017-07-26 RX ORDER — DOCUSATE SODIUM 100 MG/1
100 CAPSULE, LIQUID FILLED ORAL 2 TIMES DAILY PRN
Status: DISCONTINUED | OUTPATIENT
Start: 2017-07-26 | End: 2017-07-29 | Stop reason: HOSPADM

## 2017-07-26 RX ORDER — LABETALOL HYDROCHLORIDE 5 MG/ML
5 INJECTION, SOLUTION INTRAVENOUS
Status: DISCONTINUED | OUTPATIENT
Start: 2017-07-26 | End: 2017-07-26 | Stop reason: HOSPADM

## 2017-07-26 RX ORDER — ALBUMIN, HUMAN INJ 5% 5 %
SOLUTION INTRAVENOUS CONTINUOUS PRN
Status: DISCONTINUED | OUTPATIENT
Start: 2017-07-26 | End: 2017-07-26 | Stop reason: SURG

## 2017-07-26 RX ORDER — ATORVASTATIN CALCIUM 20 MG/1
40 TABLET, FILM COATED ORAL NIGHTLY
Status: DISCONTINUED | OUTPATIENT
Start: 2017-07-26 | End: 2017-07-29 | Stop reason: HOSPADM

## 2017-07-26 RX ORDER — LIDOCAINE HYDROCHLORIDE 40 MG/ML
SOLUTION TOPICAL AS NEEDED
Status: DISCONTINUED | OUTPATIENT
Start: 2017-07-26 | End: 2017-07-26 | Stop reason: SURG

## 2017-07-26 RX ORDER — FLUMAZENIL 0.1 MG/ML
0.2 INJECTION INTRAVENOUS AS NEEDED
Status: DISCONTINUED | OUTPATIENT
Start: 2017-07-26 | End: 2017-07-26 | Stop reason: HOSPADM

## 2017-07-26 RX ORDER — MENTHOL AND ZINC OXIDE .44; 20.625 G/100G; G/100G
OINTMENT TOPICAL EVERY 12 HOURS SCHEDULED
Status: DISCONTINUED | OUTPATIENT
Start: 2017-07-26 | End: 2017-07-29 | Stop reason: HOSPADM

## 2017-07-26 RX ORDER — EPHEDRINE SULFATE 50 MG/ML
INJECTION, SOLUTION INTRAVENOUS AS NEEDED
Status: DISCONTINUED | OUTPATIENT
Start: 2017-07-26 | End: 2017-07-26 | Stop reason: SURG

## 2017-07-26 RX ORDER — WARFARIN SODIUM 5 MG/1
5 TABLET ORAL
Status: COMPLETED | OUTPATIENT
Start: 2017-07-26 | End: 2017-07-26

## 2017-07-26 RX ORDER — CEFAZOLIN SODIUM 2 G/100ML
2 INJECTION, SOLUTION INTRAVENOUS EVERY 8 HOURS
Status: COMPLETED | OUTPATIENT
Start: 2017-07-26 | End: 2017-07-27

## 2017-07-26 RX ORDER — PREGABALIN 50 MG/1
100 CAPSULE ORAL 2 TIMES DAILY
Status: DISCONTINUED | OUTPATIENT
Start: 2017-07-26 | End: 2017-07-29 | Stop reason: HOSPADM

## 2017-07-26 RX ORDER — FENTANYL CITRATE 50 UG/ML
INJECTION, SOLUTION INTRAMUSCULAR; INTRAVENOUS AS NEEDED
Status: DISCONTINUED | OUTPATIENT
Start: 2017-07-26 | End: 2017-07-26 | Stop reason: SURG

## 2017-07-26 RX ORDER — POTASSIUM CHLORIDE 750 MG/1
10 CAPSULE, EXTENDED RELEASE ORAL DAILY
Status: DISCONTINUED | OUTPATIENT
Start: 2017-07-26 | End: 2017-07-29 | Stop reason: HOSPADM

## 2017-07-26 RX ORDER — DULOXETIN HYDROCHLORIDE 30 MG/1
30 CAPSULE, DELAYED RELEASE ORAL 2 TIMES DAILY
Status: DISCONTINUED | OUTPATIENT
Start: 2017-07-26 | End: 2017-07-29 | Stop reason: HOSPADM

## 2017-07-26 RX ORDER — PROMETHAZINE HYDROCHLORIDE 25 MG/1
12.5 TABLET ORAL ONCE AS NEEDED
Status: DISCONTINUED | OUTPATIENT
Start: 2017-07-26 | End: 2017-07-26 | Stop reason: HOSPADM

## 2017-07-26 RX ORDER — POVIDONE-IODINE 10 MG/G
OINTMENT TOPICAL AS NEEDED
Status: DISCONTINUED | OUTPATIENT
Start: 2017-07-26 | End: 2017-07-27 | Stop reason: HOSPADM

## 2017-07-26 RX ORDER — SENNA AND DOCUSATE SODIUM 50; 8.6 MG/1; MG/1
2 TABLET, FILM COATED ORAL 2 TIMES DAILY
Status: DISCONTINUED | OUTPATIENT
Start: 2017-07-26 | End: 2017-07-29 | Stop reason: HOSPADM

## 2017-07-26 RX ORDER — ONDANSETRON 4 MG/1
4 TABLET, FILM COATED ORAL EVERY 6 HOURS PRN
Status: DISCONTINUED | OUTPATIENT
Start: 2017-07-26 | End: 2017-07-29 | Stop reason: HOSPADM

## 2017-07-26 RX ORDER — PROPOFOL 10 MG/ML
VIAL (ML) INTRAVENOUS AS NEEDED
Status: DISCONTINUED | OUTPATIENT
Start: 2017-07-26 | End: 2017-07-26 | Stop reason: SURG

## 2017-07-26 RX ORDER — FERROUS SULFATE 325(65) MG
325 TABLET ORAL 2 TIMES DAILY
Status: DISCONTINUED | OUTPATIENT
Start: 2017-07-26 | End: 2017-07-26 | Stop reason: SDUPTHER

## 2017-07-26 RX ORDER — EPHEDRINE SULFATE 50 MG/ML
5 INJECTION, SOLUTION INTRAVENOUS ONCE AS NEEDED
Status: DISCONTINUED | OUTPATIENT
Start: 2017-07-26 | End: 2017-07-26 | Stop reason: HOSPADM

## 2017-07-26 RX ORDER — NICOTINE 21 MG/24HR
1 PATCH, TRANSDERMAL 24 HOURS TRANSDERMAL EVERY 24 HOURS
Status: DISCONTINUED | OUTPATIENT
Start: 2017-07-26 | End: 2017-07-29 | Stop reason: HOSPADM

## 2017-07-26 RX ORDER — HYDROMORPHONE HYDROCHLORIDE 1 MG/ML
0.5 INJECTION, SOLUTION INTRAMUSCULAR; INTRAVENOUS; SUBCUTANEOUS
Status: DISCONTINUED | OUTPATIENT
Start: 2017-07-26 | End: 2017-07-29 | Stop reason: HOSPADM

## 2017-07-26 RX ORDER — CEFAZOLIN SODIUM 2 G/100ML
2 INJECTION, SOLUTION INTRAVENOUS ONCE
Status: COMPLETED | OUTPATIENT
Start: 2017-07-26 | End: 2017-07-26

## 2017-07-26 RX ORDER — OXYCODONE AND ACETAMINOPHEN 7.5; 325 MG/1; MG/1
2 TABLET ORAL EVERY 4 HOURS PRN
Status: DISCONTINUED | OUTPATIENT
Start: 2017-07-26 | End: 2017-07-29 | Stop reason: HOSPADM

## 2017-07-26 RX ORDER — ARIPIPRAZOLE 10 MG/1
10 TABLET ORAL DAILY
Status: DISCONTINUED | OUTPATIENT
Start: 2017-07-26 | End: 2017-07-29 | Stop reason: HOSPADM

## 2017-07-26 RX ORDER — MAGNESIUM HYDROXIDE 1200 MG/15ML
LIQUID ORAL AS NEEDED
Status: DISCONTINUED | OUTPATIENT
Start: 2017-07-26 | End: 2017-07-27 | Stop reason: HOSPADM

## 2017-07-26 RX ORDER — FERROUS SULFATE 325(65) MG
325 TABLET ORAL
Status: DISCONTINUED | OUTPATIENT
Start: 2017-07-27 | End: 2017-07-29 | Stop reason: HOSPADM

## 2017-07-26 RX ORDER — OXYBUTYNIN CHLORIDE 10 MG/1
10 TABLET, EXTENDED RELEASE ORAL DAILY
Status: DISCONTINUED | OUTPATIENT
Start: 2017-07-26 | End: 2017-07-29 | Stop reason: HOSPADM

## 2017-07-26 RX ORDER — HYDROMORPHONE HYDROCHLORIDE 1 MG/ML
0.5 INJECTION, SOLUTION INTRAMUSCULAR; INTRAVENOUS; SUBCUTANEOUS
Status: DISCONTINUED | OUTPATIENT
Start: 2017-07-26 | End: 2017-07-26 | Stop reason: HOSPADM

## 2017-07-26 RX ORDER — SODIUM CHLORIDE, SODIUM LACTATE, POTASSIUM CHLORIDE, CALCIUM CHLORIDE 600; 310; 30; 20 MG/100ML; MG/100ML; MG/100ML; MG/100ML
9 INJECTION, SOLUTION INTRAVENOUS CONTINUOUS
Status: DISCONTINUED | OUTPATIENT
Start: 2017-07-26 | End: 2017-07-29 | Stop reason: HOSPADM

## 2017-07-26 RX ADMIN — GLYCOPYRROLATE 0.2 MG: 0.2 INJECTION INTRAMUSCULAR; INTRAVENOUS at 15:47

## 2017-07-26 RX ADMIN — EPHEDRINE SULFATE 10 MG: 50 INJECTION INTRAMUSCULAR; INTRAVENOUS; SUBCUTANEOUS at 13:57

## 2017-07-26 RX ADMIN — PREGABALIN 100 MG: 50 CAPSULE ORAL at 21:41

## 2017-07-26 RX ADMIN — SODIUM CHLORIDE, POTASSIUM CHLORIDE, SODIUM LACTATE AND CALCIUM CHLORIDE 100 ML/HR: 600; 310; 30; 20 INJECTION, SOLUTION INTRAVENOUS at 21:42

## 2017-07-26 RX ADMIN — FENTANYL CITRATE 100 MCG: 50 INJECTION, SOLUTION INTRAMUSCULAR; INTRAVENOUS at 14:27

## 2017-07-26 RX ADMIN — EPHEDRINE SULFATE 10 MG: 50 INJECTION INTRAMUSCULAR; INTRAVENOUS; SUBCUTANEOUS at 14:07

## 2017-07-26 RX ADMIN — MIDAZOLAM 1 MG: 1 INJECTION INTRAMUSCULAR; INTRAVENOUS at 10:03

## 2017-07-26 RX ADMIN — HYDROMORPHONE HYDROCHLORIDE 0.4 MG: 2 INJECTION, SOLUTION INTRAMUSCULAR; INTRAVENOUS; SUBCUTANEOUS at 15:50

## 2017-07-26 RX ADMIN — EPINEPHRINE 0.01 MG: 0.1 INJECTION, SOLUTION ENDOTRACHEAL; INTRACARDIAC; INTRAVENOUS at 15:38

## 2017-07-26 RX ADMIN — PROPOFOL 150 MG: 10 INJECTION, EMULSION INTRAVENOUS at 12:55

## 2017-07-26 RX ADMIN — LIDOCAINE HYDROCHLORIDE 100 MG: 20 INJECTION, SOLUTION INFILTRATION; PERINEURAL at 12:55

## 2017-07-26 RX ADMIN — HYDROCODONE BITARTRATE AND ACETAMINOPHEN 1 TABLET: 7.5; 325 TABLET ORAL at 21:41

## 2017-07-26 RX ADMIN — SODIUM CHLORIDE, POTASSIUM CHLORIDE, SODIUM LACTATE AND CALCIUM CHLORIDE 9 ML/HR: 600; 310; 30; 20 INJECTION, SOLUTION INTRAVENOUS at 10:03

## 2017-07-26 RX ADMIN — EPINEPHRINE 0.01 MG: 0.1 INJECTION, SOLUTION ENDOTRACHEAL; INTRACARDIAC; INTRAVENOUS at 14:38

## 2017-07-26 RX ADMIN — CEFAZOLIN SODIUM 2 G: 2 INJECTION, SOLUTION INTRAVENOUS at 23:14

## 2017-07-26 RX ADMIN — FENTANYL CITRATE 50 MCG: 50 INJECTION, SOLUTION INTRAMUSCULAR; INTRAVENOUS at 13:50

## 2017-07-26 RX ADMIN — EPINEPHRINE 0.01 MG: 0.1 INJECTION, SOLUTION ENDOTRACHEAL; INTRACARDIAC; INTRAVENOUS at 14:27

## 2017-07-26 RX ADMIN — EPHEDRINE SULFATE 10 MG: 50 INJECTION INTRAMUSCULAR; INTRAVENOUS; SUBCUTANEOUS at 13:37

## 2017-07-26 RX ADMIN — ATORVASTATIN CALCIUM 40 MG: 20 TABLET, FILM COATED ORAL at 21:41

## 2017-07-26 RX ADMIN — FENTANYL CITRATE 50 MCG: 50 INJECTION, SOLUTION INTRAMUSCULAR; INTRAVENOUS at 12:50

## 2017-07-26 RX ADMIN — ROCURONIUM BROMIDE 20 MG: 10 INJECTION INTRAVENOUS at 14:27

## 2017-07-26 RX ADMIN — CARVEDILOL 12.5 MG: 12.5 TABLET, FILM COATED ORAL at 21:41

## 2017-07-26 RX ADMIN — SODIUM CHLORIDE, POTASSIUM CHLORIDE, SODIUM LACTATE AND CALCIUM CHLORIDE: 600; 310; 30; 20 INJECTION, SOLUTION INTRAVENOUS at 13:47

## 2017-07-26 RX ADMIN — ALBUMIN (HUMAN): 0.05 SOLUTION INTRAVENOUS at 13:55

## 2017-07-26 RX ADMIN — DEXAMETHASONE SODIUM PHOSPHATE 8 MG: 10 INJECTION INTRAMUSCULAR; INTRAVENOUS at 13:03

## 2017-07-26 RX ADMIN — NEOSTIGMINE METHYLSULFATE 1.5 MG: 1 INJECTION INTRAMUSCULAR; INTRAVENOUS; SUBCUTANEOUS at 15:47

## 2017-07-26 RX ADMIN — FENTANYL CITRATE 50 MCG: 50 INJECTION, SOLUTION INTRAMUSCULAR; INTRAVENOUS at 13:27

## 2017-07-26 RX ADMIN — CEFAZOLIN SODIUM 2 G: 2 INJECTION, SOLUTION INTRAVENOUS at 13:03

## 2017-07-26 RX ADMIN — HYDROCODONE BITARTRATE AND ACETAMINOPHEN 1 TABLET: 7.5; 325 TABLET ORAL at 23:07

## 2017-07-26 RX ADMIN — ROCURONIUM BROMIDE 20 MG: 10 INJECTION INTRAVENOUS at 15:11

## 2017-07-26 RX ADMIN — EPINEPHRINE 0.01 MG: 0.1 INJECTION, SOLUTION ENDOTRACHEAL; INTRACARDIAC; INTRAVENOUS at 14:13

## 2017-07-26 RX ADMIN — HYDROMORPHONE HYDROCHLORIDE 0.4 MG: 2 INJECTION, SOLUTION INTRAMUSCULAR; INTRAVENOUS; SUBCUTANEOUS at 15:04

## 2017-07-26 RX ADMIN — FAMOTIDINE 20 MG: 10 INJECTION INTRAVENOUS at 10:02

## 2017-07-26 RX ADMIN — DULOXETINE HYDROCHLORIDE 30 MG: 30 CAPSULE, DELAYED RELEASE ORAL at 21:41

## 2017-07-26 RX ADMIN — HYDROMORPHONE HYDROCHLORIDE 0.4 MG: 2 INJECTION, SOLUTION INTRAMUSCULAR; INTRAVENOUS; SUBCUTANEOUS at 16:19

## 2017-07-26 RX ADMIN — NICOTINE 1 PATCH: 14 PATCH, EXTENDED RELEASE TRANSDERMAL at 21:38

## 2017-07-26 RX ADMIN — GLYCOPYRROLATE 0.1 MG: 0.2 INJECTION INTRAMUSCULAR; INTRAVENOUS at 12:50

## 2017-07-26 RX ADMIN — LIDOCAINE HYDROCHLORIDE 1 EACH: 40 SPRAY LARYNGEAL; TRANSTRACHEAL at 13:01

## 2017-07-26 RX ADMIN — WARFARIN SODIUM 5 MG: 5 TABLET ORAL at 21:43

## 2017-07-26 RX ADMIN — MIDAZOLAM 1 MG: 1 INJECTION INTRAMUSCULAR; INTRAVENOUS at 11:25

## 2017-07-26 RX ADMIN — LAMOTRIGINE 25 MG: 25 TABLET ORAL at 21:40

## 2017-07-26 RX ADMIN — ROCURONIUM BROMIDE 50 MG: 10 INJECTION INTRAVENOUS at 12:55

## 2017-07-26 RX ADMIN — EPINEPHRINE 0.01 MG: 0.1 INJECTION, SOLUTION ENDOTRACHEAL; INTRACARDIAC; INTRAVENOUS at 15:47

## 2017-07-27 LAB
CREAT BLD-MCNC: 0.72 MG/DL (ref 0.57–1)
GFR SERPL CREATININE-BSD FRML MDRD: 81 ML/MIN/1.73
HCT VFR BLD AUTO: 30.6 % (ref 35.6–45.5)
HGB BLD-MCNC: 10.2 G/DL (ref 11.9–15.5)
INR PPP: 1.18 (ref 0.9–1.1)
PROTHROMBIN TIME: 14.5 SECONDS (ref 11.7–14.2)

## 2017-07-27 PROCEDURE — 97110 THERAPEUTIC EXERCISES: CPT

## 2017-07-27 PROCEDURE — 85610 PROTHROMBIN TIME: CPT | Performed by: ORTHOPAEDIC SURGERY

## 2017-07-27 PROCEDURE — 25010000003 CEFAZOLIN IN DEXTROSE 2-4 GM/100ML-% SOLUTION: Performed by: ORTHOPAEDIC SURGERY

## 2017-07-27 PROCEDURE — 97161 PT EVAL LOW COMPLEX 20 MIN: CPT

## 2017-07-27 PROCEDURE — 94799 UNLISTED PULMONARY SVC/PX: CPT

## 2017-07-27 PROCEDURE — 25010000002 ENOXAPARIN PER 10 MG: Performed by: PHYSICIAN ASSISTANT

## 2017-07-27 PROCEDURE — 85018 HEMOGLOBIN: CPT | Performed by: ORTHOPAEDIC SURGERY

## 2017-07-27 PROCEDURE — 85014 HEMATOCRIT: CPT | Performed by: ORTHOPAEDIC SURGERY

## 2017-07-27 PROCEDURE — 25010000002 VANCOMYCIN: Performed by: ORTHOPAEDIC SURGERY

## 2017-07-27 PROCEDURE — 82565 ASSAY OF CREATININE: CPT | Performed by: ORTHOPAEDIC SURGERY

## 2017-07-27 RX ORDER — WARFARIN SODIUM 6 MG/1
6 TABLET ORAL
Status: COMPLETED | OUTPATIENT
Start: 2017-07-27 | End: 2017-07-27

## 2017-07-27 RX ADMIN — FERROUS SULFATE TAB 325 MG (65 MG ELEMENTAL FE) 325 MG: 325 (65 FE) TAB at 08:27

## 2017-07-27 RX ADMIN — AMLODIPINE BESYLATE 10 MG: 10 TABLET ORAL at 08:27

## 2017-07-27 RX ADMIN — PREGABALIN 100 MG: 50 CAPSULE ORAL at 08:27

## 2017-07-27 RX ADMIN — LEVOTHYROXINE SODIUM 100 MCG: 100 TABLET ORAL at 08:27

## 2017-07-27 RX ADMIN — CLOPIDOGREL 75 MG: 75 TABLET, FILM COATED ORAL at 08:27

## 2017-07-27 RX ADMIN — CARVEDILOL 12.5 MG: 12.5 TABLET, FILM COATED ORAL at 08:27

## 2017-07-27 RX ADMIN — DULOXETINE HYDROCHLORIDE 30 MG: 30 CAPSULE, DELAYED RELEASE ORAL at 17:25

## 2017-07-27 RX ADMIN — ATORVASTATIN CALCIUM 40 MG: 20 TABLET, FILM COATED ORAL at 21:11

## 2017-07-27 RX ADMIN — SODIUM CHLORIDE, POTASSIUM CHLORIDE, SODIUM LACTATE AND CALCIUM CHLORIDE 100 ML/HR: 600; 310; 30; 20 INJECTION, SOLUTION INTRAVENOUS at 04:31

## 2017-07-27 RX ADMIN — ASPIRIN 81 MG: 81 TABLET ORAL at 08:27

## 2017-07-27 RX ADMIN — VANCOMYCIN HYDROCHLORIDE 1750 MG: 1 INJECTION, POWDER, LYOPHILIZED, FOR SOLUTION INTRAVENOUS at 21:11

## 2017-07-27 RX ADMIN — LAMOTRIGINE 25 MG: 25 TABLET ORAL at 17:25

## 2017-07-27 RX ADMIN — MUPIROCIN 1 APPLICATION: 20 OINTMENT TOPICAL at 08:28

## 2017-07-27 RX ADMIN — ARIPIPRAZOLE 10 MG: 10 TABLET ORAL at 08:27

## 2017-07-27 RX ADMIN — Medication: at 21:11

## 2017-07-27 RX ADMIN — POTASSIUM CHLORIDE 10 MEQ: 750 CAPSULE, EXTENDED RELEASE ORAL at 08:27

## 2017-07-27 RX ADMIN — DOCUSATE SODIUM -SENNOSIDES 2 TABLET: 50; 8.6 TABLET, COATED ORAL at 17:25

## 2017-07-27 RX ADMIN — FUROSEMIDE 20 MG: 20 TABLET ORAL at 08:27

## 2017-07-27 RX ADMIN — HYDROCODONE BITARTRATE AND ACETAMINOPHEN 2 TABLET: 7.5; 325 TABLET ORAL at 04:28

## 2017-07-27 RX ADMIN — OXYBUTYNIN CHLORIDE 10 MG: 10 TABLET, EXTENDED RELEASE ORAL at 08:27

## 2017-07-27 RX ADMIN — WARFARIN SODIUM 6 MG: 6 TABLET ORAL at 17:25

## 2017-07-27 RX ADMIN — PANTOPRAZOLE SODIUM 40 MG: 40 TABLET, DELAYED RELEASE ORAL at 06:22

## 2017-07-27 RX ADMIN — NICOTINE 1 PATCH: 14 PATCH, EXTENDED RELEASE TRANSDERMAL at 18:03

## 2017-07-27 RX ADMIN — LAMOTRIGINE 25 MG: 25 TABLET ORAL at 08:27

## 2017-07-27 RX ADMIN — FLUTICASONE PROPIONATE 2 SPRAY: 50 SPRAY, METERED NASAL at 08:25

## 2017-07-27 RX ADMIN — VANCOMYCIN HYDROCHLORIDE 2000 MG: 1 INJECTION, POWDER, LYOPHILIZED, FOR SOLUTION INTRAVENOUS at 10:09

## 2017-07-27 RX ADMIN — HYDROCODONE BITARTRATE AND ACETAMINOPHEN 2 TABLET: 7.5; 325 TABLET ORAL at 21:11

## 2017-07-27 RX ADMIN — CEFAZOLIN SODIUM 2 G: 2 INJECTION, SOLUTION INTRAVENOUS at 06:22

## 2017-07-27 RX ADMIN — ENOXAPARIN SODIUM 40 MG: 40 INJECTION SUBCUTANEOUS at 08:25

## 2017-07-27 RX ADMIN — DULOXETINE HYDROCHLORIDE 30 MG: 30 CAPSULE, DELAYED RELEASE ORAL at 08:27

## 2017-07-27 RX ADMIN — HYDROCODONE BITARTRATE AND ACETAMINOPHEN 2 TABLET: 7.5; 325 TABLET ORAL at 12:41

## 2017-07-27 RX ADMIN — PREGABALIN 100 MG: 50 CAPSULE ORAL at 17:25

## 2017-07-27 RX ADMIN — HYDROCODONE BITARTRATE AND ACETAMINOPHEN 2 TABLET: 7.5; 325 TABLET ORAL at 17:25

## 2017-07-27 RX ADMIN — DOCUSATE SODIUM -SENNOSIDES 2 TABLET: 50; 8.6 TABLET, COATED ORAL at 08:27

## 2017-07-27 RX ADMIN — HYDROCODONE BITARTRATE AND ACETAMINOPHEN 2 TABLET: 7.5; 325 TABLET ORAL at 08:26

## 2017-07-27 RX ADMIN — ISOSORBIDE MONONITRATE 30 MG: 30 TABLET ORAL at 08:27

## 2017-07-28 LAB
HCT VFR BLD AUTO: 25.7 % (ref 35.6–45.5)
HGB BLD-MCNC: 8.3 G/DL (ref 11.9–15.5)
INR PPP: 1.69 (ref 0.9–1.1)
PROTHROMBIN TIME: 19.3 SECONDS (ref 11.7–14.2)

## 2017-07-28 PROCEDURE — 85610 PROTHROMBIN TIME: CPT | Performed by: ORTHOPAEDIC SURGERY

## 2017-07-28 PROCEDURE — 94799 UNLISTED PULMONARY SVC/PX: CPT

## 2017-07-28 PROCEDURE — 25010000002 ENOXAPARIN PER 10 MG: Performed by: ORTHOPAEDIC SURGERY

## 2017-07-28 PROCEDURE — 02HV33Z INSERTION OF INFUSION DEVICE INTO SUPERIOR VENA CAVA, PERCUTANEOUS APPROACH: ICD-10-PCS | Performed by: ORTHOPAEDIC SURGERY

## 2017-07-28 PROCEDURE — 85018 HEMOGLOBIN: CPT | Performed by: ORTHOPAEDIC SURGERY

## 2017-07-28 PROCEDURE — 97110 THERAPEUTIC EXERCISES: CPT

## 2017-07-28 PROCEDURE — 25010000002 VANCOMYCIN: Performed by: ORTHOPAEDIC SURGERY

## 2017-07-28 PROCEDURE — 85014 HEMATOCRIT: CPT | Performed by: ORTHOPAEDIC SURGERY

## 2017-07-28 RX ORDER — WARFARIN SODIUM 5 MG/1
5 TABLET ORAL
Status: COMPLETED | OUTPATIENT
Start: 2017-07-28 | End: 2017-07-28

## 2017-07-28 RX ADMIN — DOCUSATE SODIUM -SENNOSIDES 2 TABLET: 50; 8.6 TABLET, COATED ORAL at 09:11

## 2017-07-28 RX ADMIN — LEVOTHYROXINE SODIUM 100 MCG: 100 TABLET ORAL at 09:11

## 2017-07-28 RX ADMIN — HYDROCODONE BITARTRATE AND ACETAMINOPHEN 2 TABLET: 7.5; 325 TABLET ORAL at 17:06

## 2017-07-28 RX ADMIN — HYDROCODONE BITARTRATE AND ACETAMINOPHEN 2 TABLET: 7.5; 325 TABLET ORAL at 05:03

## 2017-07-28 RX ADMIN — NICOTINE 1 PATCH: 14 PATCH, EXTENDED RELEASE TRANSDERMAL at 18:32

## 2017-07-28 RX ADMIN — PREGABALIN 100 MG: 50 CAPSULE ORAL at 17:06

## 2017-07-28 RX ADMIN — TIOTROPIUM BROMIDE 1 CAPSULE: 18 CAPSULE ORAL; RESPIRATORY (INHALATION) at 08:30

## 2017-07-28 RX ADMIN — ATORVASTATIN CALCIUM 40 MG: 20 TABLET, FILM COATED ORAL at 20:52

## 2017-07-28 RX ADMIN — FUROSEMIDE 20 MG: 20 TABLET ORAL at 09:11

## 2017-07-28 RX ADMIN — VANCOMYCIN HYDROCHLORIDE 1750 MG: 1 INJECTION, POWDER, LYOPHILIZED, FOR SOLUTION INTRAVENOUS at 22:20

## 2017-07-28 RX ADMIN — POTASSIUM CHLORIDE 10 MEQ: 750 CAPSULE, EXTENDED RELEASE ORAL at 09:11

## 2017-07-28 RX ADMIN — LAMOTRIGINE 25 MG: 25 TABLET ORAL at 17:06

## 2017-07-28 RX ADMIN — ASPIRIN 81 MG: 81 TABLET ORAL at 09:12

## 2017-07-28 RX ADMIN — LAMOTRIGINE 25 MG: 25 TABLET ORAL at 09:11

## 2017-07-28 RX ADMIN — OXYBUTYNIN CHLORIDE 10 MG: 10 TABLET, EXTENDED RELEASE ORAL at 09:12

## 2017-07-28 RX ADMIN — CLOPIDOGREL 75 MG: 75 TABLET, FILM COATED ORAL at 09:11

## 2017-07-28 RX ADMIN — FERROUS SULFATE TAB 325 MG (65 MG ELEMENTAL FE) 325 MG: 325 (65 FE) TAB at 09:12

## 2017-07-28 RX ADMIN — HYDROCODONE BITARTRATE AND ACETAMINOPHEN 2 TABLET: 7.5; 325 TABLET ORAL at 21:20

## 2017-07-28 RX ADMIN — ARIPIPRAZOLE 10 MG: 10 TABLET ORAL at 09:12

## 2017-07-28 RX ADMIN — DULOXETINE HYDROCHLORIDE 30 MG: 30 CAPSULE, DELAYED RELEASE ORAL at 17:06

## 2017-07-28 RX ADMIN — FLUTICASONE PROPIONATE 2 SPRAY: 50 SPRAY, METERED NASAL at 09:12

## 2017-07-28 RX ADMIN — PREGABALIN 100 MG: 50 CAPSULE ORAL at 09:11

## 2017-07-28 RX ADMIN — HYDROCODONE BITARTRATE AND ACETAMINOPHEN 2 TABLET: 7.5; 325 TABLET ORAL at 13:10

## 2017-07-28 RX ADMIN — HYDROCODONE BITARTRATE AND ACETAMINOPHEN 2 TABLET: 7.5; 325 TABLET ORAL at 09:11

## 2017-07-28 RX ADMIN — PANTOPRAZOLE SODIUM 40 MG: 40 TABLET, DELAYED RELEASE ORAL at 05:03

## 2017-07-28 RX ADMIN — DULOXETINE HYDROCHLORIDE 30 MG: 30 CAPSULE, DELAYED RELEASE ORAL at 09:11

## 2017-07-28 RX ADMIN — ENOXAPARIN SODIUM 40 MG: 40 INJECTION SUBCUTANEOUS at 09:11

## 2017-07-28 RX ADMIN — MUPIROCIN 1 APPLICATION: 20 OINTMENT TOPICAL at 09:54

## 2017-07-28 RX ADMIN — WARFARIN SODIUM 5 MG: 5 TABLET ORAL at 17:06

## 2017-07-28 RX ADMIN — VANCOMYCIN HYDROCHLORIDE 1750 MG: 1 INJECTION, POWDER, LYOPHILIZED, FOR SOLUTION INTRAVENOUS at 09:11

## 2017-07-29 VITALS
DIASTOLIC BLOOD PRESSURE: 78 MMHG | SYSTOLIC BLOOD PRESSURE: 120 MMHG | BODY MASS INDEX: 35.68 KG/M2 | RESPIRATION RATE: 16 BRPM | HEIGHT: 64 IN | WEIGHT: 209 LBS | OXYGEN SATURATION: 96 % | HEART RATE: 76 BPM | TEMPERATURE: 98.1 F

## 2017-07-29 LAB
BACTERIA SPEC AEROBE CULT: NORMAL
CREAT BLD-MCNC: 0.58 MG/DL (ref 0.57–1)
GFR SERPL CREATININE-BSD FRML MDRD: 104 ML/MIN/1.73
GRAM STN SPEC: NORMAL
HCT VFR BLD AUTO: 23.6 % (ref 35.6–45.5)
HGB BLD-MCNC: 7.6 G/DL (ref 11.9–15.5)
INR PPP: 1.7 (ref 0.9–1.1)
PROTHROMBIN TIME: 19.4 SECONDS (ref 11.7–14.2)
VANCOMYCIN TROUGH SERPL-MCNC: 19.3 MCG/ML (ref 5–20)

## 2017-07-29 PROCEDURE — 85014 HEMATOCRIT: CPT | Performed by: ORTHOPAEDIC SURGERY

## 2017-07-29 PROCEDURE — 85610 PROTHROMBIN TIME: CPT | Performed by: ORTHOPAEDIC SURGERY

## 2017-07-29 PROCEDURE — 80202 ASSAY OF VANCOMYCIN: CPT | Performed by: ORTHOPAEDIC SURGERY

## 2017-07-29 PROCEDURE — 25010000002 ENOXAPARIN PER 10 MG: Performed by: ORTHOPAEDIC SURGERY

## 2017-07-29 PROCEDURE — 82565 ASSAY OF CREATININE: CPT | Performed by: PHYSICIAN ASSISTANT

## 2017-07-29 PROCEDURE — 94799 UNLISTED PULMONARY SVC/PX: CPT

## 2017-07-29 PROCEDURE — 97110 THERAPEUTIC EXERCISES: CPT

## 2017-07-29 PROCEDURE — 25010000002 VANCOMYCIN: Performed by: ORTHOPAEDIC SURGERY

## 2017-07-29 PROCEDURE — 85018 HEMOGLOBIN: CPT | Performed by: ORTHOPAEDIC SURGERY

## 2017-07-29 RX ORDER — OXYCODONE AND ACETAMINOPHEN 7.5; 325 MG/1; MG/1
2 TABLET ORAL EVERY 4 HOURS PRN
Qty: 56 TABLET | Refills: 0 | Status: SHIPPED | OUTPATIENT
Start: 2017-07-29 | End: 2017-08-06 | Stop reason: HOSPADM

## 2017-07-29 RX ORDER — WARFARIN SODIUM 6 MG/1
6 TABLET ORAL
Qty: 40 TABLET | Refills: 0 | Status: SHIPPED | OUTPATIENT
Start: 2017-07-29 | End: 2018-04-27 | Stop reason: ALTCHOICE

## 2017-07-29 RX ORDER — WARFARIN SODIUM 6 MG/1
6 TABLET ORAL
Status: DISCONTINUED | OUTPATIENT
Start: 2017-07-29 | End: 2017-07-29 | Stop reason: HOSPADM

## 2017-07-29 RX ADMIN — FLUTICASONE PROPIONATE 2 SPRAY: 50 SPRAY, METERED NASAL at 08:11

## 2017-07-29 RX ADMIN — ASPIRIN 81 MG: 81 TABLET ORAL at 08:08

## 2017-07-29 RX ADMIN — FERROUS SULFATE TAB 325 MG (65 MG ELEMENTAL FE) 325 MG: 325 (65 FE) TAB at 08:07

## 2017-07-29 RX ADMIN — HYDROCODONE BITARTRATE AND ACETAMINOPHEN 2 TABLET: 7.5; 325 TABLET ORAL at 18:02

## 2017-07-29 RX ADMIN — PREGABALIN 100 MG: 50 CAPSULE ORAL at 18:02

## 2017-07-29 RX ADMIN — DULOXETINE HYDROCHLORIDE 30 MG: 30 CAPSULE, DELAYED RELEASE ORAL at 18:02

## 2017-07-29 RX ADMIN — LEVOTHYROXINE SODIUM 100 MCG: 100 TABLET ORAL at 08:08

## 2017-07-29 RX ADMIN — WARFARIN SODIUM 6 MG: 6 TABLET ORAL at 18:10

## 2017-07-29 RX ADMIN — PANTOPRAZOLE SODIUM 40 MG: 40 TABLET, DELAYED RELEASE ORAL at 05:32

## 2017-07-29 RX ADMIN — OXYBUTYNIN CHLORIDE 10 MG: 10 TABLET, EXTENDED RELEASE ORAL at 09:37

## 2017-07-29 RX ADMIN — TIOTROPIUM BROMIDE 1 CAPSULE: 18 CAPSULE ORAL; RESPIRATORY (INHALATION) at 07:59

## 2017-07-29 RX ADMIN — LAMOTRIGINE 25 MG: 25 TABLET ORAL at 08:08

## 2017-07-29 RX ADMIN — MUPIROCIN 1 APPLICATION: 20 OINTMENT TOPICAL at 05:32

## 2017-07-29 RX ADMIN — DULOXETINE HYDROCHLORIDE 30 MG: 30 CAPSULE, DELAYED RELEASE ORAL at 08:09

## 2017-07-29 RX ADMIN — Medication: at 08:11

## 2017-07-29 RX ADMIN — LAMOTRIGINE 25 MG: 25 TABLET ORAL at 18:02

## 2017-07-29 RX ADMIN — HYDROCODONE BITARTRATE AND ACETAMINOPHEN 2 TABLET: 7.5; 325 TABLET ORAL at 01:26

## 2017-07-29 RX ADMIN — CARVEDILOL 12.5 MG: 12.5 TABLET, FILM COATED ORAL at 18:02

## 2017-07-29 RX ADMIN — POTASSIUM CHLORIDE 10 MEQ: 750 CAPSULE, EXTENDED RELEASE ORAL at 08:09

## 2017-07-29 RX ADMIN — PREGABALIN 100 MG: 50 CAPSULE ORAL at 08:07

## 2017-07-29 RX ADMIN — HYDROCODONE BITARTRATE AND ACETAMINOPHEN 2 TABLET: 7.5; 325 TABLET ORAL at 14:02

## 2017-07-29 RX ADMIN — ISOSORBIDE MONONITRATE 30 MG: 30 TABLET ORAL at 09:37

## 2017-07-29 RX ADMIN — FUROSEMIDE 20 MG: 20 TABLET ORAL at 08:08

## 2017-07-29 RX ADMIN — CARVEDILOL 12.5 MG: 12.5 TABLET, FILM COATED ORAL at 08:09

## 2017-07-29 RX ADMIN — ARIPIPRAZOLE 10 MG: 10 TABLET ORAL at 08:07

## 2017-07-29 RX ADMIN — HYDROCODONE BITARTRATE AND ACETAMINOPHEN 2 TABLET: 7.5; 325 TABLET ORAL at 05:32

## 2017-07-29 RX ADMIN — ENOXAPARIN SODIUM 40 MG: 40 INJECTION SUBCUTANEOUS at 08:10

## 2017-07-29 RX ADMIN — HYDROCODONE BITARTRATE AND ACETAMINOPHEN 2 TABLET: 7.5; 325 TABLET ORAL at 09:37

## 2017-07-29 RX ADMIN — CLOPIDOGREL 75 MG: 75 TABLET, FILM COATED ORAL at 08:09

## 2017-07-29 RX ADMIN — VANCOMYCIN HYDROCHLORIDE 1750 MG: 1 INJECTION, POWDER, LYOPHILIZED, FOR SOLUTION INTRAVENOUS at 11:00

## 2017-07-30 LAB
ABO + RH BLD: NORMAL
ABO + RH BLD: NORMAL
BH BB BLOOD EXPIRATION DATE: NORMAL
BH BB BLOOD EXPIRATION DATE: NORMAL
BH BB BLOOD TYPE BARCODE: 6200
BH BB BLOOD TYPE BARCODE: 6200
BH BB DISPENSE STATUS: NORMAL
BH BB DISPENSE STATUS: NORMAL
BH BB PRODUCT CODE: NORMAL
BH BB PRODUCT CODE: NORMAL
BH BB UNIT NUMBER: NORMAL
BH BB UNIT NUMBER: NORMAL
CROSSMATCH INTERPRETATION: NORMAL
CROSSMATCH INTERPRETATION: NORMAL
UNIT  ABO: NORMAL
UNIT  ABO: NORMAL
UNIT  RH: NORMAL
UNIT  RH: NORMAL

## 2017-07-31 LAB — BACTERIA SPEC ANAEROBE CULT: NORMAL

## 2017-08-02 ENCOUNTER — APPOINTMENT (OUTPATIENT)
Dept: GENERAL RADIOLOGY | Facility: HOSPITAL | Age: 67
End: 2017-08-02

## 2017-08-02 ENCOUNTER — ANESTHESIA (OUTPATIENT)
Dept: PERIOP | Facility: HOSPITAL | Age: 67
End: 2017-08-02

## 2017-08-02 ENCOUNTER — ANESTHESIA EVENT (OUTPATIENT)
Dept: PERIOP | Facility: HOSPITAL | Age: 67
End: 2017-08-02

## 2017-08-02 ENCOUNTER — HOSPITAL ENCOUNTER (OUTPATIENT)
Facility: HOSPITAL | Age: 67
Setting detail: OBSERVATION
Discharge: HOME HEALTH | End: 2017-08-06
Attending: ORTHOPAEDIC SURGERY | Admitting: ORTHOPAEDIC SURGERY

## 2017-08-02 PROBLEM — S73.004A DISLOCATION OF RIGHT HIP (HCC): Status: ACTIVE | Noted: 2017-08-02

## 2017-08-02 PROCEDURE — 25010000002 PHENYLEPHRINE PER 1 ML: Performed by: ANESTHESIOLOGY

## 2017-08-02 PROCEDURE — G0378 HOSPITAL OBSERVATION PER HR: HCPCS

## 2017-08-02 PROCEDURE — 25010000002 DEXAMETHASONE PER 1 MG: Performed by: ANESTHESIOLOGY

## 2017-08-02 PROCEDURE — 73501 X-RAY EXAM HIP UNI 1 VIEW: CPT

## 2017-08-02 PROCEDURE — 72170 X-RAY EXAM OF PELVIS: CPT

## 2017-08-02 PROCEDURE — 25010000002 ONDANSETRON PER 1 MG: Performed by: ANESTHESIOLOGY

## 2017-08-02 PROCEDURE — 25010000002 HYDROMORPHONE PER 4 MG: Performed by: NURSE ANESTHETIST, CERTIFIED REGISTERED

## 2017-08-02 PROCEDURE — 94799 UNLISTED PULMONARY SVC/PX: CPT

## 2017-08-02 PROCEDURE — 25010000003 CEFAZOLIN IN DEXTROSE 2-4 GM/100ML-% SOLUTION: Performed by: ORTHOPAEDIC SURGERY

## 2017-08-02 PROCEDURE — 25010000002 VANCOMYCIN PER 500 MG: Performed by: ANESTHESIOLOGY

## 2017-08-02 PROCEDURE — 71010 HC CHEST PA OR AP: CPT

## 2017-08-02 PROCEDURE — 25010000002 FENTANYL CITRATE (PF) 100 MCG/2ML SOLUTION: Performed by: ANESTHESIOLOGY

## 2017-08-02 PROCEDURE — 25010000002 VANCOMYCIN: Performed by: ORTHOPAEDIC SURGERY

## 2017-08-02 PROCEDURE — 25010000002 MIDAZOLAM PER 1 MG: Performed by: ANESTHESIOLOGY

## 2017-08-02 PROCEDURE — 25010000002 SUCCINYLCHOLINE PER 20 MG: Performed by: ANESTHESIOLOGY

## 2017-08-02 PROCEDURE — 25010000002 PROPOFOL 10 MG/ML EMULSION: Performed by: ANESTHESIOLOGY

## 2017-08-02 PROCEDURE — 25010000002 FENTANYL CITRATE (PF) 100 MCG/2ML SOLUTION: Performed by: NURSE ANESTHETIST, CERTIFIED REGISTERED

## 2017-08-02 RX ORDER — SENNA AND DOCUSATE SODIUM 50; 8.6 MG/1; MG/1
2 TABLET, FILM COATED ORAL 2 TIMES DAILY
Status: DISCONTINUED | OUTPATIENT
Start: 2017-08-02 | End: 2017-08-06 | Stop reason: HOSPADM

## 2017-08-02 RX ORDER — FENTANYL CITRATE 50 UG/ML
INJECTION, SOLUTION INTRAMUSCULAR; INTRAVENOUS AS NEEDED
Status: DISCONTINUED | OUTPATIENT
Start: 2017-08-02 | End: 2017-08-02 | Stop reason: SURG

## 2017-08-02 RX ORDER — FLUMAZENIL 0.1 MG/ML
0.2 INJECTION INTRAVENOUS AS NEEDED
Status: DISCONTINUED | OUTPATIENT
Start: 2017-08-02 | End: 2017-08-02 | Stop reason: HOSPADM

## 2017-08-02 RX ORDER — OXYCODONE AND ACETAMINOPHEN 7.5; 325 MG/1; MG/1
2 TABLET ORAL EVERY 4 HOURS PRN
Status: DISPENSED | OUTPATIENT
Start: 2017-08-02 | End: 2017-08-05

## 2017-08-02 RX ORDER — LOPERAMIDE HYDROCHLORIDE 2 MG/1
2 CAPSULE ORAL EVERY 6 HOURS PRN
Status: DISCONTINUED | OUTPATIENT
Start: 2017-08-02 | End: 2017-08-06 | Stop reason: HOSPADM

## 2017-08-02 RX ORDER — LEVOTHYROXINE SODIUM 0.1 MG/1
100 TABLET ORAL DAILY
Status: DISCONTINUED | OUTPATIENT
Start: 2017-08-02 | End: 2017-08-06 | Stop reason: HOSPADM

## 2017-08-02 RX ORDER — MIDAZOLAM HYDROCHLORIDE 1 MG/ML
1 INJECTION INTRAMUSCULAR; INTRAVENOUS
Status: DISCONTINUED | OUTPATIENT
Start: 2017-08-02 | End: 2017-08-02 | Stop reason: HOSPADM

## 2017-08-02 RX ORDER — BISACODYL 5 MG/1
10 TABLET, DELAYED RELEASE ORAL DAILY PRN
Status: DISCONTINUED | OUTPATIENT
Start: 2017-08-02 | End: 2017-08-06 | Stop reason: HOSPADM

## 2017-08-02 RX ORDER — MENTHOL AND ZINC OXIDE .44; 20.625 G/100G; G/100G
OINTMENT TOPICAL EVERY 12 HOURS SCHEDULED
Status: DISCONTINUED | OUTPATIENT
Start: 2017-08-02 | End: 2017-08-06 | Stop reason: HOSPADM

## 2017-08-02 RX ORDER — PROMETHAZINE HYDROCHLORIDE 25 MG/1
25 SUPPOSITORY RECTAL ONCE AS NEEDED
Status: DISCONTINUED | OUTPATIENT
Start: 2017-08-02 | End: 2017-08-02 | Stop reason: HOSPADM

## 2017-08-02 RX ORDER — SODIUM CHLORIDE, SODIUM LACTATE, POTASSIUM CHLORIDE, CALCIUM CHLORIDE 600; 310; 30; 20 MG/100ML; MG/100ML; MG/100ML; MG/100ML
9 INJECTION, SOLUTION INTRAVENOUS CONTINUOUS
Status: DISCONTINUED | OUTPATIENT
Start: 2017-08-02 | End: 2017-08-03

## 2017-08-02 RX ORDER — CEFAZOLIN SODIUM 2 G/100ML
2 INJECTION, SOLUTION INTRAVENOUS ONCE
Status: COMPLETED | OUTPATIENT
Start: 2017-08-02 | End: 2017-08-02

## 2017-08-02 RX ORDER — ACETAMINOPHEN 325 MG/1
325 TABLET ORAL EVERY 4 HOURS PRN
Status: DISCONTINUED | OUTPATIENT
Start: 2017-08-02 | End: 2017-08-06 | Stop reason: HOSPADM

## 2017-08-02 RX ORDER — ATORVASTATIN CALCIUM 20 MG/1
40 TABLET, FILM COATED ORAL NIGHTLY
Status: DISCONTINUED | OUTPATIENT
Start: 2017-08-02 | End: 2017-08-06 | Stop reason: HOSPADM

## 2017-08-02 RX ORDER — PANTOPRAZOLE SODIUM 40 MG/1
40 TABLET, DELAYED RELEASE ORAL EVERY MORNING
Status: DISCONTINUED | OUTPATIENT
Start: 2017-08-03 | End: 2017-08-06 | Stop reason: HOSPADM

## 2017-08-02 RX ORDER — DOCUSATE SODIUM 100 MG/1
100 CAPSULE, LIQUID FILLED ORAL 2 TIMES DAILY PRN
Status: DISCONTINUED | OUTPATIENT
Start: 2017-08-02 | End: 2017-08-06 | Stop reason: HOSPADM

## 2017-08-02 RX ORDER — PROMETHAZINE HYDROCHLORIDE 25 MG/ML
12.5 INJECTION, SOLUTION INTRAMUSCULAR; INTRAVENOUS ONCE AS NEEDED
Status: DISCONTINUED | OUTPATIENT
Start: 2017-08-02 | End: 2017-08-02 | Stop reason: HOSPADM

## 2017-08-02 RX ORDER — SUCCINYLCHOLINE CHLORIDE 20 MG/ML
INJECTION INTRAMUSCULAR; INTRAVENOUS AS NEEDED
Status: DISCONTINUED | OUTPATIENT
Start: 2017-08-02 | End: 2017-08-02 | Stop reason: SURG

## 2017-08-02 RX ORDER — EPHEDRINE SULFATE 50 MG/ML
5 INJECTION, SOLUTION INTRAVENOUS ONCE AS NEEDED
Status: DISCONTINUED | OUTPATIENT
Start: 2017-08-02 | End: 2017-08-02 | Stop reason: HOSPADM

## 2017-08-02 RX ORDER — POTASSIUM CHLORIDE 750 MG/1
10 CAPSULE, EXTENDED RELEASE ORAL DAILY
Status: DISCONTINUED | OUTPATIENT
Start: 2017-08-02 | End: 2017-08-04

## 2017-08-02 RX ORDER — FERROUS SULFATE 325(65) MG
325 TABLET ORAL 2 TIMES DAILY
Status: DISCONTINUED | OUTPATIENT
Start: 2017-08-02 | End: 2017-08-06 | Stop reason: HOSPADM

## 2017-08-02 RX ORDER — OXYBUTYNIN CHLORIDE 10 MG/1
10 TABLET, EXTENDED RELEASE ORAL DAILY
Status: DISCONTINUED | OUTPATIENT
Start: 2017-08-02 | End: 2017-08-06 | Stop reason: HOSPADM

## 2017-08-02 RX ORDER — SODIUM CHLORIDE 0.9 % (FLUSH) 0.9 %
1-10 SYRINGE (ML) INJECTION AS NEEDED
Status: DISCONTINUED | OUTPATIENT
Start: 2017-08-02 | End: 2017-08-02 | Stop reason: HOSPADM

## 2017-08-02 RX ORDER — FLUTICASONE PROPIONATE 50 MCG
2 SPRAY, SUSPENSION (ML) NASAL DAILY
Status: DISCONTINUED | OUTPATIENT
Start: 2017-08-02 | End: 2017-08-06 | Stop reason: HOSPADM

## 2017-08-02 RX ORDER — CARVEDILOL 12.5 MG/1
12.5 TABLET ORAL 2 TIMES DAILY WITH MEALS
Status: DISCONTINUED | OUTPATIENT
Start: 2017-08-02 | End: 2017-08-06 | Stop reason: HOSPADM

## 2017-08-02 RX ORDER — DIPHENHYDRAMINE HYDROCHLORIDE 50 MG/ML
12.5 INJECTION INTRAMUSCULAR; INTRAVENOUS
Status: DISCONTINUED | OUTPATIENT
Start: 2017-08-02 | End: 2017-08-02 | Stop reason: HOSPADM

## 2017-08-02 RX ORDER — AMLODIPINE BESYLATE 10 MG/1
10 TABLET ORAL DAILY
Status: DISCONTINUED | OUTPATIENT
Start: 2017-08-02 | End: 2017-08-03

## 2017-08-02 RX ORDER — FAMOTIDINE 10 MG/ML
20 INJECTION, SOLUTION INTRAVENOUS ONCE
Status: COMPLETED | OUTPATIENT
Start: 2017-08-02 | End: 2017-08-02

## 2017-08-02 RX ORDER — LAMOTRIGINE 25 MG/1
25 TABLET ORAL 2 TIMES DAILY
Status: DISCONTINUED | OUTPATIENT
Start: 2017-08-02 | End: 2017-08-06 | Stop reason: HOSPADM

## 2017-08-02 RX ORDER — LABETALOL HYDROCHLORIDE 5 MG/ML
5 INJECTION, SOLUTION INTRAVENOUS
Status: DISCONTINUED | OUTPATIENT
Start: 2017-08-02 | End: 2017-08-02 | Stop reason: HOSPADM

## 2017-08-02 RX ORDER — FERROUS SULFATE 325(65) MG
325 TABLET ORAL
Status: DISCONTINUED | OUTPATIENT
Start: 2017-08-03 | End: 2017-08-04

## 2017-08-02 RX ORDER — DIPHENHYDRAMINE HCL 25 MG
25 CAPSULE ORAL EVERY 6 HOURS PRN
Status: DISCONTINUED | OUTPATIENT
Start: 2017-08-02 | End: 2017-08-06 | Stop reason: HOSPADM

## 2017-08-02 RX ORDER — HYDROCODONE BITARTRATE AND ACETAMINOPHEN 7.5; 325 MG/1; MG/1
1 TABLET ORAL EVERY 4 HOURS PRN
Status: DISCONTINUED | OUTPATIENT
Start: 2017-08-02 | End: 2017-08-06 | Stop reason: HOSPADM

## 2017-08-02 RX ORDER — ARIPIPRAZOLE 10 MG/1
10 TABLET ORAL DAILY
Status: DISCONTINUED | OUTPATIENT
Start: 2017-08-02 | End: 2017-08-06 | Stop reason: HOSPADM

## 2017-08-02 RX ORDER — WARFARIN SODIUM 5 MG/1
5 TABLET ORAL
Status: CANCELLED | OUTPATIENT
Start: 2017-08-03

## 2017-08-02 RX ORDER — ONDANSETRON 2 MG/ML
INJECTION INTRAMUSCULAR; INTRAVENOUS AS NEEDED
Status: DISCONTINUED | OUTPATIENT
Start: 2017-08-02 | End: 2017-08-02 | Stop reason: SURG

## 2017-08-02 RX ORDER — MIDAZOLAM HYDROCHLORIDE 1 MG/ML
2 INJECTION INTRAMUSCULAR; INTRAVENOUS
Status: DISCONTINUED | OUTPATIENT
Start: 2017-08-02 | End: 2017-08-02 | Stop reason: HOSPADM

## 2017-08-02 RX ORDER — SODIUM CHLORIDE 9 MG/ML
INJECTION, SOLUTION INTRAVENOUS AS NEEDED
Status: DISCONTINUED | OUTPATIENT
Start: 2017-08-02 | End: 2017-08-02 | Stop reason: HOSPADM

## 2017-08-02 RX ORDER — OXYCODONE AND ACETAMINOPHEN 10; 325 MG/1; MG/1
1 TABLET ORAL EVERY 4 HOURS PRN
Status: DISCONTINUED | OUTPATIENT
Start: 2017-08-02 | End: 2017-08-05

## 2017-08-02 RX ORDER — PREGABALIN 100 MG/1
100 CAPSULE ORAL 2 TIMES DAILY
Status: DISCONTINUED | OUTPATIENT
Start: 2017-08-02 | End: 2017-08-06 | Stop reason: HOSPADM

## 2017-08-02 RX ORDER — PROPOFOL 10 MG/ML
VIAL (ML) INTRAVENOUS AS NEEDED
Status: DISCONTINUED | OUTPATIENT
Start: 2017-08-02 | End: 2017-08-02 | Stop reason: SURG

## 2017-08-02 RX ORDER — DIAZEPAM 2 MG/1
2 TABLET ORAL EVERY 12 HOURS PRN
Status: DISCONTINUED | OUTPATIENT
Start: 2017-08-02 | End: 2017-08-06 | Stop reason: HOSPADM

## 2017-08-02 RX ORDER — MAGNESIUM HYDROXIDE 1200 MG/15ML
LIQUID ORAL AS NEEDED
Status: DISCONTINUED | OUTPATIENT
Start: 2017-08-02 | End: 2017-08-02 | Stop reason: HOSPADM

## 2017-08-02 RX ORDER — NEOMYCIN SULFATE, POLYMYXIN B SULFATE AND BACITRACIN ZINC 3.5; 10000; 4 MG/G; [USP'U]/G; [USP'U]/G
OINTMENT OPHTHALMIC AS NEEDED
Status: DISCONTINUED | OUTPATIENT
Start: 2017-08-02 | End: 2017-08-02 | Stop reason: HOSPADM

## 2017-08-02 RX ORDER — PROMETHAZINE HYDROCHLORIDE 25 MG/1
25 TABLET ORAL EVERY 4 HOURS PRN
Status: DISCONTINUED | OUTPATIENT
Start: 2017-08-02 | End: 2017-08-06 | Stop reason: HOSPADM

## 2017-08-02 RX ORDER — PROMETHAZINE HYDROCHLORIDE 25 MG/1
12.5 TABLET ORAL ONCE AS NEEDED
Status: DISCONTINUED | OUTPATIENT
Start: 2017-08-02 | End: 2017-08-02 | Stop reason: HOSPADM

## 2017-08-02 RX ORDER — ASPIRIN 81 MG/1
81 TABLET ORAL DAILY
Status: DISCONTINUED | OUTPATIENT
Start: 2017-08-02 | End: 2017-08-06 | Stop reason: HOSPADM

## 2017-08-02 RX ORDER — CLOPIDOGREL BISULFATE 75 MG/1
75 TABLET ORAL DAILY
Status: DISCONTINUED | OUTPATIENT
Start: 2017-08-02 | End: 2017-08-06 | Stop reason: HOSPADM

## 2017-08-02 RX ORDER — HYDRALAZINE HYDROCHLORIDE 20 MG/ML
5 INJECTION INTRAMUSCULAR; INTRAVENOUS
Status: DISCONTINUED | OUTPATIENT
Start: 2017-08-02 | End: 2017-08-02 | Stop reason: HOSPADM

## 2017-08-02 RX ORDER — NALOXONE HCL 0.4 MG/ML
0.2 VIAL (ML) INJECTION AS NEEDED
Status: DISCONTINUED | OUTPATIENT
Start: 2017-08-02 | End: 2017-08-02 | Stop reason: HOSPADM

## 2017-08-02 RX ORDER — DEXAMETHASONE SODIUM PHOSPHATE 10 MG/ML
INJECTION INTRAMUSCULAR; INTRAVENOUS AS NEEDED
Status: DISCONTINUED | OUTPATIENT
Start: 2017-08-02 | End: 2017-08-02 | Stop reason: SURG

## 2017-08-02 RX ORDER — PROMETHAZINE HYDROCHLORIDE 25 MG/1
25 TABLET ORAL ONCE AS NEEDED
Status: DISCONTINUED | OUTPATIENT
Start: 2017-08-02 | End: 2017-08-02 | Stop reason: HOSPADM

## 2017-08-02 RX ORDER — HYDROMORPHONE HYDROCHLORIDE 1 MG/ML
0.5 INJECTION, SOLUTION INTRAMUSCULAR; INTRAVENOUS; SUBCUTANEOUS
Status: DISCONTINUED | OUTPATIENT
Start: 2017-08-02 | End: 2017-08-02 | Stop reason: HOSPADM

## 2017-08-02 RX ORDER — NICOTINE 21 MG/24HR
1 PATCH, TRANSDERMAL 24 HOURS TRANSDERMAL EVERY 24 HOURS
Status: DISCONTINUED | OUTPATIENT
Start: 2017-08-02 | End: 2017-08-06 | Stop reason: HOSPADM

## 2017-08-02 RX ORDER — FENTANYL CITRATE 50 UG/ML
50 INJECTION, SOLUTION INTRAMUSCULAR; INTRAVENOUS
Status: COMPLETED | OUTPATIENT
Start: 2017-08-02 | End: 2017-08-02

## 2017-08-02 RX ORDER — ONDANSETRON 2 MG/ML
4 INJECTION INTRAMUSCULAR; INTRAVENOUS ONCE AS NEEDED
Status: DISCONTINUED | OUTPATIENT
Start: 2017-08-02 | End: 2017-08-02 | Stop reason: HOSPADM

## 2017-08-02 RX ORDER — DULOXETIN HYDROCHLORIDE 30 MG/1
30 CAPSULE, DELAYED RELEASE ORAL 2 TIMES DAILY
Status: DISCONTINUED | OUTPATIENT
Start: 2017-08-02 | End: 2017-08-06 | Stop reason: HOSPADM

## 2017-08-02 RX ORDER — FUROSEMIDE 20 MG/1
20 TABLET ORAL DAILY
Status: DISCONTINUED | OUTPATIENT
Start: 2017-08-02 | End: 2017-08-03

## 2017-08-02 RX ORDER — NALOXONE HCL 0.4 MG/ML
0.1 VIAL (ML) INJECTION
Status: DISCONTINUED | OUTPATIENT
Start: 2017-08-02 | End: 2017-08-06 | Stop reason: HOSPADM

## 2017-08-02 RX ORDER — ONDANSETRON 4 MG/1
4 TABLET, FILM COATED ORAL EVERY 6 HOURS PRN
Status: DISCONTINUED | OUTPATIENT
Start: 2017-08-02 | End: 2017-08-06 | Stop reason: HOSPADM

## 2017-08-02 RX ORDER — LIDOCAINE HYDROCHLORIDE 20 MG/ML
INJECTION, SOLUTION INFILTRATION; PERINEURAL AS NEEDED
Status: DISCONTINUED | OUTPATIENT
Start: 2017-08-02 | End: 2017-08-02 | Stop reason: SURG

## 2017-08-02 RX ORDER — ISOSORBIDE MONONITRATE 30 MG/1
30 TABLET, EXTENDED RELEASE ORAL DAILY
Status: DISCONTINUED | OUTPATIENT
Start: 2017-08-02 | End: 2017-08-04

## 2017-08-02 RX ORDER — SODIUM CHLORIDE 0.9 % (FLUSH) 0.9 %
1-10 SYRINGE (ML) INJECTION AS NEEDED
Status: DISCONTINUED | OUTPATIENT
Start: 2017-08-02 | End: 2017-08-06 | Stop reason: HOSPADM

## 2017-08-02 RX ORDER — SODIUM CHLORIDE, SODIUM LACTATE, POTASSIUM CHLORIDE, CALCIUM CHLORIDE 600; 310; 30; 20 MG/100ML; MG/100ML; MG/100ML; MG/100ML
100 INJECTION, SOLUTION INTRAVENOUS CONTINUOUS
Status: DISCONTINUED | OUTPATIENT
Start: 2017-08-02 | End: 2017-08-04

## 2017-08-02 RX ADMIN — SUCCINYLCHOLINE CHLORIDE 100 MG: 20 INJECTION, SOLUTION INTRAMUSCULAR; INTRAVENOUS; PARENTERAL at 15:51

## 2017-08-02 RX ADMIN — FENTANYL CITRATE 50 MCG: 50 INJECTION INTRAMUSCULAR; INTRAVENOUS at 17:39

## 2017-08-02 RX ADMIN — ASPIRIN 81 MG: 81 TABLET ORAL at 21:00

## 2017-08-02 RX ADMIN — CLOPIDOGREL 75 MG: 75 TABLET, FILM COATED ORAL at 21:00

## 2017-08-02 RX ADMIN — PHENYLEPHRINE HYDROCHLORIDE 200 MCG: 10 INJECTION INTRAVENOUS at 16:08

## 2017-08-02 RX ADMIN — OXYCODONE HYDROCHLORIDE AND ACETAMINOPHEN 1 TABLET: 10; 325 TABLET ORAL at 12:15

## 2017-08-02 RX ADMIN — OXYCODONE HYDROCHLORIDE AND ACETAMINOPHEN 1 TABLET: 10; 325 TABLET ORAL at 08:13

## 2017-08-02 RX ADMIN — SODIUM CHLORIDE, POTASSIUM CHLORIDE, SODIUM LACTATE AND CALCIUM CHLORIDE 100 ML/HR: 600; 310; 30; 20 INJECTION, SOLUTION INTRAVENOUS at 22:30

## 2017-08-02 RX ADMIN — SODIUM CHLORIDE, POTASSIUM CHLORIDE, SODIUM LACTATE AND CALCIUM CHLORIDE: 600; 310; 30; 20 INJECTION, SOLUTION INTRAVENOUS at 15:48

## 2017-08-02 RX ADMIN — FENTANYL CITRATE 50 MCG: 50 INJECTION INTRAMUSCULAR; INTRAVENOUS at 15:49

## 2017-08-02 RX ADMIN — PROPOFOL 50 MG: 10 INJECTION, EMULSION INTRAVENOUS at 15:54

## 2017-08-02 RX ADMIN — FENTANYL CITRATE 50 MCG: 50 INJECTION INTRAMUSCULAR; INTRAVENOUS at 18:10

## 2017-08-02 RX ADMIN — Medication: at 20:30

## 2017-08-02 RX ADMIN — FAMOTIDINE 20 MG: 10 INJECTION, SOLUTION INTRAVENOUS at 14:30

## 2017-08-02 RX ADMIN — LAMOTRIGINE 25 MG: 25 TABLET ORAL at 21:00

## 2017-08-02 RX ADMIN — OXYCODONE HYDROCHLORIDE AND ACETAMINOPHEN 1 TABLET: 10; 325 TABLET ORAL at 04:10

## 2017-08-02 RX ADMIN — DULOXETINE HYDROCHLORIDE 30 MG: 30 CAPSULE, DELAYED RELEASE ORAL at 21:00

## 2017-08-02 RX ADMIN — ARIPIPRAZOLE 10 MG: 10 TABLET ORAL at 21:00

## 2017-08-02 RX ADMIN — NICOTINE 1 PATCH: 14 PATCH, EXTENDED RELEASE TRANSDERMAL at 21:00

## 2017-08-02 RX ADMIN — FENTANYL CITRATE 50 MCG: 50 INJECTION INTRAMUSCULAR; INTRAVENOUS at 17:21

## 2017-08-02 RX ADMIN — DOCUSATE SODIUM -SENNOSIDES 2 TABLET: 50; 8.6 TABLET, COATED ORAL at 21:00

## 2017-08-02 RX ADMIN — CEFAZOLIN SODIUM 2 G: 2 INJECTION, SOLUTION INTRAVENOUS at 16:08

## 2017-08-02 RX ADMIN — VANCOMYCIN HYDROCHLORIDE 1500 MG: 1 INJECTION, POWDER, LYOPHILIZED, FOR SOLUTION INTRAVENOUS at 20:30

## 2017-08-02 RX ADMIN — VANCOMYCIN HYDROCHLORIDE 1.5 G: 1 INJECTION, POWDER, LYOPHILIZED, FOR SOLUTION INTRAVENOUS at 16:14

## 2017-08-02 RX ADMIN — FENTANYL CITRATE 50 MCG: 50 INJECTION INTRAMUSCULAR; INTRAVENOUS at 17:53

## 2017-08-02 RX ADMIN — PREGABALIN 100 MG: 100 CAPSULE ORAL at 21:00

## 2017-08-02 RX ADMIN — PHENYLEPHRINE HYDROCHLORIDE 100 MCG: 10 INJECTION INTRAVENOUS at 15:54

## 2017-08-02 RX ADMIN — HYDROMORPHONE HYDROCHLORIDE 0.5 MG: 1 INJECTION, SOLUTION INTRAMUSCULAR; INTRAVENOUS; SUBCUTANEOUS at 17:48

## 2017-08-02 RX ADMIN — DEXAMETHASONE SODIUM PHOSPHATE 4 MG: 10 INJECTION INTRAMUSCULAR; INTRAVENOUS at 16:29

## 2017-08-02 RX ADMIN — SODIUM CHLORIDE, POTASSIUM CHLORIDE, SODIUM LACTATE AND CALCIUM CHLORIDE 9 ML/HR: 600; 310; 30; 20 INJECTION, SOLUTION INTRAVENOUS at 14:30

## 2017-08-02 RX ADMIN — FUROSEMIDE 20 MG: 20 TABLET ORAL at 21:00

## 2017-08-02 RX ADMIN — OXYCODONE HYDROCHLORIDE AND ACETAMINOPHEN 2 TABLET: 7.5; 325 TABLET ORAL at 21:50

## 2017-08-02 RX ADMIN — ONDANSETRON 4 MG: 2 INJECTION INTRAMUSCULAR; INTRAVENOUS at 16:29

## 2017-08-02 RX ADMIN — FENTANYL CITRATE 50 MCG: 50 INJECTION INTRAMUSCULAR; INTRAVENOUS at 16:25

## 2017-08-02 RX ADMIN — MIDAZOLAM 1 MG: 1 INJECTION INTRAMUSCULAR; INTRAVENOUS at 15:09

## 2017-08-02 RX ADMIN — ATORVASTATIN CALCIUM 40 MG: 20 TABLET, FILM COATED ORAL at 21:00

## 2017-08-02 RX ADMIN — LEVOTHYROXINE SODIUM 100 MCG: 100 TABLET ORAL at 21:00

## 2017-08-02 RX ADMIN — PROPOFOL 100 MG: 10 INJECTION, EMULSION INTRAVENOUS at 15:51

## 2017-08-02 RX ADMIN — FERROUS SULFATE TAB 325 MG (65 MG ELEMENTAL FE) 325 MG: 325 (65 FE) TAB at 21:00

## 2017-08-02 RX ADMIN — HYDROMORPHONE HYDROCHLORIDE 0.5 MG: 1 INJECTION, SOLUTION INTRAMUSCULAR; INTRAVENOUS; SUBCUTANEOUS at 17:28

## 2017-08-02 RX ADMIN — POTASSIUM CHLORIDE 10 MEQ: 750 CAPSULE, EXTENDED RELEASE ORAL at 21:00

## 2017-08-02 RX ADMIN — LIDOCAINE HYDROCHLORIDE 50 MG: 20 INJECTION, SOLUTION INFILTRATION; PERINEURAL at 15:51

## 2017-08-02 RX ADMIN — FLUTICASONE PROPIONATE 2 SPRAY: 50 SPRAY, METERED NASAL at 21:00

## 2017-08-02 RX ADMIN — OXYBUTYNIN CHLORIDE 10 MG: 10 TABLET, EXTENDED RELEASE ORAL at 21:00

## 2017-08-02 RX ADMIN — PHENYLEPHRINE HYDROCHLORIDE 100 MCG: 10 INJECTION INTRAVENOUS at 16:33

## 2017-08-02 NOTE — PLAN OF CARE
Problem: Patient Care Overview (Adult)  Goal: Plan of Care Review  Outcome: Ongoing (interventions implemented as appropriate)    08/02/17 1921   Coping/Psychosocial Response Interventions   Plan Of Care Reviewed With patient   Patient Care Overview   Progress improving   Outcome Evaluation   Outcome Summary/Follow up Plan WENT TO SURGERY FOR AN OPEN REDUCTION TO REPAIR HER DISLOCATED HIP, SO FAR PAIN CONTROLLED AND VSS FOLLOWING SURGERY, PICC LINE AND FC STILL INTACT, EDUCATED ON BP MONITORING AND MEDICATIONS       Goal: Adult Individualization and Mutuality  Outcome: Ongoing (interventions implemented as appropriate)    Problem: Fall Risk (Adult)  Goal: Absence of Falls  Outcome: Ongoing (interventions implemented as appropriate)    Problem: Mobility, Physical Impaired (Adult)  Goal: Enhanced Mobility Skills  Outcome: Ongoing (interventions implemented as appropriate)  Goal: Enhanced Functionality Ability  Outcome: Ongoing (interventions implemented as appropriate)    Problem: Perioperative Period (Adult)  Goal: Signs and Symptoms of Listed Potential Problems Will be Absent or Manageable (Perioperative Period)  Outcome: Ongoing (interventions implemented as appropriate)    Problem: Hip Replacement, Total (Adult)  Goal: Signs and Symptoms of Listed Potential Problems Will be Absent or Manageable (Hip Replacement, Total)  Outcome: Ongoing (interventions implemented as appropriate)

## 2017-08-02 NOTE — ANESTHESIA PROCEDURE NOTES
Airway  Urgency: elective      General Information and Staff    Patient location during procedure: OR  Anesthesiologist: HATTIE DONALD    Indications and Patient Condition  Indications for airway management: airway protection    Preoxygenated: yes  MILS maintained throughout  Mask difficulty assessment: 1 - vent by mask    Final Airway Details  Final airway type: supraglottic airway      Successful airway: classic

## 2017-08-02 NOTE — ANESTHESIA PREPROCEDURE EVALUATION
Anesthesia Evaluation     Patient summary reviewed and Nursing notes reviewed   no history of anesthetic complications:  NPO Solid Status: > 8 hours  NPO Liquid Status: > 8 hours     Airway   Mallampati: II  TM distance: >3 FB  Neck ROM: full  no difficulty expected  Dental - normal exam     Pulmonary - normal exam   (+) pneumonia resolved , pulmonary embolism, a smoker Former, COPD (On 2 liters per minute of oxygen at home in the evening), sleep apnea on CPAP,   Cardiovascular - normal exam    (+) hypertension, valvular problems/murmurs TI and MR, CAD, DVT, hyperlipidemia      Neuro/Psych  (+) psychiatric history Depression, Anxiety and Bipolar,    GI/Hepatic/Renal/Endo    (+) obesity, morbid obesity, GERD,     Musculoskeletal     Abdominal  - normal exam   Substance History      OB/GYN          Other   (+) arthritis                                     Anesthesia Plan    ASA 4     general     intravenous induction   Anesthetic plan and risks discussed with patient.

## 2017-08-02 NOTE — ANESTHESIA POSTPROCEDURE EVALUATION
Patient: Frances Downs    Procedure Summary     Date Anesthesia Start Anesthesia Stop Room / Location    08/02/17 1546 1707  [facility] / [facility]       Procedure Diagnosis Surgeon Provider    FAILED CLOSED RIGHT HIP  REDUCTION WITH OPEN REDUCTION OF RIGHT HIP (Right Hip) No diagnosis on file. MD Loren La MD          Anesthesia Type: general  Last vitals  BP   118/75 (08/02/17 1745)    Temp        Pulse   74 (08/02/17 1745)   Resp   18 (08/02/17 1745)    SpO2   100 % (08/02/17 1745)      Post Anesthesia Care and Evaluation    Patient location during evaluation: PACU  Patient participation: complete - patient participated  Level of consciousness: awake and alert  Pain management: adequate  Airway patency: patent  Anesthetic complications: No anesthetic complications    Cardiovascular status: acceptable  Respiratory status: acceptable  Hydration status: acceptable

## 2017-08-02 NOTE — H&P
ORTHOPEDIC  HISTORY AND PHYSICAL      Patient: Frances Downs  Date of Admission: 8/2/2017 12:57 AM  YOB: 1950  Medical Record Number: [id number]  Attending Physician: Erlin Martin MD  Consulting Physician: Erlin Martin MD    CHIEF COMPLIANT: Right hip pain     HISTORY OF PRESENT ILLINESS: Patient is a 67 y.o. year old female presents to [facility] with above complaints.   The patient was transferred to [facility] after dislocating her right total hip.  The patient reports she just turned in a wrong position and her right hip dislocated.  Since then she's had pain severe right hip.  She denies any numbness tingling fever or chills.         Allergies   Allergen Reactions   • Codeine    • Latex      Added based on information entered during case entry, please review and add reactions, type, and severity as needed   • Morphine And Related          Prescriptions Prior to Admission   Medication Sig Dispense Refill Last Dose   • amLODIPine (NORVASC) 10 MG tablet Take 10 mg by mouth Daily.   8/1/2017 at Unknown time   • ARIPiprazole (ABILIFY) 10 MG tablet Take 10 mg by mouth Daily.   8/1/2017 at Unknown time   • aspirin 81 MG EC tablet Take 81 mg by mouth Daily.   8/1/2017 at Unknown time   • atorvastatin (LIPITOR) 40 MG tablet Take 40 mg by mouth Every Night.   8/1/2017 at Unknown time   • carvedilol (COREG) 12.5 MG tablet Take 12.5 mg by mouth 2 (two) times a day with meals.   8/1/2017 at Unknown time   • clopidogrel (PLAVIX) 75 MG tablet Take 1 tablet by mouth daily. 90 tablet 3 8/1/2017 at Unknown time   • darifenacin (ENABLEX) 15 MG 24 hr tablet Take 15 mg by mouth daily.   8/1/2017 at Unknown time   • diazePAM (VALIUM) 2 MG tablet Take 2 mg by mouth Every 12 (Twelve) Hours As Needed for Anxiety.   8/1/2017 at Unknown time   • DULoxetine (CYMBALTA) 30 MG capsule Take 30 mg by mouth 2 (Two) Times a Day.   8/1/2017 at Unknown time   • enoxaparin (LOVENOX) 40  MG/0.4ML solution syringe Inject 0.4 mL under the skin Every Night. 5 syringe 0 Past Week at Unknown time   • ferrous sulfate 325 (65 FE) MG tablet Take 325 mg by mouth 2 (Two) Times a Day.   8/1/2017 at Unknown time   • fluticasone (FLONASE) 50 MCG/ACT nasal spray 2 sprays into each nostril daily. Administer 2 sprays in each nostril for each dose.   8/1/2017 at Unknown time   • furosemide (LASIX) 20 MG tablet Take 1 tablet by mouth Daily. 30 tablet 0 8/1/2017 at Unknown time   • isosorbide mononitrate (IMDUR) 30 MG 24 hr tablet Take 30 mg by mouth Daily.   8/1/2017 at Unknown time   • lamoTRIgine (LaMICtal) 25 MG tablet Take 25 mg by mouth 2 (Two) Times a Day.   8/1/2017 at Unknown time   • levothyroxine (SYNTHROID, LEVOTHROID) 100 MCG tablet Take 100 mcg by mouth daily.   8/1/2017 at Unknown time   • nicotine (NICODERM CQ) 14 MG/24HR patch Place 1 patch on the skin Daily.   8/1/2017 at Unknown time   • omeprazole (PriLOSEC) 20 MG capsule Take 20 mg by mouth 2 (two) times a day.   8/1/2017 at Unknown time   • oxyCODONE-acetaminophen (PERCOCET) 7.5-325 MG per tablet Take 2 tablets by mouth Every 4 (Four) Hours As Needed for Severe Pain  (7-10) for up to 7 days. 56 tablet 0 8/1/2017 at Unknown time   • potassium chloride (K-DUR) 10 MEQ CR tablet Take 10 mEq by mouth daily.   8/1/2017 at Unknown time   • pregabalin (LYRICA) 200 MG capsule Take 100 mg by mouth 2 (Two) Times a Day.   8/1/2017 at Unknown time   • tiotropium (SPIRIVA) 18 MCG per inhalation capsule Place 1 capsule into inhaler and inhale 1 (one) time daily.   8/1/2017 at Unknown time   • vancomycin 1750 mg/500 mL 0.9% NS IVPB ([facility]) Infuse 500 mL into a venous catheter Every 12 (Twelve) Hours. Indications: Bone and/or Joint Infection 1 mL 0 8/1/2017 at Unknown time   • warfarin (COUMADIN) 6 MG tablet Take 1 tablet by mouth Daily. 40 tablet 0 8/1/2017 at Unknown time   • LOPERAMIDE HCL PO Take 2 capsules by mouth Every 6 (Six) Hours As Needed.   Unknown at  Unknown time   • MENTHOL-ZINC OXIDE EX Apply  topically. Apply to coccyx topically 2 times daily for redness on coccyx   Unknown at Unknown time   • promethazine (PHENERGAN) 25 MG tablet Take 25 mg by mouth Every 4 (Four) Hours As Needed for Nausea or Vomiting.   Unknown at Unknown time   • Vancomycin HCl in Dextrose (PHARMACY TO DOSE VANCOMYCIN) Continuous. 1 each 0        Past Medical History:   Diagnosis Date   • Anxiety    • Arthritis    • Bipolar disorder    • COPD (chronic obstructive pulmonary disease)    • Coronary artery disease    • Diastolic dysfunction     grade II per echocardiogram 11/18/2016   • Difficulty walking    • Disease of thyroid gland     hypothyroidism   • Dislocation of hip joint     recurrent dislocation right hip   • DVT (deep venous thrombosis)    • GERD without esophagitis    • Heartburn    • Hematoma    • Hyperlipidemia    • Hypertension    • LVH (left ventricular hypertrophy)     mild to moderate per echocardiogram 11/18/2016   • Major depressive disorder    • Mitral annular calcification     severe per echocardiogram 11/18/2016   • Mitral regurgitation     mild per echo 11/18/2016   • Mitral valve disease    • Muscle weakness    • Narcolepsy    • Overactive bladder    • Pneumonia    • Pulmonary emboli    • Pulmonary hypertension     mild per echo 11/18/2016   • Sleep apnea     obstructive   • Tricuspid regurgitation     mild to moderate per echo 11/18/2016     Past Surgical History:   Procedure Laterality Date   • CATARACT EXTRACTION W/ INTRAOCULAR LENS IMPLANT Right    • CORONARY ANGIOPLASTY WITH STENT PLACEMENT      x2 stents   • EYE SURGERY      cataract surgery bilateral   • HARDWARE REMOVAL FOOT / ANKLE Left 12/01/2011   • HIP SPACER INSERTION WITH ANTIBIOTIC CEMENT Right 2/13/2017    Procedure: RIGHT TOTAL HIP REMOVAL;  Surgeon: Erlin Martin MD;  Location: [facility];  Service:    • INCISION AND DRAINAGE HIP Right 2/8/2017    Procedure: RT HIP INCISION AND DRAINAGE;   Surgeon: Erlin Martin MD;  Location: [facility];  Service:    • INCISION AND DRAINAGE HIP Right 3/17/2017    Procedure: RIGHT HIP INCISION AND DRAINAGE;  Surgeon: Erlin Martin MD;  Location: [facility];  Service:    • JOINT REPLACEMENT     • KNEE ARTHROPLASTY Left 03/2010    TWICE   • ORIF ANKLE FRACTURE Left 06/16/2011   • OTHER SURGICAL HISTORY      IVC filter placement   • QUADRICEPS TENDON REPAIR Left 06/21/2010    also done 9-    • TOTAL HIP ARTHROPLASTY Right 2011   • TOTAL HIP ARTHROPLASTY REVISION Right 11/22/2016   • TOTAL HIP ARTHROPLASTY REVISION Right 7/26/2017    Procedure: REIMPLANT RIGHT TOTAL HIP;  Surgeon: Erlin Martin MD;  Location: [facility];  Service:    • TOTAL KNEE ARTHROPLASTY REVISION Left 09/2010     Social History     Occupational History   • Not on file.     Social History Main Topics   • Smoking status: Former Smoker     Packs/day: 1.00     Years: 19.00     Types: Cigarettes     Quit date: 11/21/2016   • Smokeless tobacco: Never Used   • Alcohol use No   • Drug use: No   • Sexual activity: Defer      Social History     Social History Narrative     Family History   Problem Relation Age of Onset   • Malig Hyperthermia Neg Hx        REVIEW OF SYSTEMS:    HEENT: Patient denies any headaches, vision changes, change in hearing, or tinnitus, Patient denies epistaxis, sinus pain, hoarseness, or dysphagia   Pulmonary: Patient denies any cough, congestion, acute change in SOA or wheezing.   Cardiovascular: Patient denies any change in chest pain, dyspnea, palpitations, weakness, intolerance of exercise, varicosities, change in murmur   Gastrointestinal:  Patient denies change in appetite, melena, change in bowel habits.   Genital/Urinary: Patient denies dysuria, change in color of urine, change in frequency of urination, pain with urgency, change in incontinence, retention.   Musculoskeletal: Patient denies complaints of acute changes in symptoms of other joints  "not mentioned above.   Neurological: Patient denies changes in dizziness, tremor, ataxia, or difficulty in speaking or changes in memory.   Endocrine system: Patient denies acute changes in tremors, palpitations, polyuria, polydipsia, polyphagia, diaphoresis, exophthalmos, or goiter.   Psychological: Patient denies thoughts/plans or harming self or other; denies acute changes in depression,  insomnia, night terrors, geronimo, disorientation.   Skin: Patient denies any bruising, rashes, discoloration, pruritus,or wounds not mentioned in history of present illness or chief complaint above.   Hematopoietic: Patient denies current bleeding, epistaxis, hematuria, or melena.    PHYSICAL EXAM:   Vitals:  Vitals:    08/02/17 0124 08/02/17 0737   BP: 132/66 110/58   BP Location: Left arm Left arm   Patient Position: Lying Lying   Pulse: 73 70   Resp: 18 18   Temp: 99 °F (37.2 °C)    TempSrc: Oral    SpO2: 98% 99%   Weight: 209 lb (94.8 kg)    Height: 64\" (162.6 cm)          General:  67 y.o. female who appears about stated age.    Alert, cooperative, in no acute distress                       Head:    Normocephalic, without obvious abnormality, atraumatic   Eyes:            Lids and lashes normal, conjunctivae and sclerae normal, no         icterus, no pallor, corneas clear, PERRLA   Ears:    Ears appear intact with no abnormalities noted   Throat:   No oral lesions, no thrush, oral mucosa moist   Neck:   No adenopathy, supple, trachea midline, no JVD   Back:     Limited exam shows no severe kyphosis present,no visible           erythema, no excessive  tenderness to palpation.    Lungs:     Respirations regular, even and unlabored.     Heart:    Normal rate, Pulses palpable   Chest Wall:    No abnormalities observed.   Abdomen:     Normal bowel sounds, no masses, no organomegaly, soft              non-tender, non-distended, no guarding, no rebound                      tenderness   Rectal:     Deferred   Pulses:   Pulses " palpable and equal bilaterally   Skin:   No bleeding, bruising or rash   Lymph nodes:   No palpable adenopathy   Extremities:     Examination of the patient's right hip reveals her right lower extremity is shorter than her left.  Range of motion was deferred because of known dislocation.    DIAGNOSTIC TEST:  No visits with results within 2 Day(s) from this visit.  Latest known visit with results is:    Admission on 07/26/2017, Discharged on 07/29/2017   Component Date Value Ref Range Status   • ABO Type 07/26/2017 A   Final   • RH type 07/26/2017 Positive   Final   • Antibody Screen 07/26/2017 Positive   Final   • Protime 07/26/2017 14.1  11.7 - 14.2 Seconds Final   • INR 07/26/2017 1.13* 0.90 - 1.10 Final   • C-Reactive Protein 07/26/2017 0.06  0.00 - 0.50 mg/dL Final   • Sed Rate 07/26/2017 12  0 - 30 mm/hr Final   • Product Code 07/30/2017 [id number]   Final   • Unit Number 07/30/2017 [id number]   Final   • UNIT  ABO 07/30/2017 A   Final   • UNIT  RH 07/30/2017 POS   Final   • CROSSMATCH 1 INTERPRETATION 07/30/2017 Compatible   Final   • Dispense Status 07/30/2017 RE   Final   • Blood Type 07/30/2017 APOS   Final   • Blood Expiration Date 07/30/2017 201708252359   Final   • Blood Type Barcode 07/30/2017 6200   Final   • Product Code 07/30/2017 [id number]   Final   • Unit Number 07/30/2017 [id number]   Final   • UNIT  ABO 07/30/2017 A   Final   • UNIT  RH 07/30/2017 POS   Final   • CROSSMATCH 1 INTERPRETATION 07/30/2017 Compatible   Final   • Dispense Status 07/30/2017 RE   Final   • Blood Type 07/30/2017 APOS   Final   • Blood Expiration Date 07/30/2017 [id number]   Final   • Blood Type Barcode 07/30/2017 6200   Final   • ARIANNA 07/26/2017 Negative   Final   • K antigen 07/26/2017 Negative   Final   • Anti-K 07/26/2017 ANTI-K   Final   • Culture 07/26/2017 No anaerobes isolated   Final   • Wound Culture 07/26/2017 No growth at 3 days   Final   • Gram Stain Result 07/26/2017 Few (2+) Red blood cells   Final    • Gram Stain Result 07/26/2017 Occasional WBCs seen   Final   • Gram Stain Result 07/26/2017 No organisms seen   Final   • Hemoglobin 07/26/2017 13.9  11.9 - 15.5 g/dL Final   • Hematocrit 07/26/2017 43.7  35.6 - 45.5 % Final   • Hemoglobin 07/27/2017 10.2* 11.9 - 15.5 g/dL Final   • Hematocrit 07/27/2017 30.6* 35.6 - 45.5 % Final   • Protime 07/27/2017 14.5* 11.7 - 14.2 Seconds Final   • INR 07/27/2017 1.18* 0.90 - 1.10 Final   • Creatinine 07/27/2017 0.72  0.57 - 1.00 mg/dL Final   • eGFR Non African Amer 07/27/2017 81  >60 mL/min/1.73 Final   • Hemoglobin 07/28/2017 8.3* 11.9 - 15.5 g/dL Final   • Hematocrit 07/28/2017 25.7* 35.6 - 45.5 % Final   • Protime 07/28/2017 19.3* 11.7 - 14.2 Seconds Final   • INR 07/28/2017 1.69* 0.90 - 1.10 Final   • Hemoglobin 07/29/2017 7.6* 11.9 - 15.5 g/dL Final   • Hematocrit 07/29/2017 23.6* 35.6 - 45.5 % Final   • Protime 07/29/2017 19.4* 11.7 - 14.2 Seconds Final   • INR 07/29/2017 1.70* 0.90 - 1.10 Final   • Creatinine 07/29/2017 0.58  0.57 - 1.00 mg/dL Final   • eGFR Non African Amer 07/29/2017 104  >60 mL/min/1.73 Final   • Vancomycin Trough 07/29/2017 19.30  5.00 - 20.00 mcg/mL Final       Xr Chest 1 View    Result Date: 8/2/2017  Narrative: X-RAY CHEST 1 VIEW.  HISTORY: PICC line verification.  COMPARISON: No prior studies for comparison.  FINDINGS: Cardiomediastinal silhouette is within normal limits. Right subclavian PICC line tip is in the superior vena cava. Left PICC line has been removed.  There is no consolidation or effusion.          Impression: No acute findings. No significant change.      Xr Pelvis 1 Or 2 View    Result Date: 7/26/2017  Narrative: TWO-VIEW RIGHT HIP AND PELVIS  HISTORY: Recent right hip replacement surgery.  The patient has had recent right hip replacement surgery including reconstruction of the acetabulum where there has been extensive bone loss and a long-stemmed femoral prosthesis with encircling wire at the level of the proximal shaft.  The overall alignment appears satisfactory.  This report was finalized on 7/26/2017 6:21 PM by Dr. Nestor Rizzo MD.      Xr Hip With Or Without Pelvis 1 View Right    Result Date: 7/27/2017  Narrative: PELVIS RIGHT HIP  HISTORY: Hip pain, surgery.  AP and crosstable lateral views of the right hip were obtained. An acetabular cup is in the process of being placed. A hip prosthesis has not yet been placed. The acetabular cup appears to be in satisfactory position.  This report was finalized on 7/27/2017 9:02 AM by Dr. Sj Villela MD.          ASSESSMENT:  Right total hip dislocation  Patient Active Problem List   Diagnosis   • Hematoma of right hip   • DVT (deep venous thrombosis), hx of   • Hypertension   • Hyperlipidemia   • Coronary artery disease, hx of stent   • Arthritis   • Bipolar disorder   • Infected prosthesis of right hip   • MRSA (methicillin resistant Staphylococcus aureus) infection   • Mixed infection   • Bacterial infection due to Morganella morganii   • Acute blood loss anemia   • Obstructive sleep apnea   • COPD (chronic obstructive pulmonary disease)   • Obesity (BMI 30-39.9)   • Pseudomonas aeruginosa infection   • VRE infection (vancomycin resistant Enterococcus)   • Infection of right prosthetic hip joint       PLAN:    We'll plan on a closed versus open reduction of her right total hip replacement    Risks and benefits of surgical intervention were discussed in detail with the patient.  Risks of infection, fracture, dislocation, extremity length discrepancy, neurovascular injury, persistent pain, medical risks, anesthetic risk, need for additional surgery, deep venous thrombosis, pulmonary embolism and death.      The above diagnosis and treatment plan was discussed with the patient and/or family.  They were educated in both non-surgical and surgical treatment options for their condition.   They were given the opportunity to ask questions and were answered to their satisfaction.  They  agreed to proceed with the above treatment plan.        Erlin Martin MD  Date: 8/2/2017

## 2017-08-02 NOTE — PLAN OF CARE
Problem: Patient Care Overview (Adult)  Goal: Plan of Care Review  Outcome: Ongoing (interventions implemented as appropriate)    08/02/17 0328   Coping/Psychosocial Response Interventions   Plan Of Care Reviewed With patient   Patient Care Overview   Progress improving   Outcome Evaluation   Outcome Summary/Follow up Plan Pt was admitted through the night for hip dislocation. Order rec for NPO diet and pain meds, which pt does not want at this time. Possible sx in the a.m. Educated pt on monitoring BP with HTN hx.        Goal: Adult Individualization and Mutuality  Outcome: Ongoing (interventions implemented as appropriate)  Goal: Discharge Needs Assessment  Outcome: Ongoing (interventions implemented as appropriate)    Problem: Fall Risk (Adult)  Goal: Identify Related Risk Factors and Signs and Symptoms  Outcome: Outcome(s) achieved Date Met:  08/02/17 08/02/17 0328   Fall Risk   Fall Risk: Related Risk Factors gait/mobility problems;history of falls   Fall Risk: Signs and Symptoms presence of risk factors       Goal: Absence of Falls  Outcome: Ongoing (interventions implemented as appropriate)    08/02/17 0328   Fall Risk (Adult)   Absence of Falls achieves outcome         Problem: Mobility, Physical Impaired (Adult)  Goal: Identify Related Risk Factors and Signs and Symptoms  Outcome: Outcome(s) achieved Date Met:  08/02/17 08/02/17 0328   Mobility, Physical Impaired   Physical Mobility, Impaired: Related Risk Factors pain/discomfort;surgery/procedure   Signs and Symptoms (Physical Mobility Impaired) unsafe transfers/ambulation       Goal: Enhanced Mobility Skills  Outcome: Ongoing (interventions implemented as appropriate)    08/02/17 0328   Mobility, Physical Impaired (Adult)   Enhanced Mobility Skills achieves outcome       Goal: Enhanced Functionality Ability  Outcome: Ongoing (interventions implemented as appropriate)    08/02/17 0328   Mobility, Physical Impaired (Adult)   Enhanced Functionality  Ability achieves outcome

## 2017-08-03 LAB
ABO GROUP BLD: NORMAL
ALBUMIN SERPL-MCNC: 3.2 G/DL (ref 3.5–5.2)
ALBUMIN/GLOB SERPL: 1.3 G/DL
ALP SERPL-CCNC: 75 U/L (ref 39–117)
ALT SERPL W P-5'-P-CCNC: 8 U/L (ref 1–33)
ANION GAP SERPL CALCULATED.3IONS-SCNC: 14.6 MMOL/L
ANTI-K: NORMAL
AST SERPL-CCNC: 11 U/L (ref 1–32)
BASOPHILS # BLD AUTO: 0.01 10*3/MM3 (ref 0–0.2)
BASOPHILS NFR BLD AUTO: 0.2 % (ref 0–1.5)
BILIRUB SERPL-MCNC: 0.3 MG/DL (ref 0.1–1.2)
BLD GP AB SCN SERPL QL: POSITIVE
BUN BLD-MCNC: 10 MG/DL (ref 8–23)
BUN/CREAT SERPL: 13.7 (ref 7–25)
CALCIUM SPEC-SCNC: 8.2 MG/DL (ref 8.6–10.5)
CHLORIDE SERPL-SCNC: 104 MMOL/L (ref 98–107)
CO2 SERPL-SCNC: 22.4 MMOL/L (ref 22–29)
CREAT BLD-MCNC: 0.73 MG/DL (ref 0.57–1)
DEPRECATED RDW RBC AUTO: 51.8 FL (ref 37–54)
EOSINOPHIL # BLD AUTO: 0.05 10*3/MM3 (ref 0–0.7)
EOSINOPHIL NFR BLD AUTO: 0.8 % (ref 0.3–6.2)
ERYTHROCYTE [DISTWIDTH] IN BLOOD BY AUTOMATED COUNT: 14.7 % (ref 11.7–13)
GFR SERPL CREATININE-BSD FRML MDRD: 80 ML/MIN/1.73
GLOBULIN UR ELPH-MCNC: 2.4 GM/DL
GLUCOSE BLD-MCNC: 144 MG/DL (ref 65–99)
HCT VFR BLD AUTO: 20.8 % (ref 35.6–45.5)
HCT VFR BLD AUTO: 25.8 % (ref 35.6–45.5)
HGB BLD-MCNC: 6.4 G/DL (ref 11.9–15.5)
HGB BLD-MCNC: 8.4 G/DL (ref 11.9–15.5)
IMM GRANULOCYTES # BLD: 0.02 10*3/MM3 (ref 0–0.03)
IMM GRANULOCYTES NFR BLD: 0.3 % (ref 0–0.5)
INR PPP: 1.23 (ref 0.9–1.1)
LYMPHOCYTES # BLD AUTO: 1.94 10*3/MM3 (ref 0.9–4.8)
LYMPHOCYTES NFR BLD AUTO: 29.3 % (ref 19.6–45.3)
MCH RBC QN AUTO: 31.6 PG (ref 26.9–32)
MCHC RBC AUTO-ENTMCNC: 32.6 G/DL (ref 32.4–36.3)
MCV RBC AUTO: 97 FL (ref 80.5–98.2)
MONOCYTES # BLD AUTO: 0.86 10*3/MM3 (ref 0.2–1.2)
MONOCYTES NFR BLD AUTO: 13 % (ref 5–12)
NEUTROPHILS # BLD AUTO: 3.74 10*3/MM3 (ref 1.9–8.1)
NEUTROPHILS NFR BLD AUTO: 56.4 % (ref 42.7–76)
PLATELET # BLD AUTO: 251 10*3/MM3 (ref 140–500)
PMV BLD AUTO: 10.8 FL (ref 6–12)
POTASSIUM BLD-SCNC: 4 MMOL/L (ref 3.5–5.2)
PROT SERPL-MCNC: 5.6 G/DL (ref 6–8.5)
PROTHROMBIN TIME: 15 SECONDS (ref 11.7–14.2)
RBC # BLD AUTO: 2.66 10*6/MM3 (ref 3.9–5.2)
RH BLD: POSITIVE
SODIUM BLD-SCNC: 141 MMOL/L (ref 136–145)
VANCOMYCIN SERPL-MCNC: 19.4 MCG/ML (ref 5–40)
WBC NRBC COR # BLD: 6.62 10*3/MM3 (ref 4.5–10.7)

## 2017-08-03 PROCEDURE — 86900 BLOOD TYPING SEROLOGIC ABO: CPT

## 2017-08-03 PROCEDURE — 80053 COMPREHEN METABOLIC PANEL: CPT | Performed by: HOSPITALIST

## 2017-08-03 PROCEDURE — P9016 RBC LEUKOCYTES REDUCED: HCPCS

## 2017-08-03 PROCEDURE — 97162 PT EVAL MOD COMPLEX 30 MIN: CPT

## 2017-08-03 PROCEDURE — 80202 ASSAY OF VANCOMYCIN: CPT | Performed by: ORTHOPAEDIC SURGERY

## 2017-08-03 PROCEDURE — G0378 HOSPITAL OBSERVATION PER HR: HCPCS

## 2017-08-03 PROCEDURE — 86901 BLOOD TYPING SEROLOGIC RH(D): CPT | Performed by: ORTHOPAEDIC SURGERY

## 2017-08-03 PROCEDURE — 85014 HEMATOCRIT: CPT | Performed by: ORTHOPAEDIC SURGERY

## 2017-08-03 PROCEDURE — 94660 CPAP INITIATION&MGMT: CPT

## 2017-08-03 PROCEDURE — 25010000002 VANCOMYCIN 10 G RECONSTITUTED SOLUTION: Performed by: ORTHOPAEDIC SURGERY

## 2017-08-03 PROCEDURE — G8978 MOBILITY CURRENT STATUS: HCPCS

## 2017-08-03 PROCEDURE — 93005 ELECTROCARDIOGRAM TRACING: CPT | Performed by: HOSPITALIST

## 2017-08-03 PROCEDURE — 97110 THERAPEUTIC EXERCISES: CPT

## 2017-08-03 PROCEDURE — 94799 UNLISTED PULMONARY SVC/PX: CPT

## 2017-08-03 PROCEDURE — 85025 COMPLETE CBC W/AUTO DIFF WBC: CPT | Performed by: HOSPITALIST

## 2017-08-03 PROCEDURE — 25010000002 KETOROLAC TROMETHAMINE PER 15 MG: Performed by: ORTHOPAEDIC SURGERY

## 2017-08-03 PROCEDURE — 36430 TRANSFUSION BLD/BLD COMPNT: CPT

## 2017-08-03 PROCEDURE — 86900 BLOOD TYPING SEROLOGIC ABO: CPT | Performed by: ORTHOPAEDIC SURGERY

## 2017-08-03 PROCEDURE — 86922 COMPATIBILITY TEST ANTIGLOB: CPT

## 2017-08-03 PROCEDURE — 86870 RBC ANTIBODY IDENTIFICATION: CPT | Performed by: ORTHOPAEDIC SURGERY

## 2017-08-03 PROCEDURE — G8979 MOBILITY GOAL STATUS: HCPCS

## 2017-08-03 PROCEDURE — 85018 HEMOGLOBIN: CPT | Performed by: ORTHOPAEDIC SURGERY

## 2017-08-03 PROCEDURE — 85610 PROTHROMBIN TIME: CPT | Performed by: ORTHOPAEDIC SURGERY

## 2017-08-03 PROCEDURE — 86850 RBC ANTIBODY SCREEN: CPT | Performed by: ORTHOPAEDIC SURGERY

## 2017-08-03 PROCEDURE — 93010 ELECTROCARDIOGRAM REPORT: CPT | Performed by: INTERNAL MEDICINE

## 2017-08-03 PROCEDURE — 86920 COMPATIBILITY TEST SPIN: CPT

## 2017-08-03 RX ORDER — KETOROLAC TROMETHAMINE 30 MG/ML
30 INJECTION, SOLUTION INTRAMUSCULAR; INTRAVENOUS EVERY 8 HOURS PRN
Status: DISPENSED | OUTPATIENT
Start: 2017-08-03 | End: 2017-08-05

## 2017-08-03 RX ORDER — SODIUM CHLORIDE, SODIUM LACTATE, POTASSIUM CHLORIDE, CALCIUM CHLORIDE 600; 310; 30; 20 MG/100ML; MG/100ML; MG/100ML; MG/100ML
100 INJECTION, SOLUTION INTRAVENOUS CONTINUOUS
Status: DISCONTINUED | OUTPATIENT
Start: 2017-08-03 | End: 2017-08-04

## 2017-08-03 RX ORDER — WARFARIN SODIUM 6 MG/1
6 TABLET ORAL
Status: COMPLETED | OUTPATIENT
Start: 2017-08-03 | End: 2017-08-03

## 2017-08-03 RX ADMIN — VANCOMYCIN HYDROCHLORIDE 1500 MG: 10 INJECTION, POWDER, LYOPHILIZED, FOR SOLUTION INTRAVENOUS at 22:35

## 2017-08-03 RX ADMIN — LAMOTRIGINE 25 MG: 25 TABLET ORAL at 17:20

## 2017-08-03 RX ADMIN — POTASSIUM CHLORIDE 10 MEQ: 750 CAPSULE, EXTENDED RELEASE ORAL at 08:24

## 2017-08-03 RX ADMIN — ASPIRIN 81 MG: 81 TABLET ORAL at 08:21

## 2017-08-03 RX ADMIN — LEVOTHYROXINE SODIUM 100 MCG: 100 TABLET ORAL at 08:21

## 2017-08-03 RX ADMIN — PREGABALIN 100 MG: 100 CAPSULE ORAL at 17:19

## 2017-08-03 RX ADMIN — DULOXETINE HYDROCHLORIDE 30 MG: 30 CAPSULE, DELAYED RELEASE ORAL at 17:20

## 2017-08-03 RX ADMIN — CLOPIDOGREL 75 MG: 75 TABLET, FILM COATED ORAL at 08:23

## 2017-08-03 RX ADMIN — DIAZEPAM 2 MG: 2 TABLET ORAL at 23:26

## 2017-08-03 RX ADMIN — CARVEDILOL 12.5 MG: 12.5 TABLET, FILM COATED ORAL at 08:21

## 2017-08-03 RX ADMIN — PANTOPRAZOLE SODIUM 40 MG: 40 TABLET, DELAYED RELEASE ORAL at 06:39

## 2017-08-03 RX ADMIN — FERROUS SULFATE TAB 325 MG (65 MG ELEMENTAL FE) 325 MG: 325 (65 FE) TAB at 08:23

## 2017-08-03 RX ADMIN — HYDROCODONE BITARTRATE AND ACETAMINOPHEN 1 TABLET: 7.5; 325 TABLET ORAL at 06:45

## 2017-08-03 RX ADMIN — LAMOTRIGINE 25 MG: 25 TABLET ORAL at 08:23

## 2017-08-03 RX ADMIN — DULOXETINE HYDROCHLORIDE 30 MG: 30 CAPSULE, DELAYED RELEASE ORAL at 08:23

## 2017-08-03 RX ADMIN — FUROSEMIDE 20 MG: 20 TABLET ORAL at 08:21

## 2017-08-03 RX ADMIN — FLUTICASONE PROPIONATE 2 SPRAY: 50 SPRAY, METERED NASAL at 08:24

## 2017-08-03 RX ADMIN — OXYBUTYNIN CHLORIDE 10 MG: 10 TABLET, EXTENDED RELEASE ORAL at 08:22

## 2017-08-03 RX ADMIN — KETOROLAC TROMETHAMINE 30 MG: 30 INJECTION, SOLUTION INTRAMUSCULAR at 18:54

## 2017-08-03 RX ADMIN — WARFARIN SODIUM 6 MG: 6 TABLET ORAL at 17:25

## 2017-08-03 RX ADMIN — SODIUM CHLORIDE 500 ML: 9 INJECTION, SOLUTION INTRAVENOUS at 20:30

## 2017-08-03 RX ADMIN — NICOTINE 1 PATCH: 14 PATCH, EXTENDED RELEASE TRANSDERMAL at 20:00

## 2017-08-03 RX ADMIN — PREGABALIN 100 MG: 100 CAPSULE ORAL at 08:22

## 2017-08-03 RX ADMIN — ISOSORBIDE MONONITRATE 30 MG: 30 TABLET ORAL at 08:22

## 2017-08-03 RX ADMIN — ARIPIPRAZOLE 10 MG: 10 TABLET ORAL at 08:24

## 2017-08-03 RX ADMIN — VANCOMYCIN HYDROCHLORIDE 1500 MG: 10 INJECTION, POWDER, LYOPHILIZED, FOR SOLUTION INTRAVENOUS at 08:21

## 2017-08-03 RX ADMIN — FERROUS SULFATE TAB 325 MG (65 MG ELEMENTAL FE) 325 MG: 325 (65 FE) TAB at 17:20

## 2017-08-03 RX ADMIN — AMLODIPINE BESYLATE 10 MG: 10 TABLET ORAL at 08:23

## 2017-08-03 RX ADMIN — ATORVASTATIN CALCIUM 40 MG: 20 TABLET, FILM COATED ORAL at 21:00

## 2017-08-03 RX ADMIN — TIOTROPIUM BROMIDE 1 CAPSULE: 18 CAPSULE ORAL; RESPIRATORY (INHALATION) at 08:18

## 2017-08-03 NOTE — PROGRESS NOTES
Orthopedic Total Progress Note        Patient: Frances Downs    Date of Admission: 8/2/2017 12:57 AM    YOB: 1950    Medical Record Number: [id number]    Attending Physician: Erlin Martin MD      POD # 1     Status post: NM CLOSED RX TRAUMATIC HIP DISLOCATN [03779] (HIP CLOSED REDUCTION)      Systemic or Specific Complaints: No Complaints      Allergies   Allergen Reactions   • Codeine    • Latex      Added based on information entered during case entry, please review and add reactions, type, and severity as needed   • Morphine And Related          Current Medications:  Scheduled Meds:  amLODIPine 10 mg Oral Daily   ARIPiprazole 10 mg Oral Daily   aspirin 81 mg Oral Daily   atorvastatin 40 mg Oral Nightly   carvedilol 12.5 mg Oral BID With Meals   clopidogrel 75 mg Oral Daily   DULoxetine 30 mg Oral BID   ferrous sulfate 325 mg Oral BID   ferrous sulfate 325 mg Oral Daily With Breakfast   fluticasone 2 spray Nasal Daily   furosemide 20 mg Oral Daily   isosorbide mononitrate 30 mg Oral Daily   lamoTRIgine 25 mg Oral BID   levothyroxine 100 mcg Oral Daily   menthol-zinc oxide  Topical Q12H   nicotine 1 patch Transdermal Q24H   oxybutynin XL 10 mg Oral Daily   pantoprazole 40 mg Oral QAM   potassium chloride 10 mEq Oral Daily   pregabalin 100 mg Oral BID   sennosides-docusate sodium 2 tablet Oral BID   tiotropium 1 capsule Inhalation Daily - RT   vancomycin 1,500 mg Intravenous Q12H     Continuous Infusions:  lactated ringers 9 mL/hr Last Rate: 9 mL/hr (08/02/17 1430)   lactated ringers 100 mL/hr Last Rate: 100 mL/hr (08/02/17 2230)   Pharmacy to dose vancomycin       PRN Meds:.•  acetaminophen  •  bisacodyl  •  diazePAM  •  diphenhydrAMINE  •  docusate sodium  •  HYDROcodone-acetaminophen  •  [MAR Hold] HYDROmorphone  •  HYDROmorphone **AND** naloxone  •  loperamide  •  magnesium hydroxide  •  ondansetron  •  [MAR Hold] oxyCODONE-acetaminophen  •  oxyCODONE-acetaminophen  •  promethazine  •   sodium chloride      Physical Exam: 67 y.o. female  General Appearance:    alert and oriented                Pain Relief: Patient reports some relief       Vitals:    08/02/17 2115 08/02/17 2315 08/03/17 0115 08/03/17 0300   BP: 118/68 92/68 110/61 110/58   BP Location: Left arm Left arm Left arm Left arm   Patient Position: Lying Lying Lying Lying   Pulse: 69 71 70 65   Resp: 16 16 16 16   Temp: 99.2 °F (37.3 °C) 99.3 °F (37.4 °C) 99 °F (37.2 °C) 98 °F (36.7 °C)   TempSrc: Oral Oral Oral Oral   SpO2: 96% 96% 98% 96%   Weight:       Height:               Extremities:   Operative extremity neurovascular status intact. ROM intact.    Incision intact w/out signs or  symptoms of infection. No           edema, no cyanosis, no calf tenderness     Pulses:     Pulses palpable and equal bilaterally     Skin:     Skin Warm/Dry w/out ulceration, ecchymosis, rash, or   cyanosis     Activity: Mobilizing Per P.T.       Diagnostic Tests:   Admission on 08/02/2017   Component Date Value Ref Range Status   • Protime 08/03/2017 15.0* 11.7 - 14.2 Seconds Final   • INR 08/03/2017 1.23* 0.90 - 1.10 Final       Xr Chest 1 View    Result Date: 8/2/2017  Narrative: X-RAY CHEST 1 VIEW.  HISTORY: PICC line verification.  COMPARISON: No prior studies for comparison.  FINDINGS: Cardiomediastinal silhouette is within normal limits. Right subclavian PICC line tip is in the superior vena cava. Left PICC line has been removed.  There is no consolidation or effusion.          Impression: No acute findings. No significant change.  This report was finalized on 8/2/2017 11:22 PM by Dr. Wilian Gaines MD.      Xr Pelvis 1 Or 2 View    Result Date: 8/2/2017  Narrative: STAT PORTABLE RADIOGRAPHIC VIEW OF THE PELVIS  CLINICAL HISTORY: Postop reduction.  FINDINGS: Stat portable radiographic view of the pelvis demonstrates a right hip arthroplasty in place. The femoral component is well seated in the acetabular component. Prominent erosive changes are  identified at the level of the bony acetabulum. Again noted is a long stem femoral prosthesis with an encircling wire at the level of the proximal shaft of the femur. Findings are unchanged when compared to the previous exam dated 07/26/2017. Superficial surgical staples are incidentally identified.  This report was finalized on 8/2/2017 8:48 PM by Dr. Benjy Jimenez MD.      Xr Pelvis 1 Or 2 View    Result Date: 7/26/2017  Narrative: TWO-VIEW RIGHT HIP AND PELVIS  HISTORY: Recent right hip replacement surgery.  The patient has had recent right hip replacement surgery including reconstruction of the acetabulum where there has been extensive bone loss and a long-stemmed femoral prosthesis with encircling wire at the level of the proximal shaft. The overall alignment appears satisfactory.  This report was finalized on 7/26/2017 6:21 PM by Dr. Nestor Rizzo MD.      Xr Hip With Or Without Pelvis 1 View Right    Result Date: 8/2/2017  Narrative: XR HIP W OR WO PELVIS 1 VIEW RIGHT-  INDICATIONS: Follow-up right hip dislocation.  TECHNIQUE: FRONTAL VIEW OF THE RIGHT HIP  COMPARISON: 08/02/2017 at 1350 hours  FINDINGS:  The femoral component of the right hip arthroplasty hardware remains superolaterally dislocated in relation to the acetabular component. Overlying skin staples are noted. No acute fracture is identified.      Impression:  Persistent right hip dislocation.   This report was finalized on 8/2/2017 4:28 PM by Dr. Terence Ross MD.      Xr Hip With Or Without Pelvis 1 View Right    Result Date: 8/2/2017  Narrative: XR HIP W OR WO PELVIS 1 VIEW RIGHT-  INDICATIONS: Right hip dislocation  TECHNIQUE: FRONTAL VIEWS OF THE PELVIS AND RIGHT HIP  COMPARISON: 07/26/2017  FINDINGS:   The femoral component of the right hip arthroplasty hardware is superolaterally dislocated in relation to the acetabular component. Overlying skin staples are noted. No acute fracture is identified.      Impression:  Right hip  dislocation.  This report was finalized on 8/2/2017 2:26 PM by Dr. Terence Ross MD.      Xr Hip With Or Without Pelvis 1 View Right    Result Date: 7/27/2017  Narrative: PELVIS RIGHT HIP  HISTORY: Hip pain, surgery.  AP and crosstable lateral views of the right hip were obtained. An acetabular cup is in the process of being placed. A hip prosthesis has not yet been placed. The acetabular cup appears to be in satisfactory position.  This report was finalized on 7/27/2017 9:02 AM by Dr. Sj Villela MD.          Assessment:  Patient Active Problem List   Diagnosis   • Hematoma of right hip   • DVT (deep venous thrombosis), hx of   • Hypertension   • Hyperlipidemia   • Coronary artery disease, hx of stent   • Arthritis   • Bipolar disorder   • Infected prosthesis of right hip   • MRSA (methicillin resistant Staphylococcus aureus) infection   • Mixed infection   • Bacterial infection due to Morganella morganii   • Acute blood loss anemia   • Obstructive sleep apnea   • COPD (chronic obstructive pulmonary disease)   • Obesity (BMI 30-39.9)   • Pseudomonas aeruginosa infection   • VRE infection (vancomycin resistant Enterococcus)   • Infection of right prosthetic hip joint   • Dislocation of right hip     Post-operative Pain  Immobility    Plan:    Continue Physical Therapy, increase mobility as tolerated.  Continue SCDs, Continue DVT prophalaxis.  Continue Pain management efforts  Continue Incisional care      Discharge Plan: tomorrow to [facility]    Date: 8/3/2017   Time: 7:03 AM    Erlin Martin MD

## 2017-08-03 NOTE — PROGRESS NOTES
Discharge Planning Assessment  [facility]     Patient Name: Frances Downs  MRN: [id number]  Today's Date: 8/3/2017    Admit Date: 8/2/2017          Discharge Needs Assessment     None            Discharge Plan       08/03/17 1427    Case Management/Social Work Plan    Plan Return to [facility]     Patient/Family In Agreement With Plan yes    Additional Comments Facesheet and d/c plan verified, pt is from [facility] in [place], [place]. She is from a skilled bed and may return when ready. LM w/ Debra/admissions, phone: [phone].        Discharge Placement     Facility/Agency Request Status Selected? Address Phone Number Fax Number    [facility] Accepted    Yes [address] [place] [address] [phone]        Emily Oleary RN 8/3/2017 14:27    8/3/2017  LM for Debra.                            Demographic Summary     None            Functional Status     None            Psychosocial     None            Abuse/Neglect     None            Legal     None            Substance Abuse     None            Patient Forms     None          Emily Oleary RN

## 2017-08-03 NOTE — SIGNIFICANT NOTE
08/03/17 1001   Rehab Evaluation   Evaluation Not Performed other (see comments)  (Pt.'s Hgb 6.4 this AM. Spoke with nurse (Roshni). Pt. scheduled for blood transfusion this day. Will follow up this PM.)   Recommendation   PT - Next Appointment 08/03/17

## 2017-08-03 NOTE — PROGRESS NOTES
"Pharmacokinetic Consult - Vancomycin Dosing (Initial Note)    Frances Downs has been consulted for pharmacy to dose vancomycin for bone & joint infection  Pharmacy dosing vancomycin per Dr. Martin's request.   Goal trough: 15-20 mg/L     Relevant clinical data and objective history reviewed:  67 y.o. female 64\" (162.6 cm) 209 lb (94.8 kg)    Past Medical History:   Diagnosis Date   • Anxiety    • Arthritis    • Bipolar disorder    • COPD (chronic obstructive pulmonary disease)    • Coronary artery disease    • Diastolic dysfunction     grade II per echocardiogram 11/18/2016   • Difficulty walking    • Disease of thyroid gland     hypothyroidism   • Dislocation of hip joint     recurrent dislocation right hip   • DVT (deep venous thrombosis)    • GERD without esophagitis    • Heartburn    • Hematoma    • Hyperlipidemia    • Hypertension    • LVH (left ventricular hypertrophy)     mild to moderate per echocardiogram 11/18/2016   • Major depressive disorder    • Mitral annular calcification     severe per echocardiogram 11/18/2016   • Mitral regurgitation     mild per echo 11/18/2016   • Mitral valve disease    • Muscle weakness    • Narcolepsy    • Overactive bladder    • Pneumonia    • Pulmonary emboli    • Pulmonary hypertension     mild per echo 11/18/2016   • Sleep apnea     obstructive   • Tricuspid regurgitation     mild to moderate per echo 11/18/2016     Creatinine   Date Value Ref Range Status   07/29/2017 0.58 0.57 - 1.00 mg/dL Final   07/27/2017 0.72 0.57 - 1.00 mg/dL Final   03/22/2017 0.57 0.57 - 1.00 mg/dL Final     BUN   Date Value Ref Range Status   03/22/2017 9 8 - 23 mg/dL Final     Estimated Creatinine Clearance: 77.2 mL/min (by C-G formula based on Cr of 0.58).    Lab Results   Component Value Date    WBC 4.69 03/20/2017     Temp Readings from Last 3 Encounters:   08/03/17 98 °F (36.7 °C) (Oral)   07/29/17 98.1 °F (36.7 °C) (Oral)   03/22/17 97.9 °F (36.6 °C) (Oral)      Baseline " culture/source/susceptibility: No micro to date    Assessment/Plan  Patient was originally admitted to this hospital on 7/26/17 with R Total Hip infection. On the 26th patient underwent a reimplant of R Total Hip. Was on vancomycin 1750mg iv q12h at that time & dose was therapeutic at 19.3 mcg/ml on the 29th. Yesterday (8/2/17) patient came back to hospital with a dislocation of right hip. An open reduction was performed to repair. Pt received a 1500mg preop dose at 1614. After surgery Dr. Martin ordered a 1x dose of 1500mg to be given 12hrs after pre-op dose then RX to dose. Unfortunately the 12 hr info was missed in order verification and 1500mg iv q12h was started last night at 2030. Patient received another dose at 0821 this am. Therefore will hold vancomycin for now and obtain a level at 1930 tonight. (I expect it will be high) If level is <20 mcg/ml tonight will restart the vancomycin-if not will restart tomorrow am. Pharmacy will continue to follow and adjust as needed.    Joni Hale, [facility]

## 2017-08-03 NOTE — THERAPY EVALUATION
Acute Care - Physical Therapy Initial Evaluation  [facility]     Patient Name: Frances oDwns  : 1950  MRN: [id number]  Today's Date: 8/3/2017   Onset of Illness/Injury or Date of Surgery Date: 17  Date of Referral to PT: 17  Referring Physician: Erlin Damico      Admit Date: 2017     Visit Dx:  No diagnosis found.  Patient Active Problem List   Diagnosis   • Hematoma of right hip   • DVT (deep venous thrombosis), hx of   • Hypertension   • Hyperlipidemia   • Coronary artery disease, hx of stent   • Arthritis   • Bipolar disorder   • Infected prosthesis of right hip   • MRSA (methicillin resistant Staphylococcus aureus) infection   • Mixed infection   • Bacterial infection due to Morganella morganii   • Acute blood loss anemia   • Obstructive sleep apnea   • COPD (chronic obstructive pulmonary disease)   • Obesity (BMI 30-39.9)   • Pseudomonas aeruginosa infection   • VRE infection (vancomycin resistant Enterococcus)   • Infection of right prosthetic hip joint   • Dislocation of right hip     Past Medical History:   Diagnosis Date   • Anxiety    • Arthritis    • Bipolar disorder    • COPD (chronic obstructive pulmonary disease)    • Coronary artery disease    • Diastolic dysfunction     grade II per echocardiogram 2016   • Difficulty walking    • Disease of thyroid gland     hypothyroidism   • Dislocation of hip joint     recurrent dislocation right hip   • DVT (deep venous thrombosis)    • GERD without esophagitis    • Heartburn    • Hematoma    • Hyperlipidemia    • Hypertension    • LVH (left ventricular hypertrophy)     mild to moderate per echocardiogram 2016   • Major depressive disorder    • Mitral annular calcification     severe per echocardiogram 2016   • Mitral regurgitation     mild per echo 2016   • Mitral valve disease    • Muscle weakness    • Narcolepsy    • Overactive bladder    • Pneumonia    • Pulmonary emboli    • Pulmonary  hypertension     mild per echo 11/18/2016   • Sleep apnea     obstructive   • Tricuspid regurgitation     mild to moderate per echo 11/18/2016     Past Surgical History:   Procedure Laterality Date   • CATARACT EXTRACTION W/ INTRAOCULAR LENS IMPLANT Right    • CORONARY ANGIOPLASTY WITH STENT PLACEMENT      x2 stents   • EYE SURGERY      cataract surgery bilateral   • HARDWARE REMOVAL FOOT / ANKLE Left 12/01/2011   • HIP SPACER INSERTION WITH ANTIBIOTIC CEMENT Right 2/13/2017    Procedure: RIGHT TOTAL HIP REMOVAL;  Surgeon: Erlin Martin MD;  Location: [facility] OR;  Service:    • INCISION AND DRAINAGE HIP Right 2/8/2017    Procedure: RT HIP INCISION AND DRAINAGE;  Surgeon: Erlin Martin MD;  Location: [facility] OR;  Service:    • INCISION AND DRAINAGE HIP Right 3/17/2017    Procedure: RIGHT HIP INCISION AND DRAINAGE;  Surgeon: Erlin Martin MD;  Location: [facility] OR;  Service:    • JOINT REPLACEMENT     • KNEE ARTHROPLASTY Left 03/2010    TWICE   • ORIF ANKLE FRACTURE Left 06/16/2011   • OTHER SURGICAL HISTORY      IVC filter placement   • SC CLOSED RX TRAUMATIC HIP DISLOCATN Right 8/2/2017    Procedure: FAILED CLOSED RIGHT HIP  REDUCTION WITH OPEN REDUCTION OF RIGHT HIP;  Surgeon: Erlin Martin MD;  Location: [facility] OR;  Service: Orthopedics   • QUADRICEPS TENDON REPAIR Left 06/21/2010    also done 9-    • TOTAL HIP ARTHROPLASTY Right 2011   • TOTAL HIP ARTHROPLASTY REVISION Right 11/22/2016   • TOTAL HIP ARTHROPLASTY REVISION Right 7/26/2017    Procedure: REIMPLANT RIGHT TOTAL HIP;  Surgeon: Erlin Martin MD;  Location: [facility] OR;  Service:    • TOTAL KNEE ARTHROPLASTY REVISION Left 09/2010          PT ASSESSMENT (last 72 hours)      PT Evaluation       08/03/17 1427 08/03/17 1001    Rehab Evaluation    Document Type evaluation  -MS     Subjective Information agree to therapy;complains of;fatigue;weakness;pain;dizziness  -MS     Evaluation Not Performed  other (see  comments)   Pt.'s Hgb 6.4 this AM. Spoke with nurse (Roshni). Pt. scheduled for blood transfusion this day. Will follow up this PM.  -MS    Patient Effort, Rehab Treatment good  -MS     Symptoms Noted Comment Pt. reports pain in her Right hip as well as dizziness and overall weakness with upright mobility this PM.  -MS     General Information    Onset of Illness/Injury or Date of Surgery Date 08/02/17  -MS     Referring Physician Erlin Damico  -MS     Pertinent History Of Current Problem Pt. s/p Right hip ORIF post dislocation (8/2/17)  -MS     Precautions/Limitations fall precautions   TTWB Right L.E.; Dizziness; Receiving blood transfusion  -MS     Plans/Goals Discussed With patient;agreed upon  -MS     Risks Reviewed patient:  -MS     Benefits Reviewed patient:  -MS     Barriers to Rehab none identified  -MS     Living Environment    Lives With facility resident   Currently in rehab  -MS     Clinical Impression    Date of Referral to PT 08/02/17  -MS     Criteria for Skilled Therapeutic Interventions Met treatment indicated  -MS     Rehab Potential good, to achieve stated therapy goals  -MS     Pain Assessment    Pain Assessment 0-10  -MS     Pain Score 5  -MS     Post Pain Score 5  -MS     Pain Type Acute pain;Surgical pain  -MS     Pain Location Hip  -MS     Pain Orientation Right  -MS     Pain Intervention(s) Medication (See MAR);Repositioned;Elevated;Rest  -MS     Cognitive Assessment/Intervention    Current Cognitive/Communication Assessment functional  -MS     Orientation Status oriented x 4  -MS     Follows Commands/Answers Questions 100% of the time  -MS     Personal Safety WNL/WFL  -MS     Personal Safety Interventions fall prevention program maintained;gait belt;nonskid shoes/slippers when out of bed;supervised activity  -MS     ROM (Range of Motion)    General ROM --   BUE/LLE (WFL's)  -MS     MMT (Manual Muscle Testing)    General MMT Assessment --   BUE/LLE (3+/5)  -MS     Mobility  "Assessment/Training    Extremity Weight-Bearing Status right lower extremity  -MS     Right Lower Extremity Weight-Bearing toe touch weight-bearing  -MS     Bed Mobility, Assessment/Treatment    Bed Mob, Supine to Sit, [place] minimum assist (75% patient effort)  -MS     Transfer Assessment/Treatment    Transfers, Bed-Chair [place] moderate assist (50% patient effort)  -MS     Transfers, Bed-Chair-Bed, Assist Device rolling walker   Pt. able to maintain TTWB Right L.E. during pivot transfer  -MS     Transfers, Sit-Stand [place] minimum assist (75% patient effort)  -MS     Transfers, Stand-Sit [place] minimum assist (75% patient effort)  -MS     Transfers, Sit-Stand-Sit, Assist Device rolling walker  -MS     Transfer, Comment Performed x 2 at bedside for functional strength training.  Elevated bed surface due to overall weakness.  -MS     Gait Assessment/Treatment    Gait, Comment Pt. reports her Left knee is \"weak\" and \"gives out\" on her \"all the time\" and now that she is unable to bear weight Right L.E., she feels she cannot ambulate using her Left L.E.  -MS     Therapy Exercises    Exercise Protocols hip ORIF  -MS     Hip ORIF Exercises right:;5 reps;ankle pumps/circles;quad set;glut set;hip abduction;LAQ  -MS     Positioning and Restraints    Pre-Treatment Position in bed  -MS     Post Treatment Position chair  -MS     In Chair notified nsg;reclined;sitting;call light within reach;encouraged to call for assist   All lines intact.Nurse notified how to assist pt back to bed  -MS       08/02/17 0126       General Information    Equipment Currently Used at Home wheelchair  -LAI     Living Environment    Lives With alone  -LAI     Living Arrangements apartment   came from rehab  -LAI     Home Accessibility no concerns  -LAI     Stair Railings at Home none  -LAI     Type of Financial/Environmental Concern none  -LAI     Transportation Available ambulance  -LAI       User Key  (r) = Recorded By, (t) = Taken " By, (c) = Cosigned By    Initials Name Provider Type    LAI Stubbs V, RN Registered Nurse    MS Yon Elizabeth, PT Physical Therapist          Physical Therapy Education     Title: PT OT SLP Therapies (Done)     Topic: Physical Therapy (Done)     Point: Mobility training (Done)    Learning Progress Summary    Learner Readiness Method Response Comment Documented by Status   Patient Acceptance E,D VU,NR  MS 08/03/17 1433 Done               Point: Home exercise program (Done)    Learning Progress Summary    Learner Readiness Method Response Comment Documented by Status   Patient Acceptance E,D VU,NR  MS 08/03/17 1433 Done               Point: Body mechanics (Done)    Learning Progress Summary    Learner Readiness Method Response Comment Documented by Status   Patient Acceptance E,D VU,NR  MS 08/03/17 1433 Done               Point: Precautions (Done)    Learning Progress Summary    Learner Readiness Method Response Comment Documented by Status   Patient Acceptance E,CAROLINA LINDSEY,NR  MS 08/03/17 1433 Done                      User Key     Initials Effective Dates Name Provider Type Discipline    MS 12/01/15 -  Yon Elizabeth, PT Physical Therapist PT                PT Recommendation and Plan  Anticipated Discharge Disposition: skilled nursing facility  Planned Therapy Interventions: balance training, gait training, bed mobility training, home exercise program, patient/family education, postural re-education, strengthening, transfer training  PT Frequency: daily  Plan of Care Review  Plan Of Care Reviewed With: patient  Outcome Summary/Follow up Plan: Pt. will benefit from skilled inpt. P.T. to address her functional deficits and to assist pt. in regaining her independence with functional mobility.          IP PT Goals       08/03/17 1434          Bed Mobility PT LTG    Bed Mobility PT LTG, Date Established 08/03/17  -MS      Bed Mobility PT LTG, Time to Achieve 5 - 7 days  -MS      Bed Mobility PT LTG, Activity Type all  bed mobility  -MS      Bed Mobility PT LTG, [place] Level supervision required  -MS      Transfer Training PT LTG    Transfer Training PT LTG, Date Established 08/03/17  -MS      Transfer Training PT LTG, Time to Achieve 5 - 7 days  -MS      Transfer Training PT LTG, Activity Type sit to stand/stand to sit  -MS      Transfer Training PT LTG, [place] Level contact guard assist  -MS      Transfer Training PT LTG, Assist Device walker, rolling  -MS      Transfer Training 2 PT LTG    Transfer Training PT 2 LTG, Date Established 08/03/17  -MS      Transfer Training PT 2 LTG, Time to Achieve 5 - 7 days  -MS      Transfer Training PT 2 LTG, Activity Type bed to chair /chair to bed  -MS      Transfer Training PT 2 LTG, [place] Level contact guard assist  -MS      Transfer Training PT 2 LTG, Assist Device walker, rolling  -MS        User Key  (r) = Recorded By, (t) = Taken By, (c) = Cosigned By    Initials Name Provider Type    MS Yon Elizabeth, PT Physical Therapist                Outcome Measures       08/03/17 1400          How much help from another person do you currently need...    Turning from your back to your side while in flat bed without using bedrails? 3  -MS      Moving from lying on back to sitting on the side of a flat bed without bedrails? 2  -MS      Moving to and from a bed to a chair (including a wheelchair)? 2  -MS      Standing up from a chair using your arms (e.g., wheelchair, bedside chair)? 2  -MS      Climbing 3-5 steps with a railing? 1  -MS      To walk in hospital room? 1  -MS      AM-PAC 6 Clicks Score 11  -MS      Functional Assessment    Outcome Measure Options AM-PAC 6 Clicks Basic Mobility (PT)  -MS        User Key  (r) = Recorded By, (t) = Taken By, (c) = Cosigned By    Initials Name Provider Type    MS Yon Elizabeth, PT Physical Therapist           Time Calculation:         PT Charges       08/03/17 1435 08/03/17 1001       Time Calculation    Start Time 1352  -MS       Stop Time 1412  -MS      Time Calculation (min) 20 min  -MS      PT Received On 08/03/17  -MS      PT - Next Appointment 08/04/17  -MS 08/03/17  -MS     PT Goal Re-Cert Due Date 08/10/17  -MS        User Key  (r) = Recorded By, (t) = Taken By, (c) = Cosigned By    Initials Name Provider Type    MS Yon Elizabeth, PT Physical Therapist          Therapy Charges for Today     Code Description Service Date Service Provider Modifiers Qty    12671338862 HC PT MOBILITY CURRENT 8/3/2017 Yon Elizabeth, PT GP, CL 1    95434364480 HC PT MOBILITY PROJECTED 8/3/2017 Yon Elizabeth, PT GP, CJ 1    03297653830 HC PT EVAL MOD COMPLEXITY 2 8/3/2017 Yon Elizabeth, PT GP 1    12260137058 HC PT THER PROC EA 15 MIN 8/3/2017 Yon Elizabeth, PT GP 1          PT G-Codes  PT Professional Judgement Used?: Yes  Outcome Measure Options: AM-PAC 6 Clicks Basic Mobility (PT)  Functional Limitation: Mobility: Walking and moving around  Mobility: Walking and Moving Around Current Status (): At least 60 percent but less than 80 percent impaired, limited or restricted  Mobility: Walking and Moving Around Goal Status (): At least 20 percent but less than 40 percent impaired, limited or restricted      Yon Elizabeth, PT  8/3/2017

## 2017-08-03 NOTE — PLAN OF CARE
Problem: Patient Care Overview (Adult)  Goal: Plan of Care Review    08/03/17 1434   Coping/Psychosocial Response Interventions   Plan Of Care Reviewed With patient   Outcome Evaluation   Outcome Summary/Follow up Plan Pt. will benefit from skilled inpt. P.T. to address her functional deficits and to assist pt. in regaining her independence with functional mobility.         Problem: Inpatient Physical Therapy  Goal: Bed Mobility Goal LTG- PT    08/03/17 1434   Bed Mobility PT LTG   Bed Mobility PT LTG, Date Established 08/03/17   Bed Mobility PT LTG, Time to Achieve 5 - 7 days   Bed Mobility PT LTG, Activity Type all bed mobility   Bed Mobility PT LTG, [place] Level supervision required       Goal: Transfer Training Goal 1 LTG- PT    08/03/17 1434   Transfer Training PT LTG   Transfer Training PT LTG, Date Established 08/03/17   Transfer Training PT LTG, Time to Achieve 5 - 7 days   Transfer Training PT LTG, Activity Type sit to stand/stand to sit   Transfer Training PT LTG, [place] Level contact guard assist   Transfer Training PT LTG, Assist Device walker, rolling       Goal: Transfer Training Goal 2 LTG- PT    08/03/17 1434   Transfer Training 2 PT LTG   Transfer Training PT 2 LTG, Date Established 08/03/17   Transfer Training PT 2 LTG, Time to Achieve 5 - 7 days   Transfer Training PT 2 LTG, Activity Type bed to chair /chair to bed   Transfer Training PT 2 LTG, [place] Level contact guard assist   Transfer Training PT 2 LTG, Assist Device walker, rolling

## 2017-08-03 NOTE — PLAN OF CARE
Problem: Patient Care Overview (Adult)  Goal: Plan of Care Review  Outcome: Ongoing (interventions implemented as appropriate)    08/03/17 1519   Coping/Psychosocial Response Interventions   Plan Of Care Reviewed With patient   Patient Care Overview   Progress improving   Outcome Evaluation   Outcome Summary/Follow up Plan Patient had an am HGB of 6.4. MD ordered 2units PRBC, BP has been low, Pt AOx4 ready to go home. Pt does not complain of pain       Goal: Discharge Needs Assessment    08/03/17 1519   Discharge Needs Assessment   Concerns To Be Addressed no discharge needs identified         Problem: Fall Risk (Adult)  Goal: Absence of Falls  Outcome: Ongoing (interventions implemented as appropriate)    08/03/17 1519   Fall Risk (Adult)   Absence of Falls achieves outcome         Problem: Perioperative Period (Adult)  Goal: Signs and Symptoms of Listed Potential Problems Will be Absent or Manageable (Perioperative Period)  Outcome: Ongoing (interventions implemented as appropriate)    08/03/17 1519   Perioperative Period   Problems Assessed (Perioperative Period) all   Problems Present (Perioperative Period) pain         Problem: Hip Replacement, Total (Adult)  Goal: Signs and Symptoms of Listed Potential Problems Will be Absent or Manageable (Hip Replacement, Total)  Outcome: Ongoing (interventions implemented as appropriate)    08/03/17 1519   Hip Replacement, Total   Problems Assessed (Total Hip Replacement) all   Problems Present (Total Hip Replacement) pain;functional decline/self care deficit

## 2017-08-04 PROBLEM — I50.32 CHRONIC DIASTOLIC CONGESTIVE HEART FAILURE (HCC): Status: ACTIVE | Noted: 2017-08-04

## 2017-08-04 PROBLEM — I95.81 POSTOPERATIVE HYPOTENSION: Status: ACTIVE | Noted: 2017-08-04

## 2017-08-04 LAB
ABO + RH BLD: NORMAL
ABO + RH BLD: NORMAL
ANION GAP SERPL CALCULATED.3IONS-SCNC: 11.9 MMOL/L
BH BB BLOOD EXPIRATION DATE: NORMAL
BH BB BLOOD EXPIRATION DATE: NORMAL
BH BB BLOOD TYPE BARCODE: 6200
BH BB BLOOD TYPE BARCODE: 6200
BH BB DISPENSE STATUS: NORMAL
BH BB DISPENSE STATUS: NORMAL
BH BB PRODUCT CODE: NORMAL
BH BB PRODUCT CODE: NORMAL
BH BB UNIT NUMBER: NORMAL
BH BB UNIT NUMBER: NORMAL
BUN BLD-MCNC: 9 MG/DL (ref 8–23)
BUN/CREAT SERPL: 14.3 (ref 7–25)
CALCIUM SPEC-SCNC: 8.2 MG/DL (ref 8.6–10.5)
CHLORIDE SERPL-SCNC: 108 MMOL/L (ref 98–107)
CO2 SERPL-SCNC: 24.1 MMOL/L (ref 22–29)
CREAT BLD-MCNC: 0.63 MG/DL (ref 0.57–1)
CROSSMATCH INTERPRETATION: NORMAL
CROSSMATCH INTERPRETATION: NORMAL
DEPRECATED RDW RBC AUTO: 54 FL (ref 37–54)
ERYTHROCYTE [DISTWIDTH] IN BLOOD BY AUTOMATED COUNT: 15.3 % (ref 11.7–13)
FOLATE SERPL-MCNC: 12.9 NG/ML (ref 4.78–24.2)
GFR SERPL CREATININE-BSD FRML MDRD: 95 ML/MIN/1.73
GLUCOSE BLD-MCNC: 119 MG/DL (ref 65–99)
HCT VFR BLD AUTO: 25.2 % (ref 35.6–45.5)
HGB BLD-MCNC: 8.1 G/DL (ref 11.9–15.5)
INR PPP: 1.13 (ref 0.9–1.1)
IRON 24H UR-MRATE: 48 MCG/DL (ref 37–145)
IRON SATN MFR SERPL: 19 % (ref 20–50)
MCH RBC QN AUTO: 31.3 PG (ref 26.9–32)
MCHC RBC AUTO-ENTMCNC: 32.1 G/DL (ref 32.4–36.3)
MCV RBC AUTO: 97.3 FL (ref 80.5–98.2)
NT-PROBNP SERPL-MCNC: 1031 PG/ML (ref 5–900)
PLATELET # BLD AUTO: 216 10*3/MM3 (ref 140–500)
PMV BLD AUTO: 10.4 FL (ref 6–12)
POTASSIUM BLD-SCNC: 4 MMOL/L (ref 3.5–5.2)
PROTHROMBIN TIME: 14.1 SECONDS (ref 11.7–14.2)
RBC # BLD AUTO: 2.59 10*6/MM3 (ref 3.9–5.2)
RETICS/RBC NFR AUTO: 3.3 % (ref 0.5–1.5)
SODIUM BLD-SCNC: 144 MMOL/L (ref 136–145)
TIBC SERPL-MCNC: 253 MCG/DL
TRANSFERRIN SERPL-MCNC: 170 MG/DL (ref 200–360)
TSH SERPL DL<=0.05 MIU/L-ACNC: 3.66 MIU/ML (ref 0.27–4.2)
UNIT  ABO: NORMAL
UNIT  ABO: NORMAL
UNIT  RH: NORMAL
UNIT  RH: NORMAL
VIT B12 BLD-MCNC: 260 PG/ML (ref 211–946)
WBC NRBC COR # BLD: 5.46 10*3/MM3 (ref 4.5–10.7)

## 2017-08-04 PROCEDURE — 82746 ASSAY OF FOLIC ACID SERUM: CPT | Performed by: HOSPITALIST

## 2017-08-04 PROCEDURE — 94799 UNLISTED PULMONARY SVC/PX: CPT

## 2017-08-04 PROCEDURE — 84466 ASSAY OF TRANSFERRIN: CPT | Performed by: HOSPITALIST

## 2017-08-04 PROCEDURE — G0378 HOSPITAL OBSERVATION PER HR: HCPCS

## 2017-08-04 PROCEDURE — 83540 ASSAY OF IRON: CPT | Performed by: HOSPITALIST

## 2017-08-04 PROCEDURE — 25010000002 ENOXAPARIN PER 10 MG: Performed by: ORTHOPAEDIC SURGERY

## 2017-08-04 PROCEDURE — 85027 COMPLETE CBC AUTOMATED: CPT | Performed by: HOSPITALIST

## 2017-08-04 PROCEDURE — 25010000002 KETOROLAC TROMETHAMINE PER 15 MG: Performed by: ORTHOPAEDIC SURGERY

## 2017-08-04 PROCEDURE — 84443 ASSAY THYROID STIM HORMONE: CPT | Performed by: HOSPITALIST

## 2017-08-04 PROCEDURE — 25010000002 VANCOMYCIN 10 G RECONSTITUTED SOLUTION: Performed by: ORTHOPAEDIC SURGERY

## 2017-08-04 PROCEDURE — 80048 BASIC METABOLIC PNL TOTAL CA: CPT | Performed by: HOSPITALIST

## 2017-08-04 PROCEDURE — 82607 VITAMIN B-12: CPT | Performed by: HOSPITALIST

## 2017-08-04 PROCEDURE — 97110 THERAPEUTIC EXERCISES: CPT

## 2017-08-04 PROCEDURE — 83880 ASSAY OF NATRIURETIC PEPTIDE: CPT | Performed by: HOSPITALIST

## 2017-08-04 PROCEDURE — 85045 AUTOMATED RETICULOCYTE COUNT: CPT | Performed by: HOSPITALIST

## 2017-08-04 PROCEDURE — 85610 PROTHROMBIN TIME: CPT | Performed by: ORTHOPAEDIC SURGERY

## 2017-08-04 RX ORDER — WARFARIN SODIUM 7.5 MG/1
7.5 TABLET ORAL
Status: COMPLETED | OUTPATIENT
Start: 2017-08-04 | End: 2017-08-04

## 2017-08-04 RX ORDER — FERROUS SULFATE 325(65) MG
325 TABLET ORAL
Qty: 30 TABLET | Refills: 0 | Status: SHIPPED | OUTPATIENT
Start: 2017-08-04 | End: 2018-04-27 | Stop reason: ALTCHOICE

## 2017-08-04 RX ORDER — OXYCODONE AND ACETAMINOPHEN 10; 325 MG/1; MG/1
1 TABLET ORAL EVERY 4 HOURS PRN
Qty: 56 TABLET | Refills: 0 | Status: SHIPPED | OUTPATIENT
Start: 2017-08-04 | End: 2017-08-12

## 2017-08-04 RX ADMIN — KETOROLAC TROMETHAMINE 30 MG: 30 INJECTION, SOLUTION INTRAMUSCULAR at 14:02

## 2017-08-04 RX ADMIN — OXYBUTYNIN CHLORIDE 10 MG: 10 TABLET, EXTENDED RELEASE ORAL at 09:00

## 2017-08-04 RX ADMIN — PANTOPRAZOLE SODIUM 40 MG: 40 TABLET, DELAYED RELEASE ORAL at 07:00

## 2017-08-04 RX ADMIN — FERROUS SULFATE TAB 325 MG (65 MG ELEMENTAL FE) 325 MG: 325 (65 FE) TAB at 17:48

## 2017-08-04 RX ADMIN — WARFARIN SODIUM 7.5 MG: 7.5 TABLET ORAL at 17:48

## 2017-08-04 RX ADMIN — ARIPIPRAZOLE 10 MG: 10 TABLET ORAL at 08:59

## 2017-08-04 RX ADMIN — ENOXAPARIN SODIUM 40 MG: 40 INJECTION SUBCUTANEOUS at 08:59

## 2017-08-04 RX ADMIN — VANCOMYCIN HYDROCHLORIDE 1500 MG: 10 INJECTION, POWDER, LYOPHILIZED, FOR SOLUTION INTRAVENOUS at 21:39

## 2017-08-04 RX ADMIN — LAMOTRIGINE 25 MG: 25 TABLET ORAL at 08:58

## 2017-08-04 RX ADMIN — PREGABALIN 100 MG: 100 CAPSULE ORAL at 08:59

## 2017-08-04 RX ADMIN — DULOXETINE HYDROCHLORIDE 30 MG: 30 CAPSULE, DELAYED RELEASE ORAL at 08:58

## 2017-08-04 RX ADMIN — Medication: at 21:44

## 2017-08-04 RX ADMIN — LEVOTHYROXINE SODIUM 100 MCG: 100 TABLET ORAL at 08:57

## 2017-08-04 RX ADMIN — FERROUS SULFATE TAB 325 MG (65 MG ELEMENTAL FE) 325 MG: 325 (65 FE) TAB at 08:59

## 2017-08-04 RX ADMIN — CLOPIDOGREL 75 MG: 75 TABLET, FILM COATED ORAL at 09:01

## 2017-08-04 RX ADMIN — ACETAMINOPHEN 325 MG: 325 TABLET ORAL at 21:39

## 2017-08-04 RX ADMIN — LAMOTRIGINE 25 MG: 25 TABLET ORAL at 17:48

## 2017-08-04 RX ADMIN — SODIUM CHLORIDE, POTASSIUM CHLORIDE, SODIUM LACTATE AND CALCIUM CHLORIDE 100 ML/HR: 600; 310; 30; 20 INJECTION, SOLUTION INTRAVENOUS at 00:30

## 2017-08-04 RX ADMIN — ASPIRIN 81 MG: 81 TABLET ORAL at 08:58

## 2017-08-04 RX ADMIN — PREGABALIN 100 MG: 100 CAPSULE ORAL at 17:48

## 2017-08-04 RX ADMIN — NICOTINE 1 PATCH: 14 PATCH, EXTENDED RELEASE TRANSDERMAL at 21:43

## 2017-08-04 RX ADMIN — DULOXETINE HYDROCHLORIDE 30 MG: 30 CAPSULE, DELAYED RELEASE ORAL at 17:48

## 2017-08-04 RX ADMIN — KETOROLAC TROMETHAMINE 30 MG: 30 INJECTION, SOLUTION INTRAMUSCULAR at 04:55

## 2017-08-04 RX ADMIN — FLUTICASONE PROPIONATE 2 SPRAY: 50 SPRAY, METERED NASAL at 08:57

## 2017-08-04 RX ADMIN — TIOTROPIUM BROMIDE 1 CAPSULE: 18 CAPSULE ORAL; RESPIRATORY (INHALATION) at 08:45

## 2017-08-04 RX ADMIN — PANTOPRAZOLE SODIUM 40 MG: 40 TABLET, DELAYED RELEASE ORAL at 08:58

## 2017-08-04 RX ADMIN — ATORVASTATIN CALCIUM 40 MG: 20 TABLET, FILM COATED ORAL at 21:39

## 2017-08-04 RX ADMIN — VANCOMYCIN HYDROCHLORIDE 1500 MG: 10 INJECTION, POWDER, LYOPHILIZED, FOR SOLUTION INTRAVENOUS at 09:05

## 2017-08-04 NOTE — PLAN OF CARE
Problem: Patient Care Overview (Adult)  Goal: Plan of Care Review    08/04/17 1101   Coping/Psychosocial Response Interventions   Plan Of Care Reviewed With patient   Patient Care Overview   Progress improving   Outcome Evaluation   Outcome Summary/Follow up Plan Improved tolerance to functional activity this day with initiation of ambulation (TTWB Right L.E. maintained) and work on functional strengthening (sit <-> stand). Pt. continues to perform her Right L.E. ther. ex. program as well.

## 2017-08-04 NOTE — DISCHARGE SUMMARY
This is a discharge summary on Frances Downs.  Miss Downs was admitted on 8-17 with a dislocated right total hip.  She had dislocated her hip when she was sitting down and twisted her leg.  She is taken to the operating room where she underwent an attempted closed reduction which failed and then a subsequent open reduction of her right hip.  Postoperatively she is doing well.  Subsequently discharged home on 8/5/17.  She is 25% weightbearing.  He is to follow posterior hip precautions.  Be on Percocet 10 for pain and Coumadin for DVT prophylaxis.  She will continue her routine home medicines.  Her condition on discharge is improved.  She did require transfusion of 2 units of packed red blood cells while in the hospital.  We will check her pro times on Monday and Thursday of next week and adjust her dose of Coumadin appropriately the rest of her routine home medicines will be continued.

## 2017-08-04 NOTE — PROGRESS NOTES
Name: Frances Downs ADMIT: 2017   : 1950  PCP: Talha Echeverria MD    MRN: [id number] LOS: 1 days   AGE/SEX: 67 y.o. female  ROOM: [facility]     Subjective   Subjective:  Symptoms:  Improved.  She reports weakness.  No shortness of breath or chest pain.  (Felt lightheaded yesterday with low blood pressure.  Feels better this morning.).    Diet:  Adequate intake.  No nausea.    Activity level: Impaired due to pain.    Pain:  She complains of pain that is mild.  She reports pain is improving.  Pain is partially controlled and requiring pain medication.        Objective   Vital Signs  Temp:  [97 °F (36.1 °C)-98.9 °F (37.2 °C)] 97.6 °F (36.4 °C)  Heart Rate:  [59-92] 62  Resp:  [16-18] 16  BP: ()/(44-68) 128/68  SpO2:  [92 %-100 %] 98 %  on  Flow (L/min):  [2] 2;   O2 Device: room air  Body mass index is 35.87 kg/(m^2).    Physical Exam   Constitutional: She is oriented to person, place, and time. No distress.   Cardiovascular: Normal rate and regular rhythm.    Murmur heard.  Pulmonary/Chest: Effort normal and breath sounds normal.   Abdominal: Soft. Bowel sounds are normal. She exhibits no distension. There is no tenderness.   Musculoskeletal: Normal range of motion. She exhibits no edema.   Neurological: She is alert and oriented to person, place, and time.   Skin: Skin is warm and dry. She is not diaphoretic.       Results Review:       I reviewed the patient's new clinical results.    Results from last 7 days  Lab Units 1742 17  0534   WBC 10*3/mm3 5.46 6.62  --   --    HEMOGLOBIN g/dL 8.1* 8.4* 6.4* 7.6*   PLATELETS 10*3/mm3 216 251  --   --      Results from last 7 days  Lab Units 17  0434 17  0534   SODIUM mmol/L 144 141  --    POTASSIUM mmol/L 4.0 4.0  --    CHLORIDE mmol/L 108* 104  --    CO2 mmol/L 24.1 22.4  --    BUN mg/dL 9 10  --    CREATININE mg/dL 0.63 0.73 0.58   GLUCOSE mg/dL 119* 144*  --    Estimated  Creatinine Clearance: 77.2 mL/min (by C-G formula based on Cr of 0.63).  Results from last 7 days  Lab Units 08/04/17  0434 08/03/17  2020   CALCIUM mg/dL 8.2* 8.2*   ALBUMIN g/dL  --  3.20*       Results from last 7 days  Lab Units 08/04/17  0434 08/03/17  0542 07/29/17  0534   PROTIME Seconds 14.1 15.0* 19.4*   INR  1.13* 1.23* 1.70*           ARIPiprazole 10 mg Oral Daily   aspirin 81 mg Oral Daily   atorvastatin 40 mg Oral Nightly   carvedilol 12.5 mg Oral BID With Meals   clopidogrel 75 mg Oral Daily   DULoxetine 30 mg Oral BID   enoxaparin 40 mg Subcutaneous Once   ferrous sulfate 325 mg Oral BID   ferrous sulfate 325 mg Oral Daily With Breakfast   fluticasone 2 spray Nasal Daily   isosorbide mononitrate 30 mg Oral Daily   lamoTRIgine 25 mg Oral BID   levothyroxine 100 mcg Oral Daily   menthol-zinc oxide  Topical Q12H   nicotine 1 patch Transdermal Q24H   oxybutynin XL 10 mg Oral Daily   pantoprazole 40 mg Oral QAM   potassium chloride 10 mEq Oral Daily   pregabalin 100 mg Oral BID   sennosides-docusate sodium 2 tablet Oral BID   tiotropium 1 capsule Inhalation Daily - RT   vancomycin 1,500 mg Intravenous Q12H   warfarin 7.5 mg Oral Once       lactated ringers 100 mL/hr Last Rate: Stopped (08/03/17 1334)   lactated ringers 100 mL/hr Last Rate: 100 mL/hr (08/04/17 0030)   Pharmacy to dose vancomycin           Assessment/Plan   Assessment:     Active Hospital Problems (** Indicates Principal Problem)    Diagnosis Date Noted   • **Dislocation of right hip [S73.004A] 08/02/2017   • Postoperative hypotension [I95.89] 08/04/2017   • Chronic diastolic congestive heart failure [I50.32] 08/04/2017   • Obstructive sleep apnea [G47.33] 03/21/2017   • COPD (chronic obstructive pulmonary disease) [J44.9] 03/21/2017   • Obesity (BMI 30-39.9) [E66.9] 03/21/2017   • DVT (deep venous thrombosis), hx of [I82.409] 02/10/2017   • Hypertension [I10] 02/10/2017   • Coronary artery disease, hx of stent [I25.10] 02/10/2017       Resolved Hospital Problems    Diagnosis Date Noted Date Resolved   No resolved problems to display.       Plan:   - POD#1 IN CLOSED RX TRAUMATIC HIP DISLOCATN [31766] (HIP CLOSED REDUCTION)  - Will hold COREG at certain parameters.  NORVASC has been stopped for now.  BP improved this am after low yesterday w/ drop of SBP from 130s to 80s.    - Continue IVFs as ordered.  Will saline lock after this bag empty to see how BP does on own.  - SCDs have been ordered for DVT prophylaxis per ortho.  - Patient encouraged to use incentive spirometer as instructed.        Dennis Bejarano MD  [facility]  08/04/17  7:33 AM

## 2017-08-04 NOTE — PLAN OF CARE
Problem: Patient Care Overview (Adult)  Goal: Plan of Care Review  Outcome: Ongoing (interventions implemented as appropriate)    08/04/17 1925   Coping/Psychosocial Response Interventions   Plan Of Care Reviewed With patient   Patient Care Overview   Progress improving         08/04/17 1925   Coping/Psychosocial Response Interventions   Plan Of Care Reviewed With patient   Patient Care Overview   Progress improving   Outcome Evaluation   Outcome Summary/Follow up Plan pt hemoglobin increased, pain controlled with toradol. No narcotics       Goal: Discharge Needs Assessment  Outcome: Ongoing (interventions implemented as appropriate)    Problem: Fall Risk (Adult)  Goal: Absence of Falls  Outcome: Ongoing (interventions implemented as appropriate)    08/04/17 1925   Fall Risk (Adult)   Absence of Falls achieves outcome         Problem: Perioperative Period (Adult)  Goal: Signs and Symptoms of Listed Potential Problems Will be Absent or Manageable (Perioperative Period)  Outcome: Ongoing (interventions implemented as appropriate)    08/04/17 1925   Perioperative Period   Problems Assessed (Perioperative Period) all   Problems Present (Perioperative Period) pain         Problem: Hip Replacement, Total (Adult)  Goal: Signs and Symptoms of Listed Potential Problems Will be Absent or Manageable (Hip Replacement, Total)  Outcome: Ongoing (interventions implemented as appropriate)

## 2017-08-04 NOTE — PROGRESS NOTES
"Pharmacokinetic Consult - Vancomycin Dosing (Follow-up Note)    Frances Downs is on day 2 pharmacy to dose vancomycin for bone and joint infection.  Consult for Dr. Martin.  Goal trough: 15-20mg/L     Relevant clinical data and objective history reviewed:  67 y.o. female 64\" (162.6 cm) 209 lb (94.8 kg)    Creatinine   Date Value Ref Range Status   08/03/2017 0.73 0.57 - 1.00 mg/dL Final     BUN   Date Value Ref Range Status   08/03/2017 10 8 - 23 mg/dL Final     Estimated Creatinine Clearance: 77.2 mL/min (by C-G formula based on Cr of 0.73).    Lab Results   Component Value Date    WBC 6.62 08/03/2017     Temp Readings from Last 3 Encounters:   08/03/17 97.3 °F (36.3 °C) (Oral)     IV Anti-Infectives     Ordered     Dose/Rate Route Frequency Start Stop    08/03/17 2130  vancomycin 1500 mg/500 mL 0.9% NS IVPB ([facility])     Ordering Provider:  Erlin Martin MD    1,500 mg Intravenous Every 12 Hours 08/03/17 2215      08/02/17 1921  Pharmacy to dose vancomycin     Ordering Provider:  Erlin Martin MD     Does not apply Continuous 08/02/17 2000           Lab Results   Component Value Date    VANCORANDOM 19.40 08/03/2017       Assessment/Plan  Vancomycin level=19.4mcg/ml this evening.   Will start vancomycin 1500mg IV q12h.  Trough ordered prior to morning dose on 8/5.  Pharmacy will continue to follow daily while on vancomycin.    Ivory Brewer, PharmD  8/3/2017  9:35 PM        "

## 2017-08-04 NOTE — PROGRESS NOTES
Continued Stay Note  [facility]     Patient Name: Frances Downs  MRN: [id number]  Today's Date: 8/4/2017    Admit Date: 8/2/2017          Discharge Plan       08/04/17 5658    Case Management/Social Work Plan    Additional Comments [facility] chinyere Doran/Debra notified to draw protimes on Monday and Thursday and call results to Dr. Martin's office, copy of order faxed and placed in transfer packet. Pt's bed is ready for her return. [facility] [place] [place]. Transfer packet on chart.               Discharge Codes     None        Expected Discharge Date and Time     Expected Discharge Date Expected Discharge Time    Aug 5, 2017             Emily Oleary RN

## 2017-08-04 NOTE — DISCHARGE INSTR - LAB
ACTIVITIES:  1. Exercises:  ? Complete exercise program as taught by the hospital physical therapist 2 times per day  ? Exercise program will be advanced by the physical therapist  ? Complete the ankle pump exercises at least 10 times per hour (while awake)  ? Use ice 20-30 minutes as needed for pain/ sweeling in your operative extremity for the first week post-operatively.    2. Activities of Daily Living:  ? No tub baths, hot tubs, or swimming pools for 4 weeks  ? May shower and let water run over the incision the day after the staples are removed.      II. Restrictions  ? Continue hip precautions as taught at the hospital  ? May not drive until released by physician to do so  ? Weight bearing is {Weight bearing restriction:80323}  ? First week stay inside on even terrain. May go up and down stairs one stair at a time utilizing the hand rail once cleared by physical therapy to do so.    III. Precautions:  ? No elective dental, genital-urinary, or colon procedures or surgical procedures for 12 weeks after surgery unless absolutely necessary.  ?  If dental work or surgical procedure is deemed absolutely necessary, you will need to contact your surgeon as you will need to take antibiotics 1 hour prior to any dental work (including teeth cleanings).  ? Stockings are to be placed on in the morning and removed at night until see by the physician.    IV. INCISION CARE:  ? Wash your hands prior to dressing changes  ? Change the dressing daily with dry dressing   ? No creams or ointments to the incision  ? May remove dressing once the incision is free of drainage  ? Do not touch or pick at the incision    ? Check incision every day and notify surgeon immediately if any of the following signs or symptoms are noted:  o Increase in redness  o Increase in swelling around the incision and of the entire extremity  o Increase in pain  o Excessive drainage  o Pulling apart of the edges of the incision  o Increase in overall body  temperature (greater than 100.5 degrees)  ? The inpatient rehab nurse or the home health nurse will remove your staples 2 weeks after surgery    V. Medications:   1. Anticoagulants: You will be discharged on an anticoagulant. This is a prophylactic medication that helps prevent blood clots during your post-operative period. The type and length of dosage varies based on your individual needs, procedure performed, and surgeon’s preference.  ? While taking the anticoagulant, you should avoid taking any additional aspirin, ibuprofen (Advil or Motrin), Aleve (Naprosyn) or other non-steroidal anti-inflammatory medications.   ? Notify surgeon immediately if any jimmie bleeding is noted in the urine, stool, emesis, or from the nose or the incision. Blood in the stool will often appear as black rather than red. Blood in urine may appear as pink. Blood in emesis may appear as brown/black like coffee grounds.  ? You will need to apply pressure for longer periods of time to any cuts or abrasions to stop bleeding  ? If on coumadin avoid green leafy vegetables    2. Stool Softeners: You will be at greater risk of constipation after surgery due to being less mobile and the pain medications.   ? Take stool softeners as instructed by your surgeon while on pain medications. Over the counter Colace 100 mg 1-2 capsules twice daily.   ? If stools become too loose or too frequent, please decreases the dosage or stop the stool softener.  ? If constipation occurs despite use of stool softeners, you are to continue the stool softeners and add a laxative (Milk of Magnesia 1 ounce daily as needed)  ? Drink plenty of fluids, and eat fruits and vegetables during your recovery time    3. Pain Medications utilized after surgery are narcotics and the law requires that the following information be given to all patients that are prescribed narcotics:  ? CLASSIFICATION: Pain medications are called Opioids and are narcotics  ? LEGALITIES: It is illegal  to share narcotics with others and to drive within 24 hours of taking narcotics  ? POTENTIAL SIDE EFFECTS: Potential side effects of opioids include: nausea, vomiting, itching, dizziness, drowsiness, dry mouth, constipation, and difficulty urinating.  ? POTENTIAL ADVERSE EFFECTS:   o Opioid tolerance can develop with use of pain medications and this simply means that it requires more and more of the medication to control pain; however, this is seen more in patients that use opioids for longer periods of time.  o Opioid dependence can develop with use of Opioids and this simply means that to stop the medication can cause withdrawal symptoms; however, this is seen with patients that use Opioids for longer periods of time.  o Opioid addiction can develop with use of Opioids and the incidence of this is very unlikely in patients who take the medications as ordered and stop the medications as instructed.  o Opioid overdose can be dangerous, but is unlikely when the medication is taken as ordered and stopped when ordered. It is important not to mix opioids with alcohol or with and type of sedative such as Benadryl as this can lead to over sedation and respiratory difficulty.  ? DOSAGE:   o Pain medications will need to be taken consistently for the first week to decrease pain and promote adequate pain relief and participation in physical therapy.  o After the initial surgical pain begins to resolve, you may begin to decrease the pain medication.   o Refills will not be given by the office during evening hours, or on weekends  o To seek refills on pain medications post-operatively, you must call the office 48 hours in advance to request the refill. The office will then notify you when to  the prescription. DO NOT wait until you are out of the medication to request a refill.    V. FOLLOW-UP VISITS:  ? You will need to follow up in the office with your surgeon in 2 weeks. Please call this number [phone]  to  schedule this appointment.  ? If you have any concerns or suspected complications prior to your follow up visit, please call your surgeons office. Do not wait until your appointment time if you suspect complications. These will need to be addressed in the office promptly.      We will check her pro times on Monday and Thursday of next week and adjust her dose of Coumadin appropriately the rest of her routine home medicines will be continued.

## 2017-08-04 NOTE — PROGRESS NOTES
"Pharmacy to Dose Vancomycin    Patient: Frances Downs (P784/1,  LOS: 1 day )  Relevant clinical data and objective history reviewed:  67 y.o. female 64\" (162.6 cm) 209 lb (94.8 kg)  Body mass index is 35.87 kg/(m^2).  she has a past medical history of Anxiety; Arthritis; Bipolar disorder; COPD (chronic obstructive pulmonary disease); Coronary artery disease; Diastolic dysfunction; Difficulty walking; Disease of thyroid gland; Dislocation of hip joint; DVT (deep venous thrombosis); GERD without esophagitis; Heartburn; Hematoma; Hyperlipidemia; Hypertension; LVH (left ventricular hypertrophy); Major depressive disorder; Mitral annular calcification; Mitral regurgitation; Mitral valve disease; Muscle weakness; Narcolepsy; Overactive bladder; Pneumonia; Pulmonary emboli; Pulmonary hypertension; Sleep apnea; and Tricuspid regurgitation.    Day #3  Consult for Dr Martin  Indication: bone and joint infection  Current dose: 1500 mg IV q12h    Cultures: none on current admission   Wound cx (Rt hip): NGF   Anaerobic cx: NGF    Other Abx: none    Evaluation of prior vancomycin  levels:   Date Type/value Comment    8/3  random = 19.4  trough prior to 3rd dose               Results from last 7 days  Lab Units 17  0434 17  1934 17  0542   WBC 10*3/mm3 5.46 6.62  --    HEMOGLOBIN g/dL 8.1* 8.4* 6.4*   HEMATOCRIT % 25.2* 25.8* 20.8*   PLATELETS 10*3/mm3 216 251  --      Temp (24hrs), Av.1 °F (36.7 °C), Min:97 °F (36.1 °C), Max:98.9 °F (37.2 °C)    CREATININE: 0.63 mg/dL (17 0434)  Estimated creatinine clearance: 77.2 mL/min    Assessment/Plan:   -IV fluids dc'd    1) Vancomycin 1500 mg IV q12h. Trough prior to the 4th dose ( 0930). Predicted 15-20. SCr in AM.    Ghulam Calero, PharmCAROLINA  17 9:51 AM    "

## 2017-08-04 NOTE — THERAPY TREATMENT NOTE
"Acute Care - Physical Therapy Treatment Note  [facility]     Patient Name: Frances Downs  : 1950  MRN: [id number]  Today's Date: 2017  Onset of Illness/Injury or Date of Surgery Date: 17  Date of Referral to PT: 17  Referring Physician: Erlin Damico    Admit Date: 2017    Visit Dx:  No diagnosis found.  Patient Active Problem List   Diagnosis   • Hematoma of right hip   • DVT (deep venous thrombosis), hx of   • Hypertension   • Hyperlipidemia   • Coronary artery disease, hx of stent   • Arthritis   • Bipolar disorder   • Infected prosthesis of right hip   • MRSA (methicillin resistant Staphylococcus aureus) infection   • Mixed infection   • Bacterial infection due to Morganella morganii   • Acute blood loss anemia   • Obstructive sleep apnea   • COPD (chronic obstructive pulmonary disease)   • Obesity (BMI 30-39.9)   • Pseudomonas aeruginosa infection   • VRE infection (vancomycin resistant Enterococcus)   • Infection of right prosthetic hip joint   • Dislocation of right hip   • Postoperative hypotension   • Chronic diastolic congestive heart failure               Adult Rehabilitation Note       17 1055          Rehab Assessment/Intervention    Discipline physical therapist  -MS      Document Type therapy note (daily note)  -MS      Subjective Information agree to therapy;complains of;fatigue;pain  -MS      Patient Effort, Rehab Treatment good  -MS      Symptoms Noted Comment Pt. reports continued Right L.E. pain but states she feels \"stronger\" this day compared to yesterday.  -MS      Precautions/Limitations fall precautions   TTWB Right L.E.  -MS      Recorded by [MS] Yon Elizabeht, PT      Pain Assessment    Pain Assessment 0-10  -MS      Pain Score 4  -MS      Post Pain Score 4  -MS      Pain Type Acute pain;Surgical pain  -MS      Pain Location Hip  -MS      Pain Orientation Right  -MS      Pain Intervention(s) Medication (See MAR);Cold " applied;Repositioned;Elevated;Rest  -MS      Recorded by [MS] Yon Elizabeth, PT      Cognitive Assessment/Intervention    Current Cognitive/Communication Assessment functional  -MS      Orientation Status oriented x 4  -MS      Follows Commands/Answers Questions 100% of the time  -MS      Personal Safety WNL/WFL  -MS      Personal Safety Interventions fall prevention program maintained;gait belt;nonskid shoes/slippers when out of bed;supervised activity  -MS      Recorded by [MS] Yon Elizabeth, PT      Mobility Assessment/Training    Extremity Weight-Bearing Status right lower extremity  -MS      Right Lower Extremity Weight-Bearing toe touch weight-bearing  -MS      Recorded by [MS] Yon Elizabeth, PT      Bed Mobility, Assessment/Treatment    Bed Mobility, Comment Pt. up in chair this AM.  -MS      Recorded by [MS] Yon Elizabeth PT      Transfer Assessment/Treatment    Transfers, Sit-Stand [place] minimum assist (75% patient effort)  -MS      Transfers, Stand-Sit [place] minimum assist (75% patient effort)  -MS      Transfers, Sit-Stand-Sit, Assist Device rolling walker  -MS      Transfer, Comment Performed x 5 for functional strength training.  -MS      Recorded by [MS] Yon Elizabeth, PT      Gait Assessment/Treatment    Gait, [place] Level minimum assist (75% patient effort);2 person assist required  -MS      Gait, Assistive Device rolling walker  -MS      Gait, Distance (Feet) 5   Followed pt. with a chair for safety.  -MS      Gait, Gait Pattern Analysis swing-to gait  -MS      Gait, Gait Deviations right:;antalgic;narciso decreased;forward flexed posture  -MS      Gait, Maintain Weight Bearing Status able to maintain weight bearing status   TTWB Right L.E.  -MS      Gait, Comment Pt. requires verbal/tactile cues for posture correction and proper gait sequencing with use of RWX.  -MS      Recorded by [MS] Yon Elizabeth, PT      Therapy Exercises    Exercise Protocols hip ORIF   -MS      Hip ORIF Exercises right:;10 reps;ankle pumps/circles;quad set;glut set;hip abduction;LAQ  -MS      Recorded by [MS] Yon Elizabeth, PT      Positioning and Restraints    Pre-Treatment Position sitting in chair/recliner  -MS      Post Treatment Position chair  -MS      In Chair notified nsg;reclined;sitting;call light within reach;encouraged to call for assist   All lines intact. Ice pack to Right hip.  -MS      Recorded by [MS] Yon Elizabeth PT        User Key  (r) = Recorded By, (t) = Taken By, (c) = Cosigned By    Initials Name Effective Dates    MS Yon CLAIRE Elizabeth, PT 12/01/15 -                 IP PT Goals       08/03/17 1434          Bed Mobility PT LTG    Bed Mobility PT LTG, Date Established 08/03/17  -MS      Bed Mobility PT LTG, Time to Achieve 5 - 7 days  -MS      Bed Mobility PT LTG, Activity Type all bed mobility  -MS      Bed Mobility PT LTG, [place] Level supervision required  -MS      Transfer Training PT LTG    Transfer Training PT LTG, Date Established 08/03/17  -MS      Transfer Training PT LTG, Time to Achieve 5 - 7 days  -MS      Transfer Training PT LTG, Activity Type sit to stand/stand to sit  -MS      Transfer Training PT LTG, [place] Level contact guard assist  -MS      Transfer Training PT LTG, Assist Device walker, rolling  -MS      Transfer Training 2 PT LTG    Transfer Training PT 2 LTG, Date Established 08/03/17  -MS      Transfer Training PT 2 LTG, Time to Achieve 5 - 7 days  -MS      Transfer Training PT 2 LTG, Activity Type bed to chair /chair to bed  -MS      Transfer Training PT 2 LTG, [place] Level contact guard assist  -MS      Transfer Training PT 2 LTG, Assist Device walker, rolling  -MS        User Key  (r) = Recorded By, (t) = Taken By, (c) = Cosigned By    Initials Name Provider Type    MS Yon RANGEL Glenn, PT Physical Therapist          Physical Therapy Education     Title: PT OT SLP Therapies (Done)     Topic: Physical Therapy (Done)      Point: Mobility training (Done)    Learning Progress Summary    Learner Readiness Method Response Comment Documented by Status   Patient Acceptance E,D VU,NR  MS 08/04/17 1101 Done    Acceptance ED VU,NR  MS 08/03/17 1433 Done               Point: Home exercise program (Done)    Learning Progress Summary    Learner Readiness Method Response Comment Documented by Status   Patient Acceptance ED VU,NR  MS 08/04/17 1101 Done    Acceptance ED VU,NR  MS 08/03/17 1433 Done               Point: Body mechanics (Done)    Learning Progress Summary    Learner Readiness Method Response Comment Documented by Status   Patient Acceptance E,D VU,NR  MS 08/04/17 1101 Done    Acceptance ED VU,NR  MS 08/03/17 1433 Done               Point: Precautions (Done)    Learning Progress Summary    Learner Readiness Method Response Comment Documented by Status   Patient Acceptance E,D VU,NR  MS 08/04/17 1101 Done    Acceptance ED VU,NR  MS 08/03/17 1433 Done                      User Key     Initials Effective Dates Name Provider Type Discipline    MS 12/01/15 -  Yon Elizabeth, PT Physical Therapist PT                    PT Recommendation and Plan  Anticipated Discharge Disposition: skilled nursing facility  Planned Therapy Interventions: balance training, gait training, bed mobility training, home exercise program, patient/family education, postural re-education, strengthening, transfer training  PT Frequency: daily  Plan of Care Review  Plan Of Care Reviewed With: patient  Progress: improving  Outcome Summary/Follow up Plan: Improved tolerance to functional activity this day with initiation of ambulation (TTWB Right L.E.  maintained) and work on functional strengthening (sit <-> stand). Pt. continues to perform her Right L.E. ther. ex. program as well.          Outcome Measures       08/04/17 1100 08/03/17 1400       How much help from another person do you currently need...    Turning from your back to your side while in flat bed  without using bedrails? 3  -MS 3  -MS     Moving from lying on back to sitting on the side of a flat bed without bedrails? 2  -MS 2  -MS     Moving to and from a bed to a chair (including a wheelchair)? 3  -MS 2  -MS     Standing up from a chair using your arms (e.g., wheelchair, bedside chair)? 3  -MS 2  -MS     Climbing 3-5 steps with a railing? 1  -MS 1  -MS     To walk in hospital room? 3  -MS 1  -MS     AM-PAC 6 Clicks Score 15  -MS 11  -MS     Functional Assessment    Outcome Measure Options AM-PAC 6 Clicks Basic Mobility (PT)  -MS AM-PAC 6 Clicks Basic Mobility (PT)  -MS       User Key  (r) = Recorded By, (t) = Taken By, (c) = Cosigned By    Initials Name Provider Type    MS Yon Elizabeth PT Physical Therapist           Time Calculation:         PT Charges       08/04/17 1102          Time Calculation    Start Time 1040  -MS      Stop Time 1054  -MS      Time Calculation (min) 14 min  -MS      PT Received On 08/04/17  -MS      PT - Next Appointment 08/05/17  -MS        User Key  (r) = Recorded By, (t) = Taken By, (c) = Cosigned By    Initials Name Provider Type    MS Yon Elizabeth PT Physical Therapist          Therapy Charges for Today     Code Description Service Date Service Provider Modifiers Qty    25383599658 HC PT MOBILITY CURRENT 8/3/2017 Yon Elizabeth PT GP, CL 1    96898106009 HC PT MOBILITY PROJECTED 8/3/2017 Yon Elizabeth PT GP, CJ 1    95319373612 HC PT EVAL MOD COMPLEXITY 2 8/3/2017 Yon Elizabeth PT GP 1    75920977932 HC PT THER PROC EA 15 MIN 8/3/2017 Yon Elizabeth PT GP 1    18657426129 HC PT THER PROC EA 15 MIN 8/4/2017 Yon Elizabeth PT GP 1    70637925793 HC PT THER SUPP EA 15 MIN 8/4/2017 Yon Elizabeth PT GP 1          PT G-Codes  PT Professional Judgement Used?: Yes  Outcome Measure Options: AM-PAC 6 Clicks Basic Mobility (PT)  Functional Limitation: Mobility: Walking and moving around  Mobility: Walking and Moving Around Current Status (): At least 60  percent but less than 80 percent impaired, limited or restricted  Mobility: Walking and Moving Around Goal Status (): At least 20 percent but less than 40 percent impaired, limited or restricted    Yon Elizabeth, PT  8/4/2017

## 2017-08-04 NOTE — PROGRESS NOTES
Orthopedic Total Progress Note        Patient: Frances Downs    Date of Admission: 8/2/2017 12:57 AM    YOB: 1950    Medical Record Number: [id number]    Attending Physician: Erlin Martin MD      POD # 2     Status post: [place] CLOSED RX TRAUMATIC HIP DISLOCATN [79711] (HIP CLOSED REDUCTION)      Systemic or Specific Complaints: No Complaints      Allergies   Allergen Reactions   • Codeine    • Latex      Added based on information entered during case entry, please review and add reactions, type, and severity as needed   • Morphine And Related          Current Medications:  Scheduled Meds:  ARIPiprazole 10 mg Oral Daily   aspirin 81 mg Oral Daily   atorvastatin 40 mg Oral Nightly   carvedilol 12.5 mg Oral BID With Meals   clopidogrel 75 mg Oral Daily   DULoxetine 30 mg Oral BID   ferrous sulfate 325 mg Oral BID   ferrous sulfate 325 mg Oral Daily With Breakfast   fluticasone 2 spray Nasal Daily   isosorbide mononitrate 30 mg Oral Daily   lamoTRIgine 25 mg Oral BID   levothyroxine 100 mcg Oral Daily   menthol-zinc oxide  Topical Q12H   nicotine 1 patch Transdermal Q24H   oxybutynin XL 10 mg Oral Daily   pantoprazole 40 mg Oral QAM   potassium chloride 10 mEq Oral Daily   pregabalin 100 mg Oral BID   sennosides-docusate sodium 2 tablet Oral BID   tiotropium 1 capsule Inhalation Daily - RT   vancomycin 1,500 mg Intravenous Q12H     Continuous Infusions:  lactated ringers 100 mL/hr Last Rate: Stopped (08/03/17 1334)   lactated ringers 100 mL/hr Last Rate: 100 mL/hr (08/04/17 0030)   Pharmacy to dose vancomycin       PRN Meds:.•  acetaminophen  •  bisacodyl  •  diazePAM  •  diphenhydrAMINE  •  docusate sodium  •  HYDROcodone-acetaminophen  •  HYDROmorphone  •  HYDROmorphone **AND** naloxone  •  ketorolac  •  loperamide  •  magnesium hydroxide  •  ondansetron  •  oxyCODONE-acetaminophen  •  oxyCODONE-acetaminophen  •  promethazine  •  sodium chloride      Physical Exam: 67 y.o. female  General  Appearance:    alert and oriented                Pain Relief: Patient reports some relief       Vitals:    08/03/17 1914 08/03/17 2300 08/04/17 0105 08/04/17 0300   BP: 91/50 97/53  110/64   BP Location: Left arm Left arm  Left arm   Patient Position: Lying Lying  Lying   Pulse: 63 59  69   Resp: 18 18  16   Temp: 97.3 °F (36.3 °C) 97 °F (36.1 °C)  97.4 °F (36.3 °C)   TempSrc: Oral Oral  Oral   SpO2: 98% 92% 96% 100%   Weight:       Height:               Extremities:   Operative extremity neurovascular status intact. ROM intact.    Incision intact w/out signs or  symptoms of infection. No           edema, no cyanosis, no calf tenderness     Pulses:     Pulses palpable and equal bilaterally     Skin:     Skin Warm/Dry w/out ulceration, ecchymosis, rash, or   cyanosis     Activity: Mobilizing Per P.T.       Diagnostic Tests:   Admission on 08/02/2017   Component Date Value Ref Range Status   • Hemoglobin 08/03/2017 6.4* 11.9 - 15.5 g/dL Final   • Hematocrit 08/03/2017 20.8* 35.6 - 45.5 % Final   • Protime 08/03/2017 15.0* 11.7 - 14.2 Seconds Final   • INR 08/03/2017 1.23* 0.90 - 1.10 Final   • Product Code 08/04/2017 [id number]   Final   • Unit Number 08/04/2017 [id number]   Final   • UNIT  ABO 08/04/2017 A   Final   • UNIT  RH 08/04/2017 POS   Final   • CROSSMATCH 1 INTERPRETATION 08/04/2017 Compatible   Final   • Dispense Status 08/04/2017 PT   Final   • Blood Type 08/04/2017 APOS   Final   • Blood Expiration Date 08/04/2017 482694366093   Final   • Blood Type Barcode 08/04/2017 6200   Final   • Product Code 08/04/2017 [id number]   Final   • Unit Number 08/04/2017 [id number]   Final   • UNIT  ABO 08/04/2017 A   Final   • UNIT  RH 08/04/2017 POS   Final   • CROSSMATCH 1 INTERPRETATION 08/04/2017 Compatible   Final   • Dispense Status 08/04/2017 PT   Final   • Blood Type 08/04/2017 APOS   Final   • Blood Expiration Date 08/04/2017 [id number]   Final   • Blood Type Barcode 08/04/2017 6200   Final   • ABO Type  08/03/2017 A   Final   • RH type 08/03/2017 Positive   Final   • Antibody Screen 08/03/2017 Positive   Final   • Anti-K 08/03/2017 ANTI-K   Final   • Vancomycin Random 08/03/2017 19.40  5.00 - 40.00 mcg/mL Final   • Glucose 08/03/2017 144* 65 - 99 mg/dL Final   • BUN 08/03/2017 10  8 - 23 mg/dL Final   • Creatinine 08/03/2017 0.73  0.57 - 1.00 mg/dL Final   • Sodium 08/03/2017 141  136 - 145 mmol/L Final   • Potassium 08/03/2017 4.0  3.5 - 5.2 mmol/L Final   • Chloride 08/03/2017 104  98 - 107 mmol/L Final   • CO2 08/03/2017 22.4  22.0 - 29.0 mmol/L Final   • Calcium 08/03/2017 8.2* 8.6 - 10.5 mg/dL Final   • Total Protein 08/03/2017 5.6* 6.0 - 8.5 g/dL Final   • Albumin 08/03/2017 3.20* 3.50 - 5.20 g/dL Final   • ALT (SGPT) 08/03/2017 8  1 - 33 U/L Final   • AST (SGOT) 08/03/2017 11  1 - 32 U/L Final   • Alkaline Phosphatase 08/03/2017 75  39 - 117 U/L Final   • Total Bilirubin 08/03/2017 0.3  0.1 - 1.2 mg/dL Final   • eGFR Non  Amer 08/03/2017 80  >60 mL/min/1.73 Final   • Globulin 08/03/2017 2.4  gm/dL Final   • A/G Ratio 08/03/2017 1.3  g/dL Final   • BUN/Creatinine Ratio 08/03/2017 13.7  7.0 - 25.0 Final   • Anion Gap 08/03/2017 14.6  mmol/L Final   • WBC 08/03/2017 6.62  4.50 - 10.70 10*3/mm3 Final   • RBC 08/03/2017 2.66* 3.90 - 5.20 10*6/mm3 Final   • Hemoglobin 08/03/2017 8.4* 11.9 - 15.5 g/dL Final   • Hematocrit 08/03/2017 25.8* 35.6 - 45.5 % Final   • MCV 08/03/2017 97.0  80.5 - 98.2 fL Final   • MCH 08/03/2017 31.6  26.9 - 32.0 pg Final   • MCHC 08/03/2017 32.6  32.4 - 36.3 g/dL Final   • RDW 08/03/2017 14.7* 11.7 - 13.0 % Final   • RDW-SD 08/03/2017 51.8  37.0 - 54.0 fl Final   • MPV 08/03/2017 10.8  6.0 - 12.0 fL Final   • Platelets 08/03/2017 251  140 - 500 10*3/mm3 Final   • Neutrophil % 08/03/2017 56.4  42.7 - 76.0 % Final   • Lymphocyte % 08/03/2017 29.3  19.6 - 45.3 % Final   • Monocyte % 08/03/2017 13.0* 5.0 - 12.0 % Final   • Eosinophil % 08/03/2017 0.8  0.3 - 6.2 % Final   • Basophil %  08/03/2017 0.2  0.0 - 1.5 % Final   • Immature Grans % 08/03/2017 0.3  0.0 - 0.5 % Final   • Neutrophils, Absolute 08/03/2017 3.74  1.90 - 8.10 10*3/mm3 Final   • Lymphocytes, Absolute 08/03/2017 1.94  0.90 - 4.80 10*3/mm3 Final   • Monocytes, Absolute 08/03/2017 0.86  0.20 - 1.20 10*3/mm3 Final   • Eosinophils, Absolute 08/03/2017 0.05  0.00 - 0.70 10*3/mm3 Final   • Basophils, Absolute 08/03/2017 0.01  0.00 - 0.20 10*3/mm3 Final   • Immature Grans, Absolute 08/03/2017 0.02  0.00 - 0.03 10*3/mm3 Final   • Protime 08/04/2017 14.1  11.7 - 14.2 Seconds Final   • INR 08/04/2017 1.13* 0.90 - 1.10 Final   • proBNP 08/04/2017 1031.0* 5.0 - 900.0 pg/mL Final   • TSH 08/04/2017 3.660  0.270 - 4.200 mIU/mL Final   • Glucose 08/04/2017 119* 65 - 99 mg/dL Final   • BUN 08/04/2017 9  8 - 23 mg/dL Final   • Creatinine 08/04/2017 0.63  0.57 - 1.00 mg/dL Final   • Sodium 08/04/2017 144  136 - 145 mmol/L Final   • Potassium 08/04/2017 4.0  3.5 - 5.2 mmol/L Final   • Chloride 08/04/2017 108* 98 - 107 mmol/L Final   • CO2 08/04/2017 24.1  22.0 - 29.0 mmol/L Final   • Calcium 08/04/2017 8.2* 8.6 - 10.5 mg/dL Final   • eGFR Non African Amer 08/04/2017 95  >60 mL/min/1.73 Final   • BUN/Creatinine Ratio 08/04/2017 14.3  7.0 - 25.0 Final   • Anion Gap 08/04/2017 11.9  mmol/L Final   • WBC 08/04/2017 5.46  4.50 - 10.70 10*3/mm3 Final   • RBC 08/04/2017 2.59* 3.90 - 5.20 10*6/mm3 Final   • Hemoglobin 08/04/2017 8.1* 11.9 - 15.5 g/dL Final   • Hematocrit 08/04/2017 25.2* 35.6 - 45.5 % Final   • MCV 08/04/2017 97.3  80.5 - 98.2 fL Final   • MCH 08/04/2017 31.3  26.9 - 32.0 pg Final   • MCHC 08/04/2017 32.1* 32.4 - 36.3 g/dL Final   • RDW 08/04/2017 15.3* 11.7 - 13.0 % Final   • RDW-SD 08/04/2017 54.0  37.0 - 54.0 fl Final   • MPV 08/04/2017 10.4  6.0 - 12.0 fL Final   • Platelets 08/04/2017 216  140 - 500 10*3/mm3 Final   • Iron 08/04/2017 48  37 - 145 mcg/dL Final   • Iron Saturation 08/04/2017 19* 20 - 50 % Final   • Transferrin 08/04/2017  170* 200 - 360 mg/dL Final   • TIBC 08/04/2017 253  mcg/dL Final   • Vitamin B-12 08/04/2017 260  211 - 946 pg/mL Final   • Folate 08/04/2017 12.90  4.78 - 24.20 ng/mL Final   • Reticulocyte % 08/04/2017 3.30* 0.50 - 1.50 % Final       Xr Chest 1 View    Result Date: 8/2/2017  Narrative: X-RAY CHEST 1 VIEW.  HISTORY: PICC line verification.  COMPARISON: No prior studies for comparison.  FINDINGS: Cardiomediastinal silhouette is within normal limits. Right subclavian PICC line tip is in the superior vena cava. Left PICC line has been removed.  There is no consolidation or effusion.          Impression: No acute findings. No significant change.  This report was finalized on 8/2/2017 11:22 PM by Dr. Wilian Gaines MD.      Xr Pelvis 1 Or 2 View    Result Date: 8/2/2017  Narrative: STAT PORTABLE RADIOGRAPHIC VIEW OF THE PELVIS  CLINICAL HISTORY: Postop reduction.  FINDINGS: Stat portable radiographic view of the pelvis demonstrates a right hip arthroplasty in place. The femoral component is well seated in the acetabular component. Prominent erosive changes are identified at the level of the bony acetabulum. Again noted is a long stem femoral prosthesis with an encircling wire at the level of the proximal shaft of the femur. Findings are unchanged when compared to the previous exam dated 07/26/2017. Superficial surgical staples are incidentally identified.  This report was finalized on 8/2/2017 8:48 PM by Dr. Benjy Jimenez MD.      Xr Pelvis 1 Or 2 View    Result Date: 7/26/2017  Narrative: TWO-VIEW RIGHT HIP AND PELVIS  HISTORY: Recent right hip replacement surgery.  The patient has had recent right hip replacement surgery including reconstruction of the acetabulum where there has been extensive bone loss and a long-stemmed femoral prosthesis with encircling wire at the level of the proximal shaft. The overall alignment appears satisfactory.  This report was finalized on 7/26/2017 6:21 PM by Dr. Nestor Rizzo MD.       Xr Hip With Or Without Pelvis 1 View Right    Result Date: 8/2/2017  Narrative: XR HIP W OR WO PELVIS 1 VIEW RIGHT-  INDICATIONS: Follow-up right hip dislocation.  TECHNIQUE: FRONTAL VIEW OF THE RIGHT HIP  COMPARISON: 08/02/2017 at 1350 hours  FINDINGS:  The femoral component of the right hip arthroplasty hardware remains superolaterally dislocated in relation to the acetabular component. Overlying skin staples are noted. No acute fracture is identified.      Impression:  Persistent right hip dislocation.   This report was finalized on 8/2/2017 4:28 PM by Dr. Terence Ross MD.      Xr Hip With Or Without Pelvis 1 View Right    Result Date: 8/2/2017  Narrative: XR HIP W OR WO PELVIS 1 VIEW RIGHT-  INDICATIONS: Right hip dislocation  TECHNIQUE: FRONTAL VIEWS OF THE PELVIS AND RIGHT HIP  COMPARISON: 07/26/2017  FINDINGS:   The femoral component of the right hip arthroplasty hardware is superolaterally dislocated in relation to the acetabular component. Overlying skin staples are noted. No acute fracture is identified.      Impression:  Right hip dislocation.  This report was finalized on 8/2/2017 2:26 PM by Dr. Terence Ross MD.      Xr Hip With Or Without Pelvis 1 View Right    Result Date: 7/27/2017  Narrative: PELVIS RIGHT HIP  HISTORY: Hip pain, surgery.  AP and crosstable lateral views of the right hip were obtained. An acetabular cup is in the process of being placed. A hip prosthesis has not yet been placed. The acetabular cup appears to be in satisfactory position.  This report was finalized on 7/27/2017 9:02 AM by Dr. Sj Villela MD.          Assessment:  Patient Active Problem List   Diagnosis   • Hematoma of right hip   • DVT (deep venous thrombosis), hx of   • Hypertension   • Hyperlipidemia   • Coronary artery disease, hx of stent   • Arthritis   • Bipolar disorder   • Infected prosthesis of right hip   • MRSA (methicillin resistant Staphylococcus aureus) infection   • Mixed infection   •  Bacterial infection due to Morganella morganii   • Acute blood loss anemia   • Obstructive sleep apnea   • COPD (chronic obstructive pulmonary disease)   • Obesity (BMI 30-39.9)   • Pseudomonas aeruginosa infection   • VRE infection (vancomycin resistant Enterococcus)   • Infection of right prosthetic hip joint   • Dislocation of right hip     Post-operative Pain  Immobility    Plan:    Continue Physical Therapy, increase mobility as tolerated.  Continue SCDs, Continue DVT prophalaxis.  Continue Pain management efforts  Continue Incisional care      Discharge Plan: tomorrow to home and home health    Date: 8/4/2017   Time: 6:52 AM    Erlin Martin MD

## 2017-08-04 NOTE — CONSULTS
CONSULT NOTE    INTERNAL MEDICINE   [facility]       Patient Identification:  Name: Frances Downs  Age: 67 y.o.  Sex: female  :  1950   MRN: [id number]             Date of Consultation:  8/3/2017      Primary Care Physician: Talha Echeverria MD                               Requesting Physician: Veronica AMEZCUA  Reason for Consultation:Hypotension    Chief Complaint:  Weak and tired.     History of Present Illness:   Pleasant 67-year-old female with a multitude of medical issues outlined below.  She said recent issues with the right prosthetic hip including infection, postop hematomas etc.  She recently had to have the hardware replaced due to infection issues and then returned due to dislocation of the same hip.  She is now status post reimplant of the right total hip.  We are asked to see her when her blood pressure was dropping into the 80s today.  Her only complaint is feeling weak and tired.  There's been no lightheadedness, no palpitations, no shortness of air, no chest pain.  Her hemoglobin this morning was 6.4 and she is just finishing her second bag of packed RBCs.  I requested a stat CBC and hemoglobin this time around is 8.4.  There has been some issues with blood oozing from the OR site.  She has not noted any changes in her bowel habits.  She is chronically anemic in addition.  She has a number of compounding factors as alluded to above including coronary artery disease, history of DVT with pulmonary embolus, chronic Coumadin use, diastolic dysfunction.        Past Medical History:  Past Medical History:   Diagnosis Date   • Anxiety    • Arthritis    • Bipolar disorder    • COPD (chronic obstructive pulmonary disease)    • Coronary artery disease    • Diastolic dysfunction     grade II per echocardiogram 2016   • Difficulty walking    • Disease of thyroid gland     hypothyroidism   • Dislocation of hip joint     recurrent dislocation right hip   • DVT (deep venous  thrombosis)    • GERD without esophagitis    • Heartburn    • Hematoma    • Hyperlipidemia    • Hypertension    • LVH (left ventricular hypertrophy)     mild to moderate per echocardiogram 11/18/2016   • Major depressive disorder    • Mitral annular calcification     severe per echocardiogram 11/18/2016   • Mitral regurgitation     mild per echo 11/18/2016   • Mitral valve disease    • Muscle weakness    • Narcolepsy    • Overactive bladder    • Pneumonia    • Pulmonary emboli    • Pulmonary hypertension     mild per echo 11/18/2016   • Sleep apnea     obstructive   • Tricuspid regurgitation     mild to moderate per echo 11/18/2016     Past Surgical History:  Past Surgical History:   Procedure Laterality Date   • CATARACT EXTRACTION W/ INTRAOCULAR LENS IMPLANT Right    • CORONARY ANGIOPLASTY WITH STENT PLACEMENT      x2 stents   • EYE SURGERY      cataract surgery bilateral   • HARDWARE REMOVAL FOOT / ANKLE Left 12/01/2011   • HIP SPACER INSERTION WITH ANTIBIOTIC CEMENT Right 2/13/2017    Procedure: RIGHT TOTAL HIP REMOVAL;  Surgeon: Erlin Martin MD;  Location: [facility];  Service:    • INCISION AND DRAINAGE HIP Right 2/8/2017    Procedure: RT HIP INCISION AND DRAINAGE;  Surgeon: Erlin Martin MD;  Location: [facility] OR;  Service:    • INCISION AND DRAINAGE HIP Right 3/17/2017    Procedure: RIGHT HIP INCISION AND DRAINAGE;  Surgeon: Erlin Martin MD;  Location: [facility] OR;  Service:    • JOINT REPLACEMENT     • KNEE ARTHROPLASTY Left 03/2010    TWICE   • ORIF ANKLE FRACTURE Left 06/16/2011   • OTHER SURGICAL HISTORY      IVC filter placement   • [place] CLOSED RX TRAUMATIC HIP DISLOCATN Right 8/2/2017    Procedure: FAILED CLOSED RIGHT HIP  REDUCTION WITH OPEN REDUCTION OF RIGHT HIP;  Surgeon: Erlin Martin MD;  Location: [facility];  Service: Orthopedics   • QUADRICEPS TENDON REPAIR Left 06/21/2010    also done 9-    • TOTAL HIP ARTHROPLASTY Right 2011   • TOTAL HIP ARTHROPLASTY  REVISION Right 11/22/2016   • TOTAL HIP ARTHROPLASTY REVISION Right 7/26/2017    Procedure: REIMPLANT RIGHT TOTAL HIP;  Surgeon: Erlin Martin MD;  Location: [facility];  Service:    • TOTAL KNEE ARTHROPLASTY REVISION Left 09/2010      Home Meds:  Prescriptions Prior to Admission   Medication Sig Dispense Refill Last Dose   • amLODIPine (NORVASC) 10 MG tablet Take 10 mg by mouth Daily.   8/1/2017 at Unknown time   • ARIPiprazole (ABILIFY) 10 MG tablet Take 10 mg by mouth Daily.   8/1/2017 at Unknown time   • aspirin 81 MG EC tablet Take 81 mg by mouth Daily.   8/1/2017 at Unknown time   • atorvastatin (LIPITOR) 40 MG tablet Take 40 mg by mouth Every Night.   8/1/2017 at Unknown time   • carvedilol (COREG) 12.5 MG tablet Take 12.5 mg by mouth 2 (two) times a day with meals.   8/1/2017 at Unknown time   • clopidogrel (PLAVIX) 75 MG tablet Take 1 tablet by mouth daily. 90 tablet 3 8/1/2017 at Unknown time   • darifenacin (ENABLEX) 15 MG 24 hr tablet Take 15 mg by mouth daily.   8/1/2017 at Unknown time   • diazePAM (VALIUM) 2 MG tablet Take 2 mg by mouth Every 12 (Twelve) Hours As Needed for Anxiety.   8/1/2017 at Unknown time   • DULoxetine (CYMBALTA) 30 MG capsule Take 30 mg by mouth 2 (Two) Times a Day.   8/1/2017 at Unknown time   • enoxaparin (LOVENOX) 40 MG/0.4ML solution syringe Inject 0.4 mL under the skin Every Night. 5 syringe 0 Past Week at Unknown time   • ferrous sulfate 325 (65 FE) MG tablet Take 325 mg by mouth 2 (Two) Times a Day.   8/1/2017 at Unknown time   • fluticasone (FLONASE) 50 MCG/ACT nasal spray 2 sprays into each nostril daily. Administer 2 sprays in each nostril for each dose.   8/1/2017 at Unknown time   • furosemide (LASIX) 20 MG tablet Take 1 tablet by mouth Daily. 30 tablet 0 8/1/2017 at Unknown time   • isosorbide mononitrate (IMDUR) 30 MG 24 hr tablet Take 30 mg by mouth Daily.   8/1/2017 at Unknown time   • lamoTRIgine (LaMICtal) 25 MG tablet Take 25 mg by mouth 2 (Two) Times  a Day.   8/1/2017 at Unknown time   • levothyroxine (SYNTHROID, LEVOTHROID) 100 MCG tablet Take 100 mcg by mouth daily.   8/1/2017 at Unknown time   • nicotine (NICODERM CQ) 14 MG/24HR patch Place 1 patch on the skin Daily.   8/1/2017 at Unknown time   • omeprazole (PriLOSEC) 20 MG capsule Take 20 mg by mouth 2 (two) times a day.   8/1/2017 at Unknown time   • oxyCODONE-acetaminophen (PERCOCET) 7.5-325 MG per tablet Take 2 tablets by mouth Every 4 (Four) Hours As Needed for Severe Pain  (7-10) for up to 7 days. 56 tablet 0 8/1/2017 at Unknown time   • potassium chloride (K-DUR) 10 MEQ CR tablet Take 10 mEq by mouth daily.   8/1/2017 at Unknown time   • pregabalin (LYRICA) 200 MG capsule Take 100 mg by mouth 2 (Two) Times a Day.   8/1/2017 at Unknown time   • tiotropium (SPIRIVA) 18 MCG per inhalation capsule Place 1 capsule into inhaler and inhale 1 (one) time daily.   8/1/2017 at Unknown time   • vancomycin 1750 mg/500 mL 0.9% NS IVPB ([facility]) Infuse 500 mL into a venous catheter Every 12 (Twelve) Hours. Indications: Bone and/or Joint Infection 1 mL 0 8/1/2017 at Unknown time   • warfarin (COUMADIN) 6 MG tablet Take 1 tablet by mouth Daily. 40 tablet 0 8/1/2017 at Unknown time   • LOPERAMIDE HCL PO Take 2 capsules by mouth Every 6 (Six) Hours As Needed.   Unknown at Unknown time   • MENTHOL-ZINC OXIDE EX Apply  topically. Apply to coccyx topically 2 times daily for redness on coccyx   Unknown at Unknown time   • promethazine (PHENERGAN) 25 MG tablet Take 25 mg by mouth Every 4 (Four) Hours As Needed for Nausea or Vomiting.   Unknown at Unknown time   • Vancomycin HCl in Dextrose (PHARMACY TO DOSE VANCOMYCIN) Continuous. 1 each 0      Current Meds:   [unfilled]  Allergies:  Allergies   Allergen Reactions   • Codeine    • Latex      Added based on information entered during case entry, please review and add reactions, type, and severity as needed   • Morphine And Related      Social History:   Social History   Substance  "Use Topics   • Smoking status: Former Smoker     Packs/day: 1.00     Years: 19.00     Types: Cigarettes     Quit date: 11/21/2016   • Smokeless tobacco: Never Used   • Alcohol use No      Family History:  Family History   Problem Relation Age of Onset   • Malig Hyperthermia Neg Hx           Review of Systems  Review of Systems   All other systems reviewed and are negative.        Vitals:   BP 91/50 (BP Location: Left arm, Patient Position: Lying)  Pulse 63  Temp 97.3 °F (36.3 °C) (Oral)   Resp 18  Ht 64\" (162.6 cm)  Wt 209 lb (94.8 kg)  SpO2 98%  BMI 35.87 kg/m2  I/O:   Intake/Output Summary (Last 24 hours) at 08/03/17 2010  Last data filed at 08/03/17 1914   Gross per 24 hour   Intake          1058.67 ml   Output             2125 ml   Net         -1066.33 ml       Exam:  Physical Exam   Constitutional: She is oriented to person, place, and time. She appears well-developed and well-nourished. No distress.   HENT:   Head: Normocephalic and atraumatic.   Right Ear: External ear normal.   Left Ear: External ear normal.   Nose: Nose normal.   Mouth/Throat: Oropharynx is clear and moist.   Eyes: Conjunctivae and EOM are normal. Right eye exhibits discharge. Left eye exhibits no discharge.   Neck: Neck supple. No JVD present. No tracheal deviation present. No thyromegaly present.   Cardiovascular: Normal rate, regular rhythm, normal heart sounds and intact distal pulses.    Pulmonary/Chest: Effort normal and breath sounds normal. No stridor.   Abdominal: Soft. Bowel sounds are normal. She exhibits no distension and no mass. There is no tenderness. There is no rebound and no guarding.   Musculoskeletal: She exhibits no edema or deformity.   Neurological: She is alert and oriented to person, place, and time.   Skin: Skin is warm and dry. She is not diaphoretic. No pallor.   Psychiatric: She has a normal mood and affect. Her behavior is normal. Judgment and thought content normal.       Data Review:  Labs in chart " were reviewed.    Assessment:  Active Problems:    Dislocation of right hip    Hypotension: Postop, probably secondary to low intravascular volume.    Anemia, acute on chronic: Agree with transfusion.  We'll monitor this very closely.  I'll going ahead and check iron panel, B12 etc. in the morning.    History of DVT and pulmonary embolus.  INR is appropriately lowered for the surgical procedure.  Continue with sequential compressive devices for the time being, monitor closely.    Coronary artery disease: Patient is asymptomatic and EKG is unremarkable.    COPD: Presently stable.      Plan:  Patient is presently receiving a 500 cc bolus of normal saline and we will monitor blood pressure closely.  CMP is still pending.  We will keep her Norvasc on hold.  If postop blood loss continues to be an issue we may have to hold off on her Plavix for a few days.  Recently appreciate being involved in this pleasant lady's care will be happy to follow her along with you.    Christopher Alvarado MD   8/3/2017  8:10 PM    EMR Dragon/Transcription disclaimer:   Much of this encounter note is an electronic transcription/translation of spoken language to printed text. The electronic translation of spoken language may permit erroneous, or at times, nonsensical words or phrases to be inadvertently transcribed; Although I have reviewed the note for such errors, some may still exist.

## 2017-08-04 NOTE — OP NOTE
PREOPERATIVE DIAGNOSIS: []Dislocated right total hip.    POSTOPERATIVE DIAGNOSIS: [Same.]    PROCEDURE PERFORMED: Attempted closed reduction failed.  Followed by open reduction.[]    ANESTHESIA:  Gen.[]    SURGEON:  Erlin Martin MD    ASSISTANT SURGEON: Jordy Finn PA-C.]    BLOOD LOSS: [200 cc.]    SPECIMEN: [Cultures anaerobic and aerobic.]    [This is a 67-year-old lady who had a total hip reimplanted last week following resolution of her right hip infection.  She was sitting down in a chair and twisted and felt her hip pop.  Was brought to the emergency room and found to have a dislocated right total hip.  Patient's brought to the holding room given IV vancomycin.  She was then given a general anesthetic in the operating room.  After muscle relaxation I attempted to perform a closed reduction but was unsuccessful.  In transferred from her cart to the operating table in the decubitus position with the right side up.  The previous staples were removed.  The hip was prepped and draped in a sterile fashion.  The skin incision was opened there was a hematoma present.  Hematoma was evacuated.  We then carried out dissection down to the hip joint.  We then used a hip skid relocated the hip.  Is in place the hip appeared to be very stable.  Irrigated with bacitracin and Betadine.  And then closed using 0 Ethibond interrupted sutures in the fascia.  0 and 2-0 Vicryl in the subcutaneous and staples in the skin.  Sterile dressing was applied her general anesthetic reversed and she was transferred to the recovery room.]

## 2017-08-05 LAB
CREAT BLD-MCNC: 0.58 MG/DL (ref 0.57–1)
GFR SERPL CREATININE-BSD FRML MDRD: 104 ML/MIN/1.73
HCT VFR BLD AUTO: 30.7 % (ref 35.6–45.5)
HGB BLD-MCNC: 9.9 G/DL (ref 11.9–15.5)
INR PPP: 1.12 (ref 0.9–1.1)
PROTHROMBIN TIME: 13.9 SECONDS (ref 11.7–14.2)
VANCOMYCIN TROUGH SERPL-MCNC: 16.5 MCG/ML (ref 5–20)

## 2017-08-05 PROCEDURE — 85018 HEMOGLOBIN: CPT | Performed by: ORTHOPAEDIC SURGERY

## 2017-08-05 PROCEDURE — 25010000002 VANCOMYCIN 10 G RECONSTITUTED SOLUTION: Performed by: ORTHOPAEDIC SURGERY

## 2017-08-05 PROCEDURE — 94799 UNLISTED PULMONARY SVC/PX: CPT

## 2017-08-05 PROCEDURE — G0378 HOSPITAL OBSERVATION PER HR: HCPCS

## 2017-08-05 PROCEDURE — 85610 PROTHROMBIN TIME: CPT | Performed by: ORTHOPAEDIC SURGERY

## 2017-08-05 PROCEDURE — 82565 ASSAY OF CREATININE: CPT | Performed by: ORTHOPAEDIC SURGERY

## 2017-08-05 PROCEDURE — 85014 HEMATOCRIT: CPT | Performed by: ORTHOPAEDIC SURGERY

## 2017-08-05 PROCEDURE — 94660 CPAP INITIATION&MGMT: CPT

## 2017-08-05 PROCEDURE — 80202 ASSAY OF VANCOMYCIN: CPT | Performed by: ORTHOPAEDIC SURGERY

## 2017-08-05 RX ORDER — HYDROCODONE BITARTRATE AND ACETAMINOPHEN 7.5; 325 MG/1; MG/1
1 TABLET ORAL EVERY 4 HOURS PRN
Status: DISCONTINUED | OUTPATIENT
Start: 2017-08-05 | End: 2017-08-06 | Stop reason: HOSPADM

## 2017-08-05 RX ORDER — WARFARIN SODIUM 7.5 MG/1
7.5 TABLET ORAL
Status: COMPLETED | OUTPATIENT
Start: 2017-08-05 | End: 2017-08-05

## 2017-08-05 RX ORDER — HYDROCODONE BITARTRATE AND ACETAMINOPHEN 7.5; 325 MG/1; MG/1
2 TABLET ORAL EVERY 4 HOURS PRN
Status: DISCONTINUED | OUTPATIENT
Start: 2017-08-05 | End: 2017-08-06 | Stop reason: HOSPADM

## 2017-08-05 RX ADMIN — LAMOTRIGINE 25 MG: 25 TABLET ORAL at 08:09

## 2017-08-05 RX ADMIN — HYDROCODONE BITARTRATE AND ACETAMINOPHEN 1 TABLET: 7.5; 325 TABLET ORAL at 08:08

## 2017-08-05 RX ADMIN — LEVOTHYROXINE SODIUM 100 MCG: 100 TABLET ORAL at 08:09

## 2017-08-05 RX ADMIN — VANCOMYCIN HYDROCHLORIDE 1500 MG: 10 INJECTION, POWDER, LYOPHILIZED, FOR SOLUTION INTRAVENOUS at 11:11

## 2017-08-05 RX ADMIN — FERROUS SULFATE TAB 325 MG (65 MG ELEMENTAL FE) 325 MG: 325 (65 FE) TAB at 17:11

## 2017-08-05 RX ADMIN — Medication: at 08:10

## 2017-08-05 RX ADMIN — ARIPIPRAZOLE 10 MG: 10 TABLET ORAL at 08:08

## 2017-08-05 RX ADMIN — PREGABALIN 100 MG: 100 CAPSULE ORAL at 17:12

## 2017-08-05 RX ADMIN — LAMOTRIGINE 25 MG: 25 TABLET ORAL at 17:11

## 2017-08-05 RX ADMIN — DULOXETINE HYDROCHLORIDE 30 MG: 30 CAPSULE, DELAYED RELEASE ORAL at 17:11

## 2017-08-05 RX ADMIN — TIOTROPIUM BROMIDE 1 CAPSULE: 18 CAPSULE ORAL; RESPIRATORY (INHALATION) at 08:54

## 2017-08-05 RX ADMIN — VANCOMYCIN HYDROCHLORIDE 1500 MG: 10 INJECTION, POWDER, LYOPHILIZED, FOR SOLUTION INTRAVENOUS at 22:04

## 2017-08-05 RX ADMIN — WARFARIN SODIUM 7.5 MG: 7.5 TABLET ORAL at 17:12

## 2017-08-05 RX ADMIN — CARVEDILOL 12.5 MG: 12.5 TABLET, FILM COATED ORAL at 08:09

## 2017-08-05 RX ADMIN — PANTOPRAZOLE SODIUM 40 MG: 40 TABLET, DELAYED RELEASE ORAL at 08:08

## 2017-08-05 RX ADMIN — FERROUS SULFATE TAB 325 MG (65 MG ELEMENTAL FE) 325 MG: 325 (65 FE) TAB at 08:09

## 2017-08-05 RX ADMIN — ACETAMINOPHEN 325 MG: 325 TABLET ORAL at 08:09

## 2017-08-05 RX ADMIN — Medication: at 20:45

## 2017-08-05 RX ADMIN — NICOTINE 1 PATCH: 14 PATCH, EXTENDED RELEASE TRANSDERMAL at 20:41

## 2017-08-05 RX ADMIN — CLOPIDOGREL 75 MG: 75 TABLET, FILM COATED ORAL at 08:09

## 2017-08-05 RX ADMIN — ACETAMINOPHEN 325 MG: 325 TABLET ORAL at 15:49

## 2017-08-05 RX ADMIN — ASPIRIN 81 MG: 81 TABLET ORAL at 08:09

## 2017-08-05 RX ADMIN — PREGABALIN 100 MG: 100 CAPSULE ORAL at 08:22

## 2017-08-05 RX ADMIN — DULOXETINE HYDROCHLORIDE 30 MG: 30 CAPSULE, DELAYED RELEASE ORAL at 08:09

## 2017-08-05 RX ADMIN — FLUTICASONE PROPIONATE 2 SPRAY: 50 SPRAY, METERED NASAL at 08:09

## 2017-08-05 RX ADMIN — ATORVASTATIN CALCIUM 40 MG: 20 TABLET, FILM COATED ORAL at 20:41

## 2017-08-05 RX ADMIN — OXYBUTYNIN CHLORIDE 10 MG: 10 TABLET, EXTENDED RELEASE ORAL at 08:08

## 2017-08-05 NOTE — SIGNIFICANT NOTE
08/05/17 1434   Rehab Treatment   Discipline physical therapist   Treatment Not Performed other (see comments)  (Patient is being D/C'ed to Rehab.)

## 2017-08-05 NOTE — PROGRESS NOTES
"Pharmacy to Dose Vancomycin IV    Day 4  Consult for Dr. 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)  Goal trough: 15-20 mcg/mL    Current Vancomycin dose: 31.6 mg/Kg/day    IV Anti-Infectives     Ordered     Dose/Rate Route Frequency Start Stop    08/03/17 2130  vancomycin 1500 mg/500 mL 0.9% NS IVPB ([facility])     Ordering Provider:  Erlin Martin MD    1,500 mg Intravenous Every 12 Hours 08/03/17 2215 08/02/17 1921  vancomycin 1500 mg/500 mL 0.9% NS IVPB ([facility])     Ordering Provider:  Erlin Martin MD    15 mg/kg × 94.8 kg Intravenous Once 08/02/17 2000 08/03/17 1335    08/02/17 1509  ceFAZolin in dextrose (ANCEF) IVPB solution 2 g     Ordering Provider:  Erlin Martin MD    2 g Intravenous Once 08/02/17 1545 08/02/17 1608      Relevant clinical data and objective history reviewed:  67 y.o. female 64\" (162.6 cm) 209 lb (94.8 kg)  Body mass index is 35.87 kg/(m^2).    Results from last 7 days  Lab Units 08/05/17  0451 08/04/17  0434 08/03/17  2020   CREATININE mg/dL 0.58 0.63 0.73     Estimated Creatinine Clearance: 77.2 mL/min (by C-G formula based on Cr of 0.58).    Lab Results   Component Value Date    WBC 5.46 08/04/2017    WBC 6.62 08/03/2017     Temp:  [97.2 °F (36.2 °C)-101.7 °F (38.7 °C)] 100.9 °F (38.3 °C)  Heart Rate:  [60-92] 74  Resp:  [16-24] 16  BP: (112-130)/(58-76) 130/76    Intake/Output Summary (Last 24 hours) at 08/05/17 1057  Last data filed at 08/04/17 1713   Gross per 24 hour   Intake              840 ml   Output                0 ml   Net              840 ml     Baseline cultures/labs/radiology:  7/26 Wound cx (Rt hip): NG x 3 days  7/26 Anaerobic cx (Rt hip): NGF  8/02 CXR: No acute findings  8/02 XR Rt hip: Rt hip dislocation  8/02 XR Pelvis: right hip arthroplasty in place. The femoral component is well seated in the acetabular component. Prominent erosive changes are identified at the level of the bony acetabulum. Again noted is a long stem " femoral  prosthesis with an encircling wire at the level of the proximal shaft of the femur.    Assessment/Plan:   PMH:  Anxiety; Arthritis; Bipolar disorder; COPD; CAD; Diastolic dysfunction; Disease of thyroid gland; Dislocation of hip joint; DVT; GERD w/o esophagitis; Heartburn; Hematoma; Hyperlipidemia; Hypertension; LVH (left ventricular hypertrophy); Major depressive disorder; Mitral annular calcification; Mitral regurgitation; Mitral valve disease; Narcolepsy; Overactive bladder; Pneumonia; PE; Pulmonary hypertension; Sleep apnea; and Tricuspid regurgitation.     CC: Rt hip pain/dislocation. Hx Rt total hip removal &  right total hip reimplant. Previously  discharged on Ertapenem & Vancomycin IV for polymicrobial Rt hip joint infection    8/04 OR Attempted closed reduction failed. Open reduction followed.    Vancomycin trough level within desired range. Will continue same for now.    Lab Results   Component Value Date    VANCO TROUGH 16.50 mcg/mL 08/05/2017@ 09:25     Vancomycin Dosing History:  8/02 Vancomycin 1500 mg IV x1  8/03 08:21, 22:35 Vancomycin 1500 mg IV q12hrs   8/03 19:34 Vanc trough: 19.40 mcg/mL  8/04 09:05, 21:39 Vancomycin 1500 mg IV q12hrs  8/05 11:30, 22:00 Vancomycin 1500 mg IV q12hrs   8/05 09:25 Vanc trough: 16.50 mcg/mL    Thank you for your consult,    Puja Olvera [facility]  08/05/17 10:57 AM

## 2017-08-05 NOTE — PROGRESS NOTES
Name: Frances Downs ADMIT: 2017   : 1950  PCP: Talha Echeverria MD    MRN: [id number] LOS: 1 days   AGE/SEX: 67 y.o. female  ROOM: [facility]     Subjective   Subjective:  Symptoms:  Improved.  No shortness of breath or chest pain.  (Felt bad this morning with fever).    Diet:  Adequate intake.  No nausea.    Activity level: Impaired due to pain.    Pain:  She complains of pain that is mild.  She reports pain is improving.  Pain is partially controlled and requiring pain medication.        Objective   Vital Signs  Temp:  [97.2 °F (36.2 °C)-101.7 °F (38.7 °C)] 101.7 °F (38.7 °C)  Heart Rate:  [60-92] 74  Resp:  [16-24] 16  BP: (112-130)/(58-76) 130/76  SpO2:  [94 %] 94 %  on   ;   O2 Device: room air  Body mass index is 35.87 kg/(m^2).    Physical Exam   Constitutional: She is oriented to person, place, and time. No distress.   Cardiovascular: Normal rate and regular rhythm.    Murmur heard.  Pulmonary/Chest: Effort normal and breath sounds normal.   Abdominal: Soft. Bowel sounds are normal. She exhibits no distension. There is no tenderness.   Musculoskeletal: Normal range of motion. She exhibits no edema.   Neurological: She is alert and oriented to person, place, and time.   Skin: Skin is warm and dry. She is not diaphoretic.       Results Review:       I reviewed the patient's new clinical results.    Results from last 7 days  Lab Units 17   WBC 10*3/mm3  --  5.46 6.62  --    HEMOGLOBIN g/dL 9.9* 8.1* 8.4* 6.4*   PLATELETS 10*3/mm3  --  216 251  --        Results from last 7 days  Lab Units 17   SODIUM mmol/L  --  144 141   POTASSIUM mmol/L  --  4.0 4.0   CHLORIDE mmol/L  --  108* 104   CO2 mmol/L  --  24.1 22.4   BUN mg/dL  --  9 10   CREATININE mg/dL 0.58 0.63 0.73   GLUCOSE mg/dL  --  119* 144*   Estimated Creatinine Clearance: 77.2 mL/min (by C-G formula based on Cr of 0.58).    Results from  last 7 days  Lab Units 08/04/17  0434 08/03/17  2020   CALCIUM mg/dL 8.2* 8.2*   ALBUMIN g/dL  --  3.20*       Results from last 7 days  Lab Units 08/05/17  0451 08/04/17  0434 08/03/17  0542   PROTIME Seconds 13.9 14.1 15.0*   INR  1.12* 1.13* 1.23*           ARIPiprazole 10 mg Oral Daily   aspirin 81 mg Oral Daily   atorvastatin 40 mg Oral Nightly   carvedilol 12.5 mg Oral BID With Meals   clopidogrel 75 mg Oral Daily   DULoxetine 30 mg Oral BID   ferrous sulfate 325 mg Oral BID   fluticasone 2 spray Nasal Daily   lamoTRIgine 25 mg Oral BID   levothyroxine 100 mcg Oral Daily   menthol-zinc oxide  Topical Q12H   nicotine 1 patch Transdermal Q24H   oxybutynin XL 10 mg Oral Daily   pantoprazole 40 mg Oral QAM   pregabalin 100 mg Oral BID   sennosides-docusate sodium 2 tablet Oral BID   tiotropium 1 capsule Inhalation Daily - RT   vancomycin 1,500 mg Intravenous Q12H   warfarin 7.5 mg Oral Once       Pharmacy to dose vancomycin          Assessment/Plan   Assessment:     Active Hospital Problems (** Indicates Principal Problem)    Diagnosis Date Noted   • **Dislocation of right hip [S73.004A] 08/02/2017   • Postoperative hypotension [I95.89] 08/04/2017   • Chronic diastolic congestive heart failure [I50.32] 08/04/2017   • Obstructive sleep apnea [G47.33] 03/21/2017   • COPD (chronic obstructive pulmonary disease) [J44.9] 03/21/2017   • Obesity (BMI 30-39.9) [E66.9] 03/21/2017   • DVT (deep venous thrombosis), hx of [I82.409] 02/10/2017   • Hypertension [I10] 02/10/2017   • Coronary artery disease, hx of stent [I25.10] 02/10/2017      Resolved Hospital Problems    Diagnosis Date Noted Date Resolved   No resolved problems to display.       Plan:   - Continue to hold COREG at certain parameters.  NORVASC has been stopped for now.  BP improved.  Can resume normal BP regimen at discharge.    - Off IVFs.  - No obvious source of fever.  Probable atelectasis.  Consider further workup if persists.  - SCDs have been ordered for  DVT prophylaxis per ortho.  - Patient encouraged to use incentive spirometer as instructed.        Dennis Bejarano MD  [facility]  08/05/17  9:28 AM

## 2017-08-05 NOTE — PROGRESS NOTES
Orthopedic Progress Note    Subjective :   Doing OK. Mild fever and chills.  Wound drainage.    Objective :    Vital signs in last 24 hours:  Temp:  [97.2 °F (36.2 °C)-98.7 °F (37.1 °C)] 97.7 °F (36.5 °C)  Heart Rate:  [60-92] 92  Resp:  [16-24] 24  BP: (112-126)/(58-72) 112/66  Vitals:    08/04/17 1529 08/04/17 1900 08/04/17 2300 08/05/17 0300   BP: 126/72 118/58 112/66    BP Location: Left arm Left arm Left arm Left arm   Patient Position: Sitting Lying Lying Lying   Pulse: 60 64 60 92   Resp: 16 20 20 24   Temp: 97.2 °F (36.2 °C) 97.8 °F (36.6 °C) 98.7 °F (37.1 °C) 97.7 °F (36.5 °C)   TempSrc: Oral Oral Oral Oral   SpO2: 94%      Weight:       Height:           PHYSICAL EXAM:  Patient is calm, in no acute distress, awake and oriented x 3.  Dressing with saturation to right hip.  Swelling is appropriate.  Ecchymosis is appropriate in amount.  Homans test is negative.  Patient is neurovascularly intact distally.    LABS:    Results from last 7 days  Lab Units 08/05/17 0451 08/04/17  0434   WBC 10*3/mm3  --  5.46   HEMOGLOBIN g/dL 9.9* 8.1*   HEMATOCRIT % 30.7* 25.2*   PLATELETS 10*3/mm3  --  216       Results from last 7 days  Lab Units 08/05/17 0451 08/04/17  0434   SODIUM mmol/L  --  144   POTASSIUM mmol/L  --  4.0   CHLORIDE mmol/L  --  108*   CO2 mmol/L  --  24.1   BUN mg/dL  --  9   CREATININE mg/dL 0.58 0.63   GLUCOSE mg/dL  --  119*   CALCIUM mg/dL  --  8.2*       Results from last 7 days  Lab Units 08/05/17  0451 08/04/17  0434 08/03/17  0542   INR  1.12* 1.13* 1.23*       ASSESSMENT:  Status post right hip reduction.    Plan:  Continue Physical Therapy, WBAT.  Continue SCDs, Continue Coumadin for DVT prophylaxis.  Hold lovenox due to drainage.  Patient with fever and wound drainage.  Dressing changes.  Will monitor over 24 hours.  Dispo planning for Rehab tomorrow.    Félix Geronimo MD    Date: 8/5/2017  Time: 7:26 AM

## 2017-08-05 NOTE — PLAN OF CARE
Problem: Patient Care Overview (Adult)  Goal: Plan of Care Review  Outcome: Ongoing (interventions implemented as appropriate)    08/05/17 0401   Coping/Psychosocial Response Interventions   Plan Of Care Reviewed With patient   Patient Care Overview   Progress improving   Outcome Evaluation   Outcome Summary/Follow up Plan VSS, A&Ox4. Some oozing from hip incision, dressing re-inforced. Pt ambulating well to bedside commode with walker and assist of 1. Still holding narcotics secondary to hypotension. Pt educated regard her HTN medications and it was mentioned that some of those medications may be changed prior to discharge . Will continue to monitor pt closely         Problem: Mobility, Physical Impaired (Adult)  Goal: Enhanced Mobility Skills  Outcome: Ongoing (interventions implemented as appropriate)  Goal: Enhanced Functionality Ability  Outcome: Ongoing (interventions implemented as appropriate)    Problem: Hip Replacement, Total (Adult)  Goal: Signs and Symptoms of Listed Potential Problems Will be Absent or Manageable (Hip Replacement, Total)  Outcome: Ongoing (interventions implemented as appropriate)

## 2017-08-06 ENCOUNTER — APPOINTMENT (OUTPATIENT)
Dept: GENERAL RADIOLOGY | Facility: HOSPITAL | Age: 67
End: 2017-08-06

## 2017-08-06 VITALS
TEMPERATURE: 100.5 F | RESPIRATION RATE: 20 BRPM | SYSTOLIC BLOOD PRESSURE: 101 MMHG | HEIGHT: 64 IN | OXYGEN SATURATION: 97 % | DIASTOLIC BLOOD PRESSURE: 55 MMHG | BODY MASS INDEX: 35.68 KG/M2 | WEIGHT: 209 LBS | HEART RATE: 87 BPM

## 2017-08-06 PROBLEM — R50.82 POSTOPERATIVE FEVER: Status: ACTIVE | Noted: 2017-08-06

## 2017-08-06 LAB
ANION GAP SERPL CALCULATED.3IONS-SCNC: 9.9 MMOL/L
BUN BLD-MCNC: 8 MG/DL (ref 8–23)
BUN/CREAT SERPL: 12.9 (ref 7–25)
CALCIUM SPEC-SCNC: 8.5 MG/DL (ref 8.6–10.5)
CHLORIDE SERPL-SCNC: 105 MMOL/L (ref 98–107)
CO2 SERPL-SCNC: 25.1 MMOL/L (ref 22–29)
CREAT BLD-MCNC: 0.62 MG/DL (ref 0.57–1)
GFR SERPL CREATININE-BSD FRML MDRD: 96 ML/MIN/1.73
GLUCOSE BLD-MCNC: 99 MG/DL (ref 65–99)
HCT VFR BLD AUTO: 25.5 % (ref 35.6–45.5)
HGB BLD-MCNC: 8.1 G/DL (ref 11.9–15.5)
INR PPP: 1.29 (ref 0.9–1.1)
POTASSIUM BLD-SCNC: 3.6 MMOL/L (ref 3.5–5.2)
PROCALCITONIN SERPL-MCNC: 0.03 NG/ML (ref 0.1–0.25)
PROTHROMBIN TIME: 15.6 SECONDS (ref 11.7–14.2)
SODIUM BLD-SCNC: 140 MMOL/L (ref 136–145)

## 2017-08-06 PROCEDURE — 94799 UNLISTED PULMONARY SVC/PX: CPT

## 2017-08-06 PROCEDURE — 85610 PROTHROMBIN TIME: CPT | Performed by: ORTHOPAEDIC SURGERY

## 2017-08-06 PROCEDURE — 97110 THERAPEUTIC EXERCISES: CPT

## 2017-08-06 PROCEDURE — 94660 CPAP INITIATION&MGMT: CPT

## 2017-08-06 PROCEDURE — 85014 HEMATOCRIT: CPT | Performed by: ORTHOPAEDIC SURGERY

## 2017-08-06 PROCEDURE — 25010000002 VANCOMYCIN 10 G RECONSTITUTED SOLUTION: Performed by: ORTHOPAEDIC SURGERY

## 2017-08-06 PROCEDURE — 84145 PROCALCITONIN (PCT): CPT | Performed by: HOSPITALIST

## 2017-08-06 PROCEDURE — 85018 HEMOGLOBIN: CPT | Performed by: ORTHOPAEDIC SURGERY

## 2017-08-06 PROCEDURE — G0378 HOSPITAL OBSERVATION PER HR: HCPCS

## 2017-08-06 PROCEDURE — 71020 HC CHEST PA AND LATERAL: CPT

## 2017-08-06 PROCEDURE — 80048 BASIC METABOLIC PNL TOTAL CA: CPT | Performed by: ORTHOPAEDIC SURGERY

## 2017-08-06 RX ORDER — WARFARIN SODIUM 4 MG/1
8 TABLET ORAL
Qty: 30 TABLET | Refills: 1 | Status: SHIPPED | OUTPATIENT
Start: 2017-08-06 | End: 2017-09-05

## 2017-08-06 RX ORDER — HYDROCODONE BITARTRATE AND ACETAMINOPHEN 7.5; 325 MG/1; MG/1
2 TABLET ORAL EVERY 4 HOURS PRN
Qty: 60 TABLET | Refills: 0 | Status: SHIPPED | OUTPATIENT
Start: 2017-08-06 | End: 2017-08-15

## 2017-08-06 RX ORDER — WARFARIN SODIUM 4 MG/1
8 TABLET ORAL
Status: DISCONTINUED | OUTPATIENT
Start: 2017-08-06 | End: 2017-08-06 | Stop reason: HOSPADM

## 2017-08-06 RX ADMIN — PREGABALIN 100 MG: 100 CAPSULE ORAL at 08:52

## 2017-08-06 RX ADMIN — FERROUS SULFATE TAB 325 MG (65 MG ELEMENTAL FE) 325 MG: 325 (65 FE) TAB at 08:52

## 2017-08-06 RX ADMIN — VANCOMYCIN HYDROCHLORIDE 1500 MG: 10 INJECTION, POWDER, LYOPHILIZED, FOR SOLUTION INTRAVENOUS at 09:01

## 2017-08-06 RX ADMIN — CLOPIDOGREL 75 MG: 75 TABLET, FILM COATED ORAL at 08:53

## 2017-08-06 RX ADMIN — LAMOTRIGINE 25 MG: 25 TABLET ORAL at 08:52

## 2017-08-06 RX ADMIN — Medication: at 08:55

## 2017-08-06 RX ADMIN — DULOXETINE HYDROCHLORIDE 30 MG: 30 CAPSULE, DELAYED RELEASE ORAL at 08:52

## 2017-08-06 RX ADMIN — ASPIRIN 81 MG: 81 TABLET ORAL at 08:54

## 2017-08-06 RX ADMIN — HYDROCODONE BITARTRATE AND ACETAMINOPHEN 1 TABLET: 7.5; 325 TABLET ORAL at 09:01

## 2017-08-06 RX ADMIN — FLUTICASONE PROPIONATE 2 SPRAY: 50 SPRAY, METERED NASAL at 08:54

## 2017-08-06 RX ADMIN — HYDROCODONE BITARTRATE AND ACETAMINOPHEN 1 TABLET: 7.5; 325 TABLET ORAL at 08:58

## 2017-08-06 RX ADMIN — ONDANSETRON HYDROCHLORIDE 4 MG: 4 TABLET, FILM COATED ORAL at 08:55

## 2017-08-06 RX ADMIN — LEVOTHYROXINE SODIUM 100 MCG: 100 TABLET ORAL at 08:53

## 2017-08-06 RX ADMIN — TIOTROPIUM BROMIDE 1 CAPSULE: 18 CAPSULE ORAL; RESPIRATORY (INHALATION) at 15:36

## 2017-08-06 RX ADMIN — CARVEDILOL 12.5 MG: 12.5 TABLET, FILM COATED ORAL at 08:52

## 2017-08-06 RX ADMIN — HYDROCODONE BITARTRATE AND ACETAMINOPHEN 1 TABLET: 7.5; 325 TABLET ORAL at 12:58

## 2017-08-06 RX ADMIN — HYDROCODONE BITARTRATE AND ACETAMINOPHEN 1 TABLET: 7.5; 325 TABLET ORAL at 13:01

## 2017-08-06 RX ADMIN — OXYBUTYNIN CHLORIDE 10 MG: 10 TABLET, EXTENDED RELEASE ORAL at 08:52

## 2017-08-06 RX ADMIN — PANTOPRAZOLE SODIUM 40 MG: 40 TABLET, DELAYED RELEASE ORAL at 06:23

## 2017-08-06 RX ADMIN — HYDROCODONE BITARTRATE AND ACETAMINOPHEN 2 TABLET: 7.5; 325 TABLET ORAL at 17:18

## 2017-08-06 RX ADMIN — WARFARIN SODIUM 8 MG: 4 TABLET ORAL at 17:18

## 2017-08-06 RX ADMIN — ARIPIPRAZOLE 10 MG: 10 TABLET ORAL at 08:52

## 2017-08-06 NOTE — PROGRESS NOTES
Orthopedic Progress Note    Subjective :   Doing OK. Would like to be discharged.     Objective :    Vital signs in last 24 hours:  Temp:  [99.8 °F (37.7 °C)-100.9 °F (38.3 °C)] 100.8 °F (38.2 °C)  Heart Rate:  [64-84] 75  Resp:  [16-28] 20  BP: ()/(52-66) 135/66  Vitals:    08/05/17 1900 08/05/17 2300 08/06/17 0300 08/06/17 0700   BP: 106/52 108/52 114/62 135/66   BP Location: Left arm Left arm Left arm Left arm   Patient Position: Lying Lying Lying Sitting   Pulse: 64 72 80 75   Resp: 28 24 28 20   Temp: 99.8 °F (37.7 °C) (!) 100.7 °F (38.2 °C) 100 °F (37.8 °C) (!) 100.8 °F (38.2 °C)   TempSrc: Oral Oral Oral Oral   SpO2:    97%   Weight:       Height:           PHYSICAL EXAM:  Patient is calm, in no acute distress, awake and oriented x 3.  Dressing intact.  No signs of infection.  Swelling is appropriate.  Ecchymosis is appropriate in amount.  Homans test is negative.  Patient is neurovascularly intact distally.    LABS:    Results from last 7 days  Lab Units 08/06/17  0334 08/04/17  0434   WBC 10*3/mm3  --   --  5.46   HEMOGLOBIN g/dL 8.1*  < > 8.1*   HEMATOCRIT % 25.5*  < > 25.2*   PLATELETS 10*3/mm3  --   --  216   < > = values in this interval not displayed.    Results from last 7 days  Lab Units 08/06/17  0334   SODIUM mmol/L 140   POTASSIUM mmol/L 3.6   CHLORIDE mmol/L 105   CO2 mmol/L 25.1   BUN mg/dL 8   CREATININE mg/dL 0.62   GLUCOSE mg/dL 99   CALCIUM mg/dL 8.5*       Results from last 7 days  Lab Units 08/06/17  0334 08/05/17  0451 08/04/17  0434   INR  1.29* 1.12* 1.13*       ASSESSMENT:  Status post right open hip reduction.    Plan:  Continue Physical Therapy, WBAT.  Continue SCDs, Continue Coumadin for DVT prophylaxis.  Dispo planning for Rehab today.    Félix Geronimo MD    Date: 8/6/2017  Time: 9:29 AM

## 2017-08-06 NOTE — PROGRESS NOTES
Name: Frances Downs ADMIT: 2017   : 1950  PCP: Talha Echeverria MD    MRN: [id number] LOS: 1 days   AGE/SEX: 67 y.o. female  ROOM: [facility]     Subjective   Subjective:  Symptoms:  Improved.  No shortness of breath or chest pain.  (Feels okay.  Had HA earlier.  C/o cough and chest tightness.).    Diet:  Adequate intake.  No nausea.    Activity level: Impaired due to pain.    Pain:  She complains of pain that is mild.  She reports pain is improving.  Pain is partially controlled and requiring pain medication.        Objective   Vital Signs  Temp:  [99.8 °F (37.7 °C)-100.9 °F (38.3 °C)] 100 °F (37.8 °C)  Heart Rate:  [64-84] 80  Resp:  [16-28] 28  BP: ()/(52-62) 114/62     on   ;   O2 Device: room air  Body mass index is 35.87 kg/(m^2).    Physical Exam   Constitutional: She is oriented to person, place, and time. No distress.   Cardiovascular: Normal rate and regular rhythm.    Murmur heard.  Pulmonary/Chest: Effort normal. She has rhonchi (few scattered).   Coughing w/ deep breathing   Abdominal: Soft. Bowel sounds are normal. She exhibits no distension. There is no tenderness.   Musculoskeletal: Normal range of motion. She exhibits no edema.   Neurological: She is alert and oriented to person, place, and time.   Skin: Skin is warm and dry. She is not diaphoretic.       Results Review:       I reviewed the patient's new clinical results.    Results from last 7 days  Lab Units 17  19317  0542   WBC 10*3/mm3  --   --  5.46 6.62  --    HEMOGLOBIN g/dL 8.1* 9.9* 8.1* 8.4* 6.4*   PLATELETS 10*3/mm3  --   --  216 251  --        Results from last 7 days  Lab Units 17   SODIUM mmol/L 140  --  144 141   POTASSIUM mmol/L 3.6  --  4.0 4.0   CHLORIDE mmol/L 105  --  108* 104   CO2 mmol/L 25.1  --  24.1 22.4   BUN mg/dL 8  --  9 10   CREATININE mg/dL 0.62 0.58 0.63 0.73   GLUCOSE mg/dL  99  --  119* 144*   Estimated Creatinine Clearance: 77.2 mL/min (by C-G formula based on Cr of 0.62).    Results from last 7 days  Lab Units 08/06/17  0334 08/04/17  0434 08/03/17 2020   CALCIUM mg/dL 8.5* 8.2* 8.2*   ALBUMIN g/dL  --   --  3.20*       Results from last 7 days  Lab Units 08/06/17  0334 08/05/17  0451 08/04/17  0434 08/03/17  0542   PROTIME Seconds 15.6* 13.9 14.1 15.0*   INR  1.29* 1.12* 1.13* 1.23*           ARIPiprazole 10 mg Oral Daily   aspirin 81 mg Oral Daily   atorvastatin 40 mg Oral Nightly   carvedilol 12.5 mg Oral BID With Meals   clopidogrel 75 mg Oral Daily   DULoxetine 30 mg Oral BID   ferrous sulfate 325 mg Oral BID   fluticasone 2 spray Nasal Daily   lamoTRIgine 25 mg Oral BID   levothyroxine 100 mcg Oral Daily   menthol-zinc oxide  Topical Q12H   nicotine 1 patch Transdermal Q24H   oxybutynin XL 10 mg Oral Daily   pantoprazole 40 mg Oral QAM   pregabalin 100 mg Oral BID   sennosides-docusate sodium 2 tablet Oral BID   tiotropium 1 capsule Inhalation Daily - RT   vancomycin 1,500 mg Intravenous Q12H       Pharmacy to dose vancomycin          Assessment/Plan   Assessment:     Active Hospital Problems (** Indicates Principal Problem)    Diagnosis Date Noted   • **Dislocation of right hip [S73.004A] 08/02/2017   • Postoperative fever [R50.82] 08/06/2017   • Postoperative hypotension [I95.89] 08/04/2017   • Chronic diastolic congestive heart failure [I50.32] 08/04/2017   • Obstructive sleep apnea [G47.33] 03/21/2017   • COPD (chronic obstructive pulmonary disease) [J44.9] 03/21/2017   • Obesity (BMI 30-39.9) [E66.9] 03/21/2017   • DVT (deep venous thrombosis), hx of [I82.409] 02/10/2017   • Hypertension [I10] 02/10/2017   • Coronary artery disease, hx of stent [I25.10] 02/10/2017      Resolved Hospital Problems    Diagnosis Date Noted Date Resolved   No resolved problems to display.       Plan:   - Continue to hold COREG at certain parameters.  NORVASC has been stopped for now.  BP low  but stable.   Can resume taking both normally at discharge.  - No obvious source of fever.  Likely atelectasis.  Given her c/o cough will obtain CXR.  If PNA present will start abx but could likely still be dc'ed home.  - SCDs have been ordered for DVT prophylaxis per ortho.  - Patient encouraged to use incentive spirometer as instructed.        Dennis Bejarano MD  [facility]  08/06/17  8:27 AM

## 2017-08-06 NOTE — DISCHARGE SUMMARY
Discharge Summary    Date of Admission: 8/2/2017  Date of Discharge:  8/6/2017    Discharge Diagnosis:   Dislocation of right hip [S73.004A]    PMHX:  Past Medical History:   Diagnosis Date   • Anxiety    • Arthritis    • Bipolar disorder    • COPD (chronic obstructive pulmonary disease)    • Coronary artery disease    • Diastolic dysfunction     grade II per echocardiogram 11/18/2016   • Difficulty walking    • Disease of thyroid gland     hypothyroidism   • Dislocation of hip joint     recurrent dislocation right hip   • DVT (deep venous thrombosis)    • GERD without esophagitis    • Heartburn    • Hematoma    • Hyperlipidemia    • Hypertension    • LVH (left ventricular hypertrophy)     mild to moderate per echocardiogram 11/18/2016   • Major depressive disorder    • Mitral annular calcification     severe per echocardiogram 11/18/2016   • Mitral regurgitation     mild per echo 11/18/2016   • Mitral valve disease    • Muscle weakness    • Narcolepsy    • Overactive bladder    • Pneumonia    • Pulmonary emboli    • Pulmonary hypertension     mild per echo 11/18/2016   • Sleep apnea     obstructive   • Tricuspid regurgitation     mild to moderate per echo 11/18/2016       Discharge Disposition  Home-Health Care [facility]    Procedures Performed  Procedure(s):  FAILED CLOSED RIGHT HIP  REDUCTION WITH OPEN REDUCTION OF RIGHT HIP       Indication for Admission  Patient is a 67 y.o. female admitted after undergoing the above surgical procedure. They were admitted for post-operative pain control, medical management and physical therapy.  They progressed with physical therapy.  They were deemed stable for discharge.      Consults:   Consults     Date and Time Order Name Status Description    8/3/2017 1848 Inpatient Consult to Internal Medicine            Discharge Instructions:  Patient is partial weight bearing on the operative leg.  Patient is to progress range of motion and ambulation as tolerated.  Use walker as needed  for stability and gait.  May progress to cane as tolerated.  The dressing should be changed daily until staples are removed.  Staples should be removed post-operative day 14.  Dressing changes 2 x a day until drainage has stopped.  Keep incision clean, dry and intact until staple removal then the patient may shower. PT/INR should be obtained every Monday and Thursday and call or fax results to office before 12 noon.  (Attention Jan)  Patient will follow-up in the office in 6 weeks.  Call the office at [phone] for any questions or concerns.      Discharge Medications   Frances Downs   Home Medication Instructions ROBBIE:[id number]    Printed on:08/06/17 7304   Medication Information                      amLODIPine (NORVASC) 10 MG tablet  Take 10 mg by mouth Daily.             ARIPiprazole (ABILIFY) 10 MG tablet  Take 10 mg by mouth Daily.             aspirin 81 MG EC tablet  Take 81 mg by mouth Daily.             atorvastatin (LIPITOR) 40 MG tablet  Take 40 mg by mouth Every Night.             carvedilol (COREG) 12.5 MG tablet  Take 12.5 mg by mouth 2 (two) times a day with meals.             clopidogrel (PLAVIX) 75 MG tablet  Take 1 tablet by mouth daily.             darifenacin (ENABLEX) 15 MG 24 hr tablet  Take 15 mg by mouth daily.             diazePAM (VALIUM) 2 MG tablet  Take 2 mg by mouth Every 12 (Twelve) Hours As Needed for Anxiety.             DULoxetine (CYMBALTA) 30 MG capsule  Take 30 mg by mouth 2 (Two) Times a Day.             ferrous sulfate 325 (65 FE) MG tablet  Take 325 mg by mouth 2 (Two) Times a Day.             ferrous sulfate 325 (65 FE) MG tablet  Take 1 tablet by mouth Daily With Breakfast.             fluticasone (FLONASE) 50 MCG/ACT nasal spray  2 sprays into each nostril daily. Administer 2 sprays in each nostril for each dose.             furosemide (LASIX) 20 MG tablet  Take 1 tablet by mouth Daily.             HYDROcodone-acetaminophen (NORCO) 7.5-325 MG per tablet  Take 2  tablets by mouth Every 4 (Four) Hours As Needed for Moderate Pain (4-6) or Severe Pain  (7-10) (1-2 tabs) for up to 9 days.             isosorbide mononitrate (IMDUR) 30 MG 24 hr tablet  Take 30 mg by mouth Daily.             lamoTRIgine (LaMICtal) 25 MG tablet  Take 25 mg by mouth 2 (Two) Times a Day.             levothyroxine (SYNTHROID, LEVOTHROID) 100 MCG tablet  Take 100 mcg by mouth daily.             LOPERAMIDE HCL PO  Take 2 capsules by mouth Every 6 (Six) Hours As Needed.             MENTHOL-ZINC OXIDE EX  Apply  topically. Apply to coccyx topically 2 times daily for redness on coccyx             nicotine (NICODERM CQ) 14 MG/24HR patch  Place 1 patch on the skin Daily.             omeprazole (PriLOSEC) 20 MG capsule  Take 20 mg by mouth 2 (two) times a day.             oxyCODONE-acetaminophen (PERCOCET)  MG per tablet  Take 1 tablet by mouth Every 4 (Four) Hours As Needed for Moderate Pain (4-6) for up to 8 days.             potassium chloride (K-DUR) 10 MEQ CR tablet  Take 10 mEq by mouth daily.             pregabalin (LYRICA) 200 MG capsule  Take 100 mg by mouth 2 (Two) Times a Day.             promethazine (PHENERGAN) 25 MG tablet  Take 25 mg by mouth Every 4 (Four) Hours As Needed for Nausea or Vomiting.             tiotropium (SPIRIVA) 18 MCG per inhalation capsule  Place 1 capsule into inhaler and inhale 1 (one) time daily.             vancomycin 1750 mg/500 mL 0.9% NS IVPB ([facility])  Infuse 500 mL into a venous catheter Every 12 (Twelve) Hours. Indications: Bone and/or Joint Infection             Vancomycin HCl in Dextrose (PHARMACY TO DOSE VANCOMYCIN)  Continuous.             warfarin (COUMADIN) 4 MG tablet  Take 2 tablets by mouth Daily for 30 doses.             warfarin (COUMADIN) 6 MG tablet  Take 1 tablet by mouth Daily.                 Discharge Diet:   Diet Instructions     May continue regular diet.               Activity at Discharge:   Activity Instructions     Partial weight bearing  25%  Use Walker               Follow-up Appointments  No future appointments.    Test Results Pending at Discharge       Félix Geronimo MD  08/06/17,  9:47 AM

## 2017-08-06 NOTE — PLAN OF CARE
Problem: Patient Care Overview (Adult)  Goal: Plan of Care Review  Outcome: Ongoing (interventions implemented as appropriate)    08/06/17 1012   Coping/Psychosocial Response Interventions   Plan Of Care Reviewed With patient   Patient Care Overview   Progress improving   Outcome Evaluation   Outcome Summary/Follow up Plan Patient participated with skilled PT. Increased ambulation distance this visit, but unable to maintain TTWB'ing on RLE secondary to increased weakness on the LLE. Will continue to progress patient towards goals.

## 2017-08-06 NOTE — PLAN OF CARE
Problem: Patient Care Overview (Adult)  Goal: Plan of Care Review  Outcome: Ongoing (interventions implemented as appropriate)    08/06/17 0448   Coping/Psychosocial Response Interventions   Plan Of Care Reviewed With patient   Patient Care Overview   Progress improving   Outcome Evaluation   Outcome Summary/Follow up Plan VSS, GOOD PAIN CONTROL-NO NARCS ON MY SHIFT, TEMP HANGING AROUND 100, DRESSING CHANGED DUE TO SATURATION, PT EDUCATED ON BP MONITORING AND MEDS AS WELL AS LOW BP       Goal: Adult Individualization and Mutuality  Outcome: Ongoing (interventions implemented as appropriate)    Problem: Fall Risk (Adult)  Goal: Absence of Falls  Outcome: Ongoing (interventions implemented as appropriate)    Problem: Mobility, Physical Impaired (Adult)  Goal: Enhanced Mobility Skills  Outcome: Ongoing (interventions implemented as appropriate)  Goal: Enhanced Functionality Ability  Outcome: Ongoing (interventions implemented as appropriate)    Problem: Hip Replacement, Total (Adult)  Goal: Signs and Symptoms of Listed Potential Problems Will be Absent or Manageable (Hip Replacement, Total)  Outcome: Ongoing (interventions implemented as appropriate)

## 2017-08-06 NOTE — THERAPY TREATMENT NOTE
"Acute Care - Physical Therapy Treatment Note  [facility]     Patient Name: Frances Downs  : 1950  MRN: [id number]  Today's Date: 2017  Onset of Illness/Injury or Date of Surgery Date: 17  Date of Referral to PT: 17  Referring Physician: Erlin Damico    Admit Date: 2017    Visit Dx:  No diagnosis found.  Patient Active Problem List   Diagnosis   • Hematoma of right hip   • DVT (deep venous thrombosis), hx of   • Hypertension   • Hyperlipidemia   • Coronary artery disease, hx of stent   • Arthritis   • Bipolar disorder   • Infected prosthesis of right hip   • MRSA (methicillin resistant Staphylococcus aureus) infection   • Mixed infection   • Bacterial infection due to Morganella morganii   • Acute blood loss anemia   • Obstructive sleep apnea   • COPD (chronic obstructive pulmonary disease)   • Obesity (BMI 30-39.9)   • Pseudomonas aeruginosa infection   • VRE infection (vancomycin resistant Enterococcus)   • Infection of right prosthetic hip joint   • Dislocation of right hip   • Postoperative hypotension   • Chronic diastolic congestive heart failure   • Postoperative fever               Adult Rehabilitation Note       17 0911 17 1055       Rehab Assessment/Intervention    Discipline physical therapist  -AL physical therapist  -MS     Document Type therapy note (daily note)  -AL therapy note (daily note)  -MS     Subjective Information agree to therapy;complains of;weakness  -AL agree to therapy;complains of;fatigue;pain  -MS     Patient Effort, Rehab Treatment good  -AL good  -MS     Symptoms Noted During/After Treatment none  -AL      Symptoms Noted Comment  Pt. reports continued Right L.E. pain but states she feels \"stronger\" this day compared to yesterday.  -MS     Precautions/Limitations  fall precautions   TTWB Right L.E.  -MS     Recorded by [AL] Bertha Springer, PT [MS] Yon Elizabeth, PT     Pain Assessment    Pain Assessment No/denies pain  -AL 0-10  " -MS     Pain Score  4  -MS     Post Pain Score  4  -MS     Pain Type  Acute pain;Surgical pain  -MS     Pain Location  Hip  -MS     Pain Orientation  Right  -MS     Pain Intervention(s)  Medication (See MAR);Cold applied;Repositioned;Elevated;Rest  -MS     Recorded by [AL] Bertha Springer, PT [MS] Yon Elizabeth, PT     Vision Assessment/Intervention    Visual Impairment WFL  -AL      Recorded by [AL] Bertha Springer PT      Cognitive Assessment/Intervention    Current Cognitive/Communication Assessment functional  -AL functional  -MS     Orientation Status oriented x 4  -AL oriented x 4  -MS     Follows Commands/Answers Questions 100% of the time  -% of the time  -MS     Personal Safety WNL/WFL  -AL WNL/WFL  -MS     Personal Safety Interventions fall prevention program maintained;gait belt;nonskid shoes/slippers when out of bed  -AL fall prevention program maintained;gait belt;nonskid shoes/slippers when out of bed;supervised activity  -MS     Short/Long Term Memory intact short term memory;intact long term memory  -AL      Recorded by [AL] Bertha Springer, PT [MS] Yon Elizabeth, PT     Mobility Assessment/Training    Extremity Weight-Bearing Status right lower extremity  -AL right lower extremity  -MS     Right Lower Extremity Weight-Bearing toe touch weight-bearing  -AL toe touch weight-bearing  -MS     Recorded by [AL] Bertha Springer, PT [MS] Yon Elizabeth, PT     Bed Mobility, Assessment/Treatment    Bed Mob, Supine to Sit, Frio not tested   up in chair  -AL      Bed Mob, Sit to Supine, Frio not tested   up in chair  -AL      Bed Mobility, Comment  Pt. up in chair this AM.  -MS     Recorded by [AL] Bertha Springer, PT [MS] Yon Elizabeth, PT     Transfer Assessment/Treatment    Transfers, Sit-Stand Frio supervision required;verbal cues required  -AL minimum assist (75% patient effort)  -MS     Transfers, Stand-Sit Frio supervision required;verbal cues required  -AL minimum assist  (75% patient effort)  -MS     Transfers, Sit-Stand-Sit, Assist Device rolling walker  -AL rolling walker  -MS     Transfer, Comment  Performed x 5 for functional strength training.  -MS     Recorded by [AL] Bertha Springer, PT [MS] Yon Elizabeth, PT     Gait Assessment/Treatment    Gait, [place] Level minimum assist (75% patient effort);2 person assist required;verbal cues required;nonverbal cues required (demo/gesture)  -AL minimum assist (75% patient effort);2 person assist required  -MS     Gait, Assistive Device rolling walker  -AL rolling walker  -MS     Gait, Distance (Feet) 20  -AL 5   Followed pt. with a chair for safety.  -MS     Gait, Gait Pattern Analysis  swing-to gait  -MS     Gait, Gait Deviations  right:;antalgic;narciso decreased;forward flexed posture  -MS     Gait, Maintain Weight Bearing Status unable to maintain weight bearing status  -AL able to maintain weight bearing status   TTWB Right L.E.  -MS     Gait, Safety Issues step length decreased  -AL      Gait, Impairments strength decreased;impaired balance  -AL      Gait, Comment  Pt. requires verbal/tactile cues for posture correction and proper gait sequencing with use of RWX.  -MS     Recorded by [AL] Bertha Springer, PT [MS] Yon Elizabeth, PORTILLO     Therapy Exercises    Exercise Protocols  hip ORIF  -MS     Hip ORIF Exercises  right:;10 reps;ankle pumps/circles;quad set;glut set;hip abduction;LAQ  -MS     Recorded by  [MS] Yon Elizabeth PT     Positioning and Restraints    Pre-Treatment Position sitting in chair/recliner  -AL sitting in chair/recliner  -MS     Post Treatment Position chair  -AL chair  -MS     In Chair reclined;sitting;call light within reach  -AL notified nsg;reclined;sitting;call light within reach;encouraged to call for assist   All lines intact. Ice pack to Right hip.  -MS     Recorded by [AL] Bertha Springer, PT [MS] Yon Elizabeth PT       User Key  (r) = Recorded By, (t) = Taken By, (c) = Cosigned By    Initials  Name Effective Dates    AL Bertha CARRILLO Springer, PT 12/01/15 -     MS Yon Elizabeth, PT 12/01/15 -                 IP PT Goals       08/03/17 1434          Bed Mobility PT LTG    Bed Mobility PT LTG, Date Established 08/03/17  -MS      Bed Mobility PT LTG, Time to Achieve 5 - 7 days  -MS      Bed Mobility PT LTG, Activity Type all bed mobility  -MS      Bed Mobility PT LTG, [place] Level supervision required  -MS      Transfer Training PT LTG    Transfer Training PT LTG, Date Established 08/03/17  -MS      Transfer Training PT LTG, Time to Achieve 5 - 7 days  -MS      Transfer Training PT LTG, Activity Type sit to stand/stand to sit  -MS      Transfer Training PT LTG, [place] Level contact guard assist  -MS      Transfer Training PT LTG, Assist Device walker, rolling  -MS      Transfer Training 2 PT LTG    Transfer Training PT 2 LTG, Date Established 08/03/17  -MS      Transfer Training PT 2 LTG, Time to Achieve 5 - 7 days  -MS      Transfer Training PT 2 LTG, Activity Type bed to chair /chair to bed  -MS      Transfer Training PT 2 LTG, [place] Level contact guard assist  -MS      Transfer Training PT 2 LTG, Assist Device walker, rolling  -MS        User Key  (r) = Recorded By, (t) = Taken By, (c) = Cosigned By    Initials Name Provider Type    MS Yon Elizabeth, PT Physical Therapist          Physical Therapy Education     Title: PT OT SLP Therapies (Done)     Topic: Physical Therapy (Done)     Point: Mobility training (Done)    Learning Progress Summary    Learner Readiness Method Response Comment Documented by Status   Patient Acceptance JYOTI LINDSEY  AL 08/06/17 1011 Done    Acceptance CAROLINA MONTESNR  MS 08/04/17 1101 Done    Acceptance CAROLINA MONTES,NR  MS 08/03/17 1433 Done               Point: Home exercise program (Done)    Learning Progress Summary    Learner Readiness Method Response Comment Documented by Status   Patient Acceptance CAROLINA MONTES,NR  MS 08/04/17 1101 Done    Acceptance CAROLINA MONTES,NR  MS 08/03/17 1433 Done                Point: Body mechanics (Done)    Learning Progress Summary    Learner Readiness Method Response Comment Documented by Status   Patient Acceptance E VU  AL 08/06/17 1011 Done    Acceptance E,D VU,NR  MS 08/04/17 1101 Done    Acceptance E,D VU,NR  MS 08/03/17 1433 Done               Point: Precautions (Done)    Learning Progress Summary    Learner Readiness Method Response Comment Documented by Status   Patient Acceptance E VU  AL 08/06/17 1011 Done    Acceptance E,D VU,NR  MS 08/04/17 1101 Done    Acceptance E,D VU,NR  MS 08/03/17 1433 Done                      User Key     Initials Effective Dates Name Provider Type Discipline    AL 12/01/15 -  Bertha Springer, PT Physical Therapist PT    MS 12/01/15 -  Yon Elizabeth, PT Physical Therapist PT                    PT Recommendation and Plan  Anticipated Discharge Disposition: skilled nursing facility  Planned Therapy Interventions: balance training, gait training, bed mobility training, home exercise program, patient/family education, postural re-education, strengthening, transfer training  PT Frequency: daily  Plan of Care Review  Plan Of Care Reviewed With: patient  Progress: improving  Outcome Summary/Follow up Plan: Patient participated with skilled PT. Increased ambulation distance this visit but unable to maintain TTWB'ing on RLE secondary to increased weakness on the LLE. Will continue to progress patient towards goals.          Outcome Measures       08/06/17 1000 08/04/17 1100 08/03/17 1400    How much help from another person do you currently need...    Turning from your back to your side while in flat bed without using bedrails? 3  -AL 3  -MS 3  -MS    Moving from lying on back to sitting on the side of a flat bed without bedrails? 3  -AL 2  -MS 2  -MS    Moving to and from a bed to a chair (including a wheelchair)? 3  -AL 3  -MS 2  -MS    Standing up from a chair using your arms (e.g., wheelchair, bedside chair)? 3  -AL 3  -MS 2  -MS    Climbing  3-5 steps with a railing? 1  -AL 1  -MS 1  -MS    To walk in hospital room? 3  -AL 3  -MS 1  -MS    AM-PAC 6 Clicks Score 16  -AL 15  -MS 11  -MS    Functional Assessment    Outcome Measure Options AM-PAC 6 Clicks Basic Mobility (PT)  -AL AM-PAC 6 Clicks Basic Mobility (PT)  -MS AM-PAC 6 Clicks Basic Mobility (PT)  -MS      User Key  (r) = Recorded By, (t) = Taken By, (c) = Cosigned By    Initials Name Provider Type    LYNNETTE Springer, PT Physical Therapist    MS Yon Elizabeth, PT Physical Therapist           Time Calculation:         PT Charges       08/06/17 1013          Time Calculation    Start Time 0911  -AL      Stop Time 0927  -AL      Time Calculation (min) 16 min  -AL      PT Received On 08/06/17  -AL      PT - Next Appointment 08/07/17  -AL        User Key  (r) = Recorded By, (t) = Taken By, (c) = Cosigned By    Initials Name Provider Type    LYNNETTE Springer, PT Physical Therapist          Therapy Charges for Today     Code Description Service Date Service Provider Modifiers Qty    [id number]  PT THER SUPP EA 15 MIN 8/6/2017 Bertha Springer, PT GP 1    39884955025 HC PT THER PROC EA 15 MIN 8/6/2017 Bertha Springer, PT GP 1          PT G-Codes  PT Professional Judgement Used?: Yes  Outcome Measure Options: AM-PAC 6 Clicks Basic Mobility (PT)  Functional Limitation: Mobility: Walking and moving around  Mobility: Walking and Moving Around Current Status (): At least 60 percent but less than 80 percent impaired, limited or restricted  Mobility: Walking and Moving Around Goal Status (): At least 20 percent but less than 40 percent impaired, limited or restricted    Bertha Springer, PT  8/6/2017

## 2018-04-18 RX ORDER — BUMETANIDE 1 MG/1
1 TABLET ORAL DAILY
Qty: 90 TABLET | Refills: 3 | Status: SHIPPED | OUTPATIENT
Start: 2018-04-18 | End: 2018-04-27 | Stop reason: SDUPTHER

## 2018-04-26 ENCOUNTER — TELEPHONE (OUTPATIENT)
Dept: CARDIOLOGY | Facility: CLINIC | Age: 68
End: 2018-04-26

## 2018-04-26 NOTE — TELEPHONE ENCOUNTER
That's fine.  You can double book or add her on in the morning or after the last patient in the afternoon

## 2018-04-26 NOTE — TELEPHONE ENCOUNTER
[phone]    [facility]  She c/o SOA and dry cough.  Pt states she spoke w TT last week and he started her on Bumex and told her to call back if swelling had not gone down.  Pt states it is not.  She is up 2# in last week.  She denies dizziness or loss of appetite or CP.    Pt would like to be seen in [place].  Can you see her tomorrow in TT absence?  Also where would you like me to put her?    Thanks, [facility]

## 2018-04-27 ENCOUNTER — OFFICE VISIT (OUTPATIENT)
Dept: CARDIOLOGY | Facility: CLINIC | Age: 68
End: 2018-04-27

## 2018-04-27 VITALS
DIASTOLIC BLOOD PRESSURE: 78 MMHG | BODY MASS INDEX: 38.65 KG/M2 | RESPIRATION RATE: 20 BRPM | SYSTOLIC BLOOD PRESSURE: 144 MMHG | WEIGHT: 232 LBS | HEIGHT: 65 IN | HEART RATE: 59 BPM

## 2018-04-27 DIAGNOSIS — I25.10 CORONARY ARTERY DISEASE INVOLVING NATIVE CORONARY ARTERY OF NATIVE HEART WITHOUT ANGINA PECTORIS: ICD-10-CM

## 2018-04-27 DIAGNOSIS — J44.9 CHRONIC OBSTRUCTIVE PULMONARY DISEASE, UNSPECIFIED COPD TYPE (HCC): ICD-10-CM

## 2018-04-27 DIAGNOSIS — I82.409 DEEP VEIN THROMBOSIS (DVT) OF LOWER EXTREMITY, UNSPECIFIED CHRONICITY, UNSPECIFIED LATERALITY, UNSPECIFIED VEIN (HCC): ICD-10-CM

## 2018-04-27 DIAGNOSIS — I10 ESSENTIAL HYPERTENSION: ICD-10-CM

## 2018-04-27 DIAGNOSIS — I50.32 CHRONIC DIASTOLIC CONGESTIVE HEART FAILURE (HCC): ICD-10-CM

## 2018-04-27 DIAGNOSIS — G47.33 OBSTRUCTIVE SLEEP APNEA SYNDROME: ICD-10-CM

## 2018-04-27 DIAGNOSIS — E78.5 HYPERLIPIDEMIA, UNSPECIFIED HYPERLIPIDEMIA TYPE: ICD-10-CM

## 2018-04-27 DIAGNOSIS — R60.0 LOWER EXTREMITY EDEMA: ICD-10-CM

## 2018-04-27 DIAGNOSIS — R06.09 DYSPNEA ON EXERTION: Primary | ICD-10-CM

## 2018-04-27 PROCEDURE — 93000 ELECTROCARDIOGRAM COMPLETE: CPT | Performed by: INTERNAL MEDICINE

## 2018-04-27 PROCEDURE — 99214 OFFICE O/P EST MOD 30 MIN: CPT | Performed by: INTERNAL MEDICINE

## 2018-04-27 RX ORDER — VILAZODONE HYDROCHLORIDE 40 MG/1
40 TABLET ORAL DAILY
COMMUNITY
End: 2019-02-21 | Stop reason: ALTCHOICE

## 2018-04-27 RX ORDER — BUMETANIDE 1 MG/1
1 TABLET ORAL 2 TIMES DAILY
Qty: 60 TABLET | Refills: 1 | Status: SHIPPED | OUTPATIENT
Start: 2018-04-27 | End: 2018-06-07 | Stop reason: SDUPTHER

## 2018-04-27 RX ORDER — POTASSIUM CHLORIDE 750 MG/1
10 TABLET, FILM COATED, EXTENDED RELEASE ORAL 2 TIMES DAILY WITH MEALS
Qty: 60 TABLET | Refills: 1 | Status: SHIPPED | OUTPATIENT
Start: 2018-04-27 | End: 2020-12-16 | Stop reason: SDUPTHER

## 2018-04-27 NOTE — PROGRESS NOTES
Subjective:     Encounter Date:04/27/2018      Patient ID: Frances Downs is a 67 y.o. female.    Chief Complaint:  History of Present Illness    This is a 67-year-old with a history of coronary artery disease status post prior coronary artery stent placement by Dr. Baer in 2011, history of DVT and pulmonary embolus recently on chronic anticoagulation with warfarin, hypertension, obstructive sleep apnea on CPAP, who presents for an urgent follow-up of lower extremity edema and dyspnea on exertion.    The patient is followed by Dr. Oliveros and was last seen by him in 6/2017.  In the past she has undergone both a stress test and an echocardiogram in 11/2016.  Her past stress test in the past was negative for ischemia.  Her echocardiogram showed normal left ventricle systolic function and wall motion with an estimated ejection fraction of 61%, grade 2 diastolic dysfunction, mild mitral regurgitation, mild to moderate tricuspid regurgitation, and mild pulmonary hypertension.  When she was last seen by Dr. Oliveros she was having issues with recurrent hip dislocation and MRSA infection and after having complete removal of her hip was to have a new hip placed a week after her last visit.  She was on triple therapy at that time on aspirin, clopidogrel and warfarin without any issues.  She was given instructions regarding holding her antiplatelet and anticoagulation before the surgery.    The patient reports in the last couple months that she started having more issues with lower extremity edema and worsening dyspnea on exertion.  She has remained on bumetanide 1 mg a day which she's been on chronically.  She is concerned that she has developed congestive heart failure.  She reports intermittent episodes of chest discomfort over the last few months.  She reports about 4 episodes total that she describes substernal pain that occurs at rest.  The symptoms last anywhere from a few seconds to a few minutes.  She does  believe that the episodes are similar to what she had prior to her stent placement in 2011.  She denies any palpitations, PND or orthopnea, near-syncope or syncope.  She does not appear to be on warfarin any longer and the patient cannot tell me who discontinued the medication and why it was stopped.  On review of her last hospitalization at [facility] and 8/2017 it appears she was discharged with warfarin at that time.  She was also seen by Dr. Echeverria in 3/2018 and there was no mention of anticoagulation at that time either.    Review of Systems   Constitution: Negative for weakness and malaise/fatigue.   HENT: Negative for hearing loss, hoarse voice, nosebleeds and sore throat.    Eyes: Negative for pain.   Cardiovascular: Positive for chest pain, dyspnea on exertion and leg swelling. Negative for claudication, cyanosis, irregular heartbeat, near-syncope, orthopnea, palpitations, paroxysmal nocturnal dyspnea and syncope.   Respiratory: Negative for shortness of breath and snoring.    Endocrine: Negative for cold intolerance, heat intolerance, polydipsia, polyphagia and polyuria.   Skin: Negative for itching and rash.   Musculoskeletal: Negative for arthritis, falls, joint pain, joint swelling, muscle cramps, muscle weakness and myalgias.   Gastrointestinal: Negative for constipation, diarrhea, dysphagia, heartburn, hematemesis, hematochezia, melena, nausea and vomiting.   Genitourinary: Negative for frequency, hematuria and hesitancy.   Neurological: Negative for excessive daytime sleepiness, dizziness, headaches, light-headedness and numbness.   Psychiatric/Behavioral: Negative for depression. The patient is not nervous/anxious.           Current Outpatient Prescriptions:   •  amLODIPine (NORVASC) 10 MG tablet, Take 10 mg by mouth Daily., Disp: , Rfl:   •  ARIPiprazole (ABILIFY) 10 MG tablet, Take 10 mg by mouth Daily., Disp: , Rfl:   •  aspirin 81 MG EC tablet, Take 81 mg by mouth Daily., Disp: , Rfl:   •   atorvastatin (LIPITOR) 40 MG tablet, Take 40 mg by mouth Every Night., Disp: , Rfl:   •  bumetanide (BUMEX) 1 MG tablet, Take 1 tablet by mouth Daily., Disp: 90 tablet, Rfl: 3  •  carvedilol (COREG) 12.5 MG tablet, Take 12.5 mg by mouth 2 (two) times a day with meals., Disp: , Rfl:   •  CLINDAMYCIN HCL PO, Take  by mouth Daily., Disp: , Rfl:   •  clopidogrel (PLAVIX) 75 MG tablet, Take 1 tablet by mouth daily., Disp: 90 tablet, Rfl: 3  •  isosorbide mononitrate (IMDUR) 30 MG 24 hr tablet, Take 30 mg by mouth Daily., Disp: , Rfl:   •  lamoTRIgine (LaMICtal) 25 MG tablet, Take 25 mg by mouth 2 (Two) Times a Day., Disp: , Rfl:   •  levothyroxine (SYNTHROID, LEVOTHROID) 100 MCG tablet, Take 100 mcg by mouth daily., Disp: , Rfl:   •  omeprazole (PriLOSEC) 20 MG capsule, Take 20 mg by mouth 2 (two) times a day., Disp: , Rfl:   •  potassium chloride (K-DUR) 10 MEQ CR tablet, Take 10 mEq by mouth daily., Disp: , Rfl:   •  pregabalin (LYRICA) 200 MG capsule, Take 100 mg by mouth 2 (Two) Times a Day., Disp: , Rfl:   •  Umeclidinium Bromide (INCRUSE ELLIPTA IN), Inhale Take As Directed., Disp: , Rfl:   •  vilazodone (VIIBRYD) 40 MG tablet tablet, Take 40 mg by mouth Daily., Disp: , Rfl:     Past Medical History:   Diagnosis Date   • Anxiety    • Arthritis    • Bipolar disorder    • COPD (chronic obstructive pulmonary disease)    • Coronary artery disease    • Diastolic dysfunction     grade II per echocardiogram 11/18/2016   • Difficulty walking    • Disease of thyroid gland     hypothyroidism   • Dislocation of hip joint     recurrent dislocation right hip   • DVT (deep venous thrombosis)    • GERD without esophagitis    • Heartburn    • Hematoma    • Hyperlipidemia    • Hypertension    • LVH (left ventricular hypertrophy)     mild to moderate per echocardiogram 11/18/2016   • Major depressive disorder    • Mitral annular calcification     severe per echocardiogram 11/18/2016   • Mitral regurgitation     mild per echo  11/18/2016   • Mitral valve disease    • Muscle weakness    • Narcolepsy    • Overactive bladder    • Pneumonia    • Pulmonary emboli    • Pulmonary hypertension     mild per echo 11/18/2016   • Sleep apnea     obstructive   • Tricuspid regurgitation     mild to moderate per echo 11/18/2016     Past Surgical History:   Procedure Laterality Date   • CATARACT EXTRACTION W/ INTRAOCULAR LENS IMPLANT Right    • CORONARY ANGIOPLASTY WITH STENT PLACEMENT      x2 stents   • EYE SURGERY      cataract surgery bilateral   • HARDWARE REMOVAL FOOT / ANKLE Left 12/01/2011   • HIP SPACER INSERTION WITH ANTIBIOTIC CEMENT Right 2/13/2017    Procedure: RIGHT TOTAL HIP REMOVAL;  Surgeon: Erlin Martin MD;  Location: [facility];  Service:    • INCISION AND DRAINAGE HIP Right 2/8/2017    Procedure: RT HIP INCISION AND DRAINAGE;  Surgeon: Erlin Martin MD;  Location: [facility];  Service:    • INCISION AND DRAINAGE HIP Right 3/17/2017    Procedure: RIGHT HIP INCISION AND DRAINAGE;  Surgeon: Erlin Martin MD;  Location: [facility];  Service:    • JOINT REPLACEMENT     • KNEE ARTHROPLASTY Left 03/2010    TWICE   • ORIF ANKLE FRACTURE Left 06/16/2011   • OTHER SURGICAL HISTORY      IVC filter placement   • MT CLOSED RX TRAUMATIC HIP DISLOCATN Right 8/2/2017    Procedure: FAILED CLOSED RIGHT HIP  REDUCTION WITH OPEN REDUCTION OF RIGHT HIP;  Surgeon: Erlin Martin MD;  Location: [facility];  Service: Orthopedics   • QUADRICEPS TENDON REPAIR Left 06/21/2010    also done 9-    • TOTAL HIP ARTHROPLASTY Right 2011   • TOTAL HIP ARTHROPLASTY REVISION Right 11/22/2016   • TOTAL HIP ARTHROPLASTY REVISION Right 7/26/2017    Procedure: REIMPLANT RIGHT TOTAL HIP;  Surgeon: Erlin Martin MD;  Location: [facility];  Service:    • TOTAL KNEE ARTHROPLASTY REVISION Left 09/2010     Family History   Problem Relation Age of Onset   • Malig Hyperthermia Neg Hx      Social History   Substance Use Topics   • Smoking  "status: Current Every Day Smoker     Packs/day: 1.00     Years: 19.00     Types: Cigarettes     Last attempt to quit: 11/21/2016   • Smokeless tobacco: Never Used   • Alcohol use No           ECG 12 Lead  Date/Time: 4/27/2018 6:45 PM  Performed by: ALIVIA VERGARA  Authorized by: ALIVIA VERGARA   Comparison: compared with previous ECG   Similar to previous ECG  Rhythm: sinus rhythm               Objective:         Visit Vitals  /78   Pulse 59   Resp 20   Ht 165.1 cm (65\")   Wt 105 kg (232 lb)   BMI 38.61 kg/m²        Physical Exam   Constitutional: She is oriented to person, place, and time. She appears well-developed and well-nourished.   HENT:   Head: Normocephalic and atraumatic.   Eyes: Conjunctivae, EOM and lids are normal. Pupils are equal, round, and reactive to light.   Neck: Normal range of motion and full passive range of motion without pain. Neck supple. No JVD present. Carotid bruit is not present.   Cardiovascular: Normal rate, regular rhythm, S1 normal and S2 normal.  Exam reveals no S3 and no S4.    No murmur heard.  Pulses:       Radial pulses are 2+ on the right side, and 2+ on the left side.   No bilateral lower extremity edema   Pulmonary/Chest: Effort normal and breath sounds normal. She has no rales.   Abdominal: Soft. Normal appearance. She exhibits no ascites. There is no hepatomegaly.   Lymphadenopathy:     She has no cervical adenopathy.   Neurological: She is alert and oriented to person, place, and time.   Skin: Skin is warm, dry and intact.   Psychiatric: She has a normal mood and affect.       Lab Review:       Assessment:          Diagnosis Plan   1. Dyspnea on exertion  Adult Transthoracic Echo Complete W/ Cont if Necessary Per Protocol    Duplex Venous Lower Extremity - Bilateral CAR   2. Coronary artery disease involving native coronary artery of native heart without angina pectoris     3. Chronic diastolic congestive heart failure     4. Essential hypertension     5. " Hyperlipidemia, unspecified hyperlipidemia type     6. Obstructive sleep apnea     7. Chronic obstructive pulmonary disease, unspecified COPD type     8. Deep vein thrombosis (DVT) of lower extremity, unspecified chronicity, unspecified laterality, unspecified vein     9. Lower extremity edema  Adult Transthoracic Echo Complete W/ Cont if Necessary Per Protocol    Duplex Venous Lower Extremity - Bilateral CAR          Plan:       1.  Acute on chronic diastolic congestive heart failure.  We'll assume this is due to diastolic congestive heart failure until we can get a further assessment with a repeat echocardiogram.  I've asked the patient to increase her bumetanide to 1 mg twice a day in addition to increasing her potassium chloride dosage.  2.  Chest pain.  The patient reports that the pain is similar to what she had before her stent placement.  She's only had a few episodes in the last 3-4 months.  We'll monitor symptoms for the time being while we work on her volume overload.  If her symptoms continue or if she has any decline in her LV function will need to consider with further ischemic workup.  3.  History of DVT/pulmonary emboli.  It appears she was on chronic anticoagulation for this for several years.  It is unclear when and why it was discontinued.  Due to her lower extremity edema vomiting go ahead and get get her set up for venous Doppler ultrasounds although her symptoms do sound more typical of volume overload rather than due to DVT.  We will also try to review her history little further to determine if anticoagulation needs to be resumed.  4.  Coronary artery disease.  With a history of prior stent placement.  Continue current medical management.  Otherwise plan is #2.  5.  Hypertension.  Well controlled.  6.  COPD  7.  Hyperlipidemia.  On atorvastatin.  8.  Obstructive sleep apnea on CPAP    We'll call and discuss the results of the echocardiogram.  I have asked the patient to call next week to  update us on her symptoms.  We'll determine further workup, management, and follow-up based on that.    Coronary Artery Disease  Plan  • Lifestyle modifications discussed include adhering to a heart healthy diet, avoidance of tobacco products, maintenance of a healthy weight, medication compliance, regular exercise and regular monitoring of cholesterol and blood pressure    Subjective - Objective  • There has been a previous stent procedure using MICHAEL  • Current antiplatelet therapy includes aspirin 81 mg and clopidogrel 75 mg      Heart Failure  Assessment  • NYHA class III-A - There is limitation of physical activity. The patient is comfortable at rest, but ordinary activity causes fatigue, palpitations or shortness of breath.  • ACE inhibitor not prescribed for medical reasons  • Beta blocker prescribed  • Diuretics prescribed  • Angiotensin receptor blocker (ARB) not prescribed for medical reasons  • Calcium channel blockers prescribed  • Left ventricular function is normal by qualitative assessment    Plan  • The heart failure care plan was discussed with the patient today including: up-titrating HF medications    Subjective/Objective  • Physical exam findings positive for peripheral edema.   • Physical exam findings negative for rales, elevated JVP, S3 gallop, S4 gallop, hepatomegaly and ascites.

## 2018-05-02 ENCOUNTER — TELEPHONE (OUTPATIENT)
Dept: CARDIOLOGY | Facility: CLINIC | Age: 68
End: 2018-05-02

## 2018-05-02 NOTE — TELEPHONE ENCOUNTER
Pt called stating edema in ankles have went down. Pt requesting to know what to do next? Please advise or call pt at your earliest convenience. Thanks, Donal

## 2018-05-03 NOTE — TELEPHONE ENCOUNTER
Called and spoke with patient.  She did not increase the bumex and potassium like I asked her to last week.  Despite this she reports some improvement in her dyspnea and swelling although she still complains of it.  I told her to go ahead and increase medications as previously discussed.  Instructed to keep her follow up appointment with Dr. Oliveros on 5/31 so he can follow up on symptoms and the results of her echo and lower extremity doppler.

## 2018-05-11 ENCOUNTER — TELEPHONE (OUTPATIENT)
Dept: CARDIOLOGY | Facility: CLINIC | Age: 68
End: 2018-05-11

## 2018-05-11 NOTE — TELEPHONE ENCOUNTER
Pt called requesting results for duplex venous LE Bilateral and Echo. Please call pt at your earliest convenience. Thanks, Donal

## 2018-05-14 NOTE — TELEPHONE ENCOUNTER
Her echocardiogram did look good.  She had normal pumping function of her heart and she did not have evidence of heart failure on the testing from last week.    She needs to keep her follow up with Dr. Oliveros.  He can go over the results with her in more detail.

## 2018-05-14 NOTE — TELEPHONE ENCOUNTER
Called pt to inform to keep f/u with Dr. Oliveros  And no signs of CHF on last week test. Pt understood information. Thanks, Donal

## 2018-05-14 NOTE — TELEPHONE ENCOUNTER
Pt is scheduled to see Dr. Oliveros on 5/17/18 and would like to know if she needs to keep this.    She is concerned because she said that she spoke with you in regards to her results and you told her everything looked good but was also told in the past that she has CHF.

## 2018-05-17 ENCOUNTER — OFFICE VISIT (OUTPATIENT)
Dept: CARDIOLOGY | Facility: CLINIC | Age: 68
End: 2018-05-17

## 2018-05-17 VITALS — HEART RATE: 65 BPM | DIASTOLIC BLOOD PRESSURE: 98 MMHG | SYSTOLIC BLOOD PRESSURE: 188 MMHG | HEIGHT: 66 IN

## 2018-05-17 DIAGNOSIS — I10 ESSENTIAL HYPERTENSION: ICD-10-CM

## 2018-05-17 DIAGNOSIS — I25.10 CORONARY ARTERY DISEASE INVOLVING NATIVE CORONARY ARTERY OF NATIVE HEART WITHOUT ANGINA PECTORIS: ICD-10-CM

## 2018-05-17 DIAGNOSIS — I50.32 CHRONIC DIASTOLIC CONGESTIVE HEART FAILURE (HCC): Primary | ICD-10-CM

## 2018-05-17 PROCEDURE — 93000 ELECTROCARDIOGRAM COMPLETE: CPT | Performed by: INTERNAL MEDICINE

## 2018-05-17 PROCEDURE — 99213 OFFICE O/P EST LOW 20 MIN: CPT | Performed by: INTERNAL MEDICINE

## 2018-05-17 RX ORDER — PANTOPRAZOLE SODIUM 40 MG/1
40 TABLET, DELAYED RELEASE ORAL DAILY
COMMUNITY

## 2018-05-17 NOTE — PROGRESS NOTES
Subjective:     Encounter Date:05/17/2018      Patient ID: Frances Downs is a 67 y.o. female.    Chief Complaint: HTN, chronic diastolic CHF, CAD    History of Present Illness    Dear Dr. Echeverria,     I had the pleasure of seeing your patient Frances Downs in cardiac followup today.  As you well know, she is a rob 67-year-old woman with history of prior DVT and pulmonary embolism.  She was on warfarin in the past but this has since been discontinued.  She has coronary artery disease status post stenting by Dr. Baer in 2011.      She had a negative stress test in 2016.  She has had some serious problems with her hip.  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  After one year in the nursing home, she has finally gotten back to independent living.      She saw my partner, Dr. Barker, last month due to swelling.  The patient underwent an echocardiography which demonstrated evidence for diastolic dysfunction with normal ejection fraction and some mild-to-moderate regurgitant lesions.  She had lower extremity Doppler studies which were negative for DVT.      Since I have last seen her, she continues to smoke a little bit.  She is not very good about following a good diet.  She does live independently.  She has some chronic lower extremity edema to the shins.  She also has some mild dyspnea.          Review of Systems   Cardiovascular: Positive for orthopnea.   Respiratory: Positive for shortness of breath.    All other systems reviewed and are negative.        ECG 12 Lead  Date/Time: 5/17/2018 11:45 AM  Performed by: ASHLEY UMANA  Authorized by: ASHLEY UMANA   Comparison: compared with previous ECG   Similar to previous ECG  Rhythm: sinus rhythm  BPM: 65  Other findings: LVH               Objective:     Physical Exam   Constitutional: She is oriented to person, place, and time. She appears well-developed and well-nourished.   HENT:   Head: Normocephalic and atraumatic.   Neck: Normal range  of motion. Neck supple. No JVD present.   Cardiovascular: Normal rate and regular rhythm.  Exam reveals no S3 and no S4.    Murmur heard.   Harsh systolic murmur is present with a grade of 2/6   Mild edema to shins   Pulmonary/Chest: Effort normal and breath sounds normal. She has no rales.   Abdominal: Soft. Bowel sounds are normal. She exhibits no ascites. There is no hepatomegaly.   Musculoskeletal: Normal range of motion.   Neurological: She is alert and oriented to person, place, and time.   Skin: Skin is warm and dry.   Psychiatric: She has a normal mood and affect. Her behavior is normal. Thought content normal.   Vitals reviewed.      Lab Review:       Assessment:          Diagnosis Plan   1. Chronic diastolic congestive heart failure     2. Coronary artery disease involving native coronary artery of native heart without angina pectoris     3. Essential hypertension            Plan:       It was a pleasure to see your patient in cardiac followup today.  She is a rob 67-year-old woman with obesity and severe orthopedic problems.  She is now back to independent living.  She has mild lower extremity edema to the shins.  She is on Bumex therapy which seems to be working reasonably well.  She has evidence for diastolic heart failure with class II congestive symptoms currently.  We talked mostly about dietary management and smoking cessation to help improve her symptoms.  She will see me again in six months or sooner if symptoms warrant.      Coronary Artery Disease  Assessment  • The patient has no angina    Plan  • Lifestyle modifications discussed include adhering to a heart healthy diet, avoidance of tobacco products, maintenance of a healthy weight, medication compliance, regular exercise and regular monitoring of cholesterol and blood pressure    Subjective - Objective  • There has been a previous stent procedure using MICHAEL  • Current antiplatelet therapy includes aspirin 81 mg and clopidogrel 75      Heart Failure  Assessment  • NYHA class II - There is slight limitation of physical activity. The patient is comfortable at rest, but physical activity results in fatigue, palpitations or shortness of breath.  • Beta blocker prescribed  • Diuretics prescribed  • Left ventricular function is hyperdynamic by qualitative assessment    Plan  • The patient has received heart failure education on the following topics: prognosis/end-of-life issues, dietary sodium restriction, medication instructions, smoking cessation, minimizing or avoiding NSAID use, symptom management, physical activity, weight monitoring, minimizing alcohol intake and referral for visiting nurse, heart failure education program, or management program  • The heart failure care plan was discussed with the patient today including: continuing the current program    Subjective/Objective  • The patient reports dyspnea and orthopnea  • Physical exam findings positive for peripheral edema.   • Physical exam findings negative for rales, elevated JVP, S3 gallop, S4 gallop, hepatomegaly and ascites.

## 2018-05-21 ENCOUNTER — TELEPHONE (OUTPATIENT)
Dept: CARDIOLOGY | Facility: CLINIC | Age: 68
End: 2018-05-21

## 2018-05-21 NOTE — TELEPHONE ENCOUNTER
Pt called stating she misread medication directions for Bumex and Potassium. States she was to take Bumex 1 tablet BID and Potassium 1 tablet BID. Pt states she was taking Bumex 2 tablets Bid and Potassium 2 tablets BID which helped with Edema in legs. Pt states she started taking prescriptions as directed and edema is visible again in legs. Per Domo Duffy pt is to take 1.5 Tablets of Potassium and Bumex BID. Patient informed.Thanks, Donal

## 2018-06-07 DIAGNOSIS — R60.0 LOWER EXTREMITY EDEMA: ICD-10-CM

## 2018-06-07 DIAGNOSIS — R06.09 DYSPNEA ON EXERTION: ICD-10-CM

## 2018-06-07 DIAGNOSIS — I50.32 CHRONIC DIASTOLIC CONGESTIVE HEART FAILURE (HCC): ICD-10-CM

## 2018-06-07 RX ORDER — BUMETANIDE 1 MG/1
TABLET ORAL
Qty: 120 TABLET | Refills: 5 | Status: SHIPPED | OUTPATIENT
Start: 2018-06-07 | End: 2019-03-27 | Stop reason: SDUPTHER

## 2019-02-21 ENCOUNTER — OFFICE VISIT (OUTPATIENT)
Dept: CARDIOLOGY | Facility: CLINIC | Age: 69
End: 2019-02-21

## 2019-02-21 VITALS
WEIGHT: 221 LBS | HEIGHT: 65 IN | SYSTOLIC BLOOD PRESSURE: 192 MMHG | HEART RATE: 57 BPM | DIASTOLIC BLOOD PRESSURE: 106 MMHG | BODY MASS INDEX: 36.82 KG/M2

## 2019-02-21 DIAGNOSIS — I10 ESSENTIAL HYPERTENSION: ICD-10-CM

## 2019-02-21 DIAGNOSIS — I25.10 CORONARY ARTERY DISEASE INVOLVING NATIVE CORONARY ARTERY OF NATIVE HEART WITHOUT ANGINA PECTORIS: Primary | ICD-10-CM

## 2019-02-21 PROCEDURE — 93000 ELECTROCARDIOGRAM COMPLETE: CPT | Performed by: INTERNAL MEDICINE

## 2019-02-21 PROCEDURE — 99213 OFFICE O/P EST LOW 20 MIN: CPT | Performed by: INTERNAL MEDICINE

## 2019-02-21 RX ORDER — ESCITALOPRAM OXALATE 20 MG/1
20 TABLET ORAL DAILY
COMMUNITY
End: 2020-01-03

## 2019-02-21 RX ORDER — VARENICLINE TARTRATE 1 MG/1
1 TABLET, FILM COATED ORAL 2 TIMES DAILY
COMMUNITY
End: 2020-01-03

## 2019-02-21 NOTE — PROGRESS NOTES
Subjective:     Encounter Date:02/21/2019      Patient ID: Frances Downs is a 68 y.o. female.    Chief Complaint: CAD, HTN    History of Present Illness    Dear Dr. Echeverria,    I had the pleasure of seeing your patient in cardiac followup today. As you well know, she is a rob 68-year-old woman with history of prior DVT and pulmonary embolism. She was treated with anticoagulation in the past but this has since been discontinued. She had stenting in 2011.     She comes in now for followup. Since I have last seen her she has started Chantix. She considers herself a nonsmoker. She is on her 6th week.     She says that she is having trouble tolerating the medicine. She says that it makes her depressed and agitated. She seems visibly upset today and her blood pressure is elevated. She denies any angina. She is very pleased with the effect that the medicine is having on her smoking cravings.         Review of Systems   All other systems reviewed and are negative.        ECG 12 Lead  Date/Time: 2/21/2019 10:08 AM  Performed by: Andres Oliveros MD  Authorized by: Andres Oliveros MD   Comparison: compared with previous ECG   Similar to previous ECG  Rhythm: sinus rhythm  BPM: 57  Other findings: non-specific ST-T wave changes               Objective:     Physical Exam   Constitutional: She is oriented to person, place, and time. She appears well-developed and well-nourished.   HENT:   Head: Normocephalic and atraumatic.   Neck: Normal range of motion. Neck supple.   Cardiovascular: Normal rate, regular rhythm and normal heart sounds.   Pulmonary/Chest: Effort normal and breath sounds normal.   Abdominal: Soft. Bowel sounds are normal.   Musculoskeletal: Normal range of motion.   Neurological: She is alert and oriented to person, place, and time.   Skin: Skin is warm and dry.   Psychiatric: She has a normal mood and affect. Her behavior is normal. Thought content normal.   Vitals reviewed.      Lab Review:        Assessment:          Diagnosis Plan   1. Coronary artery disease involving native coronary artery of native heart without angina pectoris     2. Essential hypertension            Plan:       It was a pleasure to see your patient in cardiac followup today. Overall she is doing well from the cardiac standpoint without complaints of angina. I am concerned about her hypertension today, but I think it is clearly related to her mental state. She attributes this to the effects of Chantix. She is strongly considering stopping this medicine. I asked her to check with you regarding this.     At this time I have recommended that she follow her blood pressure closely and that she try relaxation or exercise programs to see if she can get this down. She will see me again in 3 months or sooner if symptoms warrant.         Coronary Artery Disease  Assessment  • The patient has no angina    Plan  • Lifestyle modifications discussed include adhering to a heart healthy diet, avoidance of tobacco products, maintenance of a healthy weight, medication compliance, regular exercise and regular monitoring of cholesterol and blood pressure    Subjective - Objective  • There has been a previous stent procedure using MICHAEL  • Current antiplatelet therapy includes aspirin 81 mg and clopidogrel 75 mg

## 2019-03-05 ENCOUNTER — TELEPHONE (OUTPATIENT)
Dept: CARDIOLOGY | Facility: CLINIC | Age: 69
End: 2019-03-05

## 2019-03-05 NOTE — TELEPHONE ENCOUNTER
03/05/19  8:39 AM  Frances Downs  1950    Home Phone [phone]       Frances Downs is a patient of DR Oliveros.  Patient is calling in this morning with c/o her feet and half way  Up her leg is swollen.  She has noticed it going on for the last 3 days.  She is not SOA, NO cough, NO wheezing, NO palpitations.  Reports she recently quit smoking.      She does not weigh every day, but I instructed her to weigh daily and call for wt gain of 2lb over 24 hours or 5lbs in 1 week.  She verbalized understanding.    Upon review of her meds  Bumex 1mg daily ( she has a PRN order to take 2 tabs BID for swelling- I instructed her to take the extra dose)  plavix  Amlodipine 10mg daily  lipitor  Carvedilol 12.5mg BID  Isosorbide mononitrate 30mg daily    Instructed to weigh daily.  instructed to take extra bumex as ordered and call back if edema doesn't resolve.  instructed to elevate lower extremities.    Dr Oliveros is there anything else you would like to add?    Bernadette Ceja RN  Triage nurse

## 2019-03-07 NOTE — TELEPHONE ENCOUNTER
Pt called back stating the swelling has not gotten any better. She states she does not own a scale and is unable to weigh daily. She is taking Bumex 2 mg bid and has not helped..    Please advise  Pt can be reached [phone]

## 2019-03-08 NOTE — TELEPHONE ENCOUNTER
Spoke to patient. She is scheduled for 3/15/19 @ 1:00 pm. She lives in [place], but will try to make it here to [place] for this appointment. Thanks!

## 2019-03-27 RX ORDER — BUMETANIDE 1 MG/1
TABLET ORAL
Qty: 180 TABLET | Refills: 1 | Status: SHIPPED | OUTPATIENT
Start: 2019-03-27 | End: 2019-04-19 | Stop reason: SDUPTHER

## 2019-04-19 RX ORDER — BUMETANIDE 1 MG/1
1 TABLET ORAL DAILY
Qty: 90 TABLET | Refills: 3 | Status: SHIPPED | OUTPATIENT
Start: 2019-04-19 | End: 2019-04-19 | Stop reason: DRUGHIGH

## 2019-04-19 RX ORDER — BUMETANIDE 2 MG/1
2 TABLET ORAL 2 TIMES DAILY
Qty: 180 TABLET | Refills: 0 | Status: SHIPPED | OUTPATIENT
Start: 2019-04-19 | End: 2019-07-13 | Stop reason: SDUPTHER

## 2019-05-02 ENCOUNTER — OFFICE VISIT (OUTPATIENT)
Dept: CARDIOLOGY | Facility: CLINIC | Age: 69
End: 2019-05-02

## 2019-05-02 VITALS
DIASTOLIC BLOOD PRESSURE: 88 MMHG | BODY MASS INDEX: 35.84 KG/M2 | WEIGHT: 223 LBS | SYSTOLIC BLOOD PRESSURE: 124 MMHG | HEART RATE: 65 BPM | HEIGHT: 66 IN

## 2019-05-02 DIAGNOSIS — I25.10 CORONARY ARTERY DISEASE INVOLVING NATIVE CORONARY ARTERY OF NATIVE HEART WITHOUT ANGINA PECTORIS: Primary | ICD-10-CM

## 2019-05-02 DIAGNOSIS — I82.409 DEEP VEIN THROMBOSIS (DVT) OF LOWER EXTREMITY, UNSPECIFIED CHRONICITY, UNSPECIFIED LATERALITY, UNSPECIFIED VEIN (HCC): ICD-10-CM

## 2019-05-02 PROCEDURE — 99213 OFFICE O/P EST LOW 20 MIN: CPT | Performed by: INTERNAL MEDICINE

## 2019-05-02 PROCEDURE — 93000 ELECTROCARDIOGRAM COMPLETE: CPT | Performed by: INTERNAL MEDICINE

## 2019-05-02 NOTE — PROGRESS NOTES
Subjective:     Encounter Date:05/02/2019      Patient ID: Frances Downs is a 68 y.o. female.    Chief Complaint: CAD, DVT, edema    History of Present Illness    Dear Dr. Echeverria,     I had the pleasure of seeing your patient in cardiac followup today. As you well know she is a 68-year-old woman with history of pulmonary embolism and DVT. She was treated with anticoagulation but this has since been discontinued.     She has a history of coronary artery disease which is stable and asymptomatic.     She was on Chantix recently and was able to quit smoking; she is now a nonsmoker. She has been able to get off the medicine and her side effects resolved.     She has been gaining weight. She says that she is more dyspneic which she attributes to her weight.        Review of Systems   All other systems reviewed and are negative.        ECG 12 Lead  Date/Time: 5/2/2019 2:32 PM  Performed by: Andres Oliveros MD  Authorized by: Andres Oliveros MD   Comparison: compared with previous ECG   Similar to previous ECG  Rhythm: sinus rhythm  BPM: 65    Clinical impression: normal ECG               Objective:     Physical Exam   Constitutional: She is oriented to person, place, and time. She appears well-developed and well-nourished.   HENT:   Head: Normocephalic and atraumatic.   Neck: Normal range of motion. Neck supple.   Cardiovascular: Normal rate, regular rhythm and normal heart sounds.   Pulmonary/Chest: Effort normal and breath sounds normal.   Abdominal: Soft. Bowel sounds are normal.   Musculoskeletal: Normal range of motion.   Neurological: She is alert and oriented to person, place, and time.   Skin: Skin is warm and dry.   Psychiatric: She has a normal mood and affect. Her behavior is normal. Thought content normal.   Vitals reviewed.      Lab Review:       Assessment:          Diagnosis Plan   1. Coronary artery disease involving native coronary artery of native heart without angina pectoris     2. Deep vein  thrombosis (DVT) of lower extremity, unspecified chronicity, unspecified laterality, unspecified vein (CMS/[facility])            Plan:       It was a pleasure to see your patient in cardiac followup today. She is doing quite well from the cardiac standpoint without any complaints. She had a little bit of edema for which she was treated with increased diuretic. This has since resolved.     She will see us again in 6 months or sooner if symptoms warrant.        Coronary Artery Disease  Assessment  • The patient has no angina    Plan  • Lifestyle modifications discussed include adhering to a heart healthy diet, avoidance of tobacco products, maintenance of a healthy weight, medication compliance, regular exercise and regular monitoring of cholesterol and blood pressure    Subjective - Objective  • There has been a previous stent procedure using MICHAEL  • Current antiplatelet therapy includes aspirin 81 mg and clopidogrel 75 mg

## 2019-07-15 RX ORDER — BUMETANIDE 2 MG/1
TABLET ORAL
Qty: 180 TABLET | Refills: 0 | Status: SHIPPED | OUTPATIENT
Start: 2019-07-15 | End: 2019-09-11 | Stop reason: SDUPTHER

## 2019-07-16 RX ORDER — BUMETANIDE 2 MG/1
TABLET ORAL
Qty: 130 TABLET | Refills: 0 | OUTPATIENT
Start: 2019-07-16

## 2019-09-12 RX ORDER — BUMETANIDE 2 MG/1
TABLET ORAL
Qty: 180 TABLET | Refills: 0 | Status: SHIPPED | OUTPATIENT
Start: 2019-09-12 | End: 2019-11-13 | Stop reason: SDUPTHER

## 2019-11-13 RX ORDER — BUMETANIDE 2 MG/1
2 TABLET ORAL 2 TIMES DAILY
Qty: 180 TABLET | Refills: 1 | Status: SHIPPED | OUTPATIENT
Start: 2019-11-13 | End: 2019-12-20 | Stop reason: SDUPTHER

## 2019-12-19 ENCOUNTER — TELEPHONE (OUTPATIENT)
Dept: CARDIOLOGY | Facility: CLINIC | Age: 69
End: 2019-12-19

## 2019-12-19 PROBLEM — J98.4 CHRONIC LUNG DISEASE: Status: ACTIVE | Noted: 2017-03-21

## 2019-12-19 PROBLEM — H93.19 RINGING IN EARS: Status: ACTIVE | Noted: 2019-12-19

## 2019-12-19 PROBLEM — N32.9 BLADDER DISORDER: Status: ACTIVE | Noted: 2019-12-19

## 2019-12-19 PROBLEM — F32.A DEPRESSION: Status: ACTIVE | Noted: 2017-02-10

## 2019-12-19 PROBLEM — M54.9 BACK PAIN: Status: ACTIVE | Noted: 2019-12-19

## 2019-12-19 PROBLEM — F41.9 ANXIETY: Status: ACTIVE | Noted: 2019-12-19

## 2019-12-19 PROBLEM — M25.369 UNSTABLE KNEE: Status: ACTIVE | Noted: 2019-12-19

## 2019-12-19 PROBLEM — K21.9 GERD (GASTROESOPHAGEAL REFLUX DISEASE): Status: ACTIVE | Noted: 2019-12-19

## 2019-12-19 PROBLEM — E03.9 HYPOTHYROIDISM: Status: ACTIVE | Noted: 2019-12-19

## 2019-12-19 PROBLEM — I26.99 PULMONARY EMBOLISM (HCC): Status: ACTIVE | Noted: 2019-12-19

## 2019-12-19 RX ORDER — ATORVASTATIN CALCIUM 40 MG/1
TABLET, FILM COATED ORAL
COMMUNITY
End: 2019-12-20 | Stop reason: ALTCHOICE

## 2019-12-19 NOTE — TELEPHONE ENCOUNTER
I think it would be best if I saw her in the office since I have never seen her before (she is a former  patient).  As of now she is scheduled to see me in 2 weeks.  If she can not wait that long we may need to see if we can see her sooner.

## 2019-12-19 NOTE — TELEPHONE ENCOUNTER
"12/19/19  2:31 PM  Frances Downs  1950  Home Phone [phone]   Home Phone [phone]       Frances Downs is a patient of Dr Barker. Patient is calling in to let you know she is soa and has some edema to her ankles to calf.  She stated she is coughing and wheezing \" a little bit\"  She doesn't weight every day.    She added that she has been down and depressed and had not showered for over a week and today she had a home health aide come and help her get bathed and that is when she noticed her ankle edema.  She also was soa with exertion.    She is taking   bumex 2mg BID    Ortiz she need a med adjustment?  Bernadette Ceja RN  Triage nurse    "

## 2019-12-19 NOTE — TELEPHONE ENCOUNTER
Called patient and she can't wait, you had openings tomorrow, so I added her to your schedule.    Melina notified  Thanks  Bernadette Ceja RN  Triage nurse

## 2019-12-20 ENCOUNTER — OFFICE VISIT (OUTPATIENT)
Dept: CARDIOLOGY | Facility: CLINIC | Age: 69
End: 2019-12-20

## 2019-12-20 VITALS
WEIGHT: 226 LBS | SYSTOLIC BLOOD PRESSURE: 162 MMHG | DIASTOLIC BLOOD PRESSURE: 88 MMHG | BODY MASS INDEX: 37.65 KG/M2 | HEIGHT: 65 IN | HEART RATE: 64 BPM

## 2019-12-20 DIAGNOSIS — I50.33 ACUTE ON CHRONIC DIASTOLIC CONGESTIVE HEART FAILURE (HCC): ICD-10-CM

## 2019-12-20 DIAGNOSIS — J98.4 CHRONIC LUNG DISEASE: ICD-10-CM

## 2019-12-20 DIAGNOSIS — I10 ESSENTIAL HYPERTENSION: ICD-10-CM

## 2019-12-20 DIAGNOSIS — I26.99 OTHER PULMONARY EMBOLISM WITHOUT ACUTE COR PULMONALE, UNSPECIFIED CHRONICITY (HCC): ICD-10-CM

## 2019-12-20 DIAGNOSIS — K21.9 GASTROESOPHAGEAL REFLUX DISEASE, ESOPHAGITIS PRESENCE NOT SPECIFIED: ICD-10-CM

## 2019-12-20 DIAGNOSIS — Z95.5 PRESENCE OF STENT IN RIGHT CORONARY ARTERY: ICD-10-CM

## 2019-12-20 DIAGNOSIS — G47.33 OBSTRUCTIVE SLEEP APNEA SYNDROME: ICD-10-CM

## 2019-12-20 DIAGNOSIS — E03.9 HYPOTHYROIDISM, UNSPECIFIED TYPE: ICD-10-CM

## 2019-12-20 DIAGNOSIS — E78.5 HYPERLIPIDEMIA, UNSPECIFIED HYPERLIPIDEMIA TYPE: ICD-10-CM

## 2019-12-20 DIAGNOSIS — I82.409 DEEP VEIN THROMBOSIS (DVT) OF LOWER EXTREMITY, UNSPECIFIED CHRONICITY, UNSPECIFIED LATERALITY, UNSPECIFIED VEIN (HCC): ICD-10-CM

## 2019-12-20 DIAGNOSIS — I25.10 CORONARY ARTERY DISEASE INVOLVING NATIVE CORONARY ARTERY OF NATIVE HEART WITHOUT ANGINA PECTORIS: Primary | ICD-10-CM

## 2019-12-20 DIAGNOSIS — E66.9 OBESITY (BMI 30-39.9): ICD-10-CM

## 2019-12-20 PROCEDURE — 99214 OFFICE O/P EST MOD 30 MIN: CPT | Performed by: INTERNAL MEDICINE

## 2019-12-20 PROCEDURE — 93000 ELECTROCARDIOGRAM COMPLETE: CPT | Performed by: INTERNAL MEDICINE

## 2019-12-20 RX ORDER — POTASSIUM CHLORIDE 750 MG/1
TABLET, EXTENDED RELEASE ORAL
COMMUNITY
End: 2020-01-03 | Stop reason: SDUPTHER

## 2019-12-20 RX ORDER — BUMETANIDE 2 MG/1
2 TABLET ORAL
Qty: 90 TABLET | Refills: 3 | Status: SHIPPED | OUTPATIENT
Start: 2019-12-20 | End: 2020-03-02 | Stop reason: SDUPTHER

## 2019-12-20 NOTE — PROGRESS NOTES
Subjective:     Encounter Date:12/20/2019      Patient ID: Frances Downs is a 69 y.o. female.    Chief Complaint:  History of Present Illness    This is a 69-year-old with a history of coronary artery disease status post prior coronary artery stent placement by Dr. Baer in 2011, history of DVT and pulmonary embolus previously on anticoagulation with warfarin, hypertension, obstructive sleep apnea on CPAP, chronic diastolic congestive heart failure, who presents for an urgent follow-up of lower extremity edema and dyspnea on exertion.     The patient was previously followed by Dr. Oliveros.  Prior to that she had a history of coronary artery disease with right coronary artery stent placement by Dr. Baer in 2011.  Since following Dr. Oliveros her last stress test was in 11/2016 and was negative for ischemia.  Her last echocardiogram was in 5/2018 and showed normal left ventricular systolic function and wall motion with an EF of 65 to 70%, mild mitral regurgitation, normal diastolic function, and biatrial enlargement.  He was last seen by Dr. Oliveros in 5/2019 at which time she was doing well and no changes were made to her management.    Today she presents for urgent follow-up.  She was originally scheduled to see me in the office 2 weeks from now.  She called yesterday complaining about worsening lower extremity edema, dyspnea on exertion,.  She reports that she now has to sleep with 2 pillows propped up.  She reports compliance with her medications but admits to dietary indiscretions with sodium.  She reportedly has been quite depressed recently and actually had not bathed for a week until her caregiver came by yesterday and helps her bathe.  She denies any chest pain but reports some chest fullness and feeling like her bilateral arms are heavy.  She reports that the symptoms are different from what she felt when she had her myocardial infarction several years ago.  She denies any palpitations, near-syncope or  syncope.         Review of Systems   Constitution: Negative for malaise/fatigue.   HENT: Negative for hearing loss, hoarse voice, nosebleeds and sore throat.    Eyes: Negative for pain.   Cardiovascular: Positive for dyspnea on exertion, leg swelling and orthopnea. Negative for chest pain, claudication, cyanosis, irregular heartbeat, near-syncope, palpitations, paroxysmal nocturnal dyspnea and syncope.   Respiratory: Negative for shortness of breath and snoring.    Endocrine: Negative for cold intolerance, heat intolerance, polydipsia, polyphagia and polyuria.   Skin: Negative for itching and rash.   Musculoskeletal: Negative for arthritis, falls, joint pain, joint swelling, muscle cramps, muscle weakness and myalgias.        Arm heaviness   Gastrointestinal: Negative for constipation, diarrhea, dysphagia, heartburn, hematemesis, hematochezia, melena, nausea and vomiting.   Genitourinary: Negative for frequency, hematuria and hesitancy.   Neurological: Negative for excessive daytime sleepiness, dizziness, headaches, light-headedness, numbness and weakness.   Psychiatric/Behavioral: Negative for depression. The patient is not nervous/anxious.          Current Outpatient Medications:   •  albuterol sulfate HFA (PROAIR HFA) 108 (90 Base) MCG/ACT inhaler, ProAir HFA 90 mcg/actuation aerosol inhaler, Disp: , Rfl:   •  ALPRAZolam (XANAX) 0.25 MG tablet, Take 0.25 mg by mouth., Disp: , Rfl:   •  amLODIPine (NORVASC) 10 MG tablet, Take 10 mg by mouth Daily., Disp: , Rfl:   •  ARIPiprazole (ABILIFY) 10 MG tablet, Take 20 mg by mouth Daily., Disp: , Rfl:   •  aspirin 81 MG EC tablet, Take 325 mg by mouth Daily., Disp: , Rfl:   •  atorvastatin (LIPITOR) 40 MG tablet, Take 40 mg by mouth Every Night., Disp: , Rfl:   •  azithromycin (ZITHROMAX) 250 MG tablet, azithromycin 250 mg tablet, Disp: , Rfl:   •  bumetanide (BUMEX) 2 MG tablet, Take 1 tablet by mouth 2 (Two) Times a Day., Disp: 180 tablet, Rfl: 1  •  carvedilol (COREG)  12.5 MG tablet, Take 12.5 mg by mouth 2 (two) times a day with meals., Disp: , Rfl:   •  clonazePAM (KlonoPIN) 0.5 MG tablet, clonazepam 0.5 mg tablet, Disp: , Rfl:   •  clopidogrel (PLAVIX) 75 MG tablet, Take 1 tablet by mouth daily., Disp: 90 tablet, Rfl: 3  •  cyclobenzaprine (FLEXERIL) 10 MG tablet, cyclobenzaprine 10 mg tablet, Disp: , Rfl:   •  darifenacin (ENABLEX) 15 MG 24 hr tablet, darifenacin ER 15 mg tablet,extended release 24 hr, Disp: , Rfl:   •  DULoxetine (CYMBALTA) 60 MG capsule, Take 60 mg by mouth., Disp: , Rfl:   •  escitalopram (LEXAPRO) 20 MG tablet, Take 20 mg by mouth Daily., Disp: , Rfl:   •  fluticasone (FLONASE) 50 MCG/ACT nasal spray, fluticasone 50 mcg/actuation nasal spray,suspension, Disp: , Rfl:   •  fondaparinux (ARIXTRA) 2.5 MG/0.5ML solution injection, Inject 1 syringe sub-q daily, Disp: , Rfl:   •  ipratropium-albuterol (DUO-NEB) 0.5-2.5 mg/3 ml nebulizer, ipratropium-albuterol 0.5 mg-3 mg(2.5 mg base)/3 mL nebulization soln, Disp: , Rfl:   •  isosorbide mononitrate (IMDUR) 30 MG 24 hr tablet, Take 30 mg by mouth Daily., Disp: , Rfl:   •  lamoTRIgine (LaMICtal) 25 MG tablet, Take 25 mg by mouth 2 (Two) Times a Day., Disp: , Rfl:   •  levoFLOXacin (LEVAQUIN) 500 MG tablet, levofloxacin 500 mg tablet, Disp: , Rfl:   •  levothyroxine (SYNTHROID, LEVOTHROID) 100 MCG tablet, Take 100 mcg by mouth daily., Disp: , Rfl:   •  meloxicam (MOBIC) 15 MG tablet, meloxicam 15 mg tablet, Disp: , Rfl:   •  pantoprazole (PROTONIX) 40 MG EC tablet, Take 40 mg by mouth Daily., Disp: , Rfl:   •  potassium chloride (K-DUR) 10 MEQ CR tablet, Take 1 tablet by mouth 2 (Two) Times a Day With Meals., Disp: 60 tablet, Rfl: 1  •  potassium chloride (KLOR-CON) 10 MEQ CR tablet, Klor-Con M10 mEq tablet,extended release, Disp: , Rfl:   •  pregabalin (LYRICA) 200 MG capsule, Take 100 mg by mouth 2 (Two) Times a Day., Disp: , Rfl:   •  Umeclidinium Bromide (INCRUSE ELLIPTA IN), Inhale Take As Directed., Disp: ,  Rfl:   •  varenicline (CHANTIX) 1 MG tablet, Take 1 mg by mouth 2 (Two) Times a Day., Disp: , Rfl:     Past Medical History:   Diagnosis Date   • Anxiety    • Arthritis    • Bipolar disorder (CMS/HCC)    • COPD (chronic obstructive pulmonary disease) (CMS/HCC)    • Coronary artery disease    • Diastolic dysfunction     grade II per echocardiogram 11/18/2016   • Difficulty walking    • Disease of thyroid gland     hypothyroidism   • Dislocation of hip joint (CMS/HCC)     recurrent dislocation right hip   • DVT (deep venous thrombosis) (CMS/HCC)    • GERD without esophagitis    • Heartburn    • Hematoma    • Hyperlipidemia    • Hypertension    • LVH (left ventricular hypertrophy)     mild to moderate per echocardiogram 11/18/2016   • Major depressive disorder    • Mitral annular calcification     severe per echocardiogram 11/18/2016   • Mitral regurgitation     mild per echo 11/18/2016   • Mitral valve disease    • Muscle weakness    • Narcolepsy    • Overactive bladder    • Pneumonia    • Pulmonary emboli (CMS/HCC)    • Pulmonary hypertension (CMS/HCC)     mild per echo 11/18/2016   • Sleep apnea     obstructive   • Tricuspid regurgitation     mild to moderate per echo 11/18/2016       Past Surgical History:   Procedure Laterality Date   • CATARACT EXTRACTION W/ INTRAOCULAR LENS IMPLANT Right    • CORONARY ANGIOPLASTY WITH STENT PLACEMENT      x2 stents   • EYE SURGERY      cataract surgery bilateral   • HARDWARE REMOVAL FOOT / ANKLE Left 12/01/2011   • HIP SPACER INSERTION WITH ANTIBIOTIC CEMENT Right 2/13/2017    Procedure: RIGHT TOTAL HIP REMOVAL;  Surgeon: Erlin Martin MD;  Location: [facility];  Service:    • INCISION AND DRAINAGE HIP Right 2/8/2017    Procedure: RT HIP INCISION AND DRAINAGE;  Surgeon: Erlin Martin MD;  Location: [facility] OR;  Service:    • INCISION AND DRAINAGE HIP Right 3/17/2017    Procedure: RIGHT HIP INCISION AND DRAINAGE;  Surgeon: Erlin Martin MD;  Location: [facility]  "MAIN OR;  Service:    • JOINT REPLACEMENT     • KNEE ARTHROPLASTY Left 2010    TWICE   • ORIF ANKLE FRACTURE Left 2011   • OTHER SURGICAL HISTORY      IVC filter placement   • CT CLOSED RX TRAUMATIC HIP DISLOCATN Right 2017    Procedure: FAILED CLOSED RIGHT HIP  REDUCTION WITH OPEN REDUCTION OF RIGHT HIP;  Surgeon: Erlin Martin MD;  Location: [facility] OR;  Service: Orthopedics   • QUADRICEPS TENDON REPAIR Left 2010    also done 2010    • TOTAL HIP ARTHROPLASTY Right    • TOTAL HIP ARTHROPLASTY REVISION Right 2016   • TOTAL HIP ARTHROPLASTY REVISION Right 2017    Procedure: REIMPLANT RIGHT TOTAL HIP;  Surgeon: Erlin Martin MD;  Location: [facility] MAIN OR;  Service:    • TOTAL KNEE ARTHROPLASTY REVISION Left 2010       Family History   Problem Relation Age of Onset   • Malig Hyperthermia Neg Hx        Social History     Tobacco Use   • Smoking status: Former Smoker     Packs/day: 1.00     Years: 19.00     Pack years: 19.00     Types: Cigarettes     Last attempt to quit: 12/15/2019     Years since quittin.0   • Smokeless tobacco: Never Used   Substance Use Topics   • Alcohol use: No   • Drug use: No         ECG 12 Lead  Date/Time: 2019 1:06 PM  Performed by: Margi Barker MD  Authorized by: Margi Barker MD   Comparison: compared with previous ECG   Similar to previous ECG  Rhythm: sinus rhythm               Objective:     Visit Vitals  /88   Pulse 64   Ht 165.1 cm (65\")   Wt 103 kg (226 lb)   BMI 37.61 kg/m²         Physical Exam   Constitutional: She is oriented to person, place, and time. She appears well-developed and well-nourished.   HENT:   Head: Normocephalic and atraumatic.   Eyes: Pupils are equal, round, and reactive to light. Conjunctivae, EOM and lids are normal.   Neck: Normal range of motion and full passive range of motion without pain. Neck supple. No JVD present. Carotid bruit is not present.   Cardiovascular: Normal rate, " regular rhythm, S1 normal and S2 normal. Exam reveals no gallop.   No murmur heard.  Pulses:       Radial pulses are 2+ on the right side, and 2+ on the left side.   2+ bilateral lower extremity edema   Pulmonary/Chest: Effort normal and breath sounds normal.   Abdominal: Soft. Normal appearance.   Lymphadenopathy:     She has no cervical adenopathy.   Neurological: She is alert and oriented to person, place, and time.   Skin: Skin is warm, dry and intact.   Psychiatric: She has a normal mood and affect.       Lab Review:       Assessment:          Diagnosis Plan   1. Coronary artery disease involving native coronary artery of native heart without angina pectoris     2. Presence of stent in right coronary artery     3. Acute on chronic diastolic congestive heart failure (CMS/HCC)     4. Deep vein thrombosis (DVT) of lower extremity, unspecified chronicity, unspecified laterality, unspecified vein (CMS/HCC)     5. Other pulmonary embolism without acute cor pulmonale, unspecified chronicity (CMS/HCC)     6. Chronic lung disease     7. Obstructive sleep apnea     8. Essential hypertension     9. Hyperlipidemia, unspecified hyperlipidemia type     10. Gastroesophageal reflux disease, esophagitis presence not specified     11. Obesity (BMI 30-39.9)     12. Hypothyroidism, unspecified type            Plan:       1.  Acute on chronic diastolic congestive heart failure she does appear volume overloaded on exam today.  I have asked her to increase her bumetanide to 2 mg 3 times a day.  We will also repeat another echocardiogram.  2.  Hypertension.  Blood pressures appear elevated in the office today but they were normal at her last office visit and when she was seen by Dr. Echeverria last month.  I wonder if her elevated pressures are related to volume overload.  We will see if this improves with further diuresis.  I have also asked her to try and obtain a blood pressure cuff to check her blood pressures at home.  3.  Chest  fullness/upper extremity heaviness.  She reports the symptoms are different from her prior anginal symptoms.  This may all be related to volume overload.  We will watch for improvement of her symptoms with further diuresis.  4.  Hyperlipidemia.  On atorvastatin which is managed by Dr. Echeverria.  5.  Diabetes mellitus type 2  6.  Coronary artery disease.  History of prior right coronary artery stent placement in 2011.  Plan as per #3.  Otherwise continue medical management.  7.  Depression  8.  Hypothyroidism    I will see the patient back again in 2 weeks as originally scheduled.  We will discuss her echocardiogram results at the time of that office visit.  In the meanwhile if her symptoms worsen I have asked her to go ahead and go to the emergency room.

## 2019-12-23 ENCOUNTER — TELEPHONE (OUTPATIENT)
Dept: CARDIOLOGY | Facility: CLINIC | Age: 69
End: 2019-12-23

## 2019-12-23 NOTE — TELEPHONE ENCOUNTER
Patient called today complaining that her legs are hurting extremely bad.  She is taking the increased bumex dose of 2mg TID, but still only 2 potassium daily.  She is wondering if the potassium should be increased as well.    Please advise or call patient at [phone].    Thanks!

## 2020-01-02 RX ORDER — VILAZODONE HYDROCHLORIDE 40 MG/1
40 TABLET ORAL DAILY
COMMUNITY
Start: 2019-12-10 | End: 2022-07-29

## 2020-01-03 ENCOUNTER — OFFICE VISIT (OUTPATIENT)
Dept: CARDIOLOGY | Facility: CLINIC | Age: 70
End: 2020-01-03

## 2020-01-03 VITALS
HEIGHT: 65 IN | WEIGHT: 221 LBS | BODY MASS INDEX: 36.82 KG/M2 | SYSTOLIC BLOOD PRESSURE: 142 MMHG | DIASTOLIC BLOOD PRESSURE: 80 MMHG | HEART RATE: 67 BPM

## 2020-01-03 DIAGNOSIS — I50.33 ACUTE ON CHRONIC DIASTOLIC CONGESTIVE HEART FAILURE (HCC): ICD-10-CM

## 2020-01-03 DIAGNOSIS — I10 ESSENTIAL HYPERTENSION: ICD-10-CM

## 2020-01-03 DIAGNOSIS — I82.409 DEEP VEIN THROMBOSIS (DVT) OF LOWER EXTREMITY, UNSPECIFIED CHRONICITY, UNSPECIFIED LATERALITY, UNSPECIFIED VEIN (HCC): ICD-10-CM

## 2020-01-03 DIAGNOSIS — I25.10 CORONARY ARTERY DISEASE INVOLVING NATIVE CORONARY ARTERY OF NATIVE HEART WITHOUT ANGINA PECTORIS: ICD-10-CM

## 2020-01-03 DIAGNOSIS — I26.99 OTHER PULMONARY EMBOLISM WITHOUT ACUTE COR PULMONALE, UNSPECIFIED CHRONICITY (HCC): ICD-10-CM

## 2020-01-03 DIAGNOSIS — E66.9 OBESITY (BMI 30-39.9): ICD-10-CM

## 2020-01-03 DIAGNOSIS — Z95.5 PRESENCE OF STENT IN RIGHT CORONARY ARTERY: ICD-10-CM

## 2020-01-03 DIAGNOSIS — G47.33 OBSTRUCTIVE SLEEP APNEA SYNDROME: ICD-10-CM

## 2020-01-03 DIAGNOSIS — J98.4 CHRONIC LUNG DISEASE: ICD-10-CM

## 2020-01-03 DIAGNOSIS — R07.9 CHEST PAIN, UNSPECIFIED TYPE: Primary | ICD-10-CM

## 2020-01-03 DIAGNOSIS — K21.9 GASTROESOPHAGEAL REFLUX DISEASE, ESOPHAGITIS PRESENCE NOT SPECIFIED: ICD-10-CM

## 2020-01-03 DIAGNOSIS — E78.5 HYPERLIPIDEMIA, UNSPECIFIED HYPERLIPIDEMIA TYPE: ICD-10-CM

## 2020-01-03 PROCEDURE — 93000 ELECTROCARDIOGRAM COMPLETE: CPT | Performed by: INTERNAL MEDICINE

## 2020-01-03 PROCEDURE — 99214 OFFICE O/P EST MOD 30 MIN: CPT | Performed by: INTERNAL MEDICINE

## 2020-01-03 RX ORDER — ASPIRIN 325 MG
325 TABLET ORAL DAILY
COMMUNITY
End: 2022-12-02 | Stop reason: ALTCHOICE

## 2020-01-03 NOTE — PROGRESS NOTES
Subjective:     Encounter Date:01/03/2020      Patient ID: Frances Downs is a 69 y.o. female.    Chief Complaint:  History of Present Illness    This is a 69-year-old with a history of coronary artery disease status post prior coronary artery stent placement, history of DVT and pulmonary embolus previously on anticoagulation with warfarin, hypertension, obstructive sleep apnea on CPAP, chronic diastolic congestive heart failure, who presents for follow-up.     The patient was previously followed by Dr. Oliveros.  Prior to that she had a history of coronary artery disease with right coronary artery stent placement by Dr. Baer in 2011.  Since following Dr. Oliveros her last stress test was in 11/2016 and was negative for ischemia.  Her last echocardiogram was in 5/2018 and showed normal left ventricular systolic function and wall motion with an EF of 65 to 70%, mild mitral regurgitation, normal diastolic function, and biatrial enlargement.  She was last seen by Dr. Oliveros in 5/2019 at which time she was doing well and no changes were made to her management.     I saw her initially on 12/20/19 for urgent evaluation of dyspnea and lower extremity edema.  She reported orthopnea requiring her to sleep with 2 pillows propped up.  She reported compliance with her medications but admits to dietary indiscretions with sodium.  She reportedly has been quite depressed recently and actually had not bathed for a week until her caregiver came by yesterday and helps her bathe.  She denied any chest pain like her prior anginal symptoms but reported me chest fullness and bilateral arms heaviness.  At that office visit I recommended increasing her bumetanide to 2 mg 3 times a day and to return for her previously scheduled appointment in 2 weeks.  Since that office visit she called with increased lower extremity cramping so I had her increase her potassium chloride tablets to 3 times a day also.    She presents today for 2-week  follow-up.  She reports that the swelling in her legs has improved.  She initially reported that dyspnea had not really changed and that she had to stop several times on her way in from the parking lot.  She continues to have heaviness in her chest and bilateral arms which is constant.  She continues to report that the heaviness that she feels at this time is different from anginal symptoms she has had in the past.  She however looks better overall to me and when I pointed this out to the patient she did admit that she does feel some better.  She denies any palpitations, near-syncope or syncope.  She does continue to sleep on 2 pillows at night.    Review of Systems   Constitution: Negative for malaise/fatigue.   HENT: Negative for hearing loss, hoarse voice, nosebleeds and sore throat.    Eyes: Negative for pain.   Cardiovascular: Positive for dyspnea on exertion and leg swelling. Negative for chest pain, claudication, cyanosis, irregular heartbeat, near-syncope, orthopnea, palpitations, paroxysmal nocturnal dyspnea and syncope.        Chest and arm heaviness   Respiratory: Negative for shortness of breath and snoring.    Endocrine: Negative for cold intolerance, heat intolerance, polydipsia, polyphagia and polyuria.   Skin: Negative for itching and rash.   Musculoskeletal: Negative for arthritis, falls, joint pain, joint swelling, muscle cramps, muscle weakness and myalgias.   Gastrointestinal: Negative for constipation, diarrhea, dysphagia, heartburn, hematemesis, hematochezia, melena, nausea and vomiting.   Genitourinary: Negative for frequency, hematuria and hesitancy.   Neurological: Negative for excessive daytime sleepiness, dizziness, headaches, light-headedness, numbness and weakness.   Psychiatric/Behavioral: Negative for depression. The patient is not nervous/anxious.          Current Outpatient Medications:   •  albuterol sulfate HFA (PROAIR HFA) 108 (90 Base) MCG/ACT inhaler, ProAir HFA 90 mcg/actuation  aerosol inhaler, Disp: , Rfl:   •  amLODIPine (NORVASC) 10 MG tablet, Take 10 mg by mouth Daily., Disp: , Rfl:   •  ARIPiprazole (ABILIFY) 10 MG tablet, Take 20 mg by mouth Daily., Disp: , Rfl:   •  aspirin 325 MG tablet, Take 325 mg by mouth Daily., Disp: , Rfl:   •  atorvastatin (LIPITOR) 40 MG tablet, Take 40 mg by mouth Every Night., Disp: , Rfl:   •  bumetanide (BUMEX) 2 MG tablet, Take 1 tablet by mouth 3 (Three) Times a Day Before Meals., Disp: 90 tablet, Rfl: 3  •  carvedilol (COREG) 12.5 MG tablet, Take 12.5 mg by mouth 2 (two) times a day with meals., Disp: , Rfl:   •  clopidogrel (PLAVIX) 75 MG tablet, Take 1 tablet by mouth daily., Disp: 90 tablet, Rfl: 3  •  fluticasone (FLONASE) 50 MCG/ACT nasal spray, fluticasone 50 mcg/actuation nasal spray,suspension, Disp: , Rfl:   •  ipratropium-albuterol (DUO-NEB) 0.5-2.5 mg/3 ml nebulizer, ipratropium-albuterol 0.5 mg-3 mg(2.5 mg base)/3 mL nebulization soln, Disp: , Rfl:   •  isosorbide mononitrate (IMDUR) 30 MG 24 hr tablet, Take 30 mg by mouth Daily., Disp: , Rfl:   •  lamoTRIgine (LaMICtal) 25 MG tablet, Take 25 mg by mouth 2 (Two) Times a Day., Disp: , Rfl:   •  levothyroxine (SYNTHROID, LEVOTHROID) 100 MCG tablet, Take 100 mcg by mouth daily., Disp: , Rfl:   •  meloxicam (MOBIC) 15 MG tablet, meloxicam 15 mg tablet, Disp: , Rfl:   •  pantoprazole (PROTONIX) 40 MG EC tablet, Take 40 mg by mouth Daily., Disp: , Rfl:   •  potassium chloride (K-DUR) 10 MEQ CR tablet, Take 1 tablet by mouth 2 (Two) Times a Day With Meals. (Patient taking differently: Take 10 mEq by mouth 3 (Three) Times a Day.), Disp: 60 tablet, Rfl: 1  •  Umeclidinium Bromide (INCRUSE ELLIPTA IN), Inhale Take As Directed., Disp: , Rfl:   •  VIIBRYD 40 MG tablet tablet, , Disp: , Rfl:     Past Medical History:   Diagnosis Date   • Anxiety    • Arthritis    • Bipolar disorder (CMS/HCC)    • COPD (chronic obstructive pulmonary disease) (CMS/HCC)    • Coronary artery disease    • Diastolic  dysfunction     grade II per echocardiogram 11/18/2016   • Difficulty walking    • Disease of thyroid gland     hypothyroidism   • Dislocation of hip joint (CMS/HCC)     recurrent dislocation right hip   • DVT (deep venous thrombosis) (CMS/HCC)    • GERD without esophagitis    • Heartburn    • Hematoma    • Hyperlipidemia    • Hypertension    • LVH (left ventricular hypertrophy)     mild to moderate per echocardiogram 11/18/2016   • Major depressive disorder    • Mitral annular calcification     severe per echocardiogram 11/18/2016   • Mitral regurgitation     mild per echo 11/18/2016   • Mitral valve disease    • Muscle weakness    • Narcolepsy    • Overactive bladder    • Pneumonia    • Pulmonary emboli (CMS/HCC)    • Pulmonary hypertension (CMS/HCC)     mild per echo 11/18/2016   • Sleep apnea     obstructive   • Tricuspid regurgitation     mild to moderate per echo 11/18/2016       Past Surgical History:   Procedure Laterality Date   • CATARACT EXTRACTION W/ INTRAOCULAR LENS IMPLANT Right    • CORONARY ANGIOPLASTY WITH STENT PLACEMENT      x2 stents   • EYE SURGERY      cataract surgery bilateral   • HARDWARE REMOVAL FOOT / ANKLE Left 12/01/2011   • HIP SPACER INSERTION WITH ANTIBIOTIC CEMENT Right 2/13/2017    Procedure: RIGHT TOTAL HIP REMOVAL;  Surgeon: Erlin Martin MD;  Location: [facility] OR;  Service:    • INCISION AND DRAINAGE HIP Right 2/8/2017    Procedure: RT HIP INCISION AND DRAINAGE;  Surgeon: Erlin Martin MD;  Location: [facility] OR;  Service:    • INCISION AND DRAINAGE HIP Right 3/17/2017    Procedure: RIGHT HIP INCISION AND DRAINAGE;  Surgeon: Erlin Martin MD;  Location: [facility] OR;  Service:    • JOINT REPLACEMENT     • KNEE ARTHROPLASTY Left 03/2010    TWICE   • ORIF ANKLE FRACTURE Left 06/16/2011   • OTHER SURGICAL HISTORY      IVC filter placement   • AK CLOSED RX TRAUMATIC HIP DISLOCATN Right 8/2/2017    Procedure: FAILED CLOSED RIGHT HIP  REDUCTION WITH OPEN REDUCTION  "OF RIGHT HIP;  Surgeon: Erlin Martin MD;  Location: [facility] OR;  Service: Orthopedics   • QUADRICEPS TENDON REPAIR Left 2010    also done 2010    • TOTAL HIP ARTHROPLASTY Right    • TOTAL HIP ARTHROPLASTY REVISION Right 2016   • TOTAL HIP ARTHROPLASTY REVISION Right 2017    Procedure: REIMPLANT RIGHT TOTAL HIP;  Surgeon: Erlin Martin MD;  Location: [facility] MAIN OR;  Service:    • TOTAL KNEE ARTHROPLASTY REVISION Left 2010       Family History   Problem Relation Age of Onset   • Malig Hyperthermia Neg Hx        Social History     Tobacco Use   • Smoking status: Former Smoker     Packs/day: 1.00     Years: 19.00     Pack years: 19.00     Types: Cigarettes     Last attempt to quit: 12/15/2019     Years since quittin.0   • Smokeless tobacco: Never Used   Substance Use Topics   • Alcohol use: No   • Drug use: No         ECG 12 Lead  Date/Time: 1/3/2020 2:44 PM  Performed by: Margi Barker MD  Authorized by: Margi Barker MD   Comparison: compared with previous ECG   Comparison to previous ECG: Lateral t wave inversions are new  Rhythm: sinus rhythm  T inversion: I and aVL                 Objective:     Visit Vitals  /80   Pulse 67   Ht 165.1 cm (65\")   Wt 100 kg (221 lb)   BMI 36.78 kg/m²         Physical Exam   Constitutional: She is oriented to person, place, and time. She appears well-developed and well-nourished.   HENT:   Head: Normocephalic and atraumatic.   Eyes: Pupils are equal, round, and reactive to light. Conjunctivae, EOM and lids are normal.   Neck: Normal range of motion and full passive range of motion without pain. Neck supple. No JVD present. Carotid bruit is not present.   Cardiovascular: Normal rate, regular rhythm, S1 normal and S2 normal. Exam reveals no gallop.   No murmur heard.  Pulses:       Radial pulses are 2+ on the right side, and 2+ on the left side.   No bilateral lower extremity edema   Pulmonary/Chest: Effort normal and breath " sounds normal.   Abdominal: Soft. Normal appearance.   Lymphadenopathy:     She has no cervical adenopathy.   Neurological: She is alert and oriented to person, place, and time.   Skin: Skin is warm, dry and intact.   Psychiatric: She has a normal mood and affect.       Lab Review:       Assessment:          Diagnosis Plan   1. Chest pain, unspecified type  Stress Test With Myocardial Perfusion One Day   2. Acute on chronic diastolic congestive heart failure (CMS/HCC)     3. Coronary artery disease involving native coronary artery of native heart without angina pectoris     4. Presence of stent in right coronary artery     5. Essential hypertension     6. Hyperlipidemia, unspecified hyperlipidemia type     7. Deep vein thrombosis (DVT) of lower extremity, unspecified chronicity, unspecified laterality, unspecified vein (CMS/HCC)     8. Other pulmonary embolism without acute cor pulmonale, unspecified chronicity (CMS/HCC)     9. Chronic lung disease     10. Obstructive sleep apnea     11. Obesity (BMI 30-39.9)     12. Gastroesophageal reflux disease, esophagitis presence not specified            Plan:       1.  Acute on chronic diastolic congestive heart failure.  Although she continues to complain about dyspnea and chest heaviness she appears better to me today.  Her weight is also down about 5 pounds.  I will have her continue the bumetanide at 3 times a day for now.  Will have her get some lab work today including a basic metabolic panel and a BNP.  Also proceed with a chest x-ray to ensure there is no other process going on.  She has an echocardiogram already ordered which is scheduled for next week.  2.  Chest/arm heaviness.  I suspect this is due to her volume overload.  Symptoms do not sound anginal.  She has no significant EKG changes except for nonspecific lateral T wave changes.  Due to her history of coronary artery disease we will go ahead and proceed with a stress test.  We will see if we can schedule  this for the same day as her echocardiogram.  3.  Hypertension.  Her blood pressures appear borderline elevated but appear better than at her last office visit.  I suspect this is due to improvement in her volume status.  4.  Hyperlipidemia.  On atorvastatin which is managed by Dr. Echeverria.  5.  Coronary artery disease.  Prior right coronary artery stent placement in 2011.  Plan as per #2.  6.  Diabetes mellitus type 2    We will call and discuss results of her lab work and chest x-ray from today.  We will call and discuss results of her stress test and echocardiogram next week.  We will tentatively plan on seeing her back in the office in 1 month.  In the meanwhile we will follow-up on her symptoms when I call her for the above test results and make medication adjustments as necessary.  I instructed her to go to the emergency room at any point that she feels that her symptoms are worsening.

## 2020-01-06 ENCOUNTER — TELEPHONE (OUTPATIENT)
Dept: CARDIOLOGY | Facility: CLINIC | Age: 70
End: 2020-01-06

## 2020-01-06 NOTE — TELEPHONE ENCOUNTER
Patient had chest xray and lab work done on Friday 1/3/20 @ [facility] and is calling today for results.    Reports have been printed and placed in your INBOX for review.    Patient can be reached at 0-.    Thanks!

## 2020-01-06 NOTE — TELEPHONE ENCOUNTER
I called and went over unremarkable results.  She reports she is feeling better.  Breathing is better and heaviness has resolved.  She would like to go back to usual dose of bumetanide.  I advised her to go back to bumetanide 2 mg bid and to the potassium bid also.  She verbalized understanding.  Will need to call her back next week with results of her stress test and echo.

## 2020-01-13 ENCOUNTER — TELEPHONE (OUTPATIENT)
Dept: CARDIOLOGY | Facility: CLINIC | Age: 70
End: 2020-01-13

## 2020-01-13 NOTE — TELEPHONE ENCOUNTER
Patient left voicemail this morning stating that she is sick and would like to reschedule her testing.    Please call her to reschedule.  Thanks!

## 2020-01-22 ENCOUNTER — TELEPHONE (OUTPATIENT)
Dept: CARDIOLOGY | Facility: CLINIC | Age: 70
End: 2020-01-22

## 2020-01-22 NOTE — TELEPHONE ENCOUNTER
----- Message from Melina Baird MA sent at 1/22/2020  2:00 PM EST -----  Regarding: surgical clearance  Pt is needing cardiac clearance for L total shoulder on 3/2/20. Need consent to hold plavix  for 7 days  and aspirin 325 5 to 7 days prior. Pt was supposed to have stress and echo on the 14th but canceled because she was not feeling well;she was going to reschedule when she felt better. Please advise

## 2020-01-22 NOTE — TELEPHONE ENCOUNTER
I called patient to find out how she was feeling so I can decide if she needs to have testing before surgery.  I left a message asking her to call back.

## 2020-01-24 ENCOUNTER — TELEPHONE (OUTPATIENT)
Dept: CARDIOLOGY | Facility: CLINIC | Age: 70
End: 2020-01-24

## 2020-01-31 NOTE — TELEPHONE ENCOUNTER
Patient called left a message that she was unable to do the Myoview that was on for today. She is unable to keep her arms up over her head  [phone]

## 2020-01-31 NOTE — TELEPHONE ENCOUNTER
Patient is scheduled for 3/27/2020. Does she need the appointment on 2/14 or can that be canceled? Thanks.

## 2020-01-31 NOTE — TELEPHONE ENCOUNTER
I called and spoke with patient.  She reports feeling a lot better overall.  She denies any chest heaviness or discomfort.  Her breathing and swelling are better.  I looked at her echocardiogram today and it did not show any new or significant issues.  I told her since she is doing well and her echo looks good that we do not need the stress test (the stress test was ordered because of the chest discomfort she was complaining of before).      She can proceed with surgery and hold aspirin and clopidogrel 1 week before.   Will need to reschedule her follow up appointment for 2 months from now.    thanks

## 2020-01-31 NOTE — TELEPHONE ENCOUNTER
Spoke with patient and informed her of s/c and hold instructions.  S/C letter generated in Epic and faxed to Dr. Andrey Quinonez.    Felicia, please add patient onto SG schedule in [place] on 3/27 @12:00.  2 month f/u per SG.    Patient is aware of appt date and time.    Thank you!

## 2020-03-02 RX ORDER — BUMETANIDE 2 MG/1
2 TABLET ORAL
Qty: 270 TABLET | Refills: 1 | Status: SHIPPED | OUTPATIENT
Start: 2020-03-02 | End: 2020-06-29

## 2020-03-23 ENCOUNTER — TELEPHONE (OUTPATIENT)
Dept: CARDIOLOGY | Facility: CLINIC | Age: 70
End: 2020-03-23

## 2020-03-23 NOTE — TELEPHONE ENCOUNTER
Patient called requesting to reschedule appt on 3/27 due to COVID-19.  Informed her that she would be placed on recall list and be called in may to reschedule.    Hugo -   Appt has been cancelled in Epic, please put her on recall list.  She is aware.    Thanks!

## 2020-03-25 ENCOUNTER — TELEPHONE (OUTPATIENT)
Dept: CARDIOLOGY | Facility: CLINIC | Age: 70
End: 2020-03-25

## 2020-03-25 NOTE — TELEPHONE ENCOUNTER
Mrs. Downs got a call from a representative at [facility] to discuss her medications and they had some recommendations that she wanted you to be aware of.     1.  She takes a 325mg aspirin and they are recommending 81mg.    2.  They recommended changing her beta blocker from carvedilol & adding an ACE inhibitor.    Mrs. Downs would like to know what your opinion is on those recommendations.    Thanks!

## 2020-03-25 NOTE — TELEPHONE ENCOUNTER
I am ok with reducing her aspirin to 81 mg.  There is no reason from my standpoint to change her carvedilol.  I am not sure why they recommend an ace inhibitor except for her history of diabetes so I would leave that up to Dr. Echeverria.

## 2020-04-14 ENCOUNTER — TELEPHONE (OUTPATIENT)
Dept: CARDIOLOGY | Facility: CLINIC | Age: 70
End: 2020-04-14

## 2020-04-14 NOTE — TELEPHONE ENCOUNTER
It sounds like PCP is most appropriate at this time.  Thanks Evan Arias-this patient was supposed to have a follow-up with Dr. Barker in March that was postponed.  Can we make sure she gets on for a telephone or video visit with Dr. Barker in the next couple weeks please and thank you.

## 2020-04-14 NOTE — TELEPHONE ENCOUNTER
Is she having any other cardiac type symptoms such as dyspnea, weight gain, cough etc.?.  If she is not having increasing shortness of breath or other cardiac symptoms I would have her start with her PCP.  It sounds like she may need to have her legs looked at to see if she has cellulitis which is something that her PCP should address.  She also has a history of DVTs per her chart, In May 2018 had a bilateral lower extremity venous duplex that was negative for DVT so she may need to be evaluated to see if she needs repeat ultrasound as well.  If PCP is not able to evaluate her she may need a video visit with Dr. Barker or myself.  Not sure how much we would be able to offer just over the telephone without her legs being physically examined so if she is unable to do a video visit she may need to be physically seen by someone in our office if it cannot be arranged with her PCP. Thanks.

## 2020-04-14 NOTE — TELEPHONE ENCOUNTER
"Adore,  Called.  She is concerned about the redness and \"shiney\" appearance to her legs.  I have asked her to start with her PCP in [place], as she may need an antibiotic.  She is not SOA, nor does she have a wt gain.    She is agreeable to start with her PCP first  Thanks  Bernadette Ceja RN  Triage nurse    "

## 2020-04-14 NOTE — TELEPHONE ENCOUNTER
04/14/20  1:40 PM  Frances Downs  1950  Home Phone [phone]   Home Phone [phone]       Frances Downs is a patient of Dr Fraire.  I received a voicemail from this patient who stated she is having edema and redness to both her legs.    She stated she is taking Bumex TID ( I will confirm dose when I call back, she asked I call back after 1430, she is going out).    Adore,   I will update you after 1430. This is a [facility] patient.  Do you want her to see her PCP for the redness in her legs?  Thanks  Bernadette Ceja RN  Triage nurse

## 2020-04-20 ENCOUNTER — OFFICE VISIT (OUTPATIENT)
Dept: CARDIOLOGY | Facility: CLINIC | Age: 70
End: 2020-04-20

## 2020-04-20 VITALS — WEIGHT: 226 LBS | BODY MASS INDEX: 37.65 KG/M2 | HEIGHT: 65 IN

## 2020-04-20 DIAGNOSIS — J98.4 CHRONIC LUNG DISEASE: ICD-10-CM

## 2020-04-20 DIAGNOSIS — I50.33 ACUTE ON CHRONIC DIASTOLIC CONGESTIVE HEART FAILURE (HCC): Primary | ICD-10-CM

## 2020-04-20 DIAGNOSIS — E66.9 OBESITY (BMI 30-39.9): ICD-10-CM

## 2020-04-20 DIAGNOSIS — I25.10 CORONARY ARTERY DISEASE INVOLVING NATIVE CORONARY ARTERY OF NATIVE HEART WITHOUT ANGINA PECTORIS: ICD-10-CM

## 2020-04-20 DIAGNOSIS — E78.5 HYPERLIPIDEMIA, UNSPECIFIED HYPERLIPIDEMIA TYPE: ICD-10-CM

## 2020-04-20 DIAGNOSIS — E03.9 HYPOTHYROIDISM, UNSPECIFIED TYPE: ICD-10-CM

## 2020-04-20 DIAGNOSIS — G47.33 OBSTRUCTIVE SLEEP APNEA SYNDROME: ICD-10-CM

## 2020-04-20 DIAGNOSIS — I10 ESSENTIAL HYPERTENSION: ICD-10-CM

## 2020-04-20 DIAGNOSIS — Z95.5 PRESENCE OF STENT IN RIGHT CORONARY ARTERY: ICD-10-CM

## 2020-04-20 DIAGNOSIS — I26.99 OTHER PULMONARY EMBOLISM WITHOUT ACUTE COR PULMONALE, UNSPECIFIED CHRONICITY (HCC): ICD-10-CM

## 2020-04-20 DIAGNOSIS — I82.409 DEEP VEIN THROMBOSIS (DVT) OF LOWER EXTREMITY, UNSPECIFIED CHRONICITY, UNSPECIFIED LATERALITY, UNSPECIFIED VEIN (HCC): ICD-10-CM

## 2020-04-20 DIAGNOSIS — K21.9 GASTROESOPHAGEAL REFLUX DISEASE, ESOPHAGITIS PRESENCE NOT SPECIFIED: ICD-10-CM

## 2020-04-20 PROCEDURE — 99214 OFFICE O/P EST MOD 30 MIN: CPT | Performed by: INTERNAL MEDICINE

## 2020-04-20 NOTE — PROGRESS NOTES
"VIDEO FOLLOW UP VISIT    Subjective:     Encounter Date:04/20/2020      Patient ID: Frances Downs is a 69 y.o. female.    Chief Complaint:  History of Present Illness    This is a 69-year-old with a history of coronary artery disease status post prior coronary artery stent placement, history of DVT and pulmonary embolus previously on anticoagulation with warfarin, hypertension, obstructive sleep apnea on CPAP, chronic diastolic congestive heart failure, who presents for video follow-up.     She presents for a video visit today in place of an office visit due to the ongoing COVID-19 pandemic.  She was originally scheduled as a video visit but she switched it to a video visit and is using her home health aide's phone.  She called last week reporting worsening lower extremity edema and a \"shiny\" appearance to her legs.  She reports today the swelling is a little better today but overall began worsening last week.  She also reports worsening dyspnea on exertion to the point she is having trouble bathing herself with her home health aide.  She denies any chest pressure or pain.  She does report some tightness when she is out of breath.  She denies any palpitations.  She continues to take the bumetanide 2 mg three times a day.  She reports the swelling is worse in her right lower extremity.  She does not weigh herself.  She does not restrict her sodium intake.     Prior History:  The patient was previously followed by Dr. Oliveros.  Prior to that she had a history of coronary artery disease with right coronary artery stent placement by Dr. Baer in 2011.  Since following Dr. Oliveros her last stress test was in 11/2016 and was negative for ischemia.  Her last echocardiogram was in 5/2018 and showed normal left ventricular systolic function and wall motion with an EF of 65 to 70%, mild mitral regurgitation, normal diastolic function, and biatrial enlargement.  She was last seen by Dr. Oliveros in 5/2019 at which time she was " doing well and no changes were made to her management.     I saw her initially on 12/20/19 for urgent evaluation of dyspnea and lower extremity edema.  She reported orthopnea requiring her to sleep with 2 pillows propped up.  She reported compliance with her medications but admits to dietary indiscretions with sodium.  She reportedly has been quite depressed recently and actually had not bathed for a week until her caregiver came by yesterday and helps her bathe.  She denied any chest pain like her prior anginal symptoms but reported me chest fullness and bilateral arms heaviness.  At that office visit I recommended increasing her bumetanide to 2 mg 3 times a day and to return for her previously scheduled appointment in 2 weeks.  Since that office visit she called with increased lower extremity cramping so I had her increase her potassium chloride tablets to 3 times a day also.     At her 2 week follow up she reported improvement in her symptoms including the swelling in her legs.  She was still having some dyspnea on exertion and chest heaviness this had improved overall.  Due to ongoing symptoms I recommended proceeding with an echocardiogram and stress test.  She underwent the echocardiogram on 1/31/2020 which showed normal left ventricular systolic function with an EF of 60-65%, grade 2 diastolic dysfunction and mild valvular disease.  She was unable to proceed with stress test because she got ill.  When I called to discuss echo results she reported feeling much better with no further chest discomfort and improvement in her dyspnea.  At that time we opted to cancel her stress test and I asked her to decrease bumetanide back to twice a day.  At some point after that she developed worsening swelling and increased the bumetanide back up to three times a day.     Review of Systems   Constitution: Negative for malaise/fatigue.   HENT: Negative for hearing loss, hoarse voice, nosebleeds and sore throat.    Eyes:  Negative for pain.   Cardiovascular: Positive for dyspnea on exertion and leg swelling. Negative for chest pain, claudication, cyanosis, irregular heartbeat, near-syncope, orthopnea, palpitations, paroxysmal nocturnal dyspnea and syncope.   Respiratory: Negative for shortness of breath and snoring.    Endocrine: Negative for cold intolerance, heat intolerance, polydipsia, polyphagia and polyuria.   Skin: Negative for itching and rash.   Musculoskeletal: Negative for arthritis, falls, joint pain, joint swelling, muscle cramps, muscle weakness and myalgias.   Gastrointestinal: Negative for constipation, diarrhea, dysphagia, heartburn, hematemesis, hematochezia, melena, nausea and vomiting.   Genitourinary: Negative for frequency, hematuria and hesitancy.   Neurological: Negative for excessive daytime sleepiness, dizziness, headaches, light-headedness, numbness and weakness.   Psychiatric/Behavioral: Negative for depression. The patient is not nervous/anxious.          Current Outpatient Medications:   •  albuterol sulfate HFA (PROAIR HFA) 108 (90 Base) MCG/ACT inhaler, ProAir HFA 90 mcg/actuation aerosol inhaler, Disp: , Rfl:   •  amLODIPine (NORVASC) 10 MG tablet, Take 10 mg by mouth Daily., Disp: , Rfl:   •  ARIPiprazole (ABILIFY) 10 MG tablet, Take 20 mg by mouth Daily., Disp: , Rfl:   •  aspirin 325 MG tablet, Take 325 mg by mouth Daily., Disp: , Rfl:   •  atorvastatin (LIPITOR) 40 MG tablet, Take 40 mg by mouth Every Night., Disp: , Rfl:   •  bumetanide (BUMEX) 2 MG tablet, Take 1 tablet by mouth 3 (Three) Times a Day Before Meals., Disp: 270 tablet, Rfl: 1  •  carvedilol (COREG) 12.5 MG tablet, Take 12.5 mg by mouth 2 (two) times a day with meals., Disp: , Rfl:   •  clopidogrel (PLAVIX) 75 MG tablet, Take 1 tablet by mouth daily., Disp: 90 tablet, Rfl: 3  •  fluticasone (FLONASE) 50 MCG/ACT nasal spray, fluticasone 50 mcg/actuation nasal spray,suspension, Disp: , Rfl:   •  ipratropium-albuterol (DUO-NEB) 0.5-2.5  mg/3 ml nebulizer, ipratropium-albuterol 0.5 mg-3 mg(2.5 mg base)/3 mL nebulization soln, Disp: , Rfl:   •  isosorbide mononitrate (IMDUR) 30 MG 24 hr tablet, Take 30 mg by mouth Daily., Disp: , Rfl:   •  lamoTRIgine (LaMICtal) 25 MG tablet, Take 25 mg by mouth 2 (Two) Times a Day., Disp: , Rfl:   •  levothyroxine (SYNTHROID, LEVOTHROID) 100 MCG tablet, Take 100 mcg by mouth daily., Disp: , Rfl:   •  meloxicam (MOBIC) 15 MG tablet, meloxicam 15 mg tablet, Disp: , Rfl:   •  pantoprazole (PROTONIX) 40 MG EC tablet, Take 40 mg by mouth Daily., Disp: , Rfl:   •  potassium chloride (K-DUR) 10 MEQ CR tablet, Take 1 tablet by mouth 2 (Two) Times a Day With Meals. (Patient taking differently: Take 10 mEq by mouth 3 (Three) Times a Day.), Disp: 60 tablet, Rfl: 1  •  Umeclidinium Bromide (INCRUSE ELLIPTA IN), Inhale Take As Directed., Disp: , Rfl:   •  VIIBRYD 40 MG tablet tablet, Take 40 mg by mouth Daily., Disp: , Rfl:     Past Medical History:   Diagnosis Date   • Anxiety    • Arthritis    • Bipolar disorder (CMS/HCC)    • COPD (chronic obstructive pulmonary disease) (CMS/[facility])    • Coronary artery disease    • Diastolic dysfunction     grade II per echocardiogram 11/18/2016   • Difficulty walking    • Disease of thyroid gland     hypothyroidism   • Dislocation of hip joint (CMS/HCC)     recurrent dislocation right hip   • DVT (deep venous thrombosis) (CMS/[facility])    • GERD without esophagitis    • Heartburn    • Hematoma    • Hyperlipidemia    • Hypertension    • LVH (left ventricular hypertrophy)     mild to moderate per echocardiogram 11/18/2016   • Major depressive disorder    • Mitral annular calcification     severe per echocardiogram 11/18/2016   • Mitral regurgitation     mild per echo 11/18/2016   • Mitral valve disease    • Muscle weakness    • Narcolepsy    • Overactive bladder    • Pneumonia    • Pulmonary emboli (CMS/HCC)    • Pulmonary hypertension (CMS/HCC)     mild per echo 11/18/2016   • Sleep apnea      obstructive   • Tricuspid regurgitation     mild to moderate per echo 2016       Past Surgical History:   Procedure Laterality Date   • CATARACT EXTRACTION W/ INTRAOCULAR LENS IMPLANT Right    • CORONARY ANGIOPLASTY WITH STENT PLACEMENT      x2 stents   • EYE SURGERY      cataract surgery bilateral   • HARDWARE REMOVAL FOOT / ANKLE Left 2011   • HIP SPACER INSERTION WITH ANTIBIOTIC CEMENT Right 2017    Procedure: RIGHT TOTAL HIP REMOVAL;  Surgeon: Erlin Martin MD;  Location: [facility] OR;  Service:    • INCISION AND DRAINAGE HIP Right 2017    Procedure: RT HIP INCISION AND DRAINAGE;  Surgeon: Erlin Martin MD;  Location: [facility] OR;  Service:    • INCISION AND DRAINAGE HIP Right 3/17/2017    Procedure: RIGHT HIP INCISION AND DRAINAGE;  Surgeon: Erlin Martin MD;  Location: [facility];  Service:    • JOINT REPLACEMENT     • KNEE ARTHROPLASTY Left 2010    TWICE   • ORIF ANKLE FRACTURE Left 2011   • OTHER SURGICAL HISTORY      IVC filter placement   • AK CLOSED RX TRAUMATIC HIP DISLOCATN Right 2017    Procedure: FAILED CLOSED RIGHT HIP  REDUCTION WITH OPEN REDUCTION OF RIGHT HIP;  Surgeon: Erlin Martin MD;  Location: [facility];  Service: Orthopedics   • QUADRICEPS TENDON REPAIR Left 2010    also done 2010    • TOTAL HIP ARTHROPLASTY Right    • TOTAL HIP ARTHROPLASTY REVISION Right 2016   • TOTAL HIP ARTHROPLASTY REVISION Right 2017    Procedure: REIMPLANT RIGHT TOTAL HIP;  Surgeon: Erlin Martin MD;  Location: [facility];  Service:    • TOTAL KNEE ARTHROPLASTY REVISION Left 2010       Family History   Problem Relation Age of Onset   • Malig Hyperthermia Neg Hx        Social History     Tobacco Use   • Smoking status: Former Smoker     Packs/day: 1.00     Years: 19.00     Pack years: 19.00     Types: Cigarettes     Last attempt to quit: 12/15/2019     Years since quittin.3   • Smokeless tobacco: Never Used  "  Substance Use Topics   • Alcohol use: No   • Drug use: No            Objective:     Visit Vitals (patient does not have a BP cuff available)  Ht 165.1 cm (65\")   Wt 103 kg (226 lb)   BMI 37.61 kg/m²         Physical Exam   Constitutional: She is oriented to person, place, and time. She appears well-developed and well-nourished.   HENT:   Head: Normocephalic and atraumatic.   Eyes: Pupils are equal, round, and reactive to light. Conjunctivae, EOM and lids are normal.   Neck: Normal range of motion and full passive range of motion without pain. Neck supple. Carotid bruit is not present.   Cardiovascular: S1 normal and S2 normal.   Pulses:       Radial pulses are 2+ on the right side, and 2+ on the left side.   2+ right lower extremity edema; 1+ left lower extremity edema   Pulmonary/Chest: Effort normal.   Abdominal: Soft. Normal appearance.   Neurological: She is alert and oriented to person, place, and time.   Skin: Skin is warm, dry and intact.   Psychiatric: She has a normal mood and affect.             Assessment:          Diagnosis Plan   1. Acute on chronic diastolic congestive heart failure (CMS/HCC)     2. Coronary artery disease involving native coronary artery of native heart without angina pectoris     3. Deep vein thrombosis (DVT) of lower extremity, unspecified chronicity, unspecified laterality, unspecified vein (CMS/HCC)     4. Essential hypertension     5. Hyperlipidemia, unspecified hyperlipidemia type     6. Presence of stent in right coronary artery     7. Other pulmonary embolism without acute cor pulmonale, unspecified chronicity (CMS/HCC)     8. Chronic lung disease     9. Obstructive sleep apnea     10. Gastroesophageal reflux disease, esophagitis presence not specified     11. Obesity (BMI 30-39.9)     12. Hypothyroidism, unspecified type            Plan:       1. Dyspnea on exertion/lower extremity edema.  Her symptoms could due to diastolic congestive heart failure but also need to " consider the possibility of thromboembolism with her history of prior DVT and pulmonary embolism.  She is already on a high dose of bumetanide.  I discussed going to the emergency room for further evaluation but we decided to start with increasing the bumetanide further to 4 mg in the morning and keeping 2 mg in the afternoon and evening.  I also asked her to start restricting her sodium intake and to weigh herself daily.   2. Chronic diastolic congestive heart failure.  Plan as above.   3. History of DVT and pulmonary embolism.  Previously on warfarin but has not been for a long period of time.    4. Coronary artery disease.  No chest discomfort.  Can not entirely rule out the possibility that her symptoms are an ischemic equivalent but it seems unlikely.   5. COPD  6. Obstructive sleep apnea  7. GERD  8. Hypothyroidism    Will call for an update in her symptoms later this week.  If there is no improvement or worsening prior to that she will need to go to the emergency room.     This patient has consented to a telehealth visit via telephone. The visit was scheduled as a video visit to comply with patient safety concerns in accordance with CDC recommendations.  All vitals recorded within this visit are reported by the patient.  I spent 25 minutes in total including but not limited to the 19 minutes spent in direct conversation with this patient.

## 2020-04-23 ENCOUNTER — TELEPHONE (OUTPATIENT)
Dept: CARDIOLOGY | Facility: CLINIC | Age: 70
End: 2020-04-23

## 2020-06-29 RX ORDER — BUMETANIDE 2 MG/1
2 TABLET ORAL
Qty: 270 TABLET | Refills: 1 | Status: SHIPPED | OUTPATIENT
Start: 2020-06-29 | End: 2020-12-16 | Stop reason: SDUPTHER

## 2020-06-30 ENCOUNTER — TELEPHONE (OUTPATIENT)
Dept: CARDIOLOGY | Facility: CLINIC | Age: 70
End: 2020-06-30

## 2020-06-30 NOTE — TELEPHONE ENCOUNTER
Mrs. Downs calling today because she is just so SOA, her ankles are really swollen, and she has no strength or energy.  She states that she can barely take a shower or dry her hair.  Also, she mentioned that she keeps some tightness in her chest, but about 4 days ago she did have a really sharp pain in her chest that lasted about 2 mins and went away on it's own.  When asked about weight gain, she admitted that she doesn't have a scale at home.    She is wondering if she needs a change in medication.  She hasn't had any labs done recently but she does have an appt scheduled with Dr. Echeverria on 7/7.    Please advise or call patient to discuss.  She can be reached at [phone].    Thank you!

## 2020-06-30 NOTE — TELEPHONE ENCOUNTER
Called and spoke with the patient.  For the last 6 weeks she has had progressive dyspnea now occurring all the time.  She is wearing her CPAP and not having orthopnea or PND.  She is having increasing leg edema despite taking 4 mg of Bumex 3 times a day and having reportedly adequate urine response.  She does not have blood pressure heart rate machine.  She does sharp chest pain a week ago that did not occur with exertion.  She does not believe she has a fever but continues to smoke and has a continuous cough.  She does not sound well over the phone and is really limited and unable to do much due to her dyspnea.  Because of her extensive history including PE/DVT, CAD with stenting, CHF and lung disease and  progressive symptoms she is going to have a family member take her to the ED at [facility] as she tells me she is unable to come to [place] for an evaluation.  We will plan to follow-up with her in a few days to see how she is doing.

## 2020-07-02 ENCOUNTER — TELEPHONE (OUTPATIENT)
Dept: CARDIOLOGY | Facility: CLINIC | Age: 70
End: 2020-07-02

## 2020-07-02 DIAGNOSIS — I25.119 CORONARY ARTERY DISEASE INVOLVING NATIVE CORONARY ARTERY OF NATIVE HEART WITH ANGINA PECTORIS (HCC): ICD-10-CM

## 2020-07-02 DIAGNOSIS — R06.09 DYSPNEA ON EXERTION: Primary | ICD-10-CM

## 2020-07-02 NOTE — TELEPHONE ENCOUNTER
Called patient after reviewing her ER records.  Her work up was unremarkable including a normal CT angiogram of the chest, BNP and troponin.  She does have some evidence of left subclavian stenosis but I explained this would not cause dyspnea.   I told the patient that there is no evidence of volume overload.  I recommended proceeding with a the stress test we discussed pursuing in the past to see if this is the cause of her dyspnea.  She is agreeable.     I placed an order for a stress test that needs to be scheduled at [facility].   Please schedule her to see me in the office on the 17th (the next time in [facility]).  Ok to overbook.  Thanks.

## 2020-07-02 NOTE — TELEPHONE ENCOUNTER
Called to follow-up with this patient after her ED visit 6/30/2020 but no answer.  Dr. Barker has already addressed her questions and her work-up from the ED and has plans for follow-up.

## 2020-07-02 NOTE — TELEPHONE ENCOUNTER
Phone # [phone]    Patient called, informing me that she did go to [facility] ER on 6/30.  Patient was told that her artery is blocked or hardening from her CT.  Patient wandered what that means.  Patient said she is still having SOA and would like to know if you could adjust one of her medications.  Patient said she is taking Bumex 2mg three times a day and K 10meq 2 po twice daily.  (see in box for [facility] ER records)  Catr[place]ita

## 2020-07-08 RX ORDER — MODAFINIL 200 MG/1
200 TABLET ORAL 2 TIMES DAILY
COMMUNITY
Start: 2020-04-14 | End: 2022-07-29

## 2020-07-17 ENCOUNTER — OFFICE VISIT (OUTPATIENT)
Dept: CARDIOLOGY | Facility: CLINIC | Age: 70
End: 2020-07-17

## 2020-07-17 VITALS
SYSTOLIC BLOOD PRESSURE: 158 MMHG | HEART RATE: 60 BPM | BODY MASS INDEX: 38.94 KG/M2 | DIASTOLIC BLOOD PRESSURE: 104 MMHG | WEIGHT: 234 LBS

## 2020-07-17 DIAGNOSIS — J98.4 CHRONIC LUNG DISEASE: ICD-10-CM

## 2020-07-17 DIAGNOSIS — Z95.5 PRESENCE OF STENT IN RIGHT CORONARY ARTERY: ICD-10-CM

## 2020-07-17 DIAGNOSIS — K21.9 GASTROESOPHAGEAL REFLUX DISEASE, ESOPHAGITIS PRESENCE NOT SPECIFIED: ICD-10-CM

## 2020-07-17 DIAGNOSIS — I25.10 CORONARY ARTERY DISEASE INVOLVING NATIVE CORONARY ARTERY OF NATIVE HEART WITHOUT ANGINA PECTORIS: Primary | ICD-10-CM

## 2020-07-17 DIAGNOSIS — G47.33 OBSTRUCTIVE SLEEP APNEA SYNDROME: ICD-10-CM

## 2020-07-17 DIAGNOSIS — E78.5 HYPERLIPIDEMIA, UNSPECIFIED HYPERLIPIDEMIA TYPE: ICD-10-CM

## 2020-07-17 DIAGNOSIS — I50.32 CHRONIC DIASTOLIC CONGESTIVE HEART FAILURE (HCC): ICD-10-CM

## 2020-07-17 DIAGNOSIS — E66.9 OBESITY (BMI 30-39.9): ICD-10-CM

## 2020-07-17 DIAGNOSIS — E03.9 HYPOTHYROIDISM, UNSPECIFIED TYPE: ICD-10-CM

## 2020-07-17 DIAGNOSIS — I10 ESSENTIAL HYPERTENSION: ICD-10-CM

## 2020-07-17 DIAGNOSIS — I26.99 OTHER PULMONARY EMBOLISM WITHOUT ACUTE COR PULMONALE, UNSPECIFIED CHRONICITY (HCC): ICD-10-CM

## 2020-07-17 PROCEDURE — 99214 OFFICE O/P EST MOD 30 MIN: CPT | Performed by: INTERNAL MEDICINE

## 2020-07-17 PROCEDURE — 93000 ELECTROCARDIOGRAM COMPLETE: CPT | Performed by: INTERNAL MEDICINE

## 2020-07-17 NOTE — PROGRESS NOTES
Subjective:     Encounter Date:07/17/2020      Patient ID: Frances Downs is a 70 y.o. female.    Chief Complaint:  History of Present Illness    This is a 70-year-old with a history of coronary artery disease status post prior coronary artery stent placement, history of DVT and pulmonary embolus previously on anticoagulation with warfarin, hypertension, obstructive sleep apnea on CPAP, chronic diastolic congestive heart failure, who presents for follow-up.     Since her video visit she reported that her symptoms were doing better so I had her return to her usual dose of bumetanide.  She reported ongoing discomfort and erythema of her legs so I recommended that she see Dr. Echeverria at that point.  She then called again on 6/30/2020 again with worsening shortness of breath and lower extremity edema.  She ended up going to the emergency room at that point where her work-up was unremarkable including a normal BNP.  She also underwent a CT angiogram of the chest that showed no evidence of pulmonary embolism or acute lung disease but did show evidence of possible left subclavian artery stenosis.  She has since followed up with Dr. Echeverria who has referred her to vascular surgery.    She presents today for routine follow-up.  She is feeling better overall.  Her swelling is almost gone.  Her shortness of breath has improved.  She denies any chest pain, palpitations, orthopnea, near-syncope or syncope.  Her blood pressures are elevated the office today but at her ER visit and recent follow-up with Dr. Echeverria they were normal.  She does not check her blood pressures at home.     Prior History:  The patient was previously followed by Dr. Oliveros.  Prior to that she had a history of coronary artery disease with right coronary artery stent placement by Dr. Baer in 2011.  Since following Dr. Oliveros her last stress test was in 11/2016 and was negative for ischemia.  Her last echocardiogram was in 5/2018 and showed normal left  ventricular systolic function and wall motion with an EF of 65 to 70%, mild mitral regurgitation, normal diastolic function, and biatrial enlargement.  She was last seen by Dr. Oliveros in 5/2019 at which time she was doing well and no changes were made to her management.     I saw her initially on 12/20/19 for urgent evaluation of dyspnea and lower extremity edema.  She reported orthopnea requiring her to sleep with 2 pillows propped up.  She reported compliance with her medications but admits to dietary indiscretions with sodium.  She reportedly has been quite depressed recently and actually had not bathed for a week until her caregiver came by yesterday and helps her bathe.  She denied any chest pain like her prior anginal symptoms but reported me chest fullness and bilateral arms heaviness.  At that office visit I recommended increasing her bumetanide to 2 mg 3 times a day and to return for her previously scheduled appointment in 2 weeks.  Since that office visit she called with increased lower extremity cramping so I had her increase her potassium chloride tablets to 3 times a day also.     At her 2 week follow up she reported improvement in her symptoms including the swelling in her legs.  She was still having some dyspnea on exertion and chest heaviness this had improved overall.  Due to ongoing symptoms I recommended proceeding with an echocardiogram and stress test.  She underwent the echocardiogram on 1/31/2020 which showed normal left ventricular systolic function with an EF of 60-65%, grade 2 diastolic dysfunction and mild valvular disease.  She was unable to proceed with stress test because she got ill.  When I called to discuss echo results she reported feeling much better with no further chest discomfort and improvement in her dyspnea.  At that time we opted to cancel her stress test and I asked her to decrease bumetanide back to twice a day.  At some point after that she developed worsening swelling and  increased the bumetanide back up to three times a day.     At a video visit in 4/2020 she reported worsening lower extremity edema and dyspnea on exertion to the point she is having trouble bathing herself with her home health aide.   She was still on bumetanide 2 mg three times a day.   She was not weighing herself or watching her sodium intake.  Recommended trying to increase her bumetanide further and to start restricting her sodium in weigh herself daily.  Patient was to call with an update on her symptoms the following week.        Review of Systems   Constitution: Negative for malaise/fatigue.   HENT: Negative for hearing loss, hoarse voice, nosebleeds and sore throat.    Eyes: Negative for pain.   Cardiovascular: Positive for dyspnea on exertion and leg swelling. Negative for chest pain, claudication, cyanosis, irregular heartbeat, near-syncope, orthopnea, palpitations, paroxysmal nocturnal dyspnea and syncope.   Respiratory: Negative for shortness of breath and snoring.    Endocrine: Negative for cold intolerance, heat intolerance, polydipsia, polyphagia and polyuria.   Skin: Negative for itching and rash.   Musculoskeletal: Negative for arthritis, falls, joint pain, joint swelling, muscle cramps, muscle weakness and myalgias.   Gastrointestinal: Negative for constipation, diarrhea, dysphagia, heartburn, hematemesis, hematochezia, melena, nausea and vomiting.   Genitourinary: Negative for frequency, hematuria and hesitancy.   Neurological: Negative for excessive daytime sleepiness, dizziness, headaches, light-headedness, numbness and weakness.   Psychiatric/Behavioral: Negative for depression. The patient is not nervous/anxious.          Current Outpatient Medications:   •  albuterol sulfate HFA (PROAIR HFA) 108 (90 Base) MCG/ACT inhaler, ProAir HFA 90 mcg/actuation aerosol inhaler, Disp: , Rfl:   •  amLODIPine (NORVASC) 10 MG tablet, Take 10 mg by mouth Daily., Disp: , Rfl:   •  ARIPiprazole (ABILIFY) 10 MG  tablet, Take 20 mg by mouth Daily., Disp: , Rfl:   •  aspirin 325 MG tablet, Take 325 mg by mouth Daily., Disp: , Rfl:   •  atorvastatin (LIPITOR) 40 MG tablet, Take 40 mg by mouth Every Night., Disp: , Rfl:   •  bumetanide (BUMEX) 2 MG tablet, TAKE 1 TABLET BY MOUTH 3 (THREE) TIMES A DAY BEFORE MEALS., Disp: 270 tablet, Rfl: 1  •  carvedilol (COREG) 12.5 MG tablet, Take 12.5 mg by mouth 2 (two) times a day with meals., Disp: , Rfl:   •  clopidogrel (PLAVIX) 75 MG tablet, Take 1 tablet by mouth daily., Disp: 90 tablet, Rfl: 3  •  fluticasone (FLONASE) 50 MCG/ACT nasal spray, fluticasone 50 mcg/actuation nasal spray,suspension, Disp: , Rfl:   •  ipratropium-albuterol (DUO-NEB) 0.5-2.5 mg/3 ml nebulizer, ipratropium-albuterol 0.5 mg-3 mg(2.5 mg base)/3 mL nebulization soln, Disp: , Rfl:   •  isosorbide mononitrate (IMDUR) 30 MG 24 hr tablet, Take 30 mg by mouth Daily., Disp: , Rfl:   •  lamoTRIgine (LaMICtal) 25 MG tablet, Take 25 mg by mouth 2 (Two) Times a Day., Disp: , Rfl:   •  levothyroxine (SYNTHROID, LEVOTHROID) 100 MCG tablet, Take 100 mcg by mouth daily., Disp: , Rfl:   •  meloxicam (MOBIC) 15 MG tablet, meloxicam 15 mg tablet, Disp: , Rfl:   •  modafinil (PROVIGIL) 200 MG tablet, Take 200 mg by mouth 2 (Two) Times a Day., Disp: , Rfl:   •  pantoprazole (PROTONIX) 40 MG EC tablet, Take 40 mg by mouth Daily., Disp: , Rfl:   •  potassium chloride (K-DUR) 10 MEQ CR tablet, Take 1 tablet by mouth 2 (Two) Times a Day With Meals. (Patient taking differently: Take 10 mEq by mouth 3 (Three) Times a Day.), Disp: 60 tablet, Rfl: 1  •  Umeclidinium Bromide (INCRUSE ELLIPTA IN), Inhale Take As Directed., Disp: , Rfl:   •  VIIBRYD 40 MG tablet tablet, Take 40 mg by mouth Daily., Disp: , Rfl:     Past Medical History:   Diagnosis Date   • Anxiety    • Arthritis    • Bipolar disorder (CMS/HCC)    • COPD (chronic obstructive pulmonary disease) (CMS/HCC)    • Coronary artery disease    • Diastolic dysfunction     grade II per  echocardiogram 11/18/2016   • Difficulty walking    • Disease of thyroid gland     hypothyroidism   • Dislocation of hip joint (CMS/HCC)     recurrent dislocation right hip   • DVT (deep venous thrombosis) (CMS/HCC)    • GERD without esophagitis    • Heartburn    • Hematoma    • Hyperlipidemia    • Hypertension    • LVH (left ventricular hypertrophy)     mild to moderate per echocardiogram 11/18/2016   • Major depressive disorder    • Mitral annular calcification     severe per echocardiogram 11/18/2016   • Mitral regurgitation     mild per echo 11/18/2016   • Mitral valve disease    • Muscle weakness    • Narcolepsy    • Overactive bladder    • Pneumonia    • Pulmonary emboli (CMS/HCC)    • Pulmonary hypertension (CMS/HCC)     mild per echo 11/18/2016   • Sleep apnea     obstructive   • Tricuspid regurgitation     mild to moderate per echo 11/18/2016       Past Surgical History:   Procedure Laterality Date   • CATARACT EXTRACTION W/ INTRAOCULAR LENS IMPLANT Right    • CORONARY ANGIOPLASTY WITH STENT PLACEMENT      x2 stents   • EYE SURGERY      cataract surgery bilateral   • HARDWARE REMOVAL FOOT / ANKLE Left 12/01/2011   • HIP SPACER INSERTION WITH ANTIBIOTIC CEMENT Right 2/13/2017    Procedure: RIGHT TOTAL HIP REMOVAL;  Surgeon: Erlin Martin MD;  Location: [facility] OR;  Service:    • INCISION AND DRAINAGE HIP Right 2/8/2017    Procedure: RT HIP INCISION AND DRAINAGE;  Surgeon: Erlin Martin MD;  Location: [facility] OR;  Service:    • INCISION AND DRAINAGE HIP Right 3/17/2017    Procedure: RIGHT HIP INCISION AND DRAINAGE;  Surgeon: Erlin Martin MD;  Location: [facility] OR;  Service:    • JOINT REPLACEMENT     • KNEE ARTHROPLASTY Left 03/2010    TWICE   • ORIF ANKLE FRACTURE Left 06/16/2011   • OTHER SURGICAL HISTORY      IVC filter placement   • SD CLOSED RX TRAUMATIC HIP DISLOCATN Right 8/2/2017    Procedure: FAILED CLOSED RIGHT HIP  REDUCTION WITH OPEN REDUCTION OF RIGHT HIP;  Surgeon:  Erlin Martin MD;  Location: [facility] OR;  Service: Orthopedics   • QUADRICEPS TENDON REPAIR Left 2010    also done 2010    • TOTAL HIP ARTHROPLASTY Right    • TOTAL HIP ARTHROPLASTY REVISION Right 2016   • TOTAL HIP ARTHROPLASTY REVISION Right 2017    Procedure: REIMPLANT RIGHT TOTAL HIP;  Surgeon: Erlin Martin MD;  Location: [facility] OR;  Service:    • TOTAL KNEE ARTHROPLASTY REVISION Left 2010       Family History   Problem Relation Age of Onset   • Malig Hyperthermia Neg Hx        Social History     Tobacco Use   • Smoking status: Former Smoker     Packs/day: 1.00     Years: 19.00     Pack years: 19.00     Types: Cigarettes     Last attempt to quit: 12/15/2019     Years since quittin.5   • Smokeless tobacco: Never Used   Substance Use Topics   • Alcohol use: No   • Drug use: No         ECG 12 Lead  Date/Time: 2020 9:38 AM  Performed by: Margi Barker MD  Authorized by: Margi Barker MD   Comparison: compared with previous ECG   Similar to previous ECG  Rhythm: sinus rhythm  T inversion: I and aVL                 Objective:     There were no vitals taken for this visit.      Physical Exam   Constitutional: She is oriented to person, place, and time. She appears well-developed and well-nourished.   HENT:   Head: Normocephalic and atraumatic.   Eyes: Pupils are equal, round, and reactive to light. Conjunctivae, EOM and lids are normal.   Neck: Normal range of motion and full passive range of motion without pain. Neck supple. No JVD present. Carotid bruit is not present.   Cardiovascular: Normal rate, regular rhythm, S1 normal and S2 normal. Exam reveals no gallop.   No murmur heard.  Pulses:       Radial pulses are 2+ on the right side, and 2+ on the left side.   Pulmonary/Chest: Effort normal and breath sounds normal.   Abdominal: Soft. Normal appearance.   Musculoskeletal: She exhibits no edema.   Lymphadenopathy:     She has no cervical adenopathy.    Neurological: She is alert and oriented to person, place, and time.   Skin: Skin is warm, dry and intact.   Psychiatric: She has a normal mood and affect.             Assessment:          Diagnosis Plan   1. Coronary artery disease involving native coronary artery of native heart without angina pectoris     2. Chronic diastolic congestive heart failure (CMS/HCC)     3. Essential hypertension     4. Hyperlipidemia, unspecified hyperlipidemia type     5. Presence of stent in right coronary artery     6. Other pulmonary embolism without acute cor pulmonale, unspecified chronicity (CMS/HCC)     7. Obstructive sleep apnea     8. Chronic lung disease     9. Gastroesophageal reflux disease, esophagitis presence not specified     10. Obesity (BMI 30-39.9)     11. Hypothyroidism, unspecified type            Plan:       1.  Chronic diastolic congestive heart failure.  Currently appears to be stable on her current regimen of medications.  We will continue her current management.  Fortunately her renal function appears to be tolerating the high dose of bumetanide.   2.  Coronary artery disease.  We have gone back and forth about getting a stress test.  A stress test was ordered after prior conversation but was never done.  She does not currently have any symptoms at this time.  I think we can monitor her for symptoms before proceeding with a stress test at this point.  In the meanwhile we will continue current medical management.  3.  Left subclavian artery stenosis.  She has been referred to vascular surgery per Dr. Echeverria.  No evidence of subclavian steal.  4.  History of DVT/pulmonary embolism.  Previously on warfarin but none recently.  Recent CT angiogram of the chest was unremarkable.  5.  COPD  6.  Obstructive sleep apnea  7.  GERD  8.  Hypothyroidism  9.  Morbid obesity  10.  Hypertension.  Elevated in the office but has been normal at other visits.  Asked her to start monitoring her blood pressures at home.    We  will plan on seeing the patient back again in 3 months.

## 2020-08-24 NOTE — PROGRESS NOTES
"   LOS: 5 days   Patient Care Team:  Talha Echeverria MD as PCP - General (Internal Medicine)    Chief Complaint: scared about surgery    Subjective     HPI Comments: Feels fine this AM. Still nervous about surgery later today.      Subjective:  Symptoms:  Stable.  She reports anxiety.  No shortness of breath, malaise, cough, chest pain, weakness, headache, chest pressure, anorexia or diarrhea.    Diet:  Poor intake.  No nausea or vomiting.    Activity level: Impaired due to pain.    Pain:  She complains of pain that is moderate.  She reports pain is improving.  Pain is well controlled.        History taken from: patient chart    Objective     Vital Signs  Temp:  [97.9 °F (36.6 °C)-98.8 °F (37.1 °C)] 98.4 °F (36.9 °C)  Heart Rate:  [60-69] 64  Resp:  [16-20] 16  BP: ()/(61-80) 132/80    Objective:  General Appearance:  Comfortable and in no acute distress.    Vital signs: (most recent): Blood pressure 132/80, pulse 64, temperature 98.4 °F (36.9 °C), temperature source Oral, resp. rate 16, height 64\" (162.6 cm), weight 226 lb (103 kg), SpO2 95 %.  Vital signs are normal.  No fever.    Output: Producing urine and producing stool.    Lungs:  Normal respiratory rate and normal effort.  Breath sounds clear to auscultation.    Heart: Normal rate.  Regular rhythm.    Abdomen: Abdomen is soft.  Bowel sounds are normal.   There is no abdominal tenderness.     Extremities: There is no dependent edema.    Pulses: Distal pulses are intact.    Neurological: Patient is alert and oriented to person, place and time.    Skin:  Warm and dry.              Results Review:     I reviewed the patient's new clinical results.  I reviewed the patient's other test results and agree with the interpretation  Discussed with patient    Medication Review: reviewed    Assessment/Plan     Principal Problem:    Prosthetic hip infection, mrsa  Active Problems:    Hematoma of right hip    DVT (deep venous thrombosis), hx of    Hypertension    " Patient had a C4/5 C5/6 ACDF 08/11/20.    She is requesting refill on Tizanidine 4 mg q8hrs PRN. Last filled on 08/12/20.    Katie Hloly is out of the office.    Hyperlipidemia    Coronary artery disease, hx of stent    Arthritis    Bipolar disorder    MRSA (methicillin resistant Staphylococcus aureus) infection      Assessment:  (1. MRSA infection of right hip prosthesis--POD #5 s/p I&D  2. Anemia NOS  3. H/o DVT, AC on hold for OR  4. CAD with stent  5. HTN  6. Hyperlipidemia  7. Chronic pain syndrome  8. HypoT4  9. Bipolar d/o).     Plan:   (Received 1 unit PRBCs yesterday, check anemia labs  Incentive spirometry  Plan is for OR today--removal of right hip hardware  Pt pretty scared about whole thing  Supportive psychotherapy utilizing BATHE method).       Cornelio Perry MD  02/13/17  8:47 AM    Time: 20min

## 2020-09-11 ENCOUNTER — OFFICE VISIT (OUTPATIENT)
Dept: CARDIOLOGY | Facility: CLINIC | Age: 70
End: 2020-09-11

## 2020-09-11 VITALS
HEIGHT: 64 IN | SYSTOLIC BLOOD PRESSURE: 142 MMHG | WEIGHT: 245 LBS | BODY MASS INDEX: 41.83 KG/M2 | HEART RATE: 66 BPM | DIASTOLIC BLOOD PRESSURE: 82 MMHG

## 2020-09-11 DIAGNOSIS — E03.9 HYPOTHYROIDISM, UNSPECIFIED TYPE: ICD-10-CM

## 2020-09-11 DIAGNOSIS — I25.10 CORONARY ARTERY DISEASE INVOLVING NATIVE CORONARY ARTERY OF NATIVE HEART WITHOUT ANGINA PECTORIS: ICD-10-CM

## 2020-09-11 DIAGNOSIS — I82.409 DEEP VEIN THROMBOSIS (DVT) OF LOWER EXTREMITY, UNSPECIFIED CHRONICITY, UNSPECIFIED LATERALITY, UNSPECIFIED VEIN (HCC): ICD-10-CM

## 2020-09-11 DIAGNOSIS — E78.5 HYPERLIPIDEMIA, UNSPECIFIED HYPERLIPIDEMIA TYPE: ICD-10-CM

## 2020-09-11 DIAGNOSIS — G47.33 OBSTRUCTIVE SLEEP APNEA SYNDROME: ICD-10-CM

## 2020-09-11 DIAGNOSIS — I26.99 OTHER PULMONARY EMBOLISM WITHOUT ACUTE COR PULMONALE, UNSPECIFIED CHRONICITY (HCC): ICD-10-CM

## 2020-09-11 DIAGNOSIS — I10 ESSENTIAL HYPERTENSION: ICD-10-CM

## 2020-09-11 DIAGNOSIS — E66.9 OBESITY (BMI 30-39.9): ICD-10-CM

## 2020-09-11 DIAGNOSIS — Z95.5 PRESENCE OF STENT IN RIGHT CORONARY ARTERY: ICD-10-CM

## 2020-09-11 DIAGNOSIS — I50.32 CHRONIC DIASTOLIC CONGESTIVE HEART FAILURE (HCC): Primary | ICD-10-CM

## 2020-09-11 DIAGNOSIS — K21.9 GASTROESOPHAGEAL REFLUX DISEASE, ESOPHAGITIS PRESENCE NOT SPECIFIED: ICD-10-CM

## 2020-09-11 DIAGNOSIS — J98.4 CHRONIC LUNG DISEASE: ICD-10-CM

## 2020-09-11 PROCEDURE — 93000 ELECTROCARDIOGRAM COMPLETE: CPT | Performed by: INTERNAL MEDICINE

## 2020-09-11 PROCEDURE — 99214 OFFICE O/P EST MOD 30 MIN: CPT | Performed by: INTERNAL MEDICINE

## 2020-09-11 NOTE — PROGRESS NOTES
Subjective:     Encounter Date:09/11/2020      Patient ID: Frances Downs is a 70 y.o. female.    Chief Complaint:  History of Present Illness    This is a 70-year-old with a history of coronary artery disease status post prior coronary artery stent placement, history of DVT and pulmonary embolus previously on anticoagulation with warfarin, hypertension, obstructive sleep apnea on CPAP, chronic diastolic congestive heart failure, who presents for follow-up.     She presents today for follow-up.  Since I saw her last she has seen Dr. Jackson.  She did not appear to be having any symptoms from her left subclavian stenosis during his evaluation so he did not recommend any further intervention.  He did refer her for a carotid artery ultrasound which was performed on 8/19/2020 and showed 50 to 75% or moderate stenosis of the left carotid artery.  Both sides showed antegrade flow.      She reports her bilateral lower extremity edema is stable.  She continues to have dyspnea on exertion which she attributes to her smoking history.  She is interested in quitting and feels motivated to do so.  She reports she has nicotine patches at home which she has tried in the past.  She is willing to try those again.  She denies any chest pain, palpitations, orthopnea, near-syncope or syncope.     Prior History:  The patient was previously followed by Dr. Oliveros.  Prior to that she had a history of coronary artery disease with right coronary artery stent placement by Dr. Baer in 2011.  Since following Dr. Oliveros her last stress test was in 11/2016 and was negative for ischemia.  Her last echocardiogram was in 5/2018 and showed normal left ventricular systolic function and wall motion with an EF of 65 to 70%, mild mitral regurgitation, normal diastolic function, and biatrial enlargement.  She was last seen by Dr. Oliveros in 5/2019 at which time she was doing well and no changes were made to her management.     I saw her initially on  12/20/19 for urgent evaluation of dyspnea and lower extremity edema.  She reported orthopnea requiring her to sleep with 2 pillows propped up.  She reported compliance with her medications but admits to dietary indiscretions with sodium.  She reportedly has been quite depressed recently and actually had not bathed for a week until her caregiver came by yesterday and helps her bathe.  She denied any chest pain like her prior anginal symptoms but reported me chest fullness and bilateral arms heaviness.  At that office visit I recommended increasing her bumetanide to 2 mg 3 times a day and to return for her previously scheduled appointment in 2 weeks.  Since that office visit she called with increased lower extremity cramping so I had her increase her potassium chloride tablets to 3 times a day also.     At her 2 week follow up she reported improvement in her symptoms including the swelling in her legs.  She was still having some dyspnea on exertion and chest heaviness this had improved overall.  Due to ongoing symptoms I recommended proceeding with an echocardiogram and stress test.  She underwent the echocardiogram on 1/31/2020 which showed normal left ventricular systolic function with an EF of 60-65%, grade 2 diastolic dysfunction and mild valvular disease.  She was unable to proceed with stress test because she got ill.  When I called to discuss echo results she reported feeling much better with no further chest discomfort and improvement in her dyspnea.  At that time we opted to cancel her stress test and I asked her to decrease bumetanide back to twice a day.  At some point after that she developed worsening swelling and increased the bumetanide back up to three times a day.      At a video visit in 4/2020 she reported worsening lower extremity edema and dyspnea on exertion to the point she is having trouble bathing herself with her home health aide.   She was still on bumetanide 2 mg three times a day.   She  was not weighing herself or watching her sodium intake.  Recommended trying to increase her bumetanide further and to start restricting her sodium in weigh herself daily.  Patient was to call with an update on her symptoms the following week.      Symptoms initially improved but again on 6/30/2020 she complained of worsening shortness of breath and lower extremity edema.  She ended up going to the emergency room at that point where her work-up was unremarkable including a normal BNP.  She also underwent a CT angiogram of the chest that showed no evidence of pulmonary embolism or acute lung disease but did show evidence of possible left subclavian artery stenosis.  She has since followed up with Dr. Echeverria who has referred her to vascular surgery.      Review of Systems   Constitution: Positive for malaise/fatigue.   HENT: Negative for hearing loss, hoarse voice, nosebleeds and sore throat.    Eyes: Negative for pain.   Cardiovascular: Positive for dyspnea on exertion and leg swelling. Negative for chest pain, claudication, cyanosis, irregular heartbeat, near-syncope, orthopnea, palpitations, paroxysmal nocturnal dyspnea and syncope.   Respiratory: Negative for shortness of breath and snoring.    Endocrine: Negative for cold intolerance, heat intolerance, polydipsia, polyphagia and polyuria.   Skin: Negative for itching and rash.   Musculoskeletal: Negative for arthritis, falls, joint pain, joint swelling, muscle cramps, muscle weakness and myalgias.   Gastrointestinal: Negative for constipation, diarrhea, dysphagia, heartburn, hematemesis, hematochezia, melena, nausea and vomiting.   Genitourinary: Negative for frequency, hematuria and hesitancy.   Neurological: Negative for excessive daytime sleepiness, dizziness, headaches, light-headedness, numbness and weakness.   Psychiatric/Behavioral: Negative for depression. The patient is not nervous/anxious.          Current Outpatient Medications:   •  albuterol sulfate  HFA (PROAIR HFA) 108 (90 Base) MCG/ACT inhaler, ProAir HFA 90 mcg/actuation aerosol inhaler, Disp: , Rfl:   •  amLODIPine (NORVASC) 10 MG tablet, Take 10 mg by mouth Daily., Disp: , Rfl:   •  ARIPiprazole (ABILIFY) 10 MG tablet, Take 20 mg by mouth Daily., Disp: , Rfl:   •  aspirin 325 MG tablet, Take 325 mg by mouth Daily., Disp: , Rfl:   •  atorvastatin (LIPITOR) 40 MG tablet, Take 40 mg by mouth Every Night., Disp: , Rfl:   •  bumetanide (BUMEX) 2 MG tablet, TAKE 1 TABLET BY MOUTH 3 (THREE) TIMES A DAY BEFORE MEALS., Disp: 270 tablet, Rfl: 1  •  carvedilol (COREG) 12.5 MG tablet, Take 12.5 mg by mouth 2 (two) times a day with meals., Disp: , Rfl:   •  clopidogrel (PLAVIX) 75 MG tablet, Take 1 tablet by mouth daily., Disp: 90 tablet, Rfl: 3  •  fluticasone (FLONASE) 50 MCG/ACT nasal spray, fluticasone 50 mcg/actuation nasal spray,suspension, Disp: , Rfl:   •  ipratropium-albuterol (DUO-NEB) 0.5-2.5 mg/3 ml nebulizer, ipratropium-albuterol 0.5 mg-3 mg(2.5 mg base)/3 mL nebulization soln, Disp: , Rfl:   •  isosorbide mononitrate (IMDUR) 30 MG 24 hr tablet, Take 30 mg by mouth Daily., Disp: , Rfl:   •  lamoTRIgine (LaMICtal) 25 MG tablet, Take 25 mg by mouth 2 (Two) Times a Day., Disp: , Rfl:   •  levothyroxine (SYNTHROID, LEVOTHROID) 100 MCG tablet, Take 100 mcg by mouth daily., Disp: , Rfl:   •  meloxicam (MOBIC) 15 MG tablet, meloxicam 15 mg tablet, Disp: , Rfl:   •  modafinil (PROVIGIL) 200 MG tablet, Take 200 mg by mouth 2 (Two) Times a Day., Disp: , Rfl:   •  pantoprazole (PROTONIX) 40 MG EC tablet, Take 40 mg by mouth Daily., Disp: , Rfl:   •  potassium chloride (K-DUR) 10 MEQ CR tablet, Take 1 tablet by mouth 2 (Two) Times a Day With Meals. (Patient taking differently: Take 10 mEq by mouth 3 (Three) Times a Day.), Disp: 60 tablet, Rfl: 1  •  Umeclidinium Bromide (INCRUSE ELLIPTA IN), Inhale Take As Directed., Disp: , Rfl:   •  VIIBRYD 40 MG tablet tablet, Take 40 mg by mouth Daily., Disp: , Rfl:     Past  Medical History:   Diagnosis Date   • Anxiety    • Arthritis    • Bipolar disorder (CMS/HCC)    • COPD (chronic obstructive pulmonary disease) (CMS/[facility])    • Coronary artery disease    • Diastolic dysfunction     grade II per echocardiogram 11/18/2016   • Difficulty walking    • Disease of thyroid gland     hypothyroidism   • Dislocation of hip joint (CMS/HCC)     recurrent dislocation right hip   • DVT (deep venous thrombosis) (CMS/[facility])    • GERD without esophagitis    • Heartburn    • Hematoma    • Hyperlipidemia    • Hypertension    • LVH (left ventricular hypertrophy)     mild to moderate per echocardiogram 11/18/2016   • Major depressive disorder    • Mitral annular calcification     severe per echocardiogram 11/18/2016   • Mitral regurgitation     mild per echo 11/18/2016   • Mitral valve disease    • Muscle weakness    • Narcolepsy    • Overactive bladder    • Pneumonia    • Pulmonary emboli (CMS/[facility])    • Pulmonary hypertension (CMS/[facility])     mild per echo 11/18/2016   • Sleep apnea     obstructive   • Tricuspid regurgitation     mild to moderate per echo 11/18/2016       Past Surgical History:   Procedure Laterality Date   • CATARACT EXTRACTION W/ INTRAOCULAR LENS IMPLANT Right    • CORONARY ANGIOPLASTY WITH STENT PLACEMENT      x2 stents   • EYE SURGERY      cataract surgery bilateral   • HARDWARE REMOVAL FOOT / ANKLE Left 12/01/2011   • HIP SPACER INSERTION WITH ANTIBIOTIC CEMENT Right 2/13/2017    Procedure: RIGHT TOTAL HIP REMOVAL;  Surgeon: Erlin Martin MD;  Location: [facility] OR;  Service:    • INCISION AND DRAINAGE HIP Right 2/8/2017    Procedure: RT HIP INCISION AND DRAINAGE;  Surgeon: Erlin Martin MD;  Location: [facility] OR;  Service:    • INCISION AND DRAINAGE HIP Right 3/17/2017    Procedure: RIGHT HIP INCISION AND DRAINAGE;  Surgeon: Erlin Martin MD;  Location: [facility] OR;  Service:    • JOINT REPLACEMENT     • KNEE ARTHROPLASTY Left 03/2010    TWICE   • ORIF ANKLE  "FRACTURE Left 2011   • OTHER SURGICAL HISTORY      IVC filter placement   • NV CLOSED RX TRAUMATIC HIP DISLOCATN Right 2017    Procedure: FAILED CLOSED RIGHT HIP  REDUCTION WITH OPEN REDUCTION OF RIGHT HIP;  Surgeon: Erlin Martin MD;  Location: [facility] OR;  Service: Orthopedics   • QUADRICEPS TENDON REPAIR Left 2010    also done 2010    • TOTAL HIP ARTHROPLASTY Right    • TOTAL HIP ARTHROPLASTY REVISION Right 2016   • TOTAL HIP ARTHROPLASTY REVISION Right 2017    Procedure: REIMPLANT RIGHT TOTAL HIP;  Surgeon: Erlin Martin MD;  Location: [facility] OR;  Service:    • TOTAL KNEE ARTHROPLASTY REVISION Left 2010       Family History   Problem Relation Age of Onset   • Malig Hyperthermia Neg Hx        Social History     Tobacco Use   • Smoking status: Former Smoker     Packs/day: 1.00     Years: 19.00     Pack years: 19.00     Types: Cigarettes     Last attempt to quit: 12/15/2019     Years since quittin.7   • Smokeless tobacco: Never Used   Substance Use Topics   • Alcohol use: No   • Drug use: No         ECG 12 Lead  Date/Time: 2020 3:50 PM  Performed by: Margi Barker MD  Authorized by: Margi Barker MD   Comparison: compared with previous ECG   Similar to previous ECG  Rhythm: sinus rhythm               Objective:     Visit Vitals  /82   Pulse 66   Ht 162.6 cm (64\")   Wt 111 kg (245 lb)   BMI 42.05 kg/m²         Physical Exam   Constitutional: She is oriented to person, place, and time. She appears well-developed and well-nourished.   HENT:   Head: Normocephalic and atraumatic.   Eyes: Pupils are equal, round, and reactive to light. Conjunctivae, EOM and lids are normal.   Neck: Normal range of motion and full passive range of motion without pain. Neck supple. No JVD present. Carotid bruit is not present.   Cardiovascular: Normal rate, regular rhythm, S1 normal and S2 normal. Exam reveals no gallop.   No murmur heard.  Pulses:       Radial " pulses are 2+ on the right side, and 2+ on the left side.   Pulmonary/Chest: Effort normal and breath sounds normal.   Abdominal: Soft. Normal appearance.   Musculoskeletal: She exhibits edema.   1+ bilateral lower extremity edema   Lymphadenopathy:     She has no cervical adenopathy.   Neurological: She is alert and oriented to person, place, and time.   Skin: Skin is warm, dry and intact.   Psychiatric: She has a normal mood and affect.       Assessment:          Diagnosis Plan   1. Chronic diastolic congestive heart failure (CMS/HCC)     2. Coronary artery disease involving native coronary artery of native heart without angina pectoris     3. Other pulmonary embolism without acute cor pulmonale, unspecified chronicity (CMS/HCC)     4. Deep vein thrombosis (DVT) of lower extremity, unspecified chronicity, unspecified laterality, unspecified vein (CMS/[facility])     5. Hyperlipidemia, unspecified hyperlipidemia type     6. Essential hypertension     7. Presence of stent in right coronary artery     8. Chronic lung disease     9. Obstructive sleep apnea     10. Gastroesophageal reflux disease, esophagitis presence not specified     11. Obesity (BMI 30-39.9)     12. Hypothyroidism, unspecified type            Plan:       1.  Chronic diastolic congestive heart failure.  Appears to be largely stable on her current medications.  We will continue the same.  2.  Coronary artery disease.  No recent symptoms suggestive of angina.  Continue current medical management.  3.  Left subclavian artery stenosis.  No symptoms at this time.  No plans for intervention as per vascular surgery as long as she remains asymptomatic.  4.  Left carotid artery stenosis.  Appears to be in the moderate range.  5.  History of DVT/pulmonary embolism.  Previously on warfarin.  Recent CT angiogram of the chest was unremarkable.  6.  COPD  7.  Obstructive sleep apnea  8.  Hypothyroidism  9.  Morbid obesity  10.  Hypertension.  Fairly well controlled on  current medications.  11.  Tobacco use.  Patient appears motivated to quit smoking.  We discussed trying the nicotine patches again.  If this does not work I asked her to discuss other pharmacologic aids with Dr. Echeverria.    We will plan on seeing her back again in 6 months.

## 2020-09-21 ENCOUNTER — TELEPHONE (OUTPATIENT)
Dept: CARDIOLOGY | Facility: CLINIC | Age: 70
End: 2020-09-21

## 2020-09-21 NOTE — TELEPHONE ENCOUNTER
Patient left voicemail today asking if you would order the test to check on Left subclavian artery stenosis.  She states that you all discussed this at last visit.    If agreeable, please place order.    Thanks!

## 2020-09-21 NOTE — TELEPHONE ENCOUNTER
Let her know that at this point she does not need any further testing.  Is she having any new symptoms with her left arm?

## 2020-09-30 RX ORDER — MIRABEGRON 25 MG/1
25 TABLET, FILM COATED, EXTENDED RELEASE ORAL DAILY
COMMUNITY
Start: 2020-09-03

## 2020-09-30 RX ORDER — ARIPIPRAZOLE 20 MG/1
20 TABLET ORAL
COMMUNITY
Start: 2020-07-11

## 2020-09-30 RX ORDER — LAMOTRIGINE 200 MG/1
200 TABLET ORAL 2 TIMES DAILY
COMMUNITY
Start: 2020-08-17

## 2020-09-30 RX ORDER — ATOMOXETINE 60 MG/1
60 CAPSULE ORAL EVERY MORNING
COMMUNITY
Start: 2020-07-20 | End: 2022-07-29

## 2020-09-30 RX ORDER — BUPROPION HYDROCHLORIDE 300 MG/1
300 TABLET ORAL EVERY MORNING
COMMUNITY
Start: 2020-08-31

## 2020-10-02 ENCOUNTER — TELEPHONE (OUTPATIENT)
Dept: CARDIOLOGY | Facility: CLINIC | Age: 70
End: 2020-10-02

## 2020-10-02 NOTE — TELEPHONE ENCOUNTER
Pt called and left VM stating that she is having pain in her legs. She thinks that she is not taking enough potassium, that's why her legs hurt.    She can be reached at [phone]    Thanks

## 2020-10-09 ENCOUNTER — OFFICE VISIT (OUTPATIENT)
Dept: CARDIOLOGY | Facility: CLINIC | Age: 70
End: 2020-10-09

## 2020-10-09 VITALS
BODY MASS INDEX: 41.83 KG/M2 | WEIGHT: 245 LBS | HEART RATE: 67 BPM | DIASTOLIC BLOOD PRESSURE: 84 MMHG | HEIGHT: 64 IN | SYSTOLIC BLOOD PRESSURE: 150 MMHG

## 2020-10-09 DIAGNOSIS — I82.409 DEEP VEIN THROMBOSIS (DVT) OF LOWER EXTREMITY, UNSPECIFIED CHRONICITY, UNSPECIFIED LATERALITY, UNSPECIFIED VEIN (HCC): ICD-10-CM

## 2020-10-09 DIAGNOSIS — I10 ESSENTIAL HYPERTENSION: ICD-10-CM

## 2020-10-09 DIAGNOSIS — E78.5 HYPERLIPIDEMIA, UNSPECIFIED HYPERLIPIDEMIA TYPE: ICD-10-CM

## 2020-10-09 DIAGNOSIS — I26.99 OTHER PULMONARY EMBOLISM WITHOUT ACUTE COR PULMONALE, UNSPECIFIED CHRONICITY (HCC): ICD-10-CM

## 2020-10-09 DIAGNOSIS — G47.33 OBSTRUCTIVE SLEEP APNEA SYNDROME: ICD-10-CM

## 2020-10-09 DIAGNOSIS — E03.9 HYPOTHYROIDISM, UNSPECIFIED TYPE: ICD-10-CM

## 2020-10-09 DIAGNOSIS — E66.9 OBESITY (BMI 30-39.9): ICD-10-CM

## 2020-10-09 DIAGNOSIS — K21.9 GASTROESOPHAGEAL REFLUX DISEASE, UNSPECIFIED WHETHER ESOPHAGITIS PRESENT: ICD-10-CM

## 2020-10-09 DIAGNOSIS — I25.10 CORONARY ARTERY DISEASE INVOLVING NATIVE CORONARY ARTERY OF NATIVE HEART WITHOUT ANGINA PECTORIS: Primary | ICD-10-CM

## 2020-10-09 DIAGNOSIS — Z95.5 PRESENCE OF STENT IN RIGHT CORONARY ARTERY: ICD-10-CM

## 2020-10-09 DIAGNOSIS — I50.32 CHRONIC DIASTOLIC CONGESTIVE HEART FAILURE (HCC): ICD-10-CM

## 2020-10-09 PROCEDURE — 99214 OFFICE O/P EST MOD 30 MIN: CPT | Performed by: INTERNAL MEDICINE

## 2020-10-09 PROCEDURE — 93000 ELECTROCARDIOGRAM COMPLETE: CPT | Performed by: INTERNAL MEDICINE

## 2020-10-09 NOTE — PROGRESS NOTES
Subjective:     Encounter Date:10/09/2020      Patient ID: Frances Downs is a 70 y.o. female.    Chief Complaint:  History of Present Illness    This is a 70-year-old with a history of coronary artery disease status post prior coronary artery stent placement, history of DVT and pulmonary embolus previously on anticoagulation with warfarin, hypertension, obstructive sleep apnea on CPAP, chronic diastolic congestive heart failure, who presents for follow-up.     For urgent follow-up.  I saw her at in 9/2020 at which time she was doing fairly well.  She called last week complaining of bilateral lower extremity leg pain.  She was worried that her potassium levels may be low.  I called her back to discuss this but did not receive a call back and it appears that she made an appointment instead.  She describes it as a muscle aching pain.  Occurs both at rest and with activity.  She denies any worsening dyspnea.  She has chronic dyspnea on exertion which he attributes to her history of smoking.  She is now off cigarettes and is using nicotine patches.  She denies any chest pain, palpitations, orthopnea, near-syncope or syncope.  Her bilateral lower extremity edema is mild and stable.     Prior History:  The patient was previously followed by Dr. Oliveros.  Prior to that she had a history of coronary artery disease with right coronary artery stent placement by Dr. Baer in 2011.  Since following Dr. Oliveros her last stress test was in 11/2016 and was negative for ischemia.  Her last echocardiogram was in 5/2018 and showed normal left ventricular systolic function and wall motion with an EF of 65 to 70%, mild mitral regurgitation, normal diastolic function, and biatrial enlargement.  She was last seen by Dr. Oliveros in 5/2019 at which time she was doing well and no changes were made to her management.     I saw her initially on 12/20/19 for urgent evaluation of dyspnea and lower extremity edema.  She reported orthopnea  requiring her to sleep with 2 pillows propped up.  She reported compliance with her medications but admits to dietary indiscretions with sodium.  She reportedly has been quite depressed recently and actually had not bathed for a week until her caregiver came by yesterday and helps her bathe.  She denied any chest pain like her prior anginal symptoms but reported me chest fullness and bilateral arms heaviness.  At that office visit I recommended increasing her bumetanide to 2 mg 3 times a day and to return for her previously scheduled appointment in 2 weeks.  Since that office visit she called with increased lower extremity cramping so I had her increase her potassium chloride tablets to 3 times a day also.     At her 2 week follow up she reported improvement in her symptoms including the swelling in her legs.  She was still having some dyspnea on exertion and chest heaviness this had improved overall.  Due to ongoing symptoms I recommended proceeding with an echocardiogram and stress test.  She underwent the echocardiogram on 1/31/2020 which showed normal left ventricular systolic function with an EF of 60-65%, grade 2 diastolic dysfunction and mild valvular disease.  She was unable to proceed with stress test because she got ill.  When I called to discuss echo results she reported feeling much better with no further chest discomfort and improvement in her dyspnea.  At that time we opted to cancel her stress test and I asked her to decrease bumetanide back to twice a day.  At some point after that she developed worsening swelling and increased the bumetanide back up to three times a day.      At a video visit in 4/2020 she reported worsening lower extremity edema and dyspnea on exertion to the point she is having trouble bathing herself with her home health aide.   She was still on bumetanide 2 mg three times a day.   She was not weighing herself or watching her sodium intake.  Recommended trying to increase her  bumetanide further and to start restricting her sodium in weigh herself daily.  Patient was to call with an update on her symptoms the following week.       Symptoms initially improved but again on 6/30/2020 she complained of worsening shortness of breath and lower extremity edema.  She ended up going to the emergency room at that point where her work-up was unremarkable including a normal BNP.  She also underwent a CT angiogram of the chest that showed no evidence of pulmonary embolism or acute lung disease but did show evidence of possible left subclavian artery stenosis.  She has since followed up with Dr. Echeverria who has referred her to vascular surgery.    She was evaluated by Dr. Jackson who did not recommend any further intervention since she was not having any symptoms from the left subclavian stenosis during his evaluation so he did not recommend any further intervention.  He did refer her for a carotid artery ultrasound which was performed on 8/19/2020 and showed 50 to 75% or moderate stenosis of the left carotid artery.  Both sides showed antegrade flow.         Review of Systems   Constitution: Positive for malaise/fatigue.   HENT: Negative for hearing loss, hoarse voice, nosebleeds and sore throat.    Eyes: Negative for pain.   Cardiovascular: Positive for dyspnea on exertion and leg swelling. Negative for chest pain, claudication, cyanosis, irregular heartbeat, near-syncope, orthopnea, palpitations, paroxysmal nocturnal dyspnea and syncope.   Respiratory: Negative for shortness of breath and snoring.    Endocrine: Negative for cold intolerance, heat intolerance, polydipsia, polyphagia and polyuria.   Skin: Negative for itching and rash.   Musculoskeletal: Positive for myalgias. Negative for arthritis, falls, joint pain, joint swelling, muscle cramps and muscle weakness.   Gastrointestinal: Negative for constipation, diarrhea, dysphagia, heartburn, hematemesis, hematochezia, melena, nausea and  vomiting.   Genitourinary: Negative for frequency, hematuria and hesitancy.   Neurological: Negative for excessive daytime sleepiness, dizziness, headaches, light-headedness, numbness and weakness.   Psychiatric/Behavioral: Negative for depression. The patient is not nervous/anxious.          Current Outpatient Medications:   •  albuterol sulfate HFA (PROAIR HFA) 108 (90 Base) MCG/ACT inhaler, ProAir HFA 90 mcg/actuation aerosol inhaler, Disp: , Rfl:   •  amLODIPine (NORVASC) 10 MG tablet, Take 10 mg by mouth Daily., Disp: , Rfl:   •  ARIPiprazole (ABILIFY) 20 MG tablet, Take 20 mg by mouth every night at bedtime., Disp: , Rfl:   •  aspirin 325 MG EC tablet, Take 325 mg by mouth Every Morning., Disp: , Rfl:   •  aspirin 325 MG tablet, Take 325 mg by mouth Daily., Disp: , Rfl:   •  atomoxetine (STRATTERA) 60 MG capsule, Take 60 mg by mouth Every Morning., Disp: , Rfl:   •  atorvastatin (LIPITOR) 40 MG tablet, Take 40 mg by mouth Every Night., Disp: , Rfl:   •  bumetanide (BUMEX) 2 MG tablet, TAKE 1 TABLET BY MOUTH 3 (THREE) TIMES A DAY BEFORE MEALS., Disp: 270 tablet, Rfl: 1  •  buPROPion XL (WELLBUTRIN XL) 300 MG 24 hr tablet, Take 300 mg by mouth Every Morning., Disp: , Rfl:   •  carvedilol (COREG) 12.5 MG tablet, Take 12.5 mg by mouth 2 (two) times a day with meals., Disp: , Rfl:   •  clopidogrel (PLAVIX) 75 MG tablet, Take 1 tablet by mouth daily., Disp: 90 tablet, Rfl: 3  •  fluticasone (FLONASE) 50 MCG/ACT nasal spray, fluticasone 50 mcg/actuation nasal spray,suspension, Disp: , Rfl:   •  Incruse Ellipta 62.5 MCG/INH aerosol powder , Inhale 1 puff Daily., Disp: , Rfl:   •  ipratropium-albuterol (DUO-NEB) 0.5-2.5 mg/3 ml nebulizer, ipratropium-albuterol 0.5 mg-3 mg(2.5 mg base)/3 mL nebulization soln, Disp: , Rfl:   •  isosorbide mononitrate (IMDUR) 30 MG 24 hr tablet, Take 30 mg by mouth Daily., Disp: , Rfl:   •  LaMICtal 200 MG tablet, Take 200 mg by mouth 2 (Two) Times a Day., Disp: , Rfl:   •  lamoTRIgine  (LaMICtal) 25 MG tablet, Take 25 mg by mouth 2 (Two) Times a Day., Disp: , Rfl:   •  levothyroxine (SYNTHROID, LEVOTHROID) 100 MCG tablet, Take 100 mcg by mouth daily., Disp: , Rfl:   •  meloxicam (MOBIC) 15 MG tablet, meloxicam 15 mg tablet, Disp: , Rfl:   •  modafinil (PROVIGIL) 200 MG tablet, Take 200 mg by mouth 2 (Two) Times a Day., Disp: , Rfl:   •  Myrbetriq 25 MG tablet sustained-release 24 hour 24 hr tablet, Take 25 mg by mouth Daily., Disp: , Rfl:   •  pantoprazole (PROTONIX) 40 MG EC tablet, Take 40 mg by mouth Daily., Disp: , Rfl:   •  potassium chloride (K-DUR) 10 MEQ CR tablet, Take 1 tablet by mouth 2 (Two) Times a Day With Meals. (Patient taking differently: Take 10 mEq by mouth 3 (Three) Times a Day.), Disp: 60 tablet, Rfl: 1  •  Umeclidinium Bromide (INCRUSE ELLIPTA IN), Inhale Take As Directed., Disp: , Rfl:   •  VIIBRYD 40 MG tablet tablet, Take 40 mg by mouth Daily., Disp: , Rfl:     Past Medical History:   Diagnosis Date   • Anxiety    • Arthritis    • Bipolar disorder (CMS/HCC)    • COPD (chronic obstructive pulmonary disease) (CMS/HCC)    • Coronary artery disease    • Diastolic dysfunction     grade II per echocardiogram 11/18/2016   • Difficulty walking    • Disease of thyroid gland     hypothyroidism   • Dislocation of hip joint (CMS/HCC)     recurrent dislocation right hip   • DVT (deep venous thrombosis) (CMS/[facility])    • GERD without esophagitis    • Heartburn    • Hematoma    • Hyperlipidemia    • Hypertension    • LVH (left ventricular hypertrophy)     mild to moderate per echocardiogram 11/18/2016   • Major depressive disorder    • Mitral annular calcification     severe per echocardiogram 11/18/2016   • Mitral regurgitation     mild per echo 11/18/2016   • Mitral valve disease    • Muscle weakness    • Narcolepsy    • Overactive bladder    • Pneumonia    • Pulmonary emboli (CMS/HCC)    • Pulmonary hypertension (CMS/HCC)     mild per echo 11/18/2016   • Sleep apnea     obstructive   •  Tricuspid regurgitation     mild to moderate per echo 2016       Past Surgical History:   Procedure Laterality Date   • CATARACT EXTRACTION W/ INTRAOCULAR LENS IMPLANT Right    • CORONARY ANGIOPLASTY WITH STENT PLACEMENT      x2 stents   • EYE SURGERY      cataract surgery bilateral   • HARDWARE REMOVAL FOOT / ANKLE Left 2011   • HIP SPACER INSERTION WITH ANTIBIOTIC CEMENT Right 2017    Procedure: RIGHT TOTAL HIP REMOVAL;  Surgeon: Erlin Martin MD;  Location: [facility];  Service:    • INCISION AND DRAINAGE HIP Right 2017    Procedure: RT HIP INCISION AND DRAINAGE;  Surgeon: Erlin Martin MD;  Location: [facility] OR;  Service:    • INCISION AND DRAINAGE HIP Right 3/17/2017    Procedure: RIGHT HIP INCISION AND DRAINAGE;  Surgeon: Erlin Martin MD;  Location: [facility];  Service:    • JOINT REPLACEMENT     • KNEE ARTHROPLASTY Left 2010    TWICE   • ORIF ANKLE FRACTURE Left 2011   • OTHER SURGICAL HISTORY      IVC filter placement   • AL CLOSED RX TRAUMATIC HIP DISLOCATN Right 2017    Procedure: FAILED CLOSED RIGHT HIP  REDUCTION WITH OPEN REDUCTION OF RIGHT HIP;  Surgeon: Erlin Martin MD;  Location: [facility];  Service: Orthopedics   • QUADRICEPS TENDON REPAIR Left 2010    also done 2010    • TOTAL HIP ARTHROPLASTY Right    • TOTAL HIP ARTHROPLASTY REVISION Right 2016   • TOTAL HIP ARTHROPLASTY REVISION Right 2017    Procedure: REIMPLANT RIGHT TOTAL HIP;  Surgeon: Erlin Martin MD;  Location: [facility];  Service:    • TOTAL KNEE ARTHROPLASTY REVISION Left 2010       Family History   Problem Relation Age of Onset   • Malig Hyperthermia Neg Hx        Social History     Tobacco Use   • Smoking status: Former Smoker     Packs/day: 1.00     Years: 19.00     Pack years: 19.00     Types: Cigarettes     Quit date: 12/15/2019     Years since quittin.8   • Smokeless tobacco: Never Used   Substance Use Topics   • Alcohol  "use: No   • Drug use: No         ECG 12 Lead    Date/Time: 10/9/2020 12:32 PM  Performed by: Margi Barker MD  Authorized by: Margi Barker MD   Comparison: compared with previous ECG   Similar to previous ECG  Rhythm: sinus rhythm  T inversion: I and aVL                 Objective:     Visit Vitals  /84   Pulse 67   Ht 162.6 cm (64\")   Wt 111 kg (245 lb)   BMI 42.05 kg/m²         Constitutional:       Appearance: Normal appearance. Well-developed.   Eyes:      General: Lids are normal.      Conjunctiva/sclera: Conjunctivae normal.      Pupils: Pupils are equal, round, and reactive to light.   HENT:      Head: Normocephalic and atraumatic.   Neck:      Musculoskeletal: Full passive range of motion without pain, normal range of motion and neck supple.      Vascular: No carotid bruit or JVD.      Lymphadenopathy: No cervical adenopathy.   Pulmonary:      Effort: Pulmonary effort is normal.      Breath sounds: Normal breath sounds.   Cardiovascular:      Normal rate. Regular rhythm.      No gallop.   Pulses:     Radial: 2+ bilaterally.     Posterior tibial: 2+ bilaterally.  Edema:     Peripheral edema present.     Ankle: bilateral 1+ edema of the ankle.  Abdominal:      Palpations: Abdomen is soft.   Skin:     General: Skin is warm and dry.   Neurological:      Mental Status: Alert and oriented to person, place, and time.             Assessment:          Diagnosis Plan   1. Coronary artery disease involving native coronary artery of native heart without angina pectoris     2. Presence of stent in right coronary artery     3. Essential hypertension     4. Hyperlipidemia, unspecified hyperlipidemia type     5. Other pulmonary embolism without acute cor pulmonale, unspecified chronicity (CMS/HCC)     6. Deep vein thrombosis (DVT) of lower extremity, unspecified chronicity, unspecified laterality, unspecified vein (CMS/HCC)     7. Chronic diastolic congestive heart failure (CMS/HCC)     8. Obstructive sleep " apnea     9. Gastroesophageal reflux disease, unspecified whether esophagitis present     10. Obesity (BMI 30-39.9)     11. Hypothyroidism, unspecified type            Plan:       1.  Bilateral lower extremity pain.  Her symptoms sound atypical for claudication although this should be considered in the differential.  She appears to have good posterior tibial pulses on exam.  We will proceed with basic metabolic panel to check her potassium level.  If this looks okay and she continues to have symptoms we will consider getting arterial Doppler ultrasound.  2.  Chronic diastolic congestive heart failure.  Volume status appears to be stable.  Continue current management.  3.  Coronary artery disease.  No recent anginal symptoms.  Continue medical management.  4.  Left subclavian artery stenosis.  No symptoms related to this.  Continue medical management as long as she remains asymptomatic.  5.  Left carotid artery stenosis.  Moderate stenosis will be medically managed.  6.  History of DVT/pulmonary embolism.  On warfarin in the past.  She had a CT angiogram of the chest earlier this year that was unremarkable.  7.  COPD  8.  Obstructive sleep apnea  9.  Hypothyroidism  10.  Tobacco use.  Patient is currently using nicotine patches in place of cigarettes.  11.  Morbid obesity    We will call and discuss results of her lab work.  Will see her back as scheduled in 5 months.    ADDENDUM:  Called and discussed normal BMP results including potassium of 4.3.  Will monitor symptoms for now.

## 2020-12-16 DIAGNOSIS — R06.09 DYSPNEA ON EXERTION: ICD-10-CM

## 2020-12-16 DIAGNOSIS — R60.0 LOWER EXTREMITY EDEMA: ICD-10-CM

## 2020-12-16 DIAGNOSIS — I50.32 CHRONIC DIASTOLIC CONGESTIVE HEART FAILURE (HCC): ICD-10-CM

## 2020-12-16 RX ORDER — CLOPIDOGREL BISULFATE 75 MG/1
75 TABLET ORAL DAILY
Qty: 90 TABLET | Refills: 0 | Status: SHIPPED | OUTPATIENT
Start: 2020-12-16 | End: 2022-12-02 | Stop reason: ALTCHOICE

## 2020-12-16 RX ORDER — POTASSIUM CHLORIDE 750 MG/1
10 TABLET, FILM COATED, EXTENDED RELEASE ORAL 3 TIMES DAILY
Qty: 270 TABLET | Refills: 0 | Status: SHIPPED | OUTPATIENT
Start: 2020-12-16

## 2020-12-16 RX ORDER — ISOSORBIDE MONONITRATE 30 MG/1
30 TABLET, EXTENDED RELEASE ORAL DAILY
Qty: 90 TABLET | Refills: 0 | Status: SHIPPED | OUTPATIENT
Start: 2020-12-16

## 2020-12-16 RX ORDER — AMLODIPINE BESYLATE 10 MG/1
10 TABLET ORAL DAILY
Qty: 90 TABLET | Refills: 0 | Status: SHIPPED | OUTPATIENT
Start: 2020-12-16 | End: 2021-05-10

## 2020-12-16 RX ORDER — BUMETANIDE 2 MG/1
2 TABLET ORAL
Qty: 270 TABLET | Refills: 0 | Status: SHIPPED | OUTPATIENT
Start: 2020-12-16 | End: 2021-04-27

## 2020-12-16 RX ORDER — ATORVASTATIN CALCIUM 40 MG/1
40 TABLET, FILM COATED ORAL NIGHTLY
Qty: 90 TABLET | Refills: 0 | Status: SHIPPED | OUTPATIENT
Start: 2020-12-16

## 2020-12-16 RX ORDER — CARVEDILOL 12.5 MG/1
12.5 TABLET ORAL 2 TIMES DAILY WITH MEALS
Qty: 180 TABLET | Refills: 0 | Status: SHIPPED | OUTPATIENT
Start: 2020-12-16

## 2020-12-16 NOTE — TELEPHONE ENCOUNTER
Patient switching to [facility] and [facility] is not cooperating in getting her prescriptions switched so she asked that we send them over for her.

## 2021-03-16 ENCOUNTER — TELEPHONE (OUTPATIENT)
Dept: CARDIOLOGY | Facility: CLINIC | Age: 71
End: 2021-03-16

## 2021-03-16 NOTE — TELEPHONE ENCOUNTER
Patient is scheduled for a right reverse total shoulder replacement on 3/29/21 with Andrey Duffy. Advise of any concerns you may have

## 2021-03-16 NOTE — TELEPHONE ENCOUNTER
Called and spoke with the patient.  She reminded me that we have an appointment on 3/26.  I will determine at that time if she is okay to proceed with surgery.

## 2021-03-22 ENCOUNTER — TELEPHONE (OUTPATIENT)
Dept: CARDIOLOGY | Facility: CLINIC | Age: 71
End: 2021-03-22

## 2021-03-22 NOTE — TELEPHONE ENCOUNTER
I know you have an appt with this pt before her surgery,but she is scheduled for the 29th and needs to stop meds tomorrow. She is on plavix and aspirin 325. Please advise

## 2021-04-27 RX ORDER — BUMETANIDE 2 MG/1
TABLET ORAL
Qty: 270 TABLET | Refills: 0 | Status: SHIPPED | OUTPATIENT
Start: 2021-04-27 | End: 2021-11-23

## 2021-05-10 RX ORDER — AMLODIPINE BESYLATE 10 MG/1
TABLET ORAL
Qty: 90 TABLET | Refills: 0 | Status: SHIPPED | OUTPATIENT
Start: 2021-05-10 | End: 2021-10-18

## 2021-06-09 RX ORDER — CLINDAMYCIN HYDROCHLORIDE 300 MG/1
CAPSULE ORAL
COMMUNITY
Start: 2021-04-19 | End: 2021-10-18

## 2021-06-09 RX ORDER — BUSPIRONE HYDROCHLORIDE 15 MG/1
15 TABLET ORAL 2 TIMES DAILY
COMMUNITY
Start: 2021-04-13 | End: 2022-07-29

## 2021-06-09 RX ORDER — OXYCODONE AND ACETAMINOPHEN 7.5; 325 MG/1; MG/1
TABLET ORAL
COMMUNITY
Start: 2021-04-19 | End: 2022-07-29

## 2021-06-09 RX ORDER — HYDROCODONE BITARTRATE AND ACETAMINOPHEN 5; 325 MG/1; MG/1
1 TABLET ORAL 2 TIMES DAILY
COMMUNITY
Start: 2021-04-05 | End: 2022-07-29

## 2021-06-09 RX ORDER — VORTIOXETINE 20 MG/1
1 TABLET, FILM COATED ORAL EVERY MORNING
COMMUNITY
Start: 2021-04-13 | End: 2022-07-29

## 2021-06-09 RX ORDER — ARIPIPRAZOLE 10 MG/1
TABLET ORAL
COMMUNITY
Start: 2021-04-13 | End: 2021-12-17 | Stop reason: DRUGHIGH

## 2021-06-09 RX ORDER — CHOLECALCIFEROL (VITAMIN D3) 50 MCG
TABLET ORAL DAILY
COMMUNITY
Start: 2021-04-13

## 2021-10-18 RX ORDER — BUPROPION HYDROCHLORIDE 150 MG/1
150 TABLET ORAL EVERY MORNING
COMMUNITY
Start: 2021-07-12

## 2021-10-18 RX ORDER — VILAZODONE HYDROCHLORIDE 20 MG/1
20 TABLET ORAL DAILY
COMMUNITY
Start: 2021-09-30 | End: 2021-12-17 | Stop reason: DRUGHIGH

## 2021-10-18 RX ORDER — CARIPRAZINE 1.5 MG/1
1.5 CAPSULE, GELATIN COATED ORAL DAILY
COMMUNITY
Start: 2021-07-28 | End: 2022-07-29

## 2021-10-18 RX ORDER — AMLODIPINE BESYLATE 10 MG/1
TABLET ORAL
Qty: 90 TABLET | Refills: 1 | Status: SHIPPED | OUTPATIENT
Start: 2021-10-18 | End: 2022-07-11

## 2021-10-18 RX ORDER — LEVOTHYROXINE SODIUM 0.12 MG/1
TABLET ORAL
COMMUNITY
Start: 2021-10-02 | End: 2022-12-02 | Stop reason: ALTCHOICE

## 2021-10-18 NOTE — TELEPHONE ENCOUNTER
Last OV 10/9/20. Next OV 10/22/21. Labs 10/9/20.  Does not meet protocol.  Please advise.    [facility] [place] [facility]

## 2021-11-05 ENCOUNTER — TELEPHONE (OUTPATIENT)
Dept: CARDIOLOGY | Facility: CLINIC | Age: 71
End: 2021-11-05

## 2021-11-05 NOTE — TELEPHONE ENCOUNTER
[phone]  [phone]    Pt is scheduled to have a Lt reverse TSR on 1/12/22 w Dr. Quinonez and they need surgical clearance.  They also need to know if she can hold her plavix and asa for 7 days prior.  Please advise.     [facility]

## 2021-11-23 RX ORDER — BUMETANIDE 2 MG/1
TABLET ORAL
Qty: 270 TABLET | Refills: 1 | Status: SHIPPED | OUTPATIENT
Start: 2021-11-23 | End: 2022-07-11

## 2021-12-17 ENCOUNTER — OFFICE VISIT (OUTPATIENT)
Dept: CARDIOLOGY | Facility: CLINIC | Age: 71
End: 2021-12-17

## 2021-12-17 VITALS
BODY MASS INDEX: 56.47 KG/M2 | HEART RATE: 62 BPM | WEIGHT: 244 LBS | HEIGHT: 55 IN | SYSTOLIC BLOOD PRESSURE: 134 MMHG | DIASTOLIC BLOOD PRESSURE: 78 MMHG

## 2021-12-17 DIAGNOSIS — E03.9 HYPOTHYROIDISM, UNSPECIFIED TYPE: ICD-10-CM

## 2021-12-17 DIAGNOSIS — I26.99 OTHER PULMONARY EMBOLISM WITHOUT ACUTE COR PULMONALE, UNSPECIFIED CHRONICITY (HCC): ICD-10-CM

## 2021-12-17 DIAGNOSIS — I50.32 CHRONIC DIASTOLIC CONGESTIVE HEART FAILURE (HCC): Primary | ICD-10-CM

## 2021-12-17 DIAGNOSIS — Z95.5 PRESENCE OF STENT IN RIGHT CORONARY ARTERY: ICD-10-CM

## 2021-12-17 DIAGNOSIS — I10 PRIMARY HYPERTENSION: ICD-10-CM

## 2021-12-17 DIAGNOSIS — I25.10 CORONARY ARTERY DISEASE INVOLVING NATIVE CORONARY ARTERY OF NATIVE HEART WITHOUT ANGINA PECTORIS: ICD-10-CM

## 2021-12-17 DIAGNOSIS — E78.5 HYPERLIPIDEMIA, UNSPECIFIED HYPERLIPIDEMIA TYPE: ICD-10-CM

## 2021-12-17 DIAGNOSIS — I82.409 DEEP VEIN THROMBOSIS (DVT) OF LOWER EXTREMITY, UNSPECIFIED CHRONICITY, UNSPECIFIED LATERALITY, UNSPECIFIED VEIN (HCC): ICD-10-CM

## 2021-12-17 DIAGNOSIS — K21.9 GASTROESOPHAGEAL REFLUX DISEASE, UNSPECIFIED WHETHER ESOPHAGITIS PRESENT: ICD-10-CM

## 2021-12-17 DIAGNOSIS — G47.33 OBSTRUCTIVE SLEEP APNEA SYNDROME: ICD-10-CM

## 2021-12-17 PROBLEM — I95.81 POSTOPERATIVE HYPOTENSION: Status: RESOLVED | Noted: 2017-08-04 | Resolved: 2021-12-17

## 2021-12-17 PROCEDURE — 99214 OFFICE O/P EST MOD 30 MIN: CPT | Performed by: INTERNAL MEDICINE

## 2021-12-17 PROCEDURE — 93000 ELECTROCARDIOGRAM COMPLETE: CPT | Performed by: INTERNAL MEDICINE

## 2021-12-17 NOTE — PROGRESS NOTES
Subjective:     Encounter Date:12/17/2021      Patient ID: Frances Downs is a 71 y.o. female.    Chief Complaint:  History of Present Illness    This is a 71-year-old with a history of coronary artery disease status post prior coronary artery stent placement, history of DVT and pulmonary embolus previously on anticoagulation with warfarin, hypertension, obstructive sleep apnea on CPAP, chronic diastolic congestive heart failure, left subclavian artery stenosis, who presents for follow-up.     She presents today for routine follow-up and preoperative evaluation.  This is her first office visit since 10/2020.  It appears that she has been doing well over the last year.  She had called about getting cleared for left shoulder surgery earlier near which time I went ahead and cleared her.  It does not appear that this has been performed yet and she is now scheduled to have this performed next month with Dr. Quinonez.  It appears that she underwent repeat vascular evaluation for her left subclavian artery stenosis and for bilateral lower extremity pain.  She had ABIs performed last month that were unremarkable.  Since she was not having any symptoms related to her left subclavian artery stenosis no further intervention of this was recommended.    She denies any chest pain,  palpitation, orthopnea, near-syncope or syncope.  Chronic bilateral lower extremity edema is actually quite well controlled recently.  She denies any significant changes in her medications.  She reports stable dyspnea on exertion.     Prior History:  The patient was previously followed by Dr. Oliveros.  Prior to that she had a history of coronary artery disease with right coronary artery stent placement by Dr. Baer in 2011.  Since following Dr. Oliveros her last stress test was in 11/2016 and was negative for ischemia.  Her last echocardiogram was in 5/2018 and showed normal left ventricular systolic function and wall motion with an EF of 65 to 70%,  mild mitral regurgitation, normal diastolic function, and biatrial enlargement.  She was last seen by Dr. Oliveros in 5/2019 at which time she was doing well and no changes were made to her management.     I saw her initially on 12/20/19 for urgent evaluation of dyspnea and lower extremity edema.  She reported orthopnea requiring her to sleep with 2 pillows propped up.  She reported compliance with her medications but admits to dietary indiscretions with sodium.  She reportedly has been quite depressed recently and actually had not bathed for a week until her caregiver came by the day prior and helps her bathe.  She denied any chest pain like her prior anginal symptoms but reported me chest fullness and bilateral arms heaviness.  At that office visit I recommended increasing her bumetanide to 2 mg 3 times a day and to return for her previously scheduled appointment in 2 weeks.  Since that office visit she called with increased lower extremity cramping so I had her increase her potassium chloride tablets to 3 times a day also.     At her 2 week follow up she reported improvement in her symptoms including the swelling in her legs.  She was still having some dyspnea on exertion and chest heaviness this had improved overall.  Due to ongoing symptoms I recommended proceeding with an echocardiogram and stress test.  She underwent the echocardiogram on 1/31/2020 which showed normal left ventricular systolic function with an EF of 60-65%, grade 2 diastolic dysfunction and mild valvular disease.  She was unable to proceed with stress test because she got ill.  When I called to discuss echo results she reported feeling much better with no further chest discomfort and improvement in her dyspnea.  At that time we opted to cancel her stress test and I asked her to decrease bumetanide back to twice a day.  At some point after that she developed worsening swelling and increased the bumetanide back up to three times a day.      At  a video visit in 4/2020 she reported worsening lower extremity edema and dyspnea on exertion to the point she is having trouble bathing herself with her home health aide.   She was still on bumetanide 2 mg three times a day.   She was not weighing herself or watching her sodium intake.  Recommended trying to increase her bumetanide further and to start restricting her sodium in weigh herself daily.  Patient was to call with an update on her symptoms the following week.       Symptoms initially improved but again on 6/30/2020 she complained of worsening shortness of breath and lower extremity edema.  She ended up going to the emergency room at that point where her work-up was unremarkable including a normal BNP.  She also underwent a CT angiogram of the chest that showed no evidence of pulmonary embolism or acute lung disease but did show evidence of possible left subclavian artery stenosis.  She has since followed up with Dr. Echeverria who has referred her to vascular surgery.     She was evaluated by Dr. Jackson who did not recommend any further intervention since she was not having any symptoms from the left subclavian stenosis during his evaluation so he did not recommend any further intervention.  He did refer her for a carotid artery ultrasound which was performed on 8/19/2020 and showed 50 to 75% or moderate stenosis of the left carotid artery.  Both sides showed antegrade flow.       In 10/2020 the patient called complaining of bilateral lower extremity leg pain.  She was worried that her potassium levels may be low. She described it as a muscle aching pain.   Her bilateral lower extremity edema is mild and stable.  Lab work was checked today showed normal potassium levels and stable creatinine.  I recommended monitoring her symptoms at that time.    Review of Systems   Constitutional: Negative for malaise/fatigue.   HENT: Negative for hearing loss, hoarse voice, nosebleeds and sore throat.    Eyes: Negative  for pain.   Cardiovascular: Positive for leg swelling. Negative for chest pain, claudication, cyanosis, dyspnea on exertion, irregular heartbeat, near-syncope, orthopnea, palpitations, paroxysmal nocturnal dyspnea and syncope.   Respiratory: Negative for shortness of breath and snoring.    Endocrine: Negative for cold intolerance, heat intolerance, polydipsia, polyphagia and polyuria.   Skin: Negative for itching and rash.   Musculoskeletal: Positive for joint pain. Negative for arthritis, falls, joint swelling, muscle cramps, muscle weakness and myalgias.   Gastrointestinal: Negative for constipation, diarrhea, dysphagia, heartburn, hematemesis, hematochezia, melena, nausea and vomiting.   Genitourinary: Negative for frequency, hematuria and hesitancy.   Neurological: Negative for excessive daytime sleepiness, dizziness, headaches, light-headedness, numbness and weakness.   Psychiatric/Behavioral: Negative for depression. The patient is not nervous/anxious.          Current Outpatient Medications:   •  albuterol sulfate HFA (PROAIR HFA) 108 (90 Base) MCG/ACT inhaler, ProAir HFA 90 mcg/actuation aerosol inhaler, Disp: , Rfl:   •  amLODIPine (NORVASC) 10 MG tablet, TAKE ONE TABLET BY MOUTH DAILY, Disp: 90 tablet, Rfl: 1  •  ARIPiprazole (ABILIFY) 20 MG tablet, Take 20 mg by mouth every night at bedtime., Disp: , Rfl:   •  aspirin 325 MG tablet, Take 325 mg by mouth Daily., Disp: , Rfl:   •  atomoxetine (STRATTERA) 60 MG capsule, Take 60 mg by mouth Every Morning., Disp: , Rfl:   •  atorvastatin (Lipitor) 40 MG tablet, Take 1 tablet by mouth Every Night., Disp: 90 tablet, Rfl: 0  •  bumetanide (BUMEX) 2 MG tablet, TAKE ONE TABLET BY MOUTH THREE TIMES DAILY BEFORE MEALS, Disp: 270 tablet, Rfl: 1  •  buPROPion XL (WELLBUTRIN XL) 150 MG 24 hr tablet, Take 150 mg by mouth Every Morning., Disp: , Rfl:   •  buPROPion XL (WELLBUTRIN XL) 300 MG 24 hr tablet, Take 300 mg by mouth Every Morning., Disp: , Rfl:   •  busPIRone  (BUSPAR) 15 MG tablet, Take 15 mg by mouth 2 (Two) Times a Day., Disp: , Rfl:   •  carvedilol (COREG) 12.5 MG tablet, Take 1 tablet by mouth 2 (Two) Times a Day With Meals., Disp: 180 tablet, Rfl: 0  •  Cholecalciferol (Vitamin D) 50 MCG (2000 UT) tablet, Take  by mouth Daily., Disp: , Rfl:   •  clopidogrel (PLAVIX) 75 MG tablet, Take 1 tablet by mouth Daily., Disp: 90 tablet, Rfl: 0  •  fluticasone (FLONASE) 50 MCG/ACT nasal spray, fluticasone 50 mcg/actuation nasal spray,suspension, Disp: , Rfl:   •  HYDROcodone-acetaminophen (NORCO) 5-325 MG per tablet, Take 1 tablet by mouth 2 (Two) Times a Day., Disp: , Rfl:   •  Incruse Ellipta 62.5 MCG/INH aerosol powder , Inhale 1 puff Daily., Disp: , Rfl:   •  ipratropium-albuterol (DUO-NEB) 0.5-2.5 mg/3 ml nebulizer, ipratropium-albuterol 0.5 mg-3 mg(2.5 mg base)/3 mL nebulization soln, Disp: , Rfl:   •  isosorbide mononitrate (IMDUR) 30 MG 24 hr tablet, Take 1 tablet by mouth Daily., Disp: 90 tablet, Rfl: 0  •  LaMICtal 200 MG tablet, Take 200 mg by mouth 2 (Two) Times a Day., Disp: , Rfl:   •  levothyroxine (SYNTHROID, LEVOTHROID) 125 MCG tablet, TAKE ONE TABLET BY MOUTH DAILY STOP 100 mcg tablets, Disp: , Rfl:   •  meloxicam (MOBIC) 15 MG tablet, meloxicam 15 mg tablet, Disp: , Rfl:   •  modafinil (PROVIGIL) 200 MG tablet, Take 200 mg by mouth 2 (Two) Times a Day., Disp: , Rfl:   •  Myrbetriq 25 MG tablet sustained-release 24 hour 24 hr tablet, Take 25 mg by mouth Daily., Disp: , Rfl:   •  oxyCODONE-acetaminophen (PERCOCET) 7.5-325 MG per tablet, , Disp: , Rfl:   •  pantoprazole (PROTONIX) 40 MG EC tablet, Take 40 mg by mouth Daily., Disp: , Rfl:   •  potassium chloride 10 MEQ CR tablet, Take 1 tablet by mouth 3 (Three) Times a Day., Disp: 270 tablet, Rfl: 0  •  Trelegy Ellipta 100-62.5-25 MCG/INH inhaler, , Disp: , Rfl:   •  Trintellix 20 MG tablet, Take 1 tablet by mouth Every Morning., Disp: , Rfl:   •  Umeclidinium Bromide (INCRUSE ELLIPTA IN), Inhale Take As  Directed., Disp: , Rfl:   •  VIIBRYD 40 MG tablet tablet, Take 40 mg by mouth Daily., Disp: , Rfl:   •  Vraylar 1.5 MG capsule capsule, Take 1.5 mg by mouth Daily., Disp: , Rfl:     Past Medical History:   Diagnosis Date   • Anxiety    • Arthritis    • Bipolar disorder (CMS/HCC)    • COPD (chronic obstructive pulmonary disease) (CMS/HCC)    • Coronary artery disease    • Diastolic dysfunction     grade II per echocardiogram 11/18/2016   • Difficulty walking    • Disease of thyroid gland     hypothyroidism   • Dislocation of hip joint (CMS/HCC)     recurrent dislocation right hip   • DVT (deep venous thrombosis) (CMS/HCC)    • GERD without esophagitis    • Heartburn    • Hematoma    • Hyperlipidemia    • Hypertension    • LVH (left ventricular hypertrophy)     mild to moderate per echocardiogram 11/18/2016   • Major depressive disorder    • Mitral annular calcification     severe per echocardiogram 11/18/2016   • Mitral regurgitation     mild per echo 11/18/2016   • Mitral valve disease    • Muscle weakness    • Narcolepsy    • Overactive bladder    • Pneumonia    • Pulmonary emboli (CMS/HCC)    • Pulmonary hypertension (CMS/HCC)     mild per echo 11/18/2016   • Sleep apnea     obstructive   • Tricuspid regurgitation     mild to moderate per echo 11/18/2016       Past Surgical History:   Procedure Laterality Date   • CATARACT EXTRACTION W/ INTRAOCULAR LENS IMPLANT Right    • CORONARY ANGIOPLASTY WITH STENT PLACEMENT      x2 stents   • EYE SURGERY      cataract surgery bilateral   • HARDWARE REMOVAL FOOT / ANKLE Left 12/01/2011   • HIP SPACER INSERTION WITH ANTIBIOTIC CEMENT Right 2/13/2017    Procedure: RIGHT TOTAL HIP REMOVAL;  Surgeon: Erlin Martin MD;  Location: [facility] OR;  Service:    • INCISION AND DRAINAGE HIP Right 2/8/2017    Procedure: RT HIP INCISION AND DRAINAGE;  Surgeon: Erlin Martin MD;  Location: [facility] OR;  Service:    • INCISION AND DRAINAGE HIP Right 3/17/2017    Procedure: RIGHT  "HIP INCISION AND DRAINAGE;  Surgeon: Erlin Martin MD;  Location: [facility] OR;  Service:    • JOINT REPLACEMENT     • KNEE ARTHROPLASTY Left 2010    TWICE   • ORIF ANKLE FRACTURE Left 2011   • OTHER SURGICAL HISTORY      IVC filter placement   • AL CLOSED RX TRAUMATIC HIP DISLOCATN Right 2017    Procedure: FAILED CLOSED RIGHT HIP  REDUCTION WITH OPEN REDUCTION OF RIGHT HIP;  Surgeon: Erlin Martin MD;  Location: [facility] OR;  Service: Orthopedics   • QUADRICEPS TENDON REPAIR Left 2010    also done 2010    • TOTAL HIP ARTHROPLASTY Right    • TOTAL HIP ARTHROPLASTY REVISION Right 2016   • TOTAL HIP ARTHROPLASTY REVISION Right 2017    Procedure: REIMPLANT RIGHT TOTAL HIP;  Surgeon: Erlin Martin MD;  Location: [facility] OR;  Service:    • TOTAL KNEE ARTHROPLASTY REVISION Left 2010       Family History   Problem Relation Age of Onset   • Malig Hyperthermia Neg Hx        Social History     Tobacco Use   • Smoking status: Former Smoker     Packs/day: 1.00     Years: 19.00     Pack years: 19.00     Types: Cigarettes     Quit date: 12/15/2019     Years since quittin.0   • Smokeless tobacco: Never Used   Substance Use Topics   • Alcohol use: No   • Drug use: No         ECG 12 Lead    Date/Time: 2021 3:38 PM  Performed by: Margi Barker MD  Authorized by: Margi Barker MD   Comparison: compared with previous ECG   Similar to previous ECG  Rhythm: sinus rhythm  T flattening: I and aVL                 Objective:     Visit Vitals  /78   Pulse 62   Ht 63.5 cm (25\")   Wt 111 kg (244 lb)   .48 kg/m²         Constitutional:       Appearance: Normal appearance. Well-developed.   Eyes:      General: Lids are normal.      Conjunctiva/sclera: Conjunctivae normal.      Pupils: Pupils are equal, round, and reactive to light.   HENT:      Head: Normocephalic and atraumatic.   Neck:      Vascular: No carotid bruit or JVD.      Lymphadenopathy: No " cervical adenopathy.   Pulmonary:      Effort: Pulmonary effort is normal.      Breath sounds: Normal breath sounds.   Cardiovascular:      Normal rate. Regular rhythm.      No gallop.   Pulses:     Radial: 2+ bilaterally.  Edema:     Peripheral edema absent.   Abdominal:      Palpations: Abdomen is soft.   Musculoskeletal:      Cervical back: Full passive range of motion without pain, normal range of motion and neck supple. Skin:     General: Skin is warm and dry.   Neurological:      Mental Status: Alert and oriented to person, place, and time.             Assessment:          Diagnosis Plan   1. Chronic diastolic congestive heart failure (HCC)     2. Coronary artery disease involving native coronary artery of native heart without angina pectoris     3. Presence of stent in right coronary artery     4. Primary hypertension     5. Hyperlipidemia, unspecified hyperlipidemia type     6. Other pulmonary embolism without acute cor pulmonale, unspecified chronicity ([facility])     7. Deep vein thrombosis (DVT) of lower extremity, unspecified chronicity, unspecified laterality, unspecified vein ([facility])     8. Hypothyroidism, unspecified type     9. Gastroesophageal reflux disease, unspecified whether esophagitis present     10. Obstructive sleep apnea            Plan:       .  Preoperative evaluation.  EKG is stable and unchanged.  She is not having any new symptoms concerning for angina.  I think she may proceed with planned surgery for next month without any further cardiac work-up.  She is okay to hold her aspirin and clopidogrel 1 week prior to surgery.  2.  Coronary artery disease.  Stable and asymptomatic.  EKG is unchanged.  Continue current medical management.  3.  Left subclavian artery stenosis.  Has been evaluated by vascular surgery who does not recommend any further intervention of this at this time.  4.  Chronic diastolic congestive heart failure.  Volume status appears to be stable.  Continue current  management.  5.  Left carotid artery stenosis.  Moderate in the past and is medically managed.  6.  History of DVT/pulmonary embolism.  Treated with warfarin in the past.  7.  COPD  8.  Struct of sleep apnea  9.  Hypothyroidism  10.  Tobacco use.  11.  Depression/anxiety    I will plan on seeing the patient back again in 6 months.

## 2022-06-17 ENCOUNTER — OFFICE VISIT (OUTPATIENT)
Dept: CARDIOLOGY | Facility: CLINIC | Age: 72
End: 2022-06-17

## 2022-06-17 VITALS
SYSTOLIC BLOOD PRESSURE: 140 MMHG | WEIGHT: 238 LBS | DIASTOLIC BLOOD PRESSURE: 80 MMHG | HEART RATE: 52 BPM | BODY MASS INDEX: 55.08 KG/M2 | HEIGHT: 55 IN

## 2022-06-17 DIAGNOSIS — I82.409 DEEP VEIN THROMBOSIS (DVT) OF LOWER EXTREMITY, UNSPECIFIED CHRONICITY, UNSPECIFIED LATERALITY, UNSPECIFIED VEIN: ICD-10-CM

## 2022-06-17 DIAGNOSIS — G47.33 OBSTRUCTIVE SLEEP APNEA SYNDROME: ICD-10-CM

## 2022-06-17 DIAGNOSIS — I25.10 CORONARY ARTERY DISEASE INVOLVING NATIVE CORONARY ARTERY OF NATIVE HEART WITHOUT ANGINA PECTORIS: Primary | ICD-10-CM

## 2022-06-17 DIAGNOSIS — E78.5 HYPERLIPIDEMIA, UNSPECIFIED HYPERLIPIDEMIA TYPE: ICD-10-CM

## 2022-06-17 DIAGNOSIS — Z95.5 PRESENCE OF STENT IN RIGHT CORONARY ARTERY: ICD-10-CM

## 2022-06-17 DIAGNOSIS — E03.9 HYPOTHYROIDISM, UNSPECIFIED TYPE: ICD-10-CM

## 2022-06-17 DIAGNOSIS — I26.99 OTHER PULMONARY EMBOLISM WITHOUT ACUTE COR PULMONALE, UNSPECIFIED CHRONICITY: ICD-10-CM

## 2022-06-17 DIAGNOSIS — I10 PRIMARY HYPERTENSION: ICD-10-CM

## 2022-06-17 DIAGNOSIS — I50.32 CHRONIC DIASTOLIC CONGESTIVE HEART FAILURE: ICD-10-CM

## 2022-06-17 PROCEDURE — 93000 ELECTROCARDIOGRAM COMPLETE: CPT | Performed by: INTERNAL MEDICINE

## 2022-06-17 PROCEDURE — 99214 OFFICE O/P EST MOD 30 MIN: CPT | Performed by: INTERNAL MEDICINE

## 2022-06-17 NOTE — PROGRESS NOTES
Subjective:     Encounter Date:06/17/2022      Patient ID: Frances Downs is a 71 y.o. female.    Chief Complaint:  History of Present Illness    This is a 71-year-old with a history of coronary artery disease status post prior coronary artery stent placement, history of DVT and pulmonary embolus previously on anticoagulation with warfarin, hypertension, obstructive sleep apnea on CPAP, chronic diastolic congestive heart failure, left subclavian artery stenosis, who presents for follow-up.     She presents today for routine 6-month follow-up.  Since her last office visit she underwent bilateral shoulder replacement any significant issues.  She rehab following her surgeries in April.  Shortly after she ended up injuring her left foot and has been in the boot since then.  Show the lisinopril July.  She continues to complain of bilateral aching leg pain.  She otherwise denies any chest pain, worsening shortness of breath, palpitation, orthopnea, near-syncope or syncope, or significantly worsening lower extremity edema.     Prior History:  The patient was previously followed by Dr. Oliveros.  Prior to that she had a history of coronary artery disease with right coronary artery stent placement by Dr. Baer in 2011.  Since following Dr. Oliveros her last stress test was in 11/2016 and was negative for ischemia.  Her last echocardiogram was in 5/2018 and showed normal left ventricular systolic function and wall motion with an EF of 65 to 70%, mild mitral regurgitation, normal diastolic function, and biatrial enlargement.  She was last seen by Dr. Oliveros in 5/2019 at which time she was doing well and no changes were made to her management.     I saw her initially on 12/20/19 for urgent evaluation of dyspnea and lower extremity edema.  She reported orthopnea requiring her to sleep with 2 pillows propped up.  She reported compliance with her medications but admits to dietary indiscretions with sodium.  She reportedly has been  quite depressed recently and actually had not bathed for a week until her caregiver came by the day prior and helps her bathe.  She denied any chest pain like her prior anginal symptoms but reported me chest fullness and bilateral arms heaviness.  At that office visit I recommended increasing her bumetanide to 2 mg 3 times a day and to return for her previously scheduled appointment in 2 weeks.  Since that office visit she called with increased lower extremity cramping so I had her increase her potassium chloride tablets to 3 times a day also.     At her 2 week follow up she reported improvement in her symptoms including the swelling in her legs.  She was still having some dyspnea on exertion and chest heaviness this had improved overall.  Due to ongoing symptoms I recommended proceeding with an echocardiogram and stress test.  She underwent the echocardiogram on 1/31/2020 which showed normal left ventricular systolic function with an EF of 60-65%, grade 2 diastolic dysfunction and mild valvular disease.  She was unable to proceed with stress test because she got ill.  When I called to discuss echo results she reported feeling much better with no further chest discomfort and improvement in her dyspnea.  At that time we opted to cancel her stress test and I asked her to decrease bumetanide back to twice a day.  At some point after that she developed worsening swelling and increased the bumetanide back up to three times a day.      At a video visit in 4/2020 she reported worsening lower extremity edema and dyspnea on exertion to the point she is having trouble bathing herself with her home health aide.   She was still on bumetanide 2 mg three times a day.   She was not weighing herself or watching her sodium intake.  Recommended trying to increase her bumetanide further and to start restricting her sodium in weigh herself daily.  Patient was to call with an update on her symptoms the following week.       Symptoms  initially improved but again on 6/30/2020 she complained of worsening shortness of breath and lower extremity edema.  She ended up going to the emergency room at that point where her work-up was unremarkable including a normal BNP.  She also underwent a CT angiogram of the chest that showed no evidence of pulmonary embolism or acute lung disease but did show evidence of possible left subclavian artery stenosis.  She has since followed up with Dr. Echeverria who has referred her to vascular surgery.     She was evaluated by Dr. Jackson who did not recommend any further intervention since she was not having any symptoms from the left subclavian stenosis during his evaluation so he did not recommend any further intervention.  He did refer her for a carotid artery ultrasound which was performed on 8/19/2020 and showed 50 to 75% or moderate stenosis of the left carotid artery.  Both sides showed antegrade flow.       In 10/2020 the patient called complaining of bilateral lower extremity leg pain.  She was worried that her potassium levels may be low. She described it as a muscle aching pain.   Her bilateral lower extremity edema is mild and stable.  Lab work was checked today showed normal potassium levels and stable creatinine.  I recommended monitoring her symptoms at that time.    She underwent bilateral lower extremity ABIs in 12/2021 which were unremarkable.    Review of Systems   Constitutional: Positive for malaise/fatigue.   HENT: Negative for hearing loss, hoarse voice, nosebleeds and sore throat.    Eyes: Negative for pain.   Cardiovascular: Positive for dyspnea on exertion. Negative for chest pain, claudication, cyanosis, irregular heartbeat, leg swelling, near-syncope, orthopnea, palpitations, paroxysmal nocturnal dyspnea and syncope.   Respiratory: Negative for shortness of breath and snoring.    Endocrine: Negative for cold intolerance, heat intolerance, polydipsia, polyphagia and polyuria.   Skin: Negative  for itching and rash.   Musculoskeletal: Negative for arthritis, falls, joint pain, joint swelling, muscle cramps, muscle weakness and myalgias.        Leg pain   Gastrointestinal: Negative for constipation, diarrhea, dysphagia, heartburn, hematemesis, hematochezia, melena, nausea and vomiting.   Genitourinary: Negative for frequency, hematuria and hesitancy.   Neurological: Negative for excessive daytime sleepiness, dizziness, headaches, light-headedness, numbness and weakness.   Psychiatric/Behavioral: Negative for depression. The patient is not nervous/anxious.          Current Outpatient Medications:   •  albuterol sulfate HFA (PROAIR HFA) 108 (90 Base) MCG/ACT inhaler, ProAir HFA 90 mcg/actuation aerosol inhaler, Disp: , Rfl:   •  amLODIPine (NORVASC) 10 MG tablet, TAKE ONE TABLET BY MOUTH DAILY, Disp: 90 tablet, Rfl: 1  •  ARIPiprazole (ABILIFY) 20 MG tablet, Take 20 mg by mouth every night at bedtime., Disp: , Rfl:   •  aspirin 325 MG tablet, Take 325 mg by mouth Daily., Disp: , Rfl:   •  atomoxetine (STRATTERA) 60 MG capsule, Take 60 mg by mouth Every Morning., Disp: , Rfl:   •  atorvastatin (Lipitor) 40 MG tablet, Take 1 tablet by mouth Every Night., Disp: 90 tablet, Rfl: 0  •  bumetanide (BUMEX) 2 MG tablet, TAKE ONE TABLET BY MOUTH THREE TIMES DAILY BEFORE MEALS, Disp: 270 tablet, Rfl: 1  •  buPROPion XL (WELLBUTRIN XL) 150 MG 24 hr tablet, Take 150 mg by mouth Every Morning., Disp: , Rfl:   •  buPROPion XL (WELLBUTRIN XL) 300 MG 24 hr tablet, Take 300 mg by mouth Every Morning., Disp: , Rfl:   •  busPIRone (BUSPAR) 15 MG tablet, Take 15 mg by mouth 2 (Two) Times a Day., Disp: , Rfl:   •  carvedilol (COREG) 12.5 MG tablet, Take 1 tablet by mouth 2 (Two) Times a Day With Meals., Disp: 180 tablet, Rfl: 0  •  Cholecalciferol (Vitamin D) 50 MCG (2000 UT) tablet, Take  by mouth Daily., Disp: , Rfl:   •  clopidogrel (PLAVIX) 75 MG tablet, Take 1 tablet by mouth Daily., Disp: 90 tablet, Rfl: 0  •  fluticasone  (FLONASE) 50 MCG/ACT nasal spray, fluticasone 50 mcg/actuation nasal spray,suspension, Disp: , Rfl:   •  HYDROcodone-acetaminophen (NORCO) 5-325 MG per tablet, Take 1 tablet by mouth 2 (Two) Times a Day., Disp: , Rfl:   •  Incruse Ellipta 62.5 MCG/INH aerosol powder , Inhale 1 puff Daily., Disp: , Rfl:   •  ipratropium-albuterol (DUO-NEB) 0.5-2.5 mg/3 ml nebulizer, ipratropium-albuterol 0.5 mg-3 mg(2.5 mg base)/3 mL nebulization soln, Disp: , Rfl:   •  isosorbide mononitrate (IMDUR) 30 MG 24 hr tablet, Take 1 tablet by mouth Daily., Disp: 90 tablet, Rfl: 0  •  LaMICtal 200 MG tablet, Take 200 mg by mouth 2 (Two) Times a Day., Disp: , Rfl:   •  levothyroxine (SYNTHROID, LEVOTHROID) 125 MCG tablet, TAKE ONE TABLET BY MOUTH DAILY STOP 100 mcg tablets, Disp: , Rfl:   •  meloxicam (MOBIC) 15 MG tablet, meloxicam 15 mg tablet, Disp: , Rfl:   •  modafinil (PROVIGIL) 200 MG tablet, Take 200 mg by mouth 2 (Two) Times a Day., Disp: , Rfl:   •  Myrbetriq 25 MG tablet sustained-release 24 hour 24 hr tablet, Take 25 mg by mouth Daily., Disp: , Rfl:   •  oxyCODONE-acetaminophen (PERCOCET) 7.5-325 MG per tablet, , Disp: , Rfl:   •  pantoprazole (PROTONIX) 40 MG EC tablet, Take 40 mg by mouth Daily., Disp: , Rfl:   •  potassium chloride 10 MEQ CR tablet, Take 1 tablet by mouth 3 (Three) Times a Day., Disp: 270 tablet, Rfl: 0  •  Trelegy Ellipta 100-62.5-25 MCG/INH inhaler, , Disp: , Rfl:   •  Trintellix 20 MG tablet, Take 1 tablet by mouth Every Morning., Disp: , Rfl:   •  Umeclidinium Bromide (INCRUSE ELLIPTA IN), Inhale Take As Directed., Disp: , Rfl:   •  VIIBRYD 40 MG tablet tablet, Take 40 mg by mouth Daily., Disp: , Rfl:   •  Vraylar 1.5 MG capsule capsule, Take 1.5 mg by mouth Daily., Disp: , Rfl:     Past Medical History:   Diagnosis Date   • Anxiety    • Arthritis    • Bipolar disorder (CMS/HCC)    • COPD (chronic obstructive pulmonary disease) (CMS/HCC)    • Coronary artery disease    • Diastolic dysfunction     grade II  per echocardiogram 11/18/2016   • Difficulty walking    • Disease of thyroid gland     hypothyroidism   • Dislocation of hip joint (CMS/HCC)     recurrent dislocation right hip   • DVT (deep venous thrombosis) (CMS/HCC)    • GERD without esophagitis    • Heartburn    • Hematoma    • Hyperlipidemia    • Hypertension    • LVH (left ventricular hypertrophy)     mild to moderate per echocardiogram 11/18/2016   • Major depressive disorder    • Mitral annular calcification     severe per echocardiogram 11/18/2016   • Mitral regurgitation     mild per echo 11/18/2016   • Mitral valve disease    • Muscle weakness    • Narcolepsy    • Overactive bladder    • Pneumonia    • Pulmonary emboli (CMS/HCC)    • Pulmonary hypertension (CMS/HCC)     mild per echo 11/18/2016   • Sleep apnea     obstructive   • Tricuspid regurgitation     mild to moderate per echo 11/18/2016       Past Surgical History:   Procedure Laterality Date   • CATARACT EXTRACTION W/ INTRAOCULAR LENS IMPLANT Right    • CORONARY ANGIOPLASTY WITH STENT PLACEMENT      x2 stents   • EYE SURGERY      cataract surgery bilateral   • HARDWARE REMOVAL FOOT / ANKLE Left 12/01/2011   • HIP SPACER INSERTION WITH ANTIBIOTIC CEMENT Right 2/13/2017    Procedure: RIGHT TOTAL HIP REMOVAL;  Surgeon: Erlin Martin MD;  Location: [facility] OR;  Service:    • INCISION AND DRAINAGE HIP Right 2/8/2017    Procedure: RT HIP INCISION AND DRAINAGE;  Surgeon: Erlin Martin MD;  Location: [facility] OR;  Service:    • INCISION AND DRAINAGE HIP Right 3/17/2017    Procedure: RIGHT HIP INCISION AND DRAINAGE;  Surgeon: Erlin Martin MD;  Location: [facility] OR;  Service:    • JOINT REPLACEMENT     • KNEE ARTHROPLASTY Left 03/2010    TWICE   • ORIF ANKLE FRACTURE Left 06/16/2011   • OTHER SURGICAL HISTORY      IVC filter placement   • MS CLOSED RX TRAUMATIC HIP DISLOCATN Right 8/2/2017    Procedure: FAILED CLOSED RIGHT HIP  REDUCTION WITH OPEN REDUCTION OF RIGHT HIP;  Surgeon:  Erlin Martin MD;  Location: [facility] OR;  Service: Orthopedics   • QUADRICEPS TENDON REPAIR Left 2010    also done 2010    • TOTAL HIP ARTHROPLASTY Right    • TOTAL HIP ARTHROPLASTY REVISION Right 2016   • TOTAL HIP ARTHROPLASTY REVISION Right 2017    Procedure: REIMPLANT RIGHT TOTAL HIP;  Surgeon: Erlin Martin MD;  Location: [facility] OR;  Service:    • TOTAL KNEE ARTHROPLASTY REVISION Left 2010       Family History   Problem Relation Age of Onset   • Malig Hyperthermia Neg Hx        Social History     Tobacco Use   • Smoking status: Former Smoker     Packs/day: 1.00     Years: 19.00     Pack years: 19.00     Types: Cigarettes     Quit date: 12/15/2019     Years since quittin.5   • Smokeless tobacco: Never Used   Substance Use Topics   • Alcohol use: No   • Drug use: No         ECG 12 Lead    Date/Time: 2022 1:29 PM  Performed by: Margi Barker MD  Authorized by: Margi Barker MD   Comparison: compared with previous ECG   Similar to previous ECG  Rhythm: sinus rhythm               Objective:     There were no vitals taken for this visit.      Constitutional:       Appearance: Normal appearance. Well-developed.   Eyes:      General: Lids are normal.      Conjunctiva/sclera: Conjunctivae normal.      Pupils: Pupils are equal, round, and reactive to light.   HENT:      Head: Normocephalic and atraumatic.   Neck:      Vascular: No carotid bruit or JVD.      Lymphadenopathy: No cervical adenopathy.   Pulmonary:      Effort: Pulmonary effort is normal.      Breath sounds: Normal breath sounds.   Cardiovascular:      Normal rate. Regular rhythm.      No gallop.      Comments: Left foot/ankle boot  Pulses:     Radial: 2+ bilaterally.  Edema:     Peripheral edema absent.   Abdominal:      Palpations: Abdomen is soft.   Musculoskeletal:      Cervical back: Full passive range of motion without pain, normal range of motion and neck supple. Skin:     General: Skin is  warm and dry.   Neurological:      Mental Status: Alert and oriented to person, place, and time.         Lab Review:       Assessment:          Diagnosis Plan   1. Coronary artery disease involving native coronary artery of native heart without angina pectoris     2. Presence of stent in right coronary artery     3. Primary hypertension     4. Hyperlipidemia, unspecified hyperlipidemia type     5. Chronic diastolic congestive heart failure (HCC)     6. Other pulmonary embolism without acute cor pulmonale, unspecified chronicity (HCC)     7. Deep vein thrombosis (DVT) of lower extremity, unspecified chronicity, unspecified laterality, unspecified vein (HCC)     8. Hypothyroidism, unspecified type     9. Obstructive sleep apnea            Plan:       1.  Coronary artery disease.  She appears to be stable with no symptoms of angina at this time.  EKG remains unremarkable.  Continue current management.  2.  Left subclavian artery stenosis.  Has been evaluated by vascular surgery in the past who did not recommend any further intervention.  3.  Chronic diastolic congestive heart failure.  Volume status appears to be stable.  4.  Left carotid artery stenosis.  Moderate in the past and medically managed.  5.  History of DVT/pulmonary disease completed treatment with warfarin.  6.  COPD  7.  Obstructive sleep apnea  8.  Hypothyroidism  9.  Tobacco use  10.  Depression/anxiety.    I will plan on seeing the patient back again in 6 months.

## 2022-07-11 RX ORDER — AMLODIPINE BESYLATE 10 MG/1
TABLET ORAL
Qty: 90 TABLET | Refills: 1 | Status: SHIPPED | OUTPATIENT
Start: 2022-07-11 | End: 2023-01-09

## 2022-07-11 RX ORDER — BUMETANIDE 2 MG/1
TABLET ORAL
Qty: 270 TABLET | Refills: 1 | Status: SHIPPED | OUTPATIENT
Start: 2022-07-11 | End: 2022-07-22

## 2022-07-11 NOTE — TELEPHONE ENCOUNTER
Last OV 6/17/22.  Next OV 12/23/22.  Labs 12/15/21.  Does not meet protocol. [facility]

## 2022-07-19 ENCOUNTER — TELEPHONE (OUTPATIENT)
Dept: CARDIOLOGY | Facility: CLINIC | Age: 72
End: 2022-07-19

## 2022-07-19 NOTE — TELEPHONE ENCOUNTER
[phone]    Pt called and lm that she has some leg pain and SOA.  She states it is so short that she cannot do anything.       Called pt back and [facility] back again. [facility]

## 2022-07-22 RX ORDER — BUMETANIDE 2 MG/1
TABLET ORAL
Qty: 270 TABLET | Refills: 1 | Status: SHIPPED | OUTPATIENT
Start: 2022-07-22 | End: 2023-01-23

## 2022-07-22 NOTE — TELEPHONE ENCOUNTER
Please advise filling Bumex.  Did not meet protocol.  However labs were done 12/15/2021 in scanned labs.        Potassium was done 12/15/2021.  Result was 3.9    Sodium was done  12/15/2021 result was 142    LOV   -   6/17/2022  Next   -   12/23/2022  Last labs   -       Jessie RODRIGUEZ

## 2022-10-05 ENCOUNTER — TELEPHONE (OUTPATIENT)
Dept: CARDIOLOGY | Facility: CLINIC | Age: 72
End: 2022-10-05

## 2022-10-05 NOTE — TELEPHONE ENCOUNTER
Caller: Frances Downs    Relationship: Self    Best call back number: [phone]    Who is your current provider: ALIVIA VERGARA    Who would you like your new provider to be: DR. INTERIANO    What are your reasons for transferring care: DR. VERGARA IS LEAVING THE [place] LOCATION AT THE END OF December, PT NEEDS NEW CARDIOLOGIST CLOSER TO HER.

## 2022-12-02 ENCOUNTER — OFFICE VISIT (OUTPATIENT)
Dept: CARDIOLOGY | Facility: CLINIC | Age: 72
End: 2022-12-02

## 2022-12-02 VITALS
WEIGHT: 239.6 LBS | SYSTOLIC BLOOD PRESSURE: 132 MMHG | HEART RATE: 71 BPM | OXYGEN SATURATION: 93 % | HEIGHT: 64 IN | DIASTOLIC BLOOD PRESSURE: 72 MMHG | BODY MASS INDEX: 40.9 KG/M2

## 2022-12-02 DIAGNOSIS — G47.33 OBSTRUCTIVE SLEEP APNEA SYNDROME: ICD-10-CM

## 2022-12-02 DIAGNOSIS — I26.99 OTHER PULMONARY EMBOLISM WITHOUT ACUTE COR PULMONALE, UNSPECIFIED CHRONICITY: Primary | ICD-10-CM

## 2022-12-02 DIAGNOSIS — Z95.5 PRESENCE OF STENT IN RIGHT CORONARY ARTERY: ICD-10-CM

## 2022-12-02 DIAGNOSIS — I50.32 CHRONIC DIASTOLIC CONGESTIVE HEART FAILURE: ICD-10-CM

## 2022-12-02 DIAGNOSIS — I82.409 DEEP VEIN THROMBOSIS (DVT) OF LOWER EXTREMITY, UNSPECIFIED CHRONICITY, UNSPECIFIED LATERALITY, UNSPECIFIED VEIN: ICD-10-CM

## 2022-12-02 DIAGNOSIS — E78.5 HYPERLIPIDEMIA, UNSPECIFIED HYPERLIPIDEMIA TYPE: ICD-10-CM

## 2022-12-02 DIAGNOSIS — I25.10 CORONARY ARTERY DISEASE INVOLVING NATIVE CORONARY ARTERY OF NATIVE HEART WITHOUT ANGINA PECTORIS: ICD-10-CM

## 2022-12-02 DIAGNOSIS — I10 PRIMARY HYPERTENSION: ICD-10-CM

## 2022-12-02 PROCEDURE — 99214 OFFICE O/P EST MOD 30 MIN: CPT | Performed by: INTERNAL MEDICINE

## 2022-12-02 PROCEDURE — 93000 ELECTROCARDIOGRAM COMPLETE: CPT | Performed by: INTERNAL MEDICINE

## 2022-12-02 RX ORDER — DULOXETIN HYDROCHLORIDE 30 MG/1
30 CAPSULE, DELAYED RELEASE ORAL EVERY MORNING
COMMUNITY
Start: 2022-11-27

## 2022-12-02 RX ORDER — ATOMOXETINE 40 MG/1
40 CAPSULE ORAL DAILY
COMMUNITY
Start: 2022-10-31

## 2022-12-02 RX ORDER — LEVOTHYROXINE SODIUM 0.15 MG/1
150 TABLET ORAL EVERY MORNING
COMMUNITY
Start: 2022-09-13

## 2022-12-02 RX ORDER — CALCIUM CARBONATE/VITAMIN D3 500 MG-10
3 TABLET ORAL DAILY
COMMUNITY
Start: 2022-11-21

## 2022-12-02 RX ORDER — APIXABAN 5 MG/1
5 TABLET, FILM COATED ORAL 2 TIMES DAILY
COMMUNITY
Start: 2022-10-12

## 2022-12-02 NOTE — PROGRESS NOTES
Subjective:     Encounter Date:12/02/2022      Patient ID: Frances Downs is a 72 y.o. female.    Chief Complaint:  History of Present Illness     This is a 72-year-old with a history of coronary artery disease status post prior coronary artery stent placement, history of DVT and pulmonary embolus previously on anticoagulation with warfarin, hypertension, obstructive sleep apnea on CPAP, chronic diastolic congestive heart failure, left subclavian artery stenosis, who presents for follow-up.     The patient presents today for routine 6-month follow-up.  Reports that she recently had worsening shortness of breath and ended up being evaluated by Dr. Armendariz.  He performed further work-up and discovered that she had a pulmonary embolism.  She was placed on treatment with apixaban which she has remained on since.  Since this is her second bout of thromboembolism she indicates that she will remain on anticoagulation indefinitely.  It appears that since she was started apixaban both her aspirin and clopidogrel were discontinued.    Her breathing is now back to her..  She denies any chest pain, palpitation, orthopnea, near-syncope or syncope, or worsening lower extremity edema.     Prior History:  The patient was previously followed by Dr. Oliveros.  Prior to that she had a history of coronary artery disease with right coronary artery stent placement by Dr. Baer in 2011.  Since following Dr. Oliveros her last stress test was in 11/2016 and was negative for ischemia.  Her last echocardiogram was in 5/2018 and showed normal left ventricular systolic function and wall motion with an EF of 65 to 70%, mild mitral regurgitation, normal diastolic function, and biatrial enlargement.  She was last seen by Dr. Oliveros in 5/2019 at which time she was doing well and no changes were made to her management.     I saw her initially on 12/20/19 for urgent evaluation of dyspnea and lower extremity edema.  She reported orthopnea requiring her  to sleep with 2 pillows propped up.  She reported compliance with her medications but admits to dietary indiscretions with sodium.  She reportedly has been quite depressed recently and actually had not bathed for a week until her caregiver came by the day prior and helps her bathe.  She denied any chest pain like her prior anginal symptoms but reported me chest fullness and bilateral arms heaviness.  At that office visit I recommended increasing her bumetanide to 2 mg 3 times a day and to return for her previously scheduled appointment in 2 weeks.  Since that office visit she called with increased lower extremity cramping so I had her increase her potassium chloride tablets to 3 times a day also.     At her 2 week follow up she reported improvement in her symptoms including the swelling in her legs.  She was still having some dyspnea on exertion and chest heaviness this had improved overall.  Due to ongoing symptoms I recommended proceeding with an echocardiogram and stress test.  She underwent the echocardiogram on 1/31/2020 which showed normal left ventricular systolic function with an EF of 60-65%, grade 2 diastolic dysfunction and mild valvular disease.  She was unable to proceed with stress test because she got ill.  When I called to discuss echo results she reported feeling much better with no further chest discomfort and improvement in her dyspnea.  At that time we opted to cancel her stress test and I asked her to decrease bumetanide back to twice a day.  At some point after that she developed worsening swelling and increased the bumetanide back up to three times a day.      At a video visit in 4/2020 she reported worsening lower extremity edema and dyspnea on exertion to the point she is having trouble bathing herself with her home health aide.   She was still on bumetanide 2 mg three times a day.   She was not weighing herself or watching her sodium intake.  Recommended trying to increase her bumetanide  further and to start restricting her sodium in weigh herself daily.  Patient was to call with an update on her symptoms the following week.       Symptoms initially improved but again on 6/30/2020 she complained of worsening shortness of breath and lower extremity edema.  She ended up going to the emergency room at that point where her work-up was unremarkable including a normal BNP.  She also underwent a CT angiogram of the chest that showed no evidence of pulmonary embolism or acute lung disease but did show evidence of possible left subclavian artery stenosis.  She has since followed up with Dr. Echeverria who has referred her to vascular surgery.     She was evaluated by Dr. Jackson who did not recommend any further intervention since she was not having any symptoms from the left subclavian stenosis during his evaluation so he did not recommend any further intervention.  He did refer her for a carotid artery ultrasound which was performed on 8/19/2020 and showed 50 to 75% or moderate stenosis of the left carotid artery.  Both sides showed antegrade flow.       In 10/2020 the patient called complaining of bilateral lower extremity leg pain.  She was worried that her potassium levels may be low. She described it as a muscle aching pain.   Her bilateral lower extremity edema is mild and stable.  Lab work was checked today showed normal potassium levels and stable creatinine.  I recommended monitoring her symptoms at that time.     She underwent bilateral lower extremity ABIs in 12/2021 which were unremarkable.      Review of Systems   Constitutional: Negative for malaise/fatigue.   HENT: Negative for hearing loss, hoarse voice, nosebleeds and sore throat.    Eyes: Negative for pain.   Cardiovascular: Positive for dyspnea on exertion. Negative for chest pain, claudication, cyanosis, irregular heartbeat, leg swelling, near-syncope, orthopnea, palpitations, paroxysmal nocturnal dyspnea and syncope.   Respiratory:  Negative for shortness of breath and snoring.    Endocrine: Negative for cold intolerance, heat intolerance, polydipsia, polyphagia and polyuria.   Skin: Negative for itching and rash.   Musculoskeletal: Negative for arthritis, falls, joint pain, joint swelling, muscle cramps, muscle weakness and myalgias.   Gastrointestinal: Negative for constipation, diarrhea, dysphagia, heartburn, hematemesis, hematochezia, melena, nausea and vomiting.   Genitourinary: Negative for frequency, hematuria and hesitancy.   Neurological: Negative for excessive daytime sleepiness, dizziness, headaches, light-headedness, numbness and weakness.   Psychiatric/Behavioral: Negative for depression. The patient is not nervous/anxious.          Current Outpatient Medications:   •  albuterol sulfate  (90 Base) MCG/ACT inhaler, ProAir HFA 90 mcg/actuation aerosol inhaler, Disp: , Rfl:   •  amLODIPine (NORVASC) 10 MG tablet, TAKE ONE TABLET BY MOUTH DAILY, Disp: 90 tablet, Rfl: 1  •  ARIPiprazole (ABILIFY) 20 MG tablet, Take 20 mg by mouth every night at bedtime., Disp: , Rfl:   •  atomoxetine (STRATTERA) 40 MG capsule, Take 40 mg by mouth Daily., Disp: , Rfl:   •  atorvastatin (Lipitor) 40 MG tablet, Take 1 tablet by mouth Every Night., Disp: 90 tablet, Rfl: 0  •  bumetanide (BUMEX) 2 MG tablet, TAKE ONE TABLET BY MOUTH THREE TIMES DAILY BEFORE MEALS, Disp: 270 tablet, Rfl: 1  •  buPROPion XL (WELLBUTRIN XL) 150 MG 24 hr tablet, Take 150 mg by mouth Every Morning., Disp: , Rfl:   •  buPROPion XL (WELLBUTRIN XL) 300 MG 24 hr tablet, Take 300 mg by mouth Every Morning., Disp: , Rfl:   •  Calcium Carb-Cholecalciferol 500-10 MG-MCG tablet, Take 3 tablets by mouth Daily., Disp: , Rfl:   •  carvedilol (COREG) 12.5 MG tablet, Take 1 tablet by mouth 2 (Two) Times a Day With Meals., Disp: 180 tablet, Rfl: 0  •  Cholecalciferol (Vitamin D) 50 MCG (2000 UT) tablet, Take  by mouth Daily., Disp: , Rfl:   •  DULoxetine (CYMBALTA) 30 MG capsule, Take 30  mg by mouth Every Morning., Disp: , Rfl:   •  Eliquis 5 MG tablet tablet, Take 5 mg by mouth 2 (Two) Times a Day., Disp: , Rfl:   •  fluticasone (FLONASE) 50 MCG/ACT nasal spray, fluticasone 50 mcg/actuation nasal spray,suspension, Disp: , Rfl:   •  isosorbide mononitrate (IMDUR) 30 MG 24 hr tablet, Take 1 tablet by mouth Daily., Disp: 90 tablet, Rfl: 0  •  LaMICtal 200 MG tablet, Take 200 mg by mouth 2 (Two) Times a Day., Disp: , Rfl:   •  levothyroxine (SYNTHROID, LEVOTHROID) 150 MCG tablet, Take 150 mcg by mouth Every Morning., Disp: , Rfl:   •  meloxicam (MOBIC) 15 MG tablet, meloxicam 15 mg tablet, Disp: , Rfl:   •  Myrbetriq 25 MG tablet sustained-release 24 hour 24 hr tablet, Take 25 mg by mouth Daily., Disp: , Rfl:   •  pantoprazole (PROTONIX) 40 MG EC tablet, Take 40 mg by mouth Daily., Disp: , Rfl:   •  potassium chloride 10 MEQ CR tablet, Take 1 tablet by mouth 3 (Three) Times a Day., Disp: 270 tablet, Rfl: 0  •  Sunosi 75 MG tablet, Take 1 tablet by mouth Every Morning., Disp: , Rfl:   •  Trelegy Ellipta 100-62.5-25 MCG/INH inhaler, , Disp: , Rfl:   •  Trintellix 10 MG tablet, Take 1 tablet by mouth Every Morning., Disp: , Rfl:     Past Medical History:   Diagnosis Date   • Anxiety    • Arthritis    • Bipolar disorder ([facility])    • COPD (chronic obstructive pulmonary disease) ([facility])    • Coronary artery disease    • Diastolic dysfunction     grade II per echocardiogram 11/18/2016   • Difficulty walking    • Disease of thyroid gland     hypothyroidism   • Dislocation of hip joint ([facility])     recurrent dislocation right hip   • DVT (deep venous thrombosis) ([facility])    • GERD without esophagitis    • Heartburn    • Hematoma    • Hyperlipidemia    • Hypertension    • LVH (left ventricular hypertrophy)     mild to moderate per echocardiogram 11/18/2016   • Major depressive disorder    • Mitral annular calcification     severe per echocardiogram 11/18/2016   • Mitral regurgitation     mild per echo 11/18/2016   •  Mitral valve disease    • Muscle weakness    • Narcolepsy    • Overactive bladder    • Pneumonia    • Pulmonary emboli (HCC)    • Pulmonary hypertension (HCC)     mild per echo 11/18/2016   • Sleep apnea     obstructive   • Tricuspid regurgitation     mild to moderate per echo 11/18/2016       Past Surgical History:   Procedure Laterality Date   • CATARACT EXTRACTION W/ INTRAOCULAR LENS IMPLANT Right    • CORONARY ANGIOPLASTY WITH STENT PLACEMENT      x2 stents   • EYE SURGERY      cataract surgery bilateral   • HARDWARE REMOVAL FOOT / ANKLE Left 12/01/2011   • HIP SPACER INSERTION WITH ANTIBIOTIC CEMENT Right 2/13/2017    Procedure: RIGHT TOTAL HIP REMOVAL;  Surgeon: Erlin Martin MD;  Location: [facility];  Service:    • INCISION AND DRAINAGE HIP Right 2/8/2017    Procedure: RT HIP INCISION AND DRAINAGE;  Surgeon: Erlin Martin MD;  Location: [facility];  Service:    • INCISION AND DRAINAGE HIP Right 3/17/2017    Procedure: RIGHT HIP INCISION AND DRAINAGE;  Surgeon: Erlin Martin MD;  Location: [facility];  Service:    • JOINT REPLACEMENT     • KNEE ARTHROPLASTY Left 03/2010    TWICE   • ORIF ANKLE FRACTURE Left 06/16/2011   • OTHER SURGICAL HISTORY      IVC filter placement   • CA CLOSED RX TRAUMATIC HIP DISLOCATN Right 8/2/2017    Procedure: FAILED CLOSED RIGHT HIP  REDUCTION WITH OPEN REDUCTION OF RIGHT HIP;  Surgeon: Erlin Martin MD;  Location: [facility];  Service: Orthopedics   • QUADRICEPS TENDON REPAIR Left 06/21/2010    also done 9-    • TOTAL HIP ARTHROPLASTY Right 2011   • TOTAL HIP ARTHROPLASTY REVISION Right 11/22/2016   • TOTAL HIP ARTHROPLASTY REVISION Right 7/26/2017    Procedure: REIMPLANT RIGHT TOTAL HIP;  Surgeon: Erlin Martin MD;  Location: [facility];  Service:    • TOTAL KNEE ARTHROPLASTY REVISION Left 09/2010       Family History   Problem Relation Age of Onset   • Malig Hyperthermia Neg Hx        Social History     Tobacco Use   • Smoking  "status: Former     Packs/day: 1.00     Years: 19.00     Pack years: 19.00     Types: Cigarettes     Quit date: 12/15/2019     Years since quittin.9   • Smokeless tobacco: Never   Vaping Use   • Vaping Use: Never used   Substance Use Topics   • Alcohol use: No   • Drug use: No         ECG 12 Lead    Date/Time: 2022 3:13 PM  Performed by: Margi Barker MD  Authorized by: Margi Barker MD   Comparison: compared with previous ECG   Similar to previous ECG  Rhythm: sinus rhythm               Objective:     Visit Vitals  /72 (BP Location: Left arm, Patient Position: Sitting, Cuff Size: Large Adult)   Pulse 71   Ht 162.6 cm (64\")   Wt 109 kg (239 lb 9.6 oz)   SpO2 93%   BMI 41.13 kg/m²         Constitutional:       Appearance: Normal appearance. Well-developed.   Eyes:      General: Lids are normal.      Conjunctiva/sclera: Conjunctivae normal.      Pupils: Pupils are equal, round, and reactive to light.   HENT:      Head: Normocephalic and atraumatic.   Neck:      Vascular: No carotid bruit or JVD.      Lymphadenopathy: No cervical adenopathy.   Pulmonary:      Effort: Pulmonary effort is normal.      Breath sounds: Normal breath sounds.   Cardiovascular:      Normal rate. Regular rhythm.      No gallop.   Pulses:     Radial: 2+ bilaterally.  Edema:     Peripheral edema absent.   Abdominal:      Palpations: Abdomen is soft.   Musculoskeletal:      Cervical back: Full passive range of motion without pain, normal range of motion and neck supple. Skin:     General: Skin is warm and dry.   Neurological:      Mental Status: Alert and oriented to person, place, and time.             Assessment:          Diagnosis Plan   1. Other pulmonary embolism without acute cor pulmonale, unspecified chronicity (HCC)        2. Deep vein thrombosis (DVT) of lower extremity, unspecified chronicity, unspecified laterality, unspecified vein (HCC)        3. Chronic diastolic congestive heart failure (HCC)        4. Coronary " artery disease involving native coronary artery of native heart without angina pectoris        5. Hyperlipidemia, unspecified hyperlipidemia type        6. Primary hypertension        7. Presence of stent in right coronary artery        8. Obstructive sleep apnea               Plan:         1.  Coronary artery disease.  Appears to be stable and asymptomatic.  She is off of aspirin and clopidogrel after she is started on apixaban for anticoagulation.  At some point may consider resuming on 81 mg if she has no bleeding issues.  Otherwise she is not having any anginal symptoms and her EKG remained stable.  2.  Peripheral vascular disease.  Including left subclavian artery stenosis.  Noted in the past and has been evaluated by vascular surgery who did not recommend any intervention at that time.  She also has known left carotid artery stenosis including moderate left carotid artery stenosis and mild right carotid stenosis.  As above we will need to consider getting her back on aspirin 81 mg in the future.  3.  Chronic diastolic congestive heart failure.  No significant evidence of volume overload.  4.  Pulmonary embolism.  Now on apixaban.  This is her second episode of thromboembolism.  She should remain on anticoagulation indefinitely for now.  5.  COPD  6.  Obstructive sleep apnea  7.  Hypothyroidism  8.  Tobacco use.  Continue to  patient on smoking cessation.    Patient has not sure where she would like to follow-up in the future.  For now I will schedule her to tentatively follow-[place] with Dr. Pinzon in 6 [place] [facility] [place]

## 2023-01-02 NOTE — PLAN OF CARE
Problem: Skin Integrity Impairment, Risk/Actual (Adult)  Goal: Identify Related Risk Factors and Signs and Symptoms  Outcome: Ongoing (interventions implemented as appropriate)  Goal: Skin Integrity/Wound Healing  Outcome: Ongoing (interventions implemented as appropriate)       5

## 2023-01-09 RX ORDER — AMLODIPINE BESYLATE 10 MG/1
TABLET ORAL
Qty: 90 TABLET | Refills: 1 | Status: SHIPPED | OUTPATIENT
Start: 2023-01-09

## 2023-01-09 NOTE — TELEPHONE ENCOUNTER
Last OV 12/2/22.  Next OV 6/2/23.  Labs 12/15/21.  [facility]. [facility]

## 2023-01-19 NOTE — TELEPHONE ENCOUNTER
Last OV 12/2/22.  Next OV 6/2/23.  Labs 12/15/21.  Does not meet protocol.  [facility]

## 2023-01-23 RX ORDER — BUMETANIDE 2 MG/1
TABLET ORAL
Qty: 270 TABLET | Refills: 1 | Status: SHIPPED | OUTPATIENT
Start: 2023-01-23

## (undated) DEVICE — SUT ETHIB 0 CT1 CR8 18IN CX21D

## (undated) DEVICE — STPLR SKIN VISISTAT WD 35CT

## (undated) DEVICE — SUT VIC 0 CT1 CR8 18IN J840D

## (undated) DEVICE — DRAPE,U/ SHT,SPLIT,PLAS,STERIL: Brand: MEDLINE

## (undated) DEVICE — DECANT BG O JET

## (undated) DEVICE — DRAPE,REIN 53X77,STERILE: Brand: MEDLINE

## (undated) DEVICE — VITAL SIGNS™ ADULT ANESTHESIA BREATHING CIRCUIT: Brand: VITAL SIGNS™

## (undated) DEVICE — PAD,ABDOMINAL,8"X10",ST,LF: Brand: MEDLINE

## (undated) DEVICE — CONN TBG Y 5 IN 1 LF STRL

## (undated) DEVICE — ANTIBACTERIAL UNDYED BRAIDED (POLYGLACTIN 910), SYNTHETIC ABSORBABLE SUTURE: Brand: COATED VICRYL

## (undated) DEVICE — DRSNG WND GZ CURAD OIL EMULSION 3X8IN LF STRL 1PK

## (undated) DEVICE — 3M™ STERI-DRAPE™ INSTRUMENT POUCH 1018: Brand: STERI-DRAPE™

## (undated) DEVICE — CULT AER/ANAEROB FASTIDIOUS BACT

## (undated) DEVICE — KT DRN EVAC WND PVC 15F3/16IN 400CC

## (undated) DEVICE — PREP SOL POVIDONE/IODINE BT 4OZ

## (undated) DEVICE — ENCORE® LATEX ORTHO SIZE 8.5, STERILE LATEX POWDER-FREE SURGICAL GLOVE: Brand: ENCORE

## (undated) DEVICE — SUT VIC 3/0 CTI 36IN J944H

## (undated) DEVICE — ELECTRD BLD EXT EDGE 1P COAT 6.5IN STRL

## (undated) DEVICE — TOWEL,OR,DSP,ST,BLUE,STD,4/PK,20PK/CS: Brand: MEDLINE

## (undated) DEVICE — SPNG GZ WOVN 4X4IN 12PLY 10/BX STRL

## (undated) DEVICE — PK HIP TOTL 40

## (undated) DEVICE — GLV SURG BIOGEL LTX PF 6 1/2

## (undated) DEVICE — 1010 S-DRAPE TOWEL DRAPE 10/BX: Brand: STERI-DRAPE™

## (undated) DEVICE — DRAPE,HIP,W/POUCHES,STERILE: Brand: MEDLINE

## (undated) DEVICE — SUT VIC 0 CT1 36IN J946H

## (undated) DEVICE — SUT VIC 0 CT1 CR8 27IN JJ41G

## (undated) DEVICE — T4 ZIPPER TOGA, (L/XL)

## (undated) DEVICE — APPL CHLORAPREP W/TINT 26ML ORNG

## (undated) DEVICE — SPNG LAP 18X18IN LF STRL PK/5

## (undated) DEVICE — GLV SURG TRIUMPH CLASSIC PF LTX 8 STRL

## (undated) DEVICE — SYR CONTRL LUERLOK 10CC

## (undated) DEVICE — 3M™ STERI-DRAPE™ U-DRAPE 1015: Brand: STERI-DRAPE™

## (undated) DEVICE — HANDPIECE SET WITH COAXIAL HIGH FLOW TIP AND SUCTION TUBE: Brand: INTERPULSE

## (undated) DEVICE — GLV SURG TRIUMPH CLASSIC PF LTX 8.5 STRL

## (undated) DEVICE — PK ORTHO MAJ 40

## (undated) DEVICE — CEMENT MIXING SYSTEM WITH FEMORAL BREAKWAY NOZZLE: Brand: REVOLUTION

## (undated) DEVICE — ENCORE® LATEX ORTHO SIZE 6.5, STERILE LATEX POWDER-FREE SURGICAL GLOVE: Brand: ENCORE

## (undated) DEVICE — DRP C/ARM 41X74IN

## (undated) DEVICE — GLV SURG BIOGEL LTX PF 8 1/2

## (undated) DEVICE — DRSNG ADAPTIC 3X8

## (undated) DEVICE — NDL SPINE 22G 31/2IN BLK

## (undated) DEVICE — GLV SURG TRIUMPH CLASSIC PF LTX 6.5 STRL

## (undated) DEVICE — TUBING, SUCTION, 1/4" X 20', STRAIGHT: Brand: MEDLINE INDUSTRIES, INC.

## (undated) DEVICE — MAT FLR ABSORBENT LG 4FT 10 2.5FT

## (undated) DEVICE — PAD GRND REM POLYHESIVE A/ DISP

## (undated) DEVICE — PK ATS CUST W CARDIOTOMY RESEVOIR

## (undated) DEVICE — 2108 SERIES SAGITTAL BLADE, NO OFFSET  (12.4 X 1.19 X 82.1MM)

## (undated) DEVICE — KT DRN EVAC WND PVC PCH WTROC RND 10F400

## (undated) DEVICE — TP CLTH MEDIPORE SFT 2IN 10YD